# Patient Record
Sex: MALE | Race: ASIAN | NOT HISPANIC OR LATINO
[De-identification: names, ages, dates, MRNs, and addresses within clinical notes are randomized per-mention and may not be internally consistent; named-entity substitution may affect disease eponyms.]

---

## 2017-01-30 ENCOUNTER — APPOINTMENT (OUTPATIENT)
Dept: MRI IMAGING | Facility: IMAGING CENTER | Age: 77
End: 2017-01-30

## 2017-04-14 ENCOUNTER — OUTPATIENT (OUTPATIENT)
Dept: OUTPATIENT SERVICES | Facility: HOSPITAL | Age: 77
LOS: 1 days | End: 2017-04-14
Payer: MEDICAID

## 2017-04-14 ENCOUNTER — APPOINTMENT (OUTPATIENT)
Dept: RADIOLOGY | Facility: IMAGING CENTER | Age: 77
End: 2017-04-14

## 2017-04-14 DIAGNOSIS — Z00.8 ENCOUNTER FOR OTHER GENERAL EXAMINATION: ICD-10-CM

## 2017-04-14 DIAGNOSIS — Z09 ENCOUNTER FOR FOLLOW-UP EXAMINATION AFTER COMPLETED TREATMENT FOR CONDITIONS OTHER THAN MALIGNANT NEOPLASM: Chronic | ICD-10-CM

## 2017-04-14 DIAGNOSIS — Z98.89 OTHER SPECIFIED POSTPROCEDURAL STATES: Chronic | ICD-10-CM

## 2017-04-14 PROCEDURE — 71046 X-RAY EXAM CHEST 2 VIEWS: CPT

## 2017-08-24 ENCOUNTER — OUTPATIENT (OUTPATIENT)
Dept: OUTPATIENT SERVICES | Facility: HOSPITAL | Age: 77
LOS: 1 days | End: 2017-08-24
Payer: MEDICAID

## 2017-08-24 ENCOUNTER — APPOINTMENT (OUTPATIENT)
Dept: CT IMAGING | Facility: IMAGING CENTER | Age: 77
End: 2017-08-24
Payer: MEDICAID

## 2017-08-24 DIAGNOSIS — Z98.89 OTHER SPECIFIED POSTPROCEDURAL STATES: Chronic | ICD-10-CM

## 2017-08-24 DIAGNOSIS — Z09 ENCOUNTER FOR FOLLOW-UP EXAMINATION AFTER COMPLETED TREATMENT FOR CONDITIONS OTHER THAN MALIGNANT NEOPLASM: Chronic | ICD-10-CM

## 2017-08-24 DIAGNOSIS — Z00.8 ENCOUNTER FOR OTHER GENERAL EXAMINATION: ICD-10-CM

## 2017-08-24 PROCEDURE — 71250 CT THORAX DX C-: CPT | Mod: 26

## 2017-08-24 PROCEDURE — 71250 CT THORAX DX C-: CPT

## 2017-09-03 ENCOUNTER — EMERGENCY (EMERGENCY)
Facility: HOSPITAL | Age: 77
LOS: 1 days | Discharge: HOME | End: 2017-09-03
Attending: EMERGENCY MEDICINE | Admitting: EMERGENCY MEDICINE
Payer: MEDICAID

## 2017-09-03 VITALS
OXYGEN SATURATION: 100 % | HEART RATE: 95 BPM | TEMPERATURE: 99 F | DIASTOLIC BLOOD PRESSURE: 83 MMHG | RESPIRATION RATE: 16 BRPM | SYSTOLIC BLOOD PRESSURE: 142 MMHG

## 2017-09-03 DIAGNOSIS — Z09 ENCOUNTER FOR FOLLOW-UP EXAMINATION AFTER COMPLETED TREATMENT FOR CONDITIONS OTHER THAN MALIGNANT NEOPLASM: Chronic | ICD-10-CM

## 2017-09-03 DIAGNOSIS — Z98.89 OTHER SPECIFIED POSTPROCEDURAL STATES: Chronic | ICD-10-CM

## 2017-09-03 PROCEDURE — 99284 EMERGENCY DEPT VISIT MOD MDM: CPT

## 2017-09-03 RX ORDER — IBUPROFEN 200 MG
600 TABLET ORAL ONCE
Qty: 0 | Refills: 0 | Status: COMPLETED | OUTPATIENT
Start: 2017-09-03 | End: 2017-09-03

## 2017-09-03 RX ADMIN — Medication 1 TABLET(S): at 18:06

## 2017-09-03 RX ADMIN — Medication 600 MILLIGRAM(S): at 18:06

## 2017-09-03 NOTE — ED PROVIDER NOTE - MEDICAL DECISION MAKING DETAILS
75 y/o M w/ DM p/w complaint of R lower molar pain w/ some discoloration on exam concerning for potential dev abscess, consult dental for further evaluation, give Motrin for pain control.

## 2017-09-03 NOTE — ED PROVIDER NOTE - TOOTH FINDINGS
no trismus, minimal TTP, R mandibular molar w/ discoloration at base of tooth, no clear fluctuance, no discharge, no mandibular TTP

## 2017-09-03 NOTE — ED PROVIDER NOTE - OBJECTIVE STATEMENT
77 y/o M w/ PMHx of DM(On metformin), and cardiac issues, presents to ED w/ improving R lower tooth pain x3days. Reports swelling at the R lower jaw area and endorses difficulty sleeping secondary to pain. States he has "dentures" surgically placed in area of tooth pain. Endorses subjective fevers and chills. Has no issues opening and closing his jaw. Denies cough, congestion, nausea, vomiting, diarrhea, HA, other complaints. Regularly takes Lisinopril and Atorvastatin. NKDA.

## 2017-09-03 NOTE — PROGRESS NOTE ADULT - SUBJECTIVE AND OBJECTIVE BOX
Patient is a 76y old  Male who presents with a chief complaint of LR pain and swelling.    HPI: Pt. reports pain and swelling started 2 days prior. Pt. reports that the pain affected his hearing in the right ear.      PAST MEDICAL & SURGICAL HISTORY:  DM (diabetes mellitus)  No significant past surgical history      MEDICATIONS  (STANDING): None    MEDICATIONS  (PRN): None    Allergies    No Known Allergies    Intolerances    *SOCIAL HISTORY: Pt. presented to ED with his youngest son.    Vital Signs Last 24 Hrs  T(C): 37.3 (03 Sep 2017 17:05), Max: 37.3 (03 Sep 2017 17:05)  T(F): 99.2 (03 Sep 2017 17:05), Max: 99.2 (03 Sep 2017 17:05)  HR: 95 (03 Sep 2017 17:05) (95 - 95)  BP: 142/83 (03 Sep 2017 17:05) (142/83 - 142/83)  BP(mean): --  RR: 16 (03 Sep 2017 17:05) (16 - 16)  SpO2: 100% (03 Sep 2017 17:05) (100% - 100%)    EOE:  TMJ (  -) clicks                    ( -  ) pops                    ( -  ) crepitus             Mandible FROM             ( - ) trismus             ( - ) LAD             ( - ) swelling             ( - ) asymmetry             ( - ) palpation             ( - ) SOB             ( - ) dysphagia    IOE:  permanent multiple carious teeth and multiple missing teeth           hard/soft palate:  No pathology noted           tongue/FOM No pathology noted           labial/buccal mucosa No pathology noted           Tooth #29  ( - ) percussion           ( - ) palpation           ( - ) swelling     *DENTAL RADIOGRAPHS: panorex and PA around #29. Periapical infection around tooth #29.    ASSESSMENT: 77 yo male has periapical infection noted around apex of tooth #29. No gross acute clinical infection noted. Gingiva desquamation noted on maxillary and mandibular anterior region.     PROCEDURE:  EOE/ IOE. Radiographs    RECOMMENDATIONS:  1) Dental F/U with outpatient dentist for comprehensive dental care.   2) F/U with PCP for annual checkup.  3) If any difficulty swallowing/breathing, fever occur, contact [facility] dental.    Porfirio Ley DDS [id number]  Jeffrey Meza DMD

## 2017-09-03 NOTE — ED PROVIDER NOTE - PLAN OF CARE
1. Take Tylenol as directed for pain.   2. Follow-up with your dentist in 3-5 days, or see sheet you were given to schedule appointment  3. Return immediately for any worsening or concerning symptoms

## 2017-09-03 NOTE — ED PROVIDER NOTE - CARE PLAN
Principal Discharge DX:	Tooth pain  Instructions for follow-up, activity and diet:	1. Take Tylenol as directed for pain.   2. Follow-up with your dentist in 3-5 days, or see sheet you were given to schedule appointment  3. Return immediately for any worsening or concerning symptoms

## 2017-09-03 NOTE — ED PROVIDER NOTE - PROGRESS NOTE DETAILS
pt seen and cleared for d/c by dental. no signs of acute infection. will have f/u with dentist. discussed proper follow-up and indications to return to ED. - Jake Stewart MD

## 2017-12-24 ENCOUNTER — INPATIENT (INPATIENT)
Facility: HOSPITAL | Age: 77
LOS: 3 days | Discharge: HOME | DRG: 872 | End: 2017-12-28
Attending: INTERNAL MEDICINE | Admitting: INTERNAL MEDICINE
Payer: MEDICAID

## 2017-12-24 VITALS
RESPIRATION RATE: 20 BRPM | SYSTOLIC BLOOD PRESSURE: 122 MMHG | WEIGHT: 169.98 LBS | OXYGEN SATURATION: 98 % | TEMPERATURE: 100 F | HEIGHT: 68 IN | DIASTOLIC BLOOD PRESSURE: 56 MMHG | HEART RATE: 111 BPM

## 2017-12-24 DIAGNOSIS — Z98.89 OTHER SPECIFIED POSTPROCEDURAL STATES: Chronic | ICD-10-CM

## 2017-12-24 DIAGNOSIS — Z09 ENCOUNTER FOR FOLLOW-UP EXAMINATION AFTER COMPLETED TREATMENT FOR CONDITIONS OTHER THAN MALIGNANT NEOPLASM: Chronic | ICD-10-CM

## 2017-12-24 LAB
ALBUMIN SERPL ELPH-MCNC: 3.1 G/DL — LOW (ref 3.5–5)
ALP SERPL-CCNC: 50 U/L — SIGNIFICANT CHANGE UP (ref 40–120)
ALT FLD-CCNC: 17 U/L DA — SIGNIFICANT CHANGE UP (ref 10–60)
AMYLASE P1 CFR SERPL: 66 U/L — SIGNIFICANT CHANGE UP (ref 25–115)
ANION GAP SERPL CALC-SCNC: 10 MMOL/L — SIGNIFICANT CHANGE UP (ref 5–17)
APPEARANCE UR: CLEAR — SIGNIFICANT CHANGE UP
AST SERPL-CCNC: 19 U/L — SIGNIFICANT CHANGE UP (ref 10–40)
BACTERIA # UR AUTO: ABNORMAL /HPF
BILIRUB SERPL-MCNC: 0.6 MG/DL — SIGNIFICANT CHANGE UP (ref 0.2–1.2)
BILIRUB UR-MCNC: ABNORMAL
BUN SERPL-MCNC: 30 MG/DL — HIGH (ref 7–18)
CALCIUM SERPL-MCNC: 8.4 MG/DL — SIGNIFICANT CHANGE UP (ref 8.4–10.5)
CHLORIDE SERPL-SCNC: 97 MMOL/L — SIGNIFICANT CHANGE UP (ref 96–108)
CO2 SERPL-SCNC: 26 MMOL/L — SIGNIFICANT CHANGE UP (ref 22–31)
COLOR SPEC: YELLOW — SIGNIFICANT CHANGE UP
COMMENT - URINE: SIGNIFICANT CHANGE UP
CREAT SERPL-MCNC: 1.46 MG/DL — HIGH (ref 0.5–1.3)
DIFF PNL FLD: ABNORMAL
EPI CELLS # UR: SIGNIFICANT CHANGE UP /HPF
GLUCOSE SERPL-MCNC: 197 MG/DL — HIGH (ref 70–99)
GLUCOSE UR QL: NEGATIVE — SIGNIFICANT CHANGE UP
GRAN CASTS # UR COMP ASSIST: ABNORMAL /LPF
HCT VFR BLD CALC: 40.2 % — SIGNIFICANT CHANGE UP (ref 39–50)
HGB BLD-MCNC: 13.7 G/DL — SIGNIFICANT CHANGE UP (ref 13–17)
HYALINE CASTS # UR AUTO: ABNORMAL /LPF
KETONES UR-MCNC: ABNORMAL
LACTATE SERPL-SCNC: 3 MMOL/L — HIGH (ref 0.7–2)
LEUKOCYTE ESTERASE UR-ACNC: ABNORMAL
LIDOCAIN IGE QN: 159 U/L — SIGNIFICANT CHANGE UP (ref 73–393)
MANUAL DIF COMMENT BLD-IMP: SIGNIFICANT CHANGE UP
MCHC RBC-ENTMCNC: 33.6 PG — SIGNIFICANT CHANGE UP (ref 27–34)
MCHC RBC-ENTMCNC: 34 GM/DL — SIGNIFICANT CHANGE UP (ref 32–36)
MCV RBC AUTO: 98.7 FL — SIGNIFICANT CHANGE UP (ref 80–100)
MONOCYTES NFR BLD AUTO: 8 % — SIGNIFICANT CHANGE UP (ref 2–14)
NEUTROPHILS NFR BLD AUTO: 91 % — HIGH (ref 43–77)
NITRITE UR-MCNC: NEGATIVE — SIGNIFICANT CHANGE UP
PH UR: 5 — SIGNIFICANT CHANGE UP (ref 5–8)
PLAT MORPH BLD: NORMAL — SIGNIFICANT CHANGE UP
PLATELET # BLD AUTO: 131 K/UL — LOW (ref 150–400)
POTASSIUM SERPL-MCNC: 3.8 MMOL/L — SIGNIFICANT CHANGE UP (ref 3.5–5.3)
POTASSIUM SERPL-SCNC: 3.8 MMOL/L — SIGNIFICANT CHANGE UP (ref 3.5–5.3)
PROT SERPL-MCNC: 7.3 G/DL — SIGNIFICANT CHANGE UP (ref 6–8.3)
PROT UR-MCNC: 100
RBC # BLD: 4.07 M/UL — LOW (ref 4.2–5.8)
RBC # FLD: 11.4 % — SIGNIFICANT CHANGE UP (ref 10.3–14.5)
RBC BLD AUTO: NORMAL — SIGNIFICANT CHANGE UP
RBC CASTS # UR COMP ASSIST: SIGNIFICANT CHANGE UP /HPF (ref 0–2)
SODIUM SERPL-SCNC: 133 MMOL/L — LOW (ref 135–145)
SP GR SPEC: 1.02 — SIGNIFICANT CHANGE UP (ref 1.01–1.02)
UROBILINOGEN FLD QL: 1
VARIANT LYMPHS # BLD: 1 % — SIGNIFICANT CHANGE UP (ref 0–6)
WBC # BLD: 20.1 K/UL — HIGH (ref 3.8–10.5)
WBC # FLD AUTO: 20.1 K/UL — HIGH (ref 3.8–10.5)
WBC UR QL: SIGNIFICANT CHANGE UP /HPF (ref 0–5)

## 2017-12-24 PROCEDURE — 93010 ELECTROCARDIOGRAM REPORT: CPT

## 2017-12-24 RX ORDER — SODIUM CHLORIDE 9 MG/ML
3 INJECTION INTRAMUSCULAR; INTRAVENOUS; SUBCUTANEOUS ONCE
Qty: 0 | Refills: 0 | Status: DISCONTINUED | OUTPATIENT
Start: 2017-12-24 | End: 2017-12-24

## 2017-12-24 RX ORDER — SODIUM CHLORIDE 9 MG/ML
1000 INJECTION INTRAMUSCULAR; INTRAVENOUS; SUBCUTANEOUS ONCE
Qty: 0 | Refills: 0 | Status: COMPLETED | OUTPATIENT
Start: 2017-12-24 | End: 2017-12-24

## 2017-12-24 RX ORDER — SODIUM CHLORIDE 9 MG/ML
3 INJECTION INTRAMUSCULAR; INTRAVENOUS; SUBCUTANEOUS ONCE
Qty: 0 | Refills: 0 | Status: COMPLETED | OUTPATIENT
Start: 2017-12-24 | End: 2017-12-24

## 2017-12-24 RX ORDER — ONDANSETRON 8 MG/1
4 TABLET, FILM COATED ORAL ONCE
Qty: 0 | Refills: 0 | Status: COMPLETED | OUTPATIENT
Start: 2017-12-24 | End: 2017-12-24

## 2017-12-24 RX ORDER — SODIUM CHLORIDE 9 MG/ML
1000 INJECTION INTRAMUSCULAR; INTRAVENOUS; SUBCUTANEOUS
Qty: 0 | Refills: 0 | Status: DISCONTINUED | OUTPATIENT
Start: 2017-12-24 | End: 2017-12-25

## 2017-12-24 RX ORDER — ACETAMINOPHEN 500 MG
650 TABLET ORAL ONCE
Qty: 0 | Refills: 0 | Status: COMPLETED | OUTPATIENT
Start: 2017-12-24 | End: 2017-12-24

## 2017-12-24 RX ADMIN — SODIUM CHLORIDE 125 MILLILITER(S): 9 INJECTION INTRAMUSCULAR; INTRAVENOUS; SUBCUTANEOUS at 21:59

## 2017-12-24 RX ADMIN — SODIUM CHLORIDE 1000 MILLILITER(S): 9 INJECTION INTRAMUSCULAR; INTRAVENOUS; SUBCUTANEOUS at 22:47

## 2017-12-24 RX ADMIN — ONDANSETRON 4 MILLIGRAM(S): 8 TABLET, FILM COATED ORAL at 22:00

## 2017-12-24 RX ADMIN — SODIUM CHLORIDE 3 MILLILITER(S): 9 INJECTION INTRAMUSCULAR; INTRAVENOUS; SUBCUTANEOUS at 22:01

## 2017-12-24 NOTE — ED PROVIDER NOTE - PROGRESS NOTE DETAILS
labs show wbc 20K, cmp shows Na 133, Cr 1.46, UA shows trace LE and many bacteria  CT shows evidence of colitis, given cipro/flagyl  patient stable for admission.

## 2017-12-24 NOTE — ED PROVIDER NOTE - PMH
BPH (benign prostatic hyperplasia)    Diabetes    DM (diabetes mellitus)    HLD (hyperlipidemia)    HTN (hypertension)

## 2017-12-24 NOTE — ED PROVIDER NOTE - PSH
No significant past surgical history    S/P abdominal surgery, follow-up exam  sbo  S/P hernia surgery  r.inguinal

## 2017-12-24 NOTE — ED PROVIDER NOTE - CARE PLAN
Principal Discharge DX:	Colitis, acute  Secondary Diagnosis:	UTI (urinary tract infection)  Secondary Diagnosis:	Dehydration

## 2017-12-24 NOTE — ED PROVIDER NOTE - MEDICAL DECISION MAKING DETAILS
77 y/o M pt presents with vomiting and diarrhea. In ED, pt tachycardic. Will obtain labs, UA, CT abd, give 1L normal saline and reassess.

## 2017-12-24 NOTE — ED PROVIDER NOTE - OBJECTIVE STATEMENT
77 y/o M/F pt with a significant PMHx of DM (on Metformin), HTN, HLD, BPH and a significant PSHx of R inguinal hernia repair, abdominal surgery 2/2 SBO presents to the ED c/o vomiting and diarrhea x2 days. Pt reports onset yesterday, but states he felt very weak today, hence his family sent him to the ED for evaluation. Pt denies fever, chills, chest pain, abd pain, recent abx use, sick contacts, recent travel or any other complaints. NKDA.

## 2017-12-25 DIAGNOSIS — E11.9 TYPE 2 DIABETES MELLITUS WITHOUT COMPLICATIONS: ICD-10-CM

## 2017-12-25 DIAGNOSIS — K52.9 NONINFECTIVE GASTROENTERITIS AND COLITIS, UNSPECIFIED: ICD-10-CM

## 2017-12-25 DIAGNOSIS — E78.5 HYPERLIPIDEMIA, UNSPECIFIED: ICD-10-CM

## 2017-12-25 DIAGNOSIS — N40.0 BENIGN PROSTATIC HYPERPLASIA WITHOUT LOWER URINARY TRACT SYMPTOMS: ICD-10-CM

## 2017-12-25 DIAGNOSIS — I10 ESSENTIAL (PRIMARY) HYPERTENSION: ICD-10-CM

## 2017-12-25 DIAGNOSIS — E87.1 HYPO-OSMOLALITY AND HYPONATREMIA: ICD-10-CM

## 2017-12-25 DIAGNOSIS — Z29.9 ENCOUNTER FOR PROPHYLACTIC MEASURES, UNSPECIFIED: ICD-10-CM

## 2017-12-25 DIAGNOSIS — E87.6 HYPOKALEMIA: ICD-10-CM

## 2017-12-25 DIAGNOSIS — R80.9 PROTEINURIA, UNSPECIFIED: ICD-10-CM

## 2017-12-25 DIAGNOSIS — N17.9 ACUTE KIDNEY FAILURE, UNSPECIFIED: ICD-10-CM

## 2017-12-25 DIAGNOSIS — E83.39 OTHER DISORDERS OF PHOSPHORUS METABOLISM: ICD-10-CM

## 2017-12-25 LAB
ANION GAP SERPL CALC-SCNC: 10 MMOL/L — SIGNIFICANT CHANGE UP (ref 5–17)
BASOPHILS # BLD AUTO: 0 K/UL — SIGNIFICANT CHANGE UP (ref 0–0.2)
BASOPHILS NFR BLD AUTO: 0.3 % — SIGNIFICANT CHANGE UP (ref 0–2)
BUN SERPL-MCNC: 24 MG/DL — HIGH (ref 7–18)
C DIFF BY PCR RESULT: SIGNIFICANT CHANGE UP
C DIFF TOX GENS STL QL NAA+PROBE: SIGNIFICANT CHANGE UP
CALCIUM SERPL-MCNC: 7.6 MG/DL — LOW (ref 8.4–10.5)
CHLORIDE SERPL-SCNC: 103 MMOL/L — SIGNIFICANT CHANGE UP (ref 96–108)
CO2 SERPL-SCNC: 23 MMOL/L — SIGNIFICANT CHANGE UP (ref 22–31)
CREAT SERPL-MCNC: 1.14 MG/DL — SIGNIFICANT CHANGE UP (ref 0.5–1.3)
EOSINOPHIL # BLD AUTO: 0 K/UL — SIGNIFICANT CHANGE UP (ref 0–0.5)
EOSINOPHIL NFR BLD AUTO: 0 % — SIGNIFICANT CHANGE UP (ref 0–6)
GLUCOSE BLDC GLUCOMTR-MCNC: 102 MG/DL — HIGH (ref 70–99)
GLUCOSE SERPL-MCNC: 125 MG/DL — HIGH (ref 70–99)
HCT VFR BLD CALC: 37.9 % — LOW (ref 39–50)
HGB BLD-MCNC: 12.8 G/DL — LOW (ref 13–17)
LACTATE SERPL-SCNC: 1.1 MMOL/L — SIGNIFICANT CHANGE UP (ref 0.7–2)
LYMPHOCYTES # BLD AUTO: 1 K/UL — SIGNIFICANT CHANGE UP (ref 1–3.3)
LYMPHOCYTES # BLD AUTO: 5.8 % — LOW (ref 13–44)
MAGNESIUM SERPL-MCNC: 1.7 MG/DL — SIGNIFICANT CHANGE UP (ref 1.6–2.6)
MCHC RBC-ENTMCNC: 32.4 PG — SIGNIFICANT CHANGE UP (ref 27–34)
MCHC RBC-ENTMCNC: 33.6 GM/DL — SIGNIFICANT CHANGE UP (ref 32–36)
MCV RBC AUTO: 96.3 FL — SIGNIFICANT CHANGE UP (ref 80–100)
MONOCYTES # BLD AUTO: 1.7 K/UL — HIGH (ref 0–0.9)
MONOCYTES NFR BLD AUTO: 9.8 % — SIGNIFICANT CHANGE UP (ref 2–14)
NEUTROPHILS # BLD AUTO: 14.8 K/UL — HIGH (ref 1.8–7.4)
NEUTROPHILS NFR BLD AUTO: 84.2 % — HIGH (ref 43–77)
PHOSPHATE SERPL-MCNC: 2 MG/DL — LOW (ref 2.5–4.5)
PLATELET # BLD AUTO: 111 K/UL — LOW (ref 150–400)
POTASSIUM SERPL-MCNC: 3.4 MMOL/L — LOW (ref 3.5–5.3)
POTASSIUM SERPL-SCNC: 3.4 MMOL/L — LOW (ref 3.5–5.3)
RBC # BLD: 3.94 M/UL — LOW (ref 4.2–5.8)
RBC # FLD: 11.5 % — SIGNIFICANT CHANGE UP (ref 10.3–14.5)
SODIUM SERPL-SCNC: 136 MMOL/L — SIGNIFICANT CHANGE UP (ref 135–145)
WBC # BLD: 17.6 K/UL — HIGH (ref 3.8–10.5)
WBC # FLD AUTO: 17.6 K/UL — HIGH (ref 3.8–10.5)

## 2017-12-25 PROCEDURE — 99285 EMERGENCY DEPT VISIT HI MDM: CPT

## 2017-12-25 PROCEDURE — 74177 CT ABD & PELVIS W/CONTRAST: CPT | Mod: 26

## 2017-12-25 RX ORDER — SODIUM CHLORIDE 9 MG/ML
1000 INJECTION INTRAMUSCULAR; INTRAVENOUS; SUBCUTANEOUS
Qty: 0 | Refills: 0 | Status: DISCONTINUED | OUTPATIENT
Start: 2017-12-25 | End: 2017-12-25

## 2017-12-25 RX ORDER — LISINOPRIL 2.5 MG/1
0 TABLET ORAL
Qty: 0 | Refills: 0 | COMMUNITY

## 2017-12-25 RX ORDER — CIPROFLOXACIN LACTATE 400MG/40ML
400 VIAL (ML) INTRAVENOUS ONCE
Qty: 0 | Refills: 0 | Status: COMPLETED | OUTPATIENT
Start: 2017-12-25 | End: 2017-12-25

## 2017-12-25 RX ORDER — ACETAMINOPHEN 500 MG
650 TABLET ORAL EVERY 6 HOURS
Qty: 0 | Refills: 0 | Status: DISCONTINUED | OUTPATIENT
Start: 2017-12-25 | End: 2017-12-28

## 2017-12-25 RX ORDER — SODIUM,POTASSIUM PHOSPHATES 278-250MG
2 POWDER IN PACKET (EA) ORAL EVERY 8 HOURS
Qty: 0 | Refills: 0 | Status: COMPLETED | OUTPATIENT
Start: 2017-12-25 | End: 2017-12-26

## 2017-12-25 RX ORDER — METRONIDAZOLE 500 MG
500 TABLET ORAL EVERY 8 HOURS
Qty: 0 | Refills: 0 | Status: DISCONTINUED | OUTPATIENT
Start: 2017-12-25 | End: 2017-12-28

## 2017-12-25 RX ORDER — METRONIDAZOLE 500 MG
500 TABLET ORAL ONCE
Qty: 0 | Refills: 0 | Status: COMPLETED | OUTPATIENT
Start: 2017-12-25 | End: 2017-12-25

## 2017-12-25 RX ORDER — POTASSIUM CHLORIDE 20 MEQ
40 PACKET (EA) ORAL ONCE
Qty: 0 | Refills: 0 | Status: DISCONTINUED | OUTPATIENT
Start: 2017-12-25 | End: 2017-12-25

## 2017-12-25 RX ORDER — POTASSIUM CHLORIDE 20 MEQ
20 PACKET (EA) ORAL
Qty: 0 | Refills: 0 | Status: DISCONTINUED | OUTPATIENT
Start: 2017-12-25 | End: 2017-12-25

## 2017-12-25 RX ORDER — CIPROFLOXACIN LACTATE 400MG/40ML
400 VIAL (ML) INTRAVENOUS EVERY 12 HOURS
Qty: 0 | Refills: 0 | Status: DISCONTINUED | OUTPATIENT
Start: 2017-12-25 | End: 2017-12-27

## 2017-12-25 RX ORDER — FINASTERIDE 5 MG/1
5 TABLET, FILM COATED ORAL DAILY
Qty: 0 | Refills: 0 | Status: DISCONTINUED | OUTPATIENT
Start: 2017-12-25 | End: 2017-12-28

## 2017-12-25 RX ORDER — SODIUM CHLORIDE 9 MG/ML
1000 INJECTION INTRAMUSCULAR; INTRAVENOUS; SUBCUTANEOUS
Qty: 0 | Refills: 0 | Status: DISCONTINUED | OUTPATIENT
Start: 2017-12-25 | End: 2017-12-27

## 2017-12-25 RX ORDER — ENOXAPARIN SODIUM 100 MG/ML
40 INJECTION SUBCUTANEOUS DAILY
Qty: 0 | Refills: 0 | Status: DISCONTINUED | OUTPATIENT
Start: 2017-12-25 | End: 2017-12-28

## 2017-12-25 RX ORDER — ATORVASTATIN CALCIUM 80 MG/1
10 TABLET, FILM COATED ORAL AT BEDTIME
Qty: 0 | Refills: 0 | Status: DISCONTINUED | OUTPATIENT
Start: 2017-12-25 | End: 2017-12-28

## 2017-12-25 RX ADMIN — Medication 500 MILLIGRAM(S): at 21:48

## 2017-12-25 RX ADMIN — Medication 650 MILLIGRAM(S): at 00:31

## 2017-12-25 RX ADMIN — ATORVASTATIN CALCIUM 10 MILLIGRAM(S): 80 TABLET, FILM COATED ORAL at 21:48

## 2017-12-25 RX ADMIN — Medication 200 MILLIGRAM(S): at 18:09

## 2017-12-25 RX ADMIN — Medication 2 TABLET(S): at 18:28

## 2017-12-25 RX ADMIN — Medication 500 MILLIGRAM(S): at 13:08

## 2017-12-25 RX ADMIN — ENOXAPARIN SODIUM 40 MILLIGRAM(S): 100 INJECTION SUBCUTANEOUS at 13:08

## 2017-12-25 RX ADMIN — FINASTERIDE 5 MILLIGRAM(S): 5 TABLET, FILM COATED ORAL at 13:08

## 2017-12-25 RX ADMIN — SODIUM CHLORIDE 3000 MILLILITER(S): 9 INJECTION INTRAMUSCULAR; INTRAVENOUS; SUBCUTANEOUS at 00:30

## 2017-12-25 RX ADMIN — SODIUM CHLORIDE 75 MILLILITER(S): 9 INJECTION INTRAMUSCULAR; INTRAVENOUS; SUBCUTANEOUS at 18:28

## 2017-12-25 RX ADMIN — Medication 100 MILLIGRAM(S): at 04:48

## 2017-12-25 RX ADMIN — SODIUM CHLORIDE 125 MILLILITER(S): 9 INJECTION INTRAMUSCULAR; INTRAVENOUS; SUBCUTANEOUS at 05:54

## 2017-12-25 RX ADMIN — Medication 200 MILLIGRAM(S): at 05:53

## 2017-12-25 RX ADMIN — Medication 650 MILLIGRAM(S): at 09:53

## 2017-12-25 NOTE — H&P ADULT - NSHPREVIEWOFSYSTEMS_GEN_ALL_CORE
REVIEW OF SYSTEMS:  CONSTITUTIONAL:  fever, No weight loss, or fatigue  EYES: No eye pain, visual disturbances, or discharge  ENMT:  No difficulty hearing, tinnitus, vertigo; No sinus or throat pain  NECK: No pain or stiffness  RESPIRATORY: dry cough, No wheezing, chills or hemoptysis; No shortness of breath  CARDIOVASCULAR: No chest pain, palpitations, dizziness, or leg swelling  GASTROINTESTINAL: Nausea, vomiting, diarrhea. No abdominal or epigastric pain.   GENITOURINARY: No dysuria, frequency, hematuria, or incontinence  NEUROLOGICAL: No headaches, memory loss, loss of strength, numbness, or tremors  MUSCULOSKELETAL: No joint pain or swelling; No muscle, back, or extremity pain

## 2017-12-25 NOTE — H&P ADULT - NSHPPHYSICALEXAM_GEN_ALL_CORE
Vital Signs Last 24 Hrs  T(C): 37.7 (25 Dec 2017 11:10), Max: 39.1 (25 Dec 2017 09:59)  T(F): 99.9 (25 Dec 2017 11:10), Max: 102.3 (25 Dec 2017 09:59)  HR: 93 (25 Dec 2017 09:59) (70 - 111)  BP: 122/55 (25 Dec 2017 09:59) (100/41 - 163/89)  BP(mean): --  RR: 16 (25 Dec 2017 09:59) (16 - 20)  SpO2: 97% (25 Dec 2017 09:59) (97% - 100%)    GENERAL: NAD  HEAD:  Atraumatic, Normocephalic  EYES: EOMI, PERRLA, conjunctiva and sclera clear  ENMT: Dry  mucous membranes  NECK: Supple  NERVOUS SYSTEM:  Alert & Oriented X3  CHEST/LUNG: Clear to auscultation bilaterally; No rales, rhonchi, wheezing, or rubs  HEART: Regular rate and rhythm; No murmurs, rubs, or gallops  ABDOMEN: Soft, Nontender, Nondistended; Bowel sounds present  EXTREMITIES:  2+ Peripheral Pulses, No clubbing, cyanosis, or edema

## 2017-12-25 NOTE — H&P ADULT - PROBLEM SELECTOR PLAN 1
Febrile, leukocytosis, and elevated lactate on admission  CT A/P- Colitis of the likely infectious versus inflammatory etiology.  Lactate wnl s/p 2L bolus  Patient states having 2 episodes of watery diarrhea in ER  - NPO, until diarrhea improves, can start liquid diet afterwards and advance as tolerated  - c/w NS@75ml/hr  - c/w Flagyl 500 q8h, and Ciprofloxacin 400 q12h  - f/u blood cx and urine cx  - f/u C diff  - f/u Stool ova and parasites Febrile, leukocytosis, and elevated lactate on admission  CT A/P- Colitis of the likely infectious versus inflammatory etiology.  Lactate wnl s/p 2L bolus  Patient states having 2 episodes of watery diarrhea in ER  - NPO, until diarrhea improves, can start liquid diet afterwards and advance as tolerated  - c/w NS@75ml/hr  - c/w Flagyl 500 q8h, and Ciprofloxacin 400 q12h  - f/u blood cx and urine cx  - f/u C diff  - f/u Stool ova and parasites  - f/u RVP

## 2017-12-25 NOTE — H&P ADULT - NSHPLABSRESULTS_GEN_ALL_CORE
13.7   20.1  )-----------( 131      ( 24 Dec 2017 22:05 )             40.2     12-24    133<L>  |  97  |  30<H>  ----------------------------<  197<H>  3.8   |  26  |  1.46<H>    Ca    8.4      24 Dec 2017 22:05    TPro  7.3  /  Alb  3.1<L>  /  TBili  0.6  /  DBili  x   /  AST  19  /  ALT  17  /  AlkPhos  50  12-24    < from: CT Abdomen and Pelvis w/ Oral Cont and w/ IV Cont (12.25.17 @ 03:04) >    EXAM:  CT ABDOMEN AND PELVIS OC IC                            PROCEDURE DATE:  12/25/2017          INTERPRETATION:  CLINICAL INFORMATION: Abdominal pain and diarrhea    COMPARISON: None    PROCEDURE:   CT of the Abdomen and Pelvis was performed with intravenous contrast.   Intravenous contrast: 90 ml Omnipaque 350. 10 ml discarded.  Oral contrast: positive contrast was administered.  Sagittal and coronal reformats were performed.    FINDINGS:    LOWER CHEST: Within normal limits.    LIVER: Within normal limits.  BILE DUCTS: Normal caliber.  GALLBLADDER: Within normal limits.  SPLEEN: Within normal limits.  PANCREAS: Within normal limits.  ADRENALS: Within normal limits.  KIDNEYS/URETERS: Within normal limits.    BLADDER: Within normal limits.  REPRODUCTIVE ORGANS: Prostatomegaly     BOWEL: No bowel obstruction. Mild colonic wall thickening from the mid   transverse colon to rectum. Normal appendix.  PERITONEUM: No ascites.  VESSELS:  Within normal limits.  RETROPERITONEUM: No lymphadenopathy.    ABDOMINAL WALL: Small fat-containing left inguinal hernia.  BONES: Degenerative changes of the spine.    IMPRESSION: Colitis of the likely infectious versus inflammatory etiology.    < end of copied text >

## 2017-12-25 NOTE — H&P ADULT - PROBLEM SELECTOR PLAN 2
on Lisinopril 2.5 qday at home  - BP within goal for now  - Will hold off to it  - restart as needed

## 2017-12-25 NOTE — CONSULT NOTE ADULT - PROBLEM SELECTOR RECOMMENDATION 9
r/o secondary to hypovolemia ,secondary to gi losses, now improving  -we will continue current iv hydration  -Keep patient euvolemic and renal diet  -Avoid Nephrotoxic Meds/ Agents such as (NSAIDs, IV contrast, Aminoglycosides such as gentamicin, -Gadolinium contrast, Phosphate containing enemas, etc..)  -Adjust Medications according to eGFR  -f/u bmp daily

## 2017-12-25 NOTE — CONSULT NOTE ADULT - SUBJECTIVE AND OBJECTIVE BOX
Patient is a 77y Male whom presented to the hospital with diarrhea/vomiting , notyed to have ras,hypokalemia and hyponatremia.    Medical HPI:  78 y/o M  with a significant PMHx of DM (on Metformin), HTN (lisinopril), HLD (atorvastatin) , BPH (Finasteride) and a significant PSHx of R inguinal hernia repair, and abdominal surgery 2/ SBO presents to the ED c/o vomiting and diarrhea x2 days. Patient states on  he had 1 episode of soft BM, on  he had multiple episodes of watery diarrhea. Denies abdominal pain. He came to the ER due to weakness and multiple episodes of diarrhea. Also states having about 2 episodes of vomiting in 2 days, and subjective fever and chills. Denies any recent use of antbiotics. After confirming with pharmacy, patient was on amoxicillin 500 TID for 7 days starting 2017 (indication unknown). Denies eating out. Denies anyone in family with similar symptoms. No other complaints at this time.    SH: Retired, denies smoking, alcohol or illicit drug use. Lives with wife, walks independently  FH: DM, HTN  Allergies: NKDA  Pharmacy: [facility] (25 Dec 2017 10:57)  Renal HPI:   pts current chart reviewed and case discussed with resident  pts baseline serum cr is unknown  pt denies pmh of renal ds, proteinuria, renal stones, recurrent uti or nephrotic edema or nephrotic syndrome  pt denies Nsaids use or otc other nephrotoxic supplements  Pt currently denies cp,sob, skin rash or leg edema    PAST MEDICAL & SURGICAL HISTORY:  BPH (benign prostatic hyperplasia)  HLD (hyperlipidemia)  HTN (hypertension)  DM (diabetes mellitus)  Diabetes  No significant past surgical history  S/P abdominal surgery, follow-up exam: sbo  S/P hernia surgery: r.inguinal      Home Medications: Reviewed    MEDICATIONS  (STANDING):  atorvastatin 10 milliGRAM(s) Oral at bedtime  ciprofloxacin   IVPB 400 milliGRAM(s) IV Intermittent every 12 hours  enoxaparin Injectable 40 milliGRAM(s) SubCutaneous daily  finasteride 5 milliGRAM(s) Oral daily  metroNIDAZOLE    Tablet 500 milliGRAM(s) Oral every 8 hours  potassium acid phosphate/sodium acid phosphate tablet (K-PHOS No. 2) 2 Tablet(s) Oral every 8 hours  sodium chloride 0.9%. 1000 milliLiter(s) (75 mL/Hr) IV Continuous <Continuous>    MEDICATIONS  (PRN):  acetaminophen   Tablet 650 milliGRAM(s) Oral every 6 hours PRN For Temp greater than 38.5 C (101.3 F)      Allergies    No Known Allergies    Intolerances        SOCIAL HISTORY:  Alcohol use [X ] No  [ ] Yes  Smoking  [ X] No  [ ] Yes  Drug Abuse [X ] No  [ ] Yes  Tattoo [X ] No  [ ] Yes  History of Blood transfusion [ X] No  [ ] Yes    FAMILY HISTORY:n/c      REVIEW OF SYSTEMS:  CONSTITUTIONAL: No weakness, fevers or chills  EYES/ENT: No visual changes;  No vertigo or throat pain   NECK: No pain or stiffness  RESPIRATORY: No cough, wheezing, hemoptysis; No shortness of breath  CARDIOVASCULAR: No chest pain or palpitations  GASTROINTESTINAL: No abdominal or epigastric pain. No nausea, +vomiting, no  hematemesis; +diarrhea  ,no constipation. No melena or hematochezia.  GENITOURINARY: No dysuria, frequency or hematuria  pt has mild diffculty voiding sometimes s he has hx of BPH  NEUROLOGICAL: No numbness or weakness  SKIN: No itching, burning, rashes, or lesions   All other review of systems is negative unless indicated above    Vital Signs  T(F): 97.9 (17 @ 12:23), Max: 102.3 (17 @ 09:59)  HR: 73 (17 @ 12:23) (70 - 111)  BP: 103/54 (17 @ 12:23) (100/41 - 163/89)  ABP: --  RR: 16 (17 @ 12:23) (16 - 20)  SpO2: 98% (17 @ 12:23) (97% - 100%)  Wt(kg): --  CVP(cm H2O): --  CO: --  PCWP: --    I and O's:    Daily Height in cm: 172.72 (25 Dec 2017 05:15)    Daily Weight in k.9 (25 Dec 2017 05:50)    PHYSICAL EXAM:  Constitutional: well developed, well nourished  and in nad  HEENT: PERRLA,  no icteric sclera and mild pallor of conjunctiva noted  Neck: No JVD, thyromegaly or adenopathy  Respiratory: reduced air entry lower lungs with no rales, wheezing or rhonchi  Cardiovascular: S1 and S2 normally heard  Gastrointestinal: soft, nondistended, nontender and normal bowel sounds heard  old scar noted in lower mid abdomen  Extremities: No peripheral edema or cyanosis  Neurological: A/O x 3, no focal deficits  Psychiatric: Normal mood, normal affect  : No flank tenderness  Skin: No rashes        LABS:                        12   17.6  )-----------( 111      ( 25 Dec 2017 11:43 )             37.9     2.0  --            136  |  103  |  24<H>  ----------------------------<  125<H>  3.4<L>   |  23  |  1.14      133<L>  |  97  |  30<H>  ----------------------------<  197<H>  3.8   |  26  |  1.46<H>    Ca    7.6<L>      25 Dec 2017 11:43  Ca    8.4      24 Dec 2017 22:05  Phos  2.0       Mg     1.7         TPro  7.3  /  Alb  3.1<L>  /  TBili  0.6  /  DBili  x   /  AST  19  /  ALT  17  /  AlkPhos  50  -24          URINE STUDIES:  Urinalysis Basic - ( 24 Dec 2017 23:24 )    Color: Yellow / Appearance: Clear / S.020 / pH: x  Gluc: x / Ketone: Trace  / Bili: Small / Urobili: 1   Blood: x / Protein: 100 / Nitrite: Negative   Leuk Esterase: Trace / RBC: 0-2 /HPF / WBC 0-2 /HPF   Sq Epi: x / Non Sq Epi: Few /HPF / Bacteria: Many /HPF                      RADIOLOGY & ADDITIONAL STUDIES:      < from: CT Abdomen and Pelvis w/ Oral Cont and w/ IV Cont (17 @ 03:04) >  EXAM:  CT ABDOMEN AND PELVIS OC IC                            PROCEDURE DATE:  2017          INTERPRETATION:  CLINICAL INFORMATION: Abdominal pain and diarrhea    COMPARISON: None    PROCEDURE:   CT of the Abdomen and Pelvis was performed with intravenous contrast.   Intravenous contrast: 90 ml Omnipaque 350. 10 ml discarded.  Oral contrast: positive contrast was administered.  Sagittal and coronal reformats were performed.    FINDINGS:    LOWER CHEST: Within normal limits.    LIVER: Within normal limits.  BILE DUCTS: Normal caliber.  GALLBLADDER: Within normal limits.  SPLEEN: Within normal limits.  PANCREAS: Within normal limits.  ADRENALS: Within normal limits.  KIDNEYS/URETERS: Within normal limits.    BLADDER: Within normal limits.  REPRODUCTIVE ORGANS: Prostatomegaly     BOWEL: No bowel obstruction. Mild colonic wall thickening from the mid   transverse colon to rectum. Normal appendix.  PERITONEUM: No ascites.  VESSELS:  Within normal limits.  RETROPERITONEUM: No lymphadenopathy.    ABDOMINAL WALL: Small fat-containing left inguinal hernia.  BONES: Degenerative changes of the spine.    IMPRESSION: Colitis of the likely infectious versus inflammatory etiology    < end of copied text >  < from: Xray Chest 2 Views PA/Lat (17 @ 12:42) >  EXAM:  CHEST PA & LAT        PROCEDURE DATE:  2017           INTERPRETATION:  EXAMINATION: CHEST PA LAT    CLINICAL INDICATION: Routine checkup    TECHNIQUE: PA and lateral radiographs of the chest were obtained.    COMPARISON: 2014.    FINDINGS:     Cardiac silhouette normal in size. Right midlung 4 mm well-circumscribed   nodule is unchanged from the prior study may represent a granuloma. Right   basilar probable linear atelectasis. Lungs otherwise clear. No pleural   effusion or pneumothorax.    IMPRESSION:   Right midlung probable calcified granuloma. Right basilar linear   atelectasis. Lungs otherwise clear.    < end of copied text > Patient is a 77y Male whom presented to the hospital with diarrhea/vomiting , noted to have ras,hypokalemia and hyponatremia.    Medical HPI:  78 y/o M  with a significant PMHx of DM (on Metformin), HTN (lisinopril), HLD (atorvastatin) , BPH (Finasteride) and a significant PSHx of R inguinal hernia repair, and abdominal surgery 2/2 SBO presents to the ED c/o vomiting and diarrhea x2 days. Patient states on  he had 1 episode of soft BM, on  he had multiple episodes of watery diarrhea. Denies abdominal pain. He came to the ER due to weakness and multiple episodes of diarrhea. Also states having about 2 episodes of vomiting in 2 days, and subjective fever and chills. Denies any recent use of antbiotics. After confirming with pharmacy, patient was on amoxicillin 500 TID for 7 days starting 2017 (indication unknown). Denies eating out. Denies anyone in family with similar symptoms. No other complaints at this time.    SH: Retired, denies smoking, alcohol or illicit drug use. Lives with wife, walks independently  FH: DM, HTN  Allergies: NKDA  Pharmacy: [facility] (25 Dec 2017 10:57)  Renal HPI:   pts current chart reviewed and case discussed with resident  pts baseline serum cr is unknown  pt denies pmh of renal ds, proteinuria, renal stones, recurrent uti or nephrotic edema or nephrotic syndrome  pt denies Nsaids use or otc other nephrotoxic supplements  Pt currently denies cp,sob, skin rash or leg edema    PAST MEDICAL & SURGICAL HISTORY:  BPH (benign prostatic hyperplasia)  HLD (hyperlipidemia)  HTN (hypertension)  DM (diabetes mellitus)  Diabetes  No significant past surgical history  S/P abdominal surgery, follow-up exam: sbo  S/P hernia surgery: r.inguinal      Home Medications: Reviewed    MEDICATIONS  (STANDING):  atorvastatin 10 milliGRAM(s) Oral at bedtime  ciprofloxacin   IVPB 400 milliGRAM(s) IV Intermittent every 12 hours  enoxaparin Injectable 40 milliGRAM(s) SubCutaneous daily  finasteride 5 milliGRAM(s) Oral daily  metroNIDAZOLE    Tablet 500 milliGRAM(s) Oral every 8 hours  potassium acid phosphate/sodium acid phosphate tablet (K-PHOS No. 2) 2 Tablet(s) Oral every 8 hours  sodium chloride 0.9%. 1000 milliLiter(s) (75 mL/Hr) IV Continuous <Continuous>    MEDICATIONS  (PRN):  acetaminophen   Tablet 650 milliGRAM(s) Oral every 6 hours PRN For Temp greater than 38.5 C (101.3 F)      Allergies    No Known Allergies    Intolerances        SOCIAL HISTORY:  Alcohol use [X ] No  [ ] Yes  Smoking  [ X] No  [ ] Yes  Drug Abuse [X ] No  [ ] Yes  Tattoo [X ] No  [ ] Yes  History of Blood transfusion [ X] No  [ ] Yes    FAMILY HISTORY:n/c      REVIEW OF SYSTEMS:  CONSTITUTIONAL: No weakness, fevers or chills  EYES/ENT: No visual changes;  No vertigo or throat pain   NECK: No pain or stiffness  RESPIRATORY: No cough, wheezing, hemoptysis; No shortness of breath  CARDIOVASCULAR: No chest pain or palpitations  GASTROINTESTINAL: No abdominal or epigastric pain. No nausea, +vomiting, no  hematemesis; +diarrhea  ,no constipation. No melena or hematochezia.  GENITOURINARY: No dysuria, frequency or hematuria  pt has mild diffculty voiding sometimes s he has hx of BPH  NEUROLOGICAL: No numbness or weakness  SKIN: No itching, burning, rashes, or lesions   All other review of systems is negative unless indicated above    Vital Signs  T(F): 97.9 (17 @ 12:23), Max: 102.3 (17 @ 09:59)  HR: 73 (17 @ 12:23) (70 - 111)  BP: 103/54 (17 @ 12:23) (100/41 - 163/89)  ABP: --  RR: 16 (17 @ 12:23) (16 - 20)  SpO2: 98% (17 @ 12:23) (97% - 100%)  Wt(kg): --  CVP(cm H2O): --  CO: --  PCWP: --    I and O's:    Daily Height in cm: 172.72 (25 Dec 2017 05:15)    Daily Weight in k.9 (25 Dec 2017 05:50)    PHYSICAL EXAM:  Constitutional: well developed, well nourished  and in nad  HEENT: PERRLA,  no icteric sclera and mild pallor of conjunctiva noted  Neck: No JVD, thyromegaly or adenopathy  Respiratory: reduced air entry lower lungs with no rales, wheezing or rhonchi  Cardiovascular: S1 and S2 normally heard  Gastrointestinal: soft, nondistended, nontender and normal bowel sounds heard  old scar noted in lower mid abdomen  Extremities: No peripheral edema or cyanosis  Neurological: A/O x 3, no focal deficits  Psychiatric: Normal mood, normal affect  : No flank tenderness  Skin: No rashes        LABS:                        12.8   17.6  )-----------( 111      ( 25 Dec 2017 11:43 )             37.9     2.0  --            136  |  103  |  24<H>  ----------------------------<  125<H>  3.4<L>   |  23  |  1.14      133<L>  |  97  |  30<H>  ----------------------------<  197<H>  3.8   |  26  |  1.46<H>    Ca    7.6<L>      25 Dec 2017 11:43  Ca    8.4      24 Dec 2017 22:05  Phos  2.0       Mg     1.7         TPro  7.3  /  Alb  3.1<L>  /  TBili  0.6  /  DBili  x   /  AST  19  /  ALT  17  /  AlkPhos  50            URINE STUDIES:  Urinalysis Basic - ( 24 Dec 2017 23:24 )    Color: Yellow / Appearance: Clear / S.020 / pH: x  Gluc: x / Ketone: Trace  / Bili: Small / Urobili: 1   Blood: x / Protein: 100 / Nitrite: Negative   Leuk Esterase: Trace / RBC: 0-2 /HPF / WBC 0-2 /HPF   Sq Epi: x / Non Sq Epi: Few /HPF / Bacteria: Many /HPF                      RADIOLOGY & ADDITIONAL STUDIES:      < from: CT Abdomen and Pelvis w/ Oral Cont and w/ IV Cont (17 @ 03:04) >  EXAM:  CT ABDOMEN AND PELVIS OC IC                            PROCEDURE DATE:  2017          INTERPRETATION:  CLINICAL INFORMATION: Abdominal pain and diarrhea    COMPARISON: None    PROCEDURE:   CT of the Abdomen and Pelvis was performed with intravenous contrast.   Intravenous contrast: 90 ml Omnipaque 350. 10 ml discarded.  Oral contrast: positive contrast was administered.  Sagittal and coronal reformats were performed.    FINDINGS:    LOWER CHEST: Within normal limits.    LIVER: Within normal limits.  BILE DUCTS: Normal caliber.  GALLBLADDER: Within normal limits.  SPLEEN: Within normal limits.  PANCREAS: Within normal limits.  ADRENALS: Within normal limits.  KIDNEYS/URETERS: Within normal limits.    BLADDER: Within normal limits.  REPRODUCTIVE ORGANS: Prostatomegaly     BOWEL: No bowel obstruction. Mild colonic wall thickening from the mid   transverse colon to rectum. Normal appendix.  PERITONEUM: No ascites.  VESSELS:  Within normal limits.  RETROPERITONEUM: No lymphadenopathy.    ABDOMINAL WALL: Small fat-containing left inguinal hernia.  BONES: Degenerative changes of the spine.    IMPRESSION: Colitis of the likely infectious versus inflammatory etiology    < end of copied text >  < from: Xray Chest 2 Views PA/Lat (17 @ 12:42) >  EXAM:  CHEST PA & LAT        PROCEDURE DATE:  2017           INTERPRETATION:  EXAMINATION: CHEST PA LAT    CLINICAL INDICATION: Routine checkup    TECHNIQUE: PA and lateral radiographs of the chest were obtained.    COMPARISON: 2014.    FINDINGS:     Cardiac silhouette normal in size. Right midlung 4 mm well-circumscribed   nodule is unchanged from the prior study may represent a granuloma. Right   basilar probable linear atelectasis. Lungs otherwise clear. No pleural   effusion or pneumothorax.    IMPRESSION:   Right midlung probable calcified granuloma. Right basilar linear   atelectasis. Lungs otherwise clear.    < end of copied text >

## 2017-12-25 NOTE — H&P ADULT - HISTORY OF PRESENT ILLNESS
78 y/o M  with a significant PMHx of DM (on Metformin), HTN (lisinopril), HLD (atorvastatin) , BPH (Finasteride) and a significant PSHx of R inguinal hernia repair, and abdominal surgery 2/2 SBO presents to the ED c/o vomiting and diarrhea x2 days. Patient states on 12/23 he had 1 episode of soft BM, on 12/24 he had multiple episodes of watery diarrhea. Denies abdominal pain. He came to the ER due to weakness and multiple episodes of diarrhea. Also states having about 2 episodes of vomiting in 2 days, and subjective fever and chills. Denies any recent use of antbiotics. After confirming with pharmacy, patient was on amoxicillin 500 TID for 7 days starting Sept 5th 2017 (indication unknown). Denies eating out. Denies anyone in family with similar symptoms. No other complaints at this time.    SH: Retired, denies smoking, alcohol or illicit drug use. Lives with wife, walks independently  FH: DM, HTN  Allergies: NKDA  Pharmacy: [facility] [address]

## 2017-12-25 NOTE — CONSULT NOTE ADULT - PROBLEM SELECTOR RECOMMENDATION 2
secondary to improved gfr plus gi losses  -we will add kphos as replacement   -f/u k level in am  -keep k >4 and <5

## 2017-12-26 DIAGNOSIS — N39.0 URINARY TRACT INFECTION, SITE NOT SPECIFIED: ICD-10-CM

## 2017-12-26 LAB
-  COAGULASE NEGATIVE STAPHYLOCOCCUS: SIGNIFICANT CHANGE UP
ANION GAP SERPL CALC-SCNC: 8 MMOL/L — SIGNIFICANT CHANGE UP (ref 5–17)
BUN SERPL-MCNC: 17 MG/DL — SIGNIFICANT CHANGE UP (ref 7–18)
CALCIUM SERPL-MCNC: 7.4 MG/DL — LOW (ref 8.4–10.5)
CHLORIDE SERPL-SCNC: 103 MMOL/L — SIGNIFICANT CHANGE UP (ref 96–108)
CHOLEST SERPL-MCNC: 76 MG/DL — SIGNIFICANT CHANGE UP (ref 10–199)
CO2 SERPL-SCNC: 24 MMOL/L — SIGNIFICANT CHANGE UP (ref 22–31)
CREAT ?TM UR-MCNC: 101 MG/DL — SIGNIFICANT CHANGE UP
CREAT SERPL-MCNC: 1.03 MG/DL — SIGNIFICANT CHANGE UP (ref 0.5–1.3)
CULTURE RESULTS: SIGNIFICANT CHANGE UP
CULTURE RESULTS: SIGNIFICANT CHANGE UP
GLUCOSE BLDC GLUCOMTR-MCNC: 112 MG/DL — HIGH (ref 70–99)
GLUCOSE BLDC GLUCOMTR-MCNC: 121 MG/DL — HIGH (ref 70–99)
GLUCOSE BLDC GLUCOMTR-MCNC: 95 MG/DL — SIGNIFICANT CHANGE UP (ref 70–99)
GLUCOSE SERPL-MCNC: 145 MG/DL — HIGH (ref 70–99)
GRAM STN FLD: SIGNIFICANT CHANGE UP
GRAM STN FLD: SIGNIFICANT CHANGE UP
HBA1C BLD-MCNC: 6 % — HIGH (ref 4–5.6)
HCT VFR BLD CALC: 37.8 % — LOW (ref 39–50)
HDLC SERPL-MCNC: 28 MG/DL — LOW (ref 40–125)
HGB BLD-MCNC: 12.6 G/DL — LOW (ref 13–17)
LIPID PNL WITH DIRECT LDL SERPL: 23 MG/DL — SIGNIFICANT CHANGE UP
MAGNESIUM SERPL-MCNC: 1.8 MG/DL — SIGNIFICANT CHANGE UP (ref 1.6–2.6)
MCHC RBC-ENTMCNC: 32.6 PG — SIGNIFICANT CHANGE UP (ref 27–34)
MCHC RBC-ENTMCNC: 33.4 GM/DL — SIGNIFICANT CHANGE UP (ref 32–36)
MCV RBC AUTO: 97.3 FL — SIGNIFICANT CHANGE UP (ref 80–100)
METHOD TYPE: SIGNIFICANT CHANGE UP
NEUTROPHILS NFR BLD AUTO: SIGNIFICANT CHANGE UP % (ref 43–77)
OB PNL STL: POSITIVE
PHOSPHATE SERPL-MCNC: 2.3 MG/DL — LOW (ref 2.5–4.5)
PLATELET # BLD AUTO: 112 K/UL — LOW (ref 150–400)
POTASSIUM SERPL-MCNC: 3.8 MMOL/L — SIGNIFICANT CHANGE UP (ref 3.5–5.3)
POTASSIUM SERPL-SCNC: 3.8 MMOL/L — SIGNIFICANT CHANGE UP (ref 3.5–5.3)
PROCALCITONIN SERPL-MCNC: 1.28 NG/ML — HIGH (ref 0–0.04)
PROT ?TM UR-MCNC: 39 MG/DL — HIGH (ref 0–12)
PROT/CREAT UR-RTO: 0.4 RATIO — HIGH (ref 0–0.2)
RBC # BLD: 3.88 M/UL — LOW (ref 4.2–5.8)
RBC # FLD: 11.4 % — SIGNIFICANT CHANGE UP (ref 10.3–14.5)
SODIUM SERPL-SCNC: 135 MMOL/L — SIGNIFICANT CHANGE UP (ref 135–145)
SPECIMEN SOURCE: SIGNIFICANT CHANGE UP
TOTAL CHOLESTEROL/HDL RATIO MEASUREMENT: 2.7 RATIO — LOW (ref 3.4–9.6)
TRIGL SERPL-MCNC: 126 MG/DL — SIGNIFICANT CHANGE UP (ref 10–149)
TSH SERPL-MCNC: 0.26 UU/ML — LOW (ref 0.34–4.82)
WBC # BLD: 10 K/UL — SIGNIFICANT CHANGE UP (ref 3.8–10.5)
WBC # FLD AUTO: 10 K/UL — SIGNIFICANT CHANGE UP (ref 3.8–10.5)

## 2017-12-26 RX ADMIN — SODIUM CHLORIDE 75 MILLILITER(S): 9 INJECTION INTRAMUSCULAR; INTRAVENOUS; SUBCUTANEOUS at 21:37

## 2017-12-26 RX ADMIN — Medication 2 TABLET(S): at 13:32

## 2017-12-26 RX ADMIN — Medication 500 MILLIGRAM(S): at 05:49

## 2017-12-26 RX ADMIN — ENOXAPARIN SODIUM 40 MILLIGRAM(S): 100 INJECTION SUBCUTANEOUS at 12:33

## 2017-12-26 RX ADMIN — Medication 200 MILLIGRAM(S): at 05:48

## 2017-12-26 RX ADMIN — Medication 500 MILLIGRAM(S): at 21:36

## 2017-12-26 RX ADMIN — Medication 2 TABLET(S): at 05:49

## 2017-12-26 RX ADMIN — FINASTERIDE 5 MILLIGRAM(S): 5 TABLET, FILM COATED ORAL at 12:34

## 2017-12-26 RX ADMIN — ATORVASTATIN CALCIUM 10 MILLIGRAM(S): 80 TABLET, FILM COATED ORAL at 21:36

## 2017-12-26 RX ADMIN — Medication 500 MILLIGRAM(S): at 13:32

## 2017-12-26 RX ADMIN — Medication 200 MILLIGRAM(S): at 18:48

## 2017-12-26 NOTE — PROVIDER CONTACT NOTE (CRITICAL VALUE NOTIFICATION) - SITUATION
blood cultures collected on 12/25/17 in anaerobic bottle preliminary result gram positive cocci in cluster

## 2017-12-26 NOTE — PROGRESS NOTE ADULT - SUBJECTIVE AND OBJECTIVE BOX
pt seen and examined.pts current chart reviewed and case discussed with resident covering.    SUBJECTIVE:  pt feels fine and denies cp,sob,gi or gu symptons    REVIEW OF SYSTEMS:  CONSTITUTIONAL: No weakness, fevers or chills  EYES/ENT: No visual changes;  No vertigo or throat pain   NECK: No pain or stiffness  RESPIRATORY: No cough, wheezing, hemoptysis; No shortness of breath  CARDIOVASCULAR: No chest pain or palpitations  GASTROINTESTINAL: No abdominal or epigastric pain. No nausea, vomiting, or hematemesis; No diarrhea or constipation. No melena or hematochezia.  GENITOURINARY: No dysuria, frequency , hematuria, flank pain or nocturia  NEUROLOGICAL: No numbness or weakness  SKIN: No itching, burning, rashes, or lesions   All other review of systems is negative unless indicated above    Current meds:    acetaminophen   Tablet 650 milliGRAM(s) Oral every 6 hours PRN  atorvastatin 10 milliGRAM(s) Oral at bedtime  ciprofloxacin   IVPB 400 milliGRAM(s) IV Intermittent every 12 hours  enoxaparin Injectable 40 milliGRAM(s) SubCutaneous daily  finasteride 5 milliGRAM(s) Oral daily  metroNIDAZOLE    Tablet 500 milliGRAM(s) Oral every 8 hours  sodium chloride 0.9%. 1000 milliLiter(s) IV Continuous <Continuous>      Vital Signs    T(F): 98.8 (12-26-17 @ 13:50), Max: 98.8 (12-26-17 @ 13:50)  HR: 74 (12-26-17 @ 13:50) (74 - 84)  BP: 111/57 (12-26-17 @ 13:50) (111/57 - 140/64)  ABP: --  RR: 16 (12-26-17 @ 13:50) (16 - 16)  SpO2: 100% (12-26-17 @ 13:50) (97% - 100%)  Wt(kg): --  CVP(cm H2O): --  CO: --  PCWP: --    I and O's:    12-25 @ 07:01  -  12-26 @ 07:00  --------------------------------------------------------  IN:    IV PiggyBack: 200 mL    sodium chloride 0.9%.: 900 mL  Total IN: 1100 mL    OUT:  Total OUT: 0 mL    Total NET: 1100 mL        Daily     Daily     PHYSICAL EXAM:  Constitutional: well developed, well nourished  and in nad  HEENT: PERRLA,  no icteric sclera and mild pallor of conjunctiva noted  Neck: No JVD, thyromegaly or adenopathy  Respiratory: reduced air entry lower lungs with no rales, wheezing or rhonchi  Cardiovascular: S1 and S2 normally heard  Gastrointestinal: soft, nondistended, nontender and normal bowel sounds heard  Extremities: No peripheral edema or cyanosis  Neurological: A/O x 3, no focal deficits  : No flank or cva tenderness palpated.  Skin: No rashes      LABS:    CBC:                          12.6   10.0  )-----------( 112      ( 26 Dec 2017 07:46 )             37.8           BMP:    12-26    135  |  103  |  17  ----------------------------<  145<H>  3.8   |  24  |  1.03  12-25    136  |  103  |  24<H>  ----------------------------<  125<H>  3.4<L>   |  23  |  1.14  12-24    133<L>  |  97  |  30<H>  ----------------------------<  197<H>  3.8   |  26  |  1.46<H>    Ca    7.4<L>      26 Dec 2017 07:46  Ca    7.6<L>      25 Dec 2017 11:43  Ca    8.4      24 Dec 2017 22:05  Phos  2.3     12-26  Phos  2.0     12-25  Mg     1.8     12-26  Mg     1.7     12-25    TPro  7.3  /  Alb  3.1<L>  /  TBili  0.6  /  DBili  x   /  AST  19  /  ALT  17  /  AlkPhos  50  12-24      Thyroid Stimulating Hormone, Serum: 0.26 uU/mL (12-26 @ 07:46)      URINE STUDIES:        Creatinine, Random Urine: 101 mg/dL (12-26 @ 09:57)  Protein/Creatinine Ratio Calculation: 0.4 Ratio (12-26 @ 09:57)  (mild) pt seen and examined.pts current chart reviewed and case discussed with resident covering.    SUBJECTIVE:  pt feels fine and denies cp,sob,or gu symptons    REVIEW OF SYSTEMS:  CONSTITUTIONAL: No weakness, fevers or chills  EYES/ENT: No visual changes;  No vertigo or throat pain   NECK: No pain or stiffness  RESPIRATORY: No cough, wheezing, hemoptysis; No shortness of breath  CARDIOVASCULAR: No chest pain or palpitations  GASTROINTESTINAL: No abdominal or epigastric pain. No nausea, vomiting, or hematemesis; +diarrhea , no constipation. No melena or hematochezia.  GENITOURINARY: No dysuria, frequency , hematuria, flank pain or nocturia  NEUROLOGICAL: No numbness or weakness  SKIN: No itching, burning, rashes, or lesions   All other review of systems is negative unless indicated above    Current meds:    acetaminophen   Tablet 650 milliGRAM(s) Oral every 6 hours PRN  atorvastatin 10 milliGRAM(s) Oral at bedtime  ciprofloxacin   IVPB 400 milliGRAM(s) IV Intermittent every 12 hours  enoxaparin Injectable 40 milliGRAM(s) SubCutaneous daily  finasteride 5 milliGRAM(s) Oral daily  metroNIDAZOLE    Tablet 500 milliGRAM(s) Oral every 8 hours  sodium chloride 0.9%. 1000 milliLiter(s) IV Continuous <Continuous>      Vital Signs    T(F): 98.8 (12-26-17 @ 13:50), Max: 98.8 (12-26-17 @ 13:50)  HR: 74 (12-26-17 @ 13:50) (74 - 84)  BP: 111/57 (12-26-17 @ 13:50) (111/57 - 140/64)  ABP: --  RR: 16 (12-26-17 @ 13:50) (16 - 16)  SpO2: 100% (12-26-17 @ 13:50) (97% - 100%)  Wt(kg): --  CVP(cm H2O): --  CO: --  PCWP: --    I and O's:    12-25 @ 07:01  -  12-26 @ 07:00  --------------------------------------------------------  IN:    IV PiggyBack: 200 mL    sodium chloride 0.9%.: 900 mL  Total IN: 1100 mL    OUT:  Total OUT: 0 mL    Total NET: 1100 mL        Daily     Daily     PHYSICAL EXAM:  Constitutional: well developed, well nourished  and in nad  HEENT: PERRLA,  no icteric sclera and mild pallor of conjunctiva noted  Neck: No JVD, thyromegaly or adenopathy  Respiratory: reduced air entry lower lungs with no rales, wheezing or rhonchi  Cardiovascular: S1 and S2 normally heard  Gastrointestinal: soft, nondistended, nontender and normal bowel sounds heard  Extremities: No peripheral edema or cyanosis  Neurological: A/O x 3, no focal deficits  : No flank or cva tenderness palpated.  Skin: No rashes      LABS:    CBC:                          12.6   10.0  )-----------( 112      ( 26 Dec 2017 07:46 )             37.8           BMP:    12-26    135  |  103  |  17  ----------------------------<  145<H>  3.8   |  24  |  1.03  12-25    136  |  103  |  24<H>  ----------------------------<  125<H>  3.4<L>   |  23  |  1.14  12-24    133<L>  |  97  |  30<H>  ----------------------------<  197<H>  3.8   |  26  |  1.46<H>    Ca    7.4<L>      26 Dec 2017 07:46  Ca    7.6<L>      25 Dec 2017 11:43  Ca    8.4      24 Dec 2017 22:05  Phos  2.3     12-26  Phos  2.0     12-25  Mg     1.8     12-26  Mg     1.7     12-25    TPro  7.3  /  Alb  3.1<L>  /  TBili  0.6  /  DBili  x   /  AST  19  /  ALT  17  /  AlkPhos  50  12-24      Thyroid Stimulating Hormone, Serum: 0.26 uU/mL (12-26 @ 07:46)      URINE STUDIES:        Creatinine, Random Urine: 101 mg/dL (12-26 @ 09:57)  Protein/Creatinine Ratio Calculation: 0.4 Ratio (12-26 @ 09:57)  (mild)

## 2017-12-26 NOTE — PROGRESS NOTE ADULT - SUBJECTIVE AND OBJECTIVE BOX
NP Note discussed with  Primary Attending    Patient is a 77y old  Male who presents with a chief complaint of Diarrhea (25 Dec 2017 10:57)      INTERVAL HPI/OVERNIGHT EVENTS: no new complaints    MEDICATIONS  (STANDING):  atorvastatin 10 milliGRAM(s) Oral at bedtime  ciprofloxacin   IVPB 400 milliGRAM(s) IV Intermittent every 12 hours  enoxaparin Injectable 40 milliGRAM(s) SubCutaneous daily  finasteride 5 milliGRAM(s) Oral daily  metroNIDAZOLE    Tablet 500 milliGRAM(s) Oral every 8 hours  sodium chloride 0.9%. 1000 milliLiter(s) (75 mL/Hr) IV Continuous <Continuous>    MEDICATIONS  (PRN):  acetaminophen   Tablet 650 milliGRAM(s) Oral every 6 hours PRN For Temp greater than 38.5 C (101.3 F)      Vital Signs Last 24 Hrs  T(C): 37.1 (26 Dec 2017 13:50), Max: 37.1 (26 Dec 2017 13:50)  T(F): 98.8 (26 Dec 2017 13:50), Max: 98.8 (26 Dec 2017 13:50)  HR: 74 (26 Dec 2017 13:50) (74 - 84)  BP: 111/57 (26 Dec 2017 13:50) (111/57 - 140/64)  BP(mean): --  RR: 16 (26 Dec 2017 13:50) (16 - 16)  SpO2: 100% (26 Dec 2017 13:50) (97% - 100%)    ________________________________________________  PHYSICAL EXAM:  GENERAL: NAD  HEENT: Normocephalic;  conjunctivae and sclerae clear; moist mucous membranes;   NECK : supple  CHEST/LUNG: Clear to auscultation bilaterally with good air entry   HEART: S1 S2  regular; no murmurs, gallops or rubs  ABDOMEN: Soft, Nontender, Nondistended; Bowel sounds present  EXTREMITIES: no cyanosis; no edema; no calf tenderness  SKIN: warm and dry; no rash  NERVOUS SYSTEM:  Awake and alert; Oriented  to place, person and time ; no new deficits    _________________________________________________  LABS:                        12.6   10.0  )-----------( 112      ( 26 Dec 2017 07:46 )             37.8         135  |  103  |  17  ----------------------------<  145<H>  3.8   |  24  |  1.03    Ca    7.4<L>      26 Dec 2017 07:46  Phos  2.3       Mg     1.8         TPro  7.3  /  Alb  3.1<L>  /  TBili  0.6  /  DBili  x   /  AST  19  /  ALT  17  /  AlkPhos  50        Urinalysis Basic - ( 24 Dec 2017 23:24 )    Color: Yellow / Appearance: Clear / S.020 / pH: x  Gluc: x / Ketone: Trace  / Bili: Small / Urobili: 1   Blood: x / Protein: 100 / Nitrite: Negative   Leuk Esterase: Trace / RBC: 0-2 /HPF / WBC 0-2 /HPF   Sq Epi: x / Non Sq Epi: Few /HPF / Bacteria: Many /HPF      CAPILLARY BLOOD GLUCOSE      POCT Blood Glucose.: 112 mg/dL (26 Dec 2017 11:27)  POCT Blood Glucose.: 121 mg/dL (26 Dec 2017 06:22)  POCT Blood Glucose.: 95 mg/dL (26 Dec 2017 02:17)  POCT Blood Glucose.: 102 mg/dL (25 Dec 2017 22:17)        RADIOLOGY & ADDITIONAL TESTS:    Imaging Personally Reviewed:  YES    Consultant(s) Notes Reviewed:   YES    Care Discussed with Consultants :     Plan of care was discussed with patient and /or primary care giver; all questions and concerns were addressed and care was aligned with patient's wishes.

## 2017-12-26 NOTE — CONSULT NOTE ADULT - ASSESSMENT
The etiology for abdominal cramps vomiting and diarrhea in this patient is due to:  1. Colitis  2. Diverticulitis unlikely    Suggestions:    1. Check stool for culture, O&P and C. Diff toxin  2. Monitor electrolytes  3. IV antibiotics  4. Protonix daily  5. Avoid NSAID  6. DVT prophylaxis

## 2017-12-26 NOTE — PROGRESS NOTE ADULT - SUBJECTIVE AND OBJECTIVE BOX
BIBI SEXTON  MR# [id number]  77yMale        Patient is a 77y old  Male who presents with a chief complaint of Diarrhea (25 Dec 2017 10:57)      INTERVAL HPI/OVERNIGHT EVENTS:  Patient reporting moderate improvement of diarrhea and abdo pain. Starting today on clears only diet. Denies any fever /chills and rigors.    ALLERGIES  No Known Allergies      MEDICATIONS  acetaminophen   Tablet 650 milliGRAM(s) Oral every 6 hours PRN For Temp greater than 38.5 C (101.3 F)  atorvastatin 10 milliGRAM(s) Oral at bedtime  ciprofloxacin   IVPB 400 milliGRAM(s) IV Intermittent every 12 hours  enoxaparin Injectable 40 milliGRAM(s) SubCutaneous daily  finasteride 5 milliGRAM(s) Oral daily  metroNIDAZOLE    Tablet 500 milliGRAM(s) Oral every 8 hours  sodium chloride 0.9%. 1000 milliLiter(s) IV Continuous <Continuous>              REVIEW OF SYSTEMS:  CONSTITUTIONAL: No fever, weight loss, or fatigue  EYES: No eye pain, visual disturbances, or discharge  ENT:  No difficulty hearing, tinnitus, vertigo; No sinus or throat pain  NECK: No pain or stiffness  RESPIRATORY: No cough, wheezing, chills or hemoptysis; No Shortness of Breath  CARDIOVASCULAR: No chest pain, palpitations, passing out, dizziness, or leg swelling  GASTROINTESTINAL: No abdominal or epigastric pain. No nausea, vomiting, or hematemesis; No diarrhea or constipation. No melena or hematochezia.  GENITOURINARY: No dysuria, frequency, hematuria, or incontinence  NEUROLOGICAL: No headaches, memory loss, loss of strength, numbness, or tremors  SKIN: No itching, burning, rashes, or lesions   LYMPH Nodes: No enlarged glands  ENDOCRINE: No heat or cold intolerance; No hair loss  MUSCULOSKELETAL: No joint pain or swelling; No muscle, back, or extremity pain  PSYCHIATRIC: No depression, anxiety, mood swings, or difficulty sleeping  HEME/LYMPH: No easy bruising, or bleeding gums  ALLERGY AND IMMUNOLOGIC: No hives or eczema	    [ ] All others negative	  [ ] Unable to obtain      T(C): 37.1 (17 @ 13:50), Max: 37.1 (17 @ 13:50)  T(F): 98.8 (17 @ 13:50), Max: 98.8 (12-26-17 @ 13:50)  HR: 74 (17 @ 13:50) (74 - 84)  BP: 111/57 (17 @ 13:50) (111/57 - 140/64)  RR: 16 (17 @ 13:50) (16 - 16)  SpO2: 100% (17 @ 13:50) (97% - 100%)  Wt(kg): --    I&O's Summary    25 Dec 2017 07:01  -  26 Dec 2017 07:00  --------------------------------------------------------  IN: 1100 mL / OUT: 0 mL / NET: 1100 mL          PHYSICAL EXAM:  A X O x  HEAD:  Atraumatic, Normocephalic  EYES: EOMI, PERRLA, conjunctiva and sclera clear  NECK: Supple, No JVD, Normal thyroid  Resp: CTAB, No crackles, wheezing,   CVS: Regular rate and rhythm; No discernable murmurs, rubs, or gallops  ABD: Soft, Nontender, Nondistended; Bowel sounds present  EXTREMITIES:  2+ Peripheral Pulses, No edema  LYMPH: No dicernable lymphadenopathy noted  GENERAL: NAD, well-groomed, well-developed      LABS:                        12.6   10.0  )-----------( 112      ( 26 Dec 2017 07:46 )             37.8         135  |  103  |  17  ----------------------------<  145<H>  3.8   |  24  |  1.03    Ca    7.4<L>      26 Dec 2017 07:46  Phos  2.3       Mg     1.8         TPro  7.3  /  Alb  3.1<L>  /  TBili  0.6  /  DBili  x   /  AST  19  /  ALT  17  /  AlkPhos  50  12-24      Urinalysis Basic - ( 24 Dec 2017 23:24 )    Color: Yellow / Appearance: Clear / S.020 / pH: x  Gluc: x / Ketone: Trace  / Bili: Small / Urobili: 1   Blood: x / Protein: 100 / Nitrite: Negative   Leuk Esterase: Trace / RBC: 0-2 /HPF / WBC 0-2 /HPF   Sq Epi: x / Non Sq Epi: Few /HPF / Bacteria: Many /HPF      CAPILLARY BLOOD GLUCOSE      POCT Blood Glucose.: 112 mg/dL (26 Dec 2017 11:27)  POCT Blood Glucose.: 121 mg/dL (26 Dec 2017 06:22)  POCT Blood Glucose.: 95 mg/dL (26 Dec 2017 02:17)  POCT Blood Glucose.: 102 mg/dL (25 Dec 2017 22:17)      Troponins:  ProBNP:  Lipid Profile:   HgA1c:  TSH:           RADIOLOGY & ADDITIONAL TESTS:    Imaging Personally Reviewed:  [ ] YES  [ ] NO      Consultant(s) Notes Reviewed:  [x ] YES  [ ] NO    Care Discussed with Consultants/Other Providers [ x] YES  [ ] NO          PAST MEDICAL & SURGICAL HISTORY:  BPH (benign prostatic hyperplasia)  HLD (hyperlipidemia)  HTN (hypertension)  DM (diabetes mellitus)  Diabetes  No significant past surgical history  S/P abdominal surgery, follow-up exam: sbo  S/P hernia surgery: r.inguinal        Noninfectious gastroenteritis  No h/o HF  Unknown h/o HF  Handoff  MEWS Score  BPH (benign prostatic hyperplasia)  HLD (hyperlipidemia)  HTN (hypertension)  DM (diabetes mellitus)  Diabetes  BPH (benign prostatic hyperplasia)  HLD (hyperlipidemia)  HTN (hypertension)  HLD (hyperlipidemia)  HTN (hypertension)  DM (diabetes mellitus)  No pertinent past medical history  Colitis, acute  Hypertension, unspecified type  Proteinuria, unspecified type  Hypophosphatemia  Hyponatremia  Hypokalemia  PELON (acute kidney injury)  Need for prophylactic measure  DM (diabetes mellitus)  BPH (benign prostatic hyperplasia)  HLD (hyperlipidemia)  HTN (hypertension)  Colitis, acute  No significant past surgical history  S/P abdominal surgery, follow-up exam  S/P hernia surgery  No significant past surgical history  W- VOMITING CANT EAT  90+  Dehydration  UTI (urinary tract infection)

## 2017-12-26 NOTE — CONSULT NOTE ADULT - NEGATIVE ENMT SYMPTOMS
no hearing difficulty/no ear pain/no gum bleeding/no nose bleeds/no dry mouth/no throat pain/no dysphagia

## 2017-12-26 NOTE — PROGRESS NOTE ADULT - PROBLEM SELECTOR PLAN 1
Febrile, leukocytosis, and elevated lactate on admission  CT A/P- Colitis of the likely infectious versus inflammatory etiology.  Lactate wnl s/p 2L bolus  Patient states having 2 episodes of watery diarrhea in ER  - advanced to clears only diet, given improvable of diarrhea  - c/w NS@75ml/hr  - c/w Flagyl 500 q8h, and Ciprofloxacin 400 q12h  - f/u blood cx and urine cx  - f/u C diff  - f/u Stool ova and parasites  - f/u RVP  - GI consult noted and appreciated

## 2017-12-26 NOTE — CONSULT NOTE ADULT - RS GEN PE MLT RESP DETAILS PC
respirations non-labored/good air movement/airway patent/clear to auscultation bilaterally/no rhonchi/no chest wall tenderness/no intercostal retractions/breath sounds equal/no rales

## 2017-12-26 NOTE — PROGRESS NOTE ADULT - PROBLEM SELECTOR PLAN 1
Febrile, leukocytosis, and elevated lactate on admission  CT A/P showed colitis likely infectious versus inflammatory etiology.  Lactate wnl s/p 2L bolus  Pt continued with maintenance IVF  Flagyl/Cipro IV  Dr. Romano, GI, consulted  Blood cultures + for gram + cocci in clusters;  However, afebrile/no  leukocytosis.  Repeat BC ordered  C-Diff negative  Stool culture negative to date

## 2017-12-26 NOTE — PROGRESS NOTE ADULT - PROBLEM SELECTOR PLAN 1
r/o secondary to hypovolemia ,secondary to gi losses, now improved  -Keep patient euvolemic and renal diet  -Avoid Nephrotoxic Meds/ Agents such as (NSAIDs, IV contrast, Aminoglycosides such as gentamicin, -Gadolinium contrast, Phosphate containing enemas, etc..)  -Adjust Medications according to eGFR  -f/u bmp daily.

## 2017-12-26 NOTE — CONSULT NOTE ADULT - SUBJECTIVE AND OBJECTIVE BOX
[  ] STAT REQUEST              [ X ] ROUTINE REQUEST    Patient is a 77 year old male with abdominal pain and diarrhea. GI consulted to evaluate.      HPI:  77 year old male with multiple medical problems  presented with 2 days h/o abdominal cramps associated with fever, diarrhea and vomiting.  Patient denies hematemesis, hematochezia, melena, chest pain, SOB, palpitation, hematuria, hemoptysis or dysuria.         PAIN MANAGEMENT:  Pain Scale:                 2-3/10  Pain Location:  Diffuse abdominal cramps    Prior Colonoscopy: Unknown    PAST MEDICAL HISTORY  BPH (benign prostatic hyperplasia)  HLD (hyperlipidemia)  HTN (hypertension)  DM (diabetes mellitus)  SBO      PAST SURGICAL HISTORY  Abdominal surgery  Hernia surgery      Allergies    No Known Allergies         MEDICATIONS  (STANDING):  atorvastatin 10 milliGRAM(s) Oral at bedtime  ciprofloxacin   IVPB 400 milliGRAM(s) IV Intermittent every 12 hours  enoxaparin Injectable 40 milliGRAM(s) SubCutaneous daily  finasteride 5 milliGRAM(s) Oral daily  metroNIDAZOLE    Tablet 500 milliGRAM(s) Oral every 8 hours  sodium chloride 0.9%. 1000 milliLiter(s) (75 mL/Hr) IV Continuous <Continuous>    MEDICATIONS  (PRN):  acetaminophen   Tablet 650 milliGRAM(s) Oral every 6 hours PRN For Temp greater than 38.5 C (101.3 F)      SOCIAL HISTORY  Advanced Directives:       [ X ] Full Code       [  ] DNR  Marital Status:         [  ] M      [ X ] S      [  ] D       [  ] W  Children:       [ X ] Yes      [  ] No  Occupation:        [  ] Employed       [ X ] Unemployed       [  ] Retired  Diet:       [ X ] Regular       [  ] PEG feeding          [  ] NG tube feeding  Drug Use:           [ X ] Patient denied          [  ] Yes  Alcohol:           [ X ] No             [  ] Yes (socially)         [  ] Yes (chronic)  Tobacco:           [  ] Yes           [ X ] No      FAMILY HISTORY  [ X ] Heart Disease  (father)          [  ] Diabetes             [  ] HTN             [  ] Colon Cancer             [  ] Stomach Cancer              [  ] Pancreatic Cancer        VITAL SIGNS   Vital Signs Last 24 Hrs  T(C): 37.1 (26 Dec 2017 13:50), Max: 37.1 (26 Dec 2017 13:50)  T(F): 98.8 (26 Dec 2017 13:50), Max: 98.8 (26 Dec 2017 13:50)  HR: 74 (26 Dec 2017 13:50) (74 - 84)  BP: 111/57 (26 Dec 2017 13:50) (111/57 - 140/64)  RR: 16 (26 Dec 2017 13:50) (16 - 16)  SpO2: 100% (26 Dec 2017 13:50) (97% - 100%)           CBC Full  -  ( 26 Dec 2017 07:46 )  WBC Count : 10.0 K/uL  Hemoglobin : 12.6 g/dL  Hematocrit : 37.8 %  Platelet Count - Automated : 112 K/uL  Mean Cell Volume : 97.3 fl  Mean Cell Hemoglobin : 32.6 pg  Mean Cell Hemoglobin Concentration : 33.4 gm/dL       135  |  103  |  17  ----------------------------<  145<H>  3.8   |  24  |  1.03    Ca    7.4<L>      26 Dec 2017 07:46  Phos  2.3     12-26  Mg     1.8     12-26    TPro  7.3  /  Alb  3.1<L>  /  TBili  0.6  /  DBili  x   /  AST  19  /  ALT  17  /  AlkPhos  50  12-24    Occult Blood, Feces (12.26.17 @ 02:18)    Occult Blood, Feces: Positive    Urinalysis (12.24.17 @ 23:24)    pH Urine: 5.0    Glucose Qualitative, Urine: Negative    Blood, Urine: Small    Color: Yellow    Urine Appearance: Clear    Bilirubin: Small    Ketone - Urine: Trace    Specific Gravity: 1.020    Protein, Urine: 100    Urobilinogen: 1    Nitrite: Negative    Leukocyte Esterase Concentration: Trace    ECG  Ventricular Rate 91 BPM    Atrial Rate 91 BPM    P-R Interval 196 ms    QRS Duration 74 ms     ms    QTc 411 ms    P Axis 57 degrees    R Axis 63 degrees    T Axis 84 degrees    Diagnosis Line Normal sinus rhythm  Left ventricular hypertrophywith repolarization abnormality  Abnormal ECG        RADIOLOGY/IMAGING                EXAM:  CT ABDOMEN AND PELVIS OC IC                            PROCEDURE DATE:  12/25/2017          INTERPRETATION:  CLINICAL INFORMATION: Abdominal pain and diarrhea    COMPARISON: None    PROCEDURE:   CT of the Abdomen and Pelvis was performed with intravenous contrast.   Intravenous contrast: 90 ml Omnipaque 350. 10 ml discarded.  Oral contrast: positive contrast was administered.  Sagittal and coronal reformats were performed.    FINDINGS:    LOWER CHEST: Within normal limits.    LIVER: Within normal limits.  BILE DUCTS: Normal caliber.  GALLBLADDER: Within normal limits.  SPLEEN: Within normal limits.  PANCREAS: Within normal limits.  ADRENALS: Within normal limits.  KIDNEYS/URETERS: Within normal limits.    BLADDER: Within normal limits.  REPRODUCTIVE ORGANS: Prostatomegaly     BOWEL: No bowel obstruction. Mild colonic wall thickening from the mid   transverse colon to rectum. Normal appendix.  PERITONEUM: No ascites.  VESSELS:  Within normal limits.  RETROPERITONEUM: No lymphadenopathy.    ABDOMINAL WALL: Small fat-containing left inguinal hernia.  BONES: Degenerative changes of the spine.    IMPRESSION: Colitis of the likely infectious versus inflammatory etiology.

## 2017-12-26 NOTE — PROVIDER CONTACT NOTE (CRITICAL VALUE NOTIFICATION) - TEST AND RESULT REPORTED:
blood cultures collected on 12/25/17 in anaerobic bottle preliminary result gram positive cocci in cluster
Blood Culture from 12/25/2017 gram + cocci and clusters in aerobic bottle

## 2017-12-27 LAB
ANION GAP SERPL CALC-SCNC: 8 MMOL/L — SIGNIFICANT CHANGE UP (ref 5–17)
BASOPHILS NFR BLD AUTO: 1 % — SIGNIFICANT CHANGE UP (ref 0–2)
BUN SERPL-MCNC: 10 MG/DL — SIGNIFICANT CHANGE UP (ref 7–18)
CA-I BLD-SCNC: 1.14 MMOL/L — SIGNIFICANT CHANGE UP (ref 1.12–1.3)
CALCIUM SERPL-MCNC: 7.7 MG/DL — LOW (ref 8.4–10.5)
CHLORIDE SERPL-SCNC: 104 MMOL/L — SIGNIFICANT CHANGE UP (ref 96–108)
CO2 SERPL-SCNC: 25 MMOL/L — SIGNIFICANT CHANGE UP (ref 22–31)
CREAT SERPL-MCNC: 0.92 MG/DL — SIGNIFICANT CHANGE UP (ref 0.5–1.3)
CULTURE RESULTS: SIGNIFICANT CHANGE UP
CULTURE RESULTS: SIGNIFICANT CHANGE UP
EOSINOPHIL NFR BLD AUTO: 7 % — HIGH (ref 0–6)
GLUCOSE BLDC GLUCOMTR-MCNC: 104 MG/DL — HIGH (ref 70–99)
GLUCOSE BLDC GLUCOMTR-MCNC: 105 MG/DL — HIGH (ref 70–99)
GLUCOSE BLDC GLUCOMTR-MCNC: 119 MG/DL — HIGH (ref 70–99)
GLUCOSE BLDC GLUCOMTR-MCNC: 140 MG/DL — HIGH (ref 70–99)
GLUCOSE BLDC GLUCOMTR-MCNC: 96 MG/DL — SIGNIFICANT CHANGE UP (ref 70–99)
GLUCOSE SERPL-MCNC: 123 MG/DL — HIGH (ref 70–99)
HCT VFR BLD CALC: 38.8 % — LOW (ref 39–50)
HGB BLD-MCNC: 12.7 G/DL — LOW (ref 13–17)
LYMPHOCYTES # BLD AUTO: 10 % — LOW (ref 13–44)
MCHC RBC-ENTMCNC: 31.4 PG — SIGNIFICANT CHANGE UP (ref 27–34)
MCHC RBC-ENTMCNC: 32.7 GM/DL — SIGNIFICANT CHANGE UP (ref 32–36)
MCV RBC AUTO: 96 FL — SIGNIFICANT CHANGE UP (ref 80–100)
MONOCYTES NFR BLD AUTO: 3 % — SIGNIFICANT CHANGE UP (ref 2–14)
NEUTROPHILS NFR BLD AUTO: 37 % — LOW (ref 43–77)
ORGANISM # SPEC MICROSCOPIC CNT: SIGNIFICANT CHANGE UP
ORGANISM # SPEC MICROSCOPIC CNT: SIGNIFICANT CHANGE UP
PHOSPHATE SERPL-MCNC: 1.6 MG/DL — LOW (ref 2.5–4.5)
PLATELET # BLD AUTO: 130 K/UL — LOW (ref 150–400)
POTASSIUM SERPL-MCNC: 3.1 MMOL/L — LOW (ref 3.5–5.3)
POTASSIUM SERPL-SCNC: 3.1 MMOL/L — LOW (ref 3.5–5.3)
RBC # BLD: 4.04 M/UL — LOW (ref 4.2–5.8)
RBC # FLD: 11.6 % — SIGNIFICANT CHANGE UP (ref 10.3–14.5)
SODIUM SERPL-SCNC: 137 MMOL/L — SIGNIFICANT CHANGE UP (ref 135–145)
SPECIMEN SOURCE: SIGNIFICANT CHANGE UP
SPECIMEN SOURCE: SIGNIFICANT CHANGE UP
T3FREE SERPL-MCNC: 1.98 PG/ML — SIGNIFICANT CHANGE UP (ref 1.8–4.6)
T4 FREE SERPL-MCNC: 1.4 NG/DL — SIGNIFICANT CHANGE UP (ref 0.9–1.8)
WBC # BLD: 5.8 K/UL — SIGNIFICANT CHANGE UP (ref 3.8–10.5)
WBC # FLD AUTO: 5.8 K/UL — SIGNIFICANT CHANGE UP (ref 3.8–10.5)

## 2017-12-27 RX ORDER — POTASSIUM PHOSPHATE, MONOBASIC POTASSIUM PHOSPHATE, DIBASIC 236; 224 MG/ML; MG/ML
15 INJECTION, SOLUTION INTRAVENOUS ONCE
Qty: 0 | Refills: 0 | Status: COMPLETED | OUTPATIENT
Start: 2017-12-27 | End: 2017-12-27

## 2017-12-27 RX ORDER — CIPROFLOXACIN LACTATE 400MG/40ML
500 VIAL (ML) INTRAVENOUS EVERY 12 HOURS
Qty: 0 | Refills: 0 | Status: DISCONTINUED | OUTPATIENT
Start: 2017-12-27 | End: 2017-12-28

## 2017-12-27 RX ORDER — POTASSIUM PHOSPHATE, MONOBASIC POTASSIUM PHOSPHATE, DIBASIC 236; 224 MG/ML; MG/ML
15 INJECTION, SOLUTION INTRAVENOUS ONCE
Qty: 0 | Refills: 0 | Status: DISCONTINUED | OUTPATIENT
Start: 2017-12-27 | End: 2017-12-27

## 2017-12-27 RX ORDER — INSULIN LISPRO 100/ML
VIAL (ML) SUBCUTANEOUS
Qty: 0 | Refills: 0 | Status: DISCONTINUED | OUTPATIENT
Start: 2017-12-27 | End: 2017-12-28

## 2017-12-27 RX ORDER — LISINOPRIL 2.5 MG/1
2.5 TABLET ORAL DAILY
Qty: 0 | Refills: 0 | Status: DISCONTINUED | OUTPATIENT
Start: 2017-12-27 | End: 2017-12-28

## 2017-12-27 RX ORDER — POTASSIUM CHLORIDE 20 MEQ
20 PACKET (EA) ORAL
Qty: 0 | Refills: 0 | Status: COMPLETED | OUTPATIENT
Start: 2017-12-27 | End: 2017-12-27

## 2017-12-27 RX ADMIN — ATORVASTATIN CALCIUM 10 MILLIGRAM(S): 80 TABLET, FILM COATED ORAL at 22:32

## 2017-12-27 RX ADMIN — Medication 200 MILLIGRAM(S): at 05:15

## 2017-12-27 RX ADMIN — Medication 20 MILLIEQUIVALENT(S): at 10:05

## 2017-12-27 RX ADMIN — Medication 500 MILLIGRAM(S): at 14:13

## 2017-12-27 RX ADMIN — Medication 500 MILLIGRAM(S): at 17:07

## 2017-12-27 RX ADMIN — POTASSIUM PHOSPHATE, MONOBASIC POTASSIUM PHOSPHATE, DIBASIC 62.5 MILLIMOLE(S): 236; 224 INJECTION, SOLUTION INTRAVENOUS at 10:05

## 2017-12-27 RX ADMIN — Medication 500 MILLIGRAM(S): at 22:32

## 2017-12-27 RX ADMIN — Medication 500 MILLIGRAM(S): at 05:14

## 2017-12-27 RX ADMIN — ENOXAPARIN SODIUM 40 MILLIGRAM(S): 100 INJECTION SUBCUTANEOUS at 12:07

## 2017-12-27 RX ADMIN — FINASTERIDE 5 MILLIGRAM(S): 5 TABLET, FILM COATED ORAL at 12:07

## 2017-12-27 RX ADMIN — Medication 20 MILLIEQUIVALENT(S): at 12:07

## 2017-12-27 NOTE — PROGRESS NOTE ADULT - ASSESSMENT
76 y/o M  lives home with wife with a significant PMHx of DM (on Metformin), HTN (lisinopril), HLD (atorvastatin) , BPH (Finasteride) and a significant PSHx of R inguinal hernia repair, and abdominal surgery 2/2 SBO presents to the ED c/o vomiting and diarrhea x2 days. Patient stated on 12/23 he had 1 episode of soft BM, on 12/24 he had multiple episodes of watery diarrhea. Denies abdominal pain. He came to the ER due to weakness and multiple episodes of diarrhea. Also stated having about 2 episodes of vomiting in 2 days, and subjective fever and chills. Denied any recent use of antbiotics. After confirming with pharmacy, patient was on amoxicillin 500 TID for 7 days startied Sept 5th 2017 (indication unknown).
78yo M with pmhx DM, HTN, HLD, and BPH came in with mulitple episodes of nausea, vomiting, and watery diarrhea since the past 2 days. Also c/o subjective fevers and chills. No abdominal pain. No sick contact.
76 y/o M  with a significant PMHx of DM (on Metformin), HTN (lisinopril), HLD (atorvastatin) , BPH (Finasteride) and a significant PSHx of R inguinal hernia repair, and abdominal surgery 2/2 SBO presents to the ED c/o vomiting and diarrhea x2 days. Patient states on 12/23 he had 1 episode of soft BM, on 12/24 he had multiple episodes of watery diarrhea. Denies abdominal pain. He came to the ER due to weakness and multiple episodes of diarrhea. Also states having about 2 episodes of vomiting in 2 days, and subjective fever and chills. Denies any recent use of antbiotics or eating at a resteraunt as well as eating left overs or poorly cooked food . . . Denies anyone in family with similar symptoms. No other complaints at this time.afebrile on adm with stable vss and leucocytosis . ct abd and pelvis with mild thickening of left side of colon . started on cipro and flagyl. stool pcr and cx neg including ova and parasites as well as c.diff  . ID called since blood cx pos for coag neg staph . denies having any foreign body , no valve replacement , no prosthetic joints       # infectious colitis ?viral vs bacterial  . all stool studies neg . responding to cipro and flagyl . currently with no vomiting and diarrhea     # coag neg staph in blood [place] contaminant     PLAN  rpt blood cx   complete 7 days of cipro and flagyl     thnx will f/u   d/w np
78 y/o M  lives home with wife with a significant PMHx of DM (on Metformin), HTN (lisinopril), HLD (atorvastatin) , BPH (Finasteride) and a significant PSHx of R inguinal hernia repair, and abdominal surgery 2/2 SBO presents to the ED c/o vomiting and diarrhea x2 days. Patient stated on 12/23 he had 1 episode of soft BM, on 12/24 he had multiple episodes of watery diarrhea. Denies abdominal pain. He came to the ER due to weakness and multiple episodes of diarrhea. Also stated having about 2 episodes of vomiting in 2 days, and subjective fever and chills. Denied any recent use of antbiotics. After confirming with pharmacy, patient was on amoxicillin 500 TID for 7 days startied Sept 5th 2017 (indication unknown).

## 2017-12-27 NOTE — PROGRESS NOTE ADULT - SUBJECTIVE AND OBJECTIVE BOX
NP Note discussed with  Primary Attending    Patient is a 77y old  Male who presents with a chief complaint of Diarrhea (25 Dec 2017 10:57)      INTERVAL HPI/OVERNIGHT EVENTS: no new complaints    MEDICATIONS  (STANDING):  atorvastatin 10 milliGRAM(s) Oral at bedtime  ciprofloxacin     Tablet 500 milliGRAM(s) Oral every 12 hours  enoxaparin Injectable 40 milliGRAM(s) SubCutaneous daily  finasteride 5 milliGRAM(s) Oral daily  insulin lispro (HumaLOG) corrective regimen sliding scale   SubCutaneous three times a day before meals  metroNIDAZOLE    Tablet 500 milliGRAM(s) Oral every 8 hours    MEDICATIONS  (PRN):  acetaminophen   Tablet 650 milliGRAM(s) Oral every 6 hours PRN For Temp greater than 38.5 C (101.3 F)      __________________________________________________  REVIEW OF SYSTEMS:    CONSTITUTIONAL: No fever,   EYES: no acute visual disturbances  NECK: No pain or stiffness  RESPIRATORY: No cough; No shortness of breath  CARDIOVASCULAR: No chest pain, no palpitations  GASTROINTESTINAL: No pain. No nausea or vomiting; ONE episode of diarrhea today.  NEUROLOGICAL: No headache or numbness, no tremors  MUSCULOSKELETAL: No joint pain, no muscle pain  GENITOURINARY: no dysuria, no frequency, no hesitancy  PSYCHIATRY: no depression , no anxiety  ALL OTHER  ROS negative        Vital Signs Last 24 Hrs  T(C): 36.6 (27 Dec 2017 14:14), Max: 36.6 (27 Dec 2017 14:14)  T(F): 97.8 (27 Dec 2017 14:14), Max: 97.8 (27 Dec 2017 14:14)  HR: 63 (27 Dec 2017 14:14) (61 - 67)  BP: 123/69 (27 Dec 2017 14:14) (120/60 - 142/62)  BP(mean): --  RR: 16 (27 Dec 2017 14:14) (16 - 16)  SpO2: 100% (27 Dec 2017 14:14) (99% - 100%)    ________________________________________________  PHYSICAL EXAM:  GENERAL: NAD  HEENT: Normocephalic;  conjunctivae and sclerae clear; moist mucous membranes;   NECK : supple  CHEST/LUNG: Clear to auscultation bilaterally with good air entry   HEART: S1 S2  regular; no murmurs, gallops or rubs  ABDOMEN: Soft, Nontender, Nondistended; Bowel sounds present  EXTREMITIES: no cyanosis; no edema; no calf tenderness  SKIN: warm and dry; no rash  NERVOUS SYSTEM:  Awake and alert; Oriented  to place, person and time ; no new deficits    _________________________________________________  LABS:                        12.7   5.8   )-----------( 130      ( 27 Dec 2017 07:52 )             38.8     12-27    137  |  104  |  10  ----------------------------<  123<H>  3.1<L>   |  25  |  0.92    Ca    7.7<L>      27 Dec 2017 07:52  Phos  1.6     12-27  Mg     1.8     12-26          CAPILLARY BLOOD GLUCOSE      POCT Blood Glucose.: 140 mg/dL (27 Dec 2017 11:09)  POCT Blood Glucose.: 119 mg/dL (27 Dec 2017 05:43)  POCT Blood Glucose.: 96 mg/dL (27 Dec 2017 02:00)        RADIOLOGY & ADDITIONAL TESTS:    Imaging Personally Reviewed:  YES    Consultant(s) Notes Reviewed:   YES    Care Discussed with Consultants :     Plan of care was discussed with patient and /or primary care giver; all questions and concerns were addressed and care was aligned with patient's wishes.

## 2017-12-27 NOTE — PROGRESS NOTE ADULT - SUBJECTIVE AND OBJECTIVE BOX
pt seen and examined.pts current chart reviewed and case discussed with resident covering.    SUBJECTIVE:  pt feels fine and denies cp,sob,or gu symptons    REVIEW OF SYSTEMS:  CONSTITUTIONAL: No weakness, fevers or chills  EYES/ENT: No visual changes;  No vertigo or throat pain   NECK: No pain or stiffness  RESPIRATORY: No cough, wheezing, hemoptysis; No shortness of breath  CARDIOVASCULAR: No chest pain or palpitations  GASTROINTESTINAL: No abdominal or epigastric pain. No nausea, vomiting, or hematemesis; +diarrhea , no constipation. No melena or hematochezia.  GENITOURINARY: No dysuria, frequency , hematuria, flank pain or nocturia  NEUROLOGICAL: No numbness or weakness  SKIN: No itching, burning, rashes, or lesions   All other review of systems is negative unless indicated above    Current meds:    acetaminophen   Tablet 650 milliGRAM(s) Oral every 6 hours PRN  atorvastatin 10 milliGRAM(s) Oral at bedtime  ciprofloxacin   IVPB 400 milliGRAM(s) IV Intermittent every 12 hours  enoxaparin Injectable 40 milliGRAM(s) SubCutaneous daily  finasteride 5 milliGRAM(s) Oral daily  metroNIDAZOLE    Tablet 500 milliGRAM(s) Oral every 8 hours  sodium chloride 0.9%. 1000 milliLiter(s) IV Continuous <Continuous>      Vital Signs: noted      PHYSICAL EXAM:  Constitutional: well developed, well nourished  and in nad  HEENT: PERRLA,  no icteric sclera and mild pallor of conjunctiva noted  Neck: No JVD, thyromegaly or adenopathy  Respiratory: reduced air entry lower lungs with no rales, wheezing or rhonchi  Cardiovascular: S1 and S2 normally heard  Gastrointestinal: soft, nondistended, nontender and normal bowel sounds heard  Extremities: No peripheral edema or cyanosis  Neurological: A/O x 3, no focal deficits  : No flank or cva tenderness palpated.  Skin: No rashes      LABS:    CBC:    Complete Blood Count + Automated Diff (12.27.17 @ 07:52)    WBC Count: 5.8: Test repeated. K/uL    RBC Count: 4.04 M/uL    Hemoglobin: 12.7 g/dL    Hematocrit: 38.8 %    Mean Cell Volume: 96.0 fl    Mean Cell Hemoglobin: 31.4 pg    Mean Cell Hemoglobin Conc: 32.7 gm/dL    Red Cell Distrib Width: 11.6 %    Platelet Count - Automated: 130 K/uL                          12.6   10.0  )-----------( 112      ( 26 Dec 2017 07:46 )             37.8           BMP:  Basic Metabolic Panel (12.27.17 @ 07:52)    Sodium, Serum: 137 mmol/L    Potassium, Serum: 3.1 mmol/L    Chloride, Serum: 104 mmol/L    Carbon Dioxide, Serum: 25 mmol/L    Anion Gap, Serum: 8 mmol/L    Blood Urea Nitrogen, Serum: 10 mg/dL    Creatinine, Serum: 0.92 mg/dL    Glucose, Serum: 123 mg/dL    Calcium, Total Serum: 7.7 mg/dL    eGFR if Non : 79: Interpretative comment    eGFR if : 92 mL/min/1.73M2  Phosphorus Level, Serum in AM (12.27.17 @ 07:52)    Phosphorus Level, Serum: 1.6 mg/dL        12-26    135  |  103  |  17  ----------------------------<  145<H>  3.8   |  24  |  1.03  12-25    136  |  103  |  24<H>  ----------------------------<  125<H>  3.4<L>   |  23  |  1.14  12-24    133<L>  |  97  |  30<H>  ----------------------------<  197<H>  3.8   |  26  |  1.46<H>    Ca    7.4<L>      26 Dec 2017 07:46  Ca    7.6<L>      25 Dec 2017 11:43  Ca    8.4      24 Dec 2017 22:05  Phos  2.3     12-26  Phos  2.0     12-25  Mg     1.8     12-26  Mg     1.7     12-25    TPro  7.3  /  Alb  3.1<L>  /  TBili  0.6  /  DBili  x   /  AST  19  /  ALT  17  /  AlkPhos  50  12-24      Thyroid Stimulating Hormone, Serum: 0.26 uU/mL (12-26 @ 07:46)      URINE STUDIES:        Creatinine, Random Urine: 101 mg/dL (12-26 @ 09:57)  Protein/Creatinine Ratio Calculation: 0.4 Ratio (12-26 @ 09:57)  (mild) pt seen and examined.pts current chart reviewed and case discussed with resident covering.    SUBJECTIVE:  pt feels fine and denies cp,sob, gi or gu symptons    REVIEW OF SYSTEMS:  CONSTITUTIONAL: No weakness, fevers or chills  EYES/ENT: No visual changes;  No vertigo or throat pain   NECK: No pain or stiffness  RESPIRATORY: No cough, wheezing, hemoptysis; No shortness of breath  CARDIOVASCULAR: No chest pain or palpitations  GASTROINTESTINAL: No abdominal or epigastric pain. No nausea, vomiting, or hematemesis; no diarrhea , no constipation. No melena or hematochezia.  GENITOURINARY: No dysuria, frequency , hematuria, flank pain or nocturia  NEUROLOGICAL: No numbness or weakness  SKIN: No itching, burning, rashes, or lesions   All other review of systems is negative unless indicated above    Current meds:    acetaminophen   Tablet 650 milliGRAM(s) Oral every 6 hours PRN  atorvastatin 10 milliGRAM(s) Oral at bedtime  ciprofloxacin   IVPB 400 milliGRAM(s) IV Intermittent every 12 hours  enoxaparin Injectable 40 milliGRAM(s) SubCutaneous daily  finasteride 5 milliGRAM(s) Oral daily  metroNIDAZOLE    Tablet 500 milliGRAM(s) Oral every 8 hours  sodium chloride 0.9%. 1000 milliLiter(s) IV Continuous <Continuous>      Vital Signs: noted      PHYSICAL EXAM:  Constitutional: well developed, well nourished  and in nad  HEENT: PERRLA,  no icteric sclera and mild pallor of conjunctiva noted  Neck: No JVD, thyromegaly or adenopathy  Respiratory: reduced air entry lower lungs with no rales, wheezing or rhonchi  Cardiovascular: S1 and S2 normally heard  Gastrointestinal: soft, nondistended, nontender and normal bowel sounds heard  Extremities: No peripheral edema or cyanosis  Neurological: A/O x 3, no focal deficits  : No flank or cva tenderness palpated.  Skin: No rashes      LABS:    CBC:    Complete Blood Count + Automated Diff (12.27.17 @ 07:52)    WBC Count: 5.8: Test repeated. K/uL    RBC Count: 4.04 M/uL    Hemoglobin: 12.7 g/dL    Hematocrit: 38.8 %    Mean Cell Volume: 96.0 fl    Mean Cell Hemoglobin: 31.4 pg    Mean Cell Hemoglobin Conc: 32.7 gm/dL    Red Cell Distrib Width: 11.6 %    Platelet Count - Automated: 130 K/uL                          12.6   10.0  )-----------( 112      ( 26 Dec 2017 07:46 )             37.8           BMP:  Basic Metabolic Panel (12.27.17 @ 07:52)    Sodium, Serum: 137 mmol/L    Potassium, Serum: 3.1 mmol/L    Chloride, Serum: 104 mmol/L    Carbon Dioxide, Serum: 25 mmol/L    Anion Gap, Serum: 8 mmol/L    Blood Urea Nitrogen, Serum: 10 mg/dL    Creatinine, Serum: 0.92 mg/dL    Glucose, Serum: 123 mg/dL    Calcium, Total Serum: 7.7 mg/dL    eGFR if Non : 79: Interpretative comment    eGFR if : 92 mL/min/1.73M2  Phosphorus Level, Serum in AM (12.27.17 @ 07:52)    Phosphorus Level, Serum: 1.6 mg/dL        12-26    135  |  103  |  17  ----------------------------<  145<H>  3.8   |  24  |  1.03  12-25    136  |  103  |  24<H>  ----------------------------<  125<H>  3.4<L>   |  23  |  1.14  12-24    133<L>  |  97  |  30<H>  ----------------------------<  197<H>  3.8   |  26  |  1.46<H>    Ca    7.4<L>      26 Dec 2017 07:46  Ca    7.6<L>      25 Dec 2017 11:43  Ca    8.4      24 Dec 2017 22:05  Phos  2.3     12-26  Phos  2.0     12-25  Mg     1.8     12-26  Mg     1.7     12-25    TPro  7.3  /  Alb  3.1<L>  /  TBili  0.6  /  DBili  x   /  AST  19  /  ALT  17  /  AlkPhos  50  12-24      Thyroid Stimulating Hormone, Serum: 0.26 uU/mL (12-26 @ 07:46)      URINE STUDIES:        Creatinine, Random Urine: 101 mg/dL (12-26 @ 09:57)  Protein/Creatinine Ratio Calculation: 0.4 Ratio (12-26 @ 09:57)  (mild)

## 2017-12-27 NOTE — PROGRESS NOTE ADULT - PROBLEM SELECTOR PLAN 1
Stool culture negative  Stool Occult blood positive/Hemoglobin stable  Pt had one episode of diarrhea today.  Dr. Romano changed patient's IV antibiotics to PO, and diet advanced to ADA/DASH and tolerating well.    Blood cultures + for gram + cocci in clusters;  However, afebrile/no  leukocytosis.  Repeat BC ordered.  Dr. Yousif, ID, to consult/AWAITING Stool culture negative  Stool Occult blood positive/Hemoglobin stable  Pt had one episode of diarrhea today.  Dr. Romano changed patient's IV antibiotics to PO, and diet advanced to ADA/DASH and tolerating well.    Blood cultures + for gram + cocci in clusters;  However, afebrile/no  leukocytosis.  Repeat BC ordered.  Dr. Yousif, ID, to consult/AWAITING  -will repeat D/C and cont on current abx

## 2017-12-27 NOTE — PROGRESS NOTE ADULT - SUBJECTIVE AND OBJECTIVE BOX
HPI:  76 y/o M  with a significant PMHx of DM (on Metformin), HTN (lisinopril), HLD (atorvastatin) , BPH (Finasteride) and a significant PSHx of R inguinal hernia repair, and abdominal surgery 2/2 SBO presents to the ED c/o vomiting and diarrhea x2 days. Patient states on 12/23 he had 1 episode of soft BM, on 12/24 he had multiple episodes of watery diarrhea. Denies abdominal pain. He came to the ER due to weakness and multiple episodes of diarrhea. Also states having about 2 episodes of vomiting in 2 days, and subjective fever and chills. Denies any recent use of antbiotics or eating at a resteraunt as well as eating left overs or poorly cooked food . . . Denies anyone in family with similar symptoms. No other complaints at this time.afebrile on adm with stable vss and leucocytosis . ct abd and pelvis with mild thickening of left side of colon . started on cipro and flagyl. stool pcr and cx neg including ova and parasites as well as c.diff  . ID called since blood cx pos for coag neg staph . denies having any foreign body , no valve replacement , no prosthetic joints     SH: Retired, denies smoking, alcohol or illicit drug use. Lives with wife, walks independently  FH: DM, HTN  Allergies: NKDA  Pharmacy: [facility] [address] (25 Dec 2017 10:57)      PAST MEDICAL & SURGICAL HISTORY:  BPH (benign prostatic hyperplasia)  HLD (hyperlipidemia)  HTN (hypertension)  DM (diabetes mellitus)  Diabetes  No significant past surgical history  S/P abdominal surgery, follow-up exam: sbo  S/P hernia surgery: r.inguinal      No Known Allergies      acetaminophen   Tablet 650 milliGRAM(s) Oral every 6 hours PRN  atorvastatin 10 milliGRAM(s) Oral at bedtime  ciprofloxacin     Tablet 500 milliGRAM(s) Oral every 12 hours  enoxaparin Injectable 40 milliGRAM(s) SubCutaneous daily  finasteride 5 milliGRAM(s) Oral daily  insulin lispro (HumaLOG) corrective regimen sliding scale   SubCutaneous three times a day before meals  lisinopril 2.5 milliGRAM(s) Oral daily  metroNIDAZOLE    Tablet 500 milliGRAM(s) Oral every 8 hours      Social Hx: no smoking no etoh     FAMILY HISTORY:non contributory         ROS  [  ] UNABLE TO ELICIT    General:  [ - ] Fever   [ - ] Malaise    Skin:no rash     HEENT:  [ - ] Sore Throat  [ - ] Photophobia	    Chest: [ - ] SOB  [ - ] Cough     Cardiovascular: [  -] CP	no pnd no orthopnea     Gastrointestinal: [ - ] Abd pain   [ x ] N    [ x ] V   [ x ] Diarrhea	    Genitourinary: [ - ] Polyuria   [ - ] Urgency   [  -] Dysuria    [-  ]  Hematuria	    Musculoskeletal:  [ - ] Back Pain	    Neurological: [-  ]Dizziness  [ - ]Visual Disturbance  [ - ]Headaches      PHYSICAL EXAM:    Vital Signs Last 24 Hrs  T(C): 36.4 (27 Dec 2017 20:33), Max: 36.6 (27 Dec 2017 14:14)  T(F): 97.5 (27 Dec 2017 20:33), Max: 97.8 (27 Dec 2017 14:14)  HR: 65 (27 Dec 2017 20:33) (63 - 67)  BP: 130/89 (27 Dec 2017 20:33) (123/69 - 142/62)  BP(mean): --  RR: 16 (27 Dec 2017 20:33) (16 - 16)  SpO2: 98% (27 Dec 2017 20:33) (98% - 100%)    Constitutional: awake alert oriented times 3   TONY SCLERA anicteric EOMI   LUNGS clear   CVS s1 s2 aud no murmurs   ABDOMEN soft non tender no HSM scar of surgery   NEUROLOGY  no defecits   SKIN no rash   EXTREMITIES no edema no cyanosis         LABS/DIAGNOSTIC TESTS                          12.7   5.8   )-----------( 130      ( 27 Dec 2017 07:52 )             38.8     WBC Count: 5.8 K/uL (12-27 @ 07:52)  WBC Count: 10.0 K/uL (12-26 @ 07:46)  WBC Count: 17.6 K/uL (12-25 @ 11:43)  WBC Count: 20.1 K/uL (12-24 @ 22:05)      12-27    137  |  104  |  10  ----------------------------<  123<H>  3.1<L>   |  25  |  0.92    Ca    7.7<L>      27 Dec 2017 07:52  Phos  1.6     12-27  Mg     1.8     12-26                    LACTATE:Lactate, Blood (12.25.17 @ 03:21)    Lactate, Blood: 1.1 mmol/L          Culture Results: stool   No enteric pathogens isolated.  (Stool culture examined for Salmonella,  Shigella, Campylobacter, Aeromonas, Plesiomonas,  Vibrio, E.coli O157 and Yersinia) (12-25 @ 10:14)  Culture Results: stool   No Protozoa seen by trichrome stain  No Helminths or Protozoa seen in formalin concentrate  performed by iodine stain  (routine O+P not evaluated for Microsporidia,  Cryptosporidia, Cyclospora, or Isospora.)  Note: One negative specimen does not rule  out the possibility of a parasitic infection. (12-25 @ 10:12)  Culture Results: urine   10,000 - 49,000 CFU/mL Coag Negative Staphylococcus  Normal Urogenital marycarmen present (12-25 @ 10:03)  Culture Results: blood   Growth in anaerobic bottle: Coag Negative Staphylococcus  ***Blood Panel PCR results on this specimen are available  approximately 3 hours after the Gram stain result.***  Gram stain, PCR, and/or culture results may not always  correspond due to difference in methodologies.  ************************************************************  This PCR assay was performed using [employer].  The following targets are tested for: Enterococcus,  vancomycin resistant enterococci, Listeria monocytogenes,  coagulase negative staphylococci, S. aureus,  methicillin resistant S. aureus, Streptococcus agalactiae  (Group B), S. pneumoniae, S. pyogenes (Group A),  Acinetobacter baumannii, Enterobacter cloacae, E. coli,  Klebsiella oxytoca, K. pneumoniae, Proteus sp.,  Serratia marcescens, Haemophilus influenzae,  Neisseria meningitidis, Pseudomonas aeruginosa, Candida  albicans, C. glabrata, C krusei, C parapsilosis,  C. tropicalis and the KPC resistance gene. (12-25 @ 09:57)  Culture Results: blood  Growth in anaerobic bottle: Coag Negative Staphylococcus (12-25 @ 09:57)  Culture Results: blood  No growth to date. (12-25 @ 09:56)  Culture Results: blood  No growth to date. (12-25 @ 09:56)    Clostridium difficile Toxin by PCR (12.25.17 @ 09:41) neg     Clostridium difficile Toxin by PCR: The results of this test should be interpreted with consideration of all  clinical and laboratory findings. This test determines the presence of  the C. difficile tcdB gene at a given time and is not intended to  identify antibiotic associated disease or C. difficile infection without  clinical context. Successful treatment is based on the resolution of  clinical symptoms. This test should not be used as a "test of cure"  because C. difficile DNA will persist after successful treatment. Repeat  testing will not be permitted.    This test is performed on the BD MAX system using Real-Time PCR and  fluorogenic target-specific hybridization.    C Diff by PCR Result: NotDetec            RADIOLOGY    < from: CT Abdomen and Pelvis w/ Oral Cont and w/ IV Cont (12.25.17 @ 03:04) >  EXAM:  CT ABDOMEN AND PELVIS OC IC                            PROCEDURE DATE:  12/25/2017          INTERPRETATION:  CLINICAL INFORMATION: Abdominal pain and diarrhea    COMPARISON: None    PROCEDURE:   CT of the Abdomen and Pelvis was performed with intravenous contrast.   Intravenous contrast: 90 ml Omnipaque 350. 10 ml discarded.  Oral contrast: positive contrast was administered.  Sagittal and coronal reformats were performed.    FINDINGS:    LOWER CHEST: Within normal limits.    LIVER: Within normal limits.  BILE DUCTS: Normal caliber.  GALLBLADDER: Within normal limits.  SPLEEN: Within normal limits.  PANCREAS: Within normal limits.  ADRENALS: Within normal limits.  KIDNEYS/URETERS: Within normal limits.    BLADDER: Within normal limits.  REPRODUCTIVE ORGANS: Prostatomegaly     BOWEL: No bowel obstruction. Mild colonic wall thickening from the mid   transverse colon to rectum. Normal appendix.  PERITONEUM: No ascites.  VESSELS:  Within normal limits.  RETROPERITONEUM: No lymphadenopathy.    ABDOMINAL WALL: Small fat-containing left inguinal hernia.  BONES: Degenerative changes of the spine.    IMPRESSION: Colitis of the likely infectious versus inflammatory etiology.    < end of copied text >

## 2017-12-28 ENCOUNTER — TRANSCRIPTION ENCOUNTER (OUTPATIENT)
Age: 77
End: 2017-12-28

## 2017-12-28 VITALS
OXYGEN SATURATION: 96 % | RESPIRATION RATE: 16 BRPM | DIASTOLIC BLOOD PRESSURE: 68 MMHG | TEMPERATURE: 98 F | HEART RATE: 71 BPM | SYSTOLIC BLOOD PRESSURE: 127 MMHG

## 2017-12-28 LAB
-  AMPICILLIN/SULBACTAM: SIGNIFICANT CHANGE UP
-  CEFAZOLIN: SIGNIFICANT CHANGE UP
-  CIPROFLOXACIN: SIGNIFICANT CHANGE UP
-  CLINDAMYCIN: SIGNIFICANT CHANGE UP
-  ERYTHROMYCIN: SIGNIFICANT CHANGE UP
-  GENTAMICIN: SIGNIFICANT CHANGE UP
-  LEVOFLOXACIN: SIGNIFICANT CHANGE UP
-  MOXIFLOXACIN(AEROBIC): SIGNIFICANT CHANGE UP
-  OXACILLIN: SIGNIFICANT CHANGE UP
-  PENICILLIN: SIGNIFICANT CHANGE UP
-  RIFAMPIN: SIGNIFICANT CHANGE UP
-  TETRACYCLINE: SIGNIFICANT CHANGE UP
-  TRIMETHOPRIM/SULFAMETHOXAZOLE: SIGNIFICANT CHANGE UP
-  VANCOMYCIN: SIGNIFICANT CHANGE UP
ANION GAP SERPL CALC-SCNC: 9 MMOL/L — SIGNIFICANT CHANGE UP (ref 5–17)
BUN SERPL-MCNC: 11 MG/DL — SIGNIFICANT CHANGE UP (ref 7–18)
CALCIUM SERPL-MCNC: 7.7 MG/DL — LOW (ref 8.4–10.5)
CHLORIDE SERPL-SCNC: 107 MMOL/L — SIGNIFICANT CHANGE UP (ref 96–108)
CO2 SERPL-SCNC: 24 MMOL/L — SIGNIFICANT CHANGE UP (ref 22–31)
CREAT SERPL-MCNC: 0.86 MG/DL — SIGNIFICANT CHANGE UP (ref 0.5–1.3)
CULTURE RESULTS: SIGNIFICANT CHANGE UP
GLUCOSE BLDC GLUCOMTR-MCNC: 101 MG/DL — HIGH (ref 70–99)
GLUCOSE BLDC GLUCOMTR-MCNC: 118 MG/DL — HIGH (ref 70–99)
GLUCOSE BLDC GLUCOMTR-MCNC: 133 MG/DL — HIGH (ref 70–99)
GLUCOSE SERPL-MCNC: 106 MG/DL — HIGH (ref 70–99)
HCT VFR BLD CALC: 32.8 % — LOW (ref 39–50)
HGB BLD-MCNC: 11.5 G/DL — LOW (ref 13–17)
MCHC RBC-ENTMCNC: 33.7 PG — SIGNIFICANT CHANGE UP (ref 27–34)
MCHC RBC-ENTMCNC: 35.1 GM/DL — SIGNIFICANT CHANGE UP (ref 32–36)
MCV RBC AUTO: 96 FL — SIGNIFICANT CHANGE UP (ref 80–100)
METHOD TYPE: SIGNIFICANT CHANGE UP
ORGANISM # SPEC MICROSCOPIC CNT: SIGNIFICANT CHANGE UP
ORGANISM # SPEC MICROSCOPIC CNT: SIGNIFICANT CHANGE UP
PHOSPHATE SERPL-MCNC: 2 MG/DL — LOW (ref 2.5–4.5)
PLATELET # BLD AUTO: 117 K/UL — LOW (ref 150–400)
POTASSIUM SERPL-MCNC: 3.5 MMOL/L — SIGNIFICANT CHANGE UP (ref 3.5–5.3)
POTASSIUM SERPL-SCNC: 3.5 MMOL/L — SIGNIFICANT CHANGE UP (ref 3.5–5.3)
RBC # BLD: 3.42 M/UL — LOW (ref 4.2–5.8)
RBC # FLD: 11.7 % — SIGNIFICANT CHANGE UP (ref 10.3–14.5)
SODIUM SERPL-SCNC: 140 MMOL/L — SIGNIFICANT CHANGE UP (ref 135–145)
SPECIMEN SOURCE: SIGNIFICANT CHANGE UP
WBC # BLD: 4.6 K/UL — SIGNIFICANT CHANGE UP (ref 3.8–10.5)
WBC # FLD AUTO: 4.6 K/UL — SIGNIFICANT CHANGE UP (ref 3.8–10.5)

## 2017-12-28 PROCEDURE — 36415 COLL VENOUS BLD VENIPUNCTURE: CPT

## 2017-12-28 PROCEDURE — 80053 COMPREHEN METABOLIC PANEL: CPT

## 2017-12-28 PROCEDURE — 81001 URINALYSIS AUTO W/SCOPE: CPT

## 2017-12-28 PROCEDURE — 83690 ASSAY OF LIPASE: CPT

## 2017-12-28 PROCEDURE — 93005 ELECTROCARDIOGRAM TRACING: CPT

## 2017-12-28 PROCEDURE — 84443 ASSAY THYROID STIM HORMONE: CPT

## 2017-12-28 PROCEDURE — 83036 HEMOGLOBIN GLYCOSYLATED A1C: CPT

## 2017-12-28 PROCEDURE — 80048 BASIC METABOLIC PNL TOTAL CA: CPT

## 2017-12-28 PROCEDURE — 87086 URINE CULTURE/COLONY COUNT: CPT

## 2017-12-28 PROCEDURE — 84484 ASSAY OF TROPONIN QUANT: CPT

## 2017-12-28 PROCEDURE — 85027 COMPLETE CBC AUTOMATED: CPT

## 2017-12-28 PROCEDURE — 87150 DNA/RNA AMPLIFIED PROBE: CPT

## 2017-12-28 PROCEDURE — 82272 OCCULT BLD FECES 1-3 TESTS: CPT

## 2017-12-28 PROCEDURE — 84145 PROCALCITONIN (PCT): CPT

## 2017-12-28 PROCEDURE — 87177 OVA AND PARASITES SMEARS: CPT

## 2017-12-28 PROCEDURE — 87040 BLOOD CULTURE FOR BACTERIA: CPT

## 2017-12-28 PROCEDURE — 74177 CT ABD & PELVIS W/CONTRAST: CPT

## 2017-12-28 PROCEDURE — 84156 ASSAY OF PROTEIN URINE: CPT

## 2017-12-28 PROCEDURE — 84100 ASSAY OF PHOSPHORUS: CPT

## 2017-12-28 PROCEDURE — 87493 C DIFF AMPLIFIED PROBE: CPT

## 2017-12-28 PROCEDURE — 83735 ASSAY OF MAGNESIUM: CPT

## 2017-12-28 PROCEDURE — 84439 ASSAY OF FREE THYROXINE: CPT

## 2017-12-28 PROCEDURE — 87186 SC STD MICRODIL/AGAR DIL: CPT

## 2017-12-28 PROCEDURE — 87046 STOOL CULTR AEROBIC BACT EA: CPT

## 2017-12-28 PROCEDURE — 82962 GLUCOSE BLOOD TEST: CPT

## 2017-12-28 PROCEDURE — 83605 ASSAY OF LACTIC ACID: CPT

## 2017-12-28 PROCEDURE — 87045 FECES CULTURE AEROBIC BACT: CPT

## 2017-12-28 PROCEDURE — 82330 ASSAY OF CALCIUM: CPT

## 2017-12-28 PROCEDURE — 80061 LIPID PANEL: CPT

## 2017-12-28 PROCEDURE — 84481 FREE ASSAY (FT-3): CPT

## 2017-12-28 PROCEDURE — 82150 ASSAY OF AMYLASE: CPT

## 2017-12-28 PROCEDURE — 99285 EMERGENCY DEPT VISIT HI MDM: CPT | Mod: 25

## 2017-12-28 RX ORDER — METRONIDAZOLE 500 MG
1 TABLET ORAL
Qty: 21 | Refills: 0 | OUTPATIENT
Start: 2017-12-28 | End: 2018-01-03

## 2017-12-28 RX ORDER — CIPROFLOXACIN LACTATE 400MG/40ML
1 VIAL (ML) INTRAVENOUS
Qty: 14 | Refills: 0 | OUTPATIENT
Start: 2017-12-28 | End: 2018-01-03

## 2017-12-28 RX ORDER — POTASSIUM PHOSPHATE, MONOBASIC POTASSIUM PHOSPHATE, DIBASIC 236; 224 MG/ML; MG/ML
15 INJECTION, SOLUTION INTRAVENOUS ONCE
Qty: 0 | Refills: 0 | Status: COMPLETED | OUTPATIENT
Start: 2017-12-28 | End: 2017-12-28

## 2017-12-28 RX ORDER — FINASTERIDE 5 MG/1
5 TABLET, FILM COATED ORAL
Qty: 0 | Refills: 0 | COMMUNITY

## 2017-12-28 RX ADMIN — Medication 500 MILLIGRAM(S): at 06:28

## 2017-12-28 RX ADMIN — LISINOPRIL 2.5 MILLIGRAM(S): 2.5 TABLET ORAL at 06:28

## 2017-12-28 RX ADMIN — POTASSIUM PHOSPHATE, MONOBASIC POTASSIUM PHOSPHATE, DIBASIC 62.5 MILLIMOLE(S): 236; 224 INJECTION, SOLUTION INTRAVENOUS at 11:03

## 2017-12-28 RX ADMIN — Medication 500 MILLIGRAM(S): at 13:50

## 2017-12-28 RX ADMIN — ENOXAPARIN SODIUM 40 MILLIGRAM(S): 100 INJECTION SUBCUTANEOUS at 11:03

## 2017-12-28 RX ADMIN — FINASTERIDE 5 MILLIGRAM(S): 5 TABLET, FILM COATED ORAL at 11:03

## 2017-12-28 NOTE — PROGRESS NOTE ADULT - PROBLEM SELECTOR PROBLEM 3
HLD (hyperlipidemia)
Hyponatremia
PELON (acute kidney injury)
PELON (acute kidney injury)

## 2017-12-28 NOTE — PROGRESS NOTE ADULT - PROBLEM SELECTOR PLAN 4
HgbA1C 6.0  Home oral med  HSS  Fingersticks monitored
HgbA1C 6.0  Home oral med held  HSS  Fingersticks monitored
c/w finasteride 5mg qday
secondary to poor po intake, now improving  -f/u phos level in am.
secondary to poor po intake, worsening today  -replace prn  -f/u phos level in am.  -keep phos level>2.5
secondary to poor po intake,mild  -s/p iv phospahte given this am  -f/u phos level in 1 week  -keep phos level>2.5

## 2017-12-28 NOTE — PROGRESS NOTE ADULT - PROBLEM SELECTOR PLAN 2
Home antihypertensive held 2/2 blood pressure within goal  Blood pressure monitored  DASH diet
on Lisinopril 2.5 qday at home  - BP within goal for now  - Will hold off at present  - restart as needed
secondary to improved gfr plus gi losses, now low nl on Lisinopril  -keep k >4 and <5.
secondary to improved gfr plus gi losses, now nl  -f/u k level in am  -keep k >4 and <5.
secondary to improved gfr plus gi losses,mild  -replace prn  -keep k >4 and <5.
Home antihypertensive held 2/2 blood pressure within goal  Blood pressure monitored

## 2017-12-28 NOTE — PROGRESS NOTE ADULT - PROBLEM SELECTOR PLAN 1
r/o secondary to hypovolemia ,secondary to gi losses, now improved and may be at baseline  -Keep patient euvolemic and renal diet  -Avoid Nephrotoxic Meds/ Agents such as (NSAIDs, IV contrast, Aminoglycosides such as gentamicin, -Gadolinium contrast, Phosphate containing enemas, etc..)  -Adjust Medications according to eGFR  -f/u bmp in 1 week at pcp office to check k and cr a spt is on Lisinopril

## 2017-12-28 NOTE — PROGRESS NOTE ADULT - PROBLEM SELECTOR PROBLEM 6
UTI (urinary tract infection)
Hypertension, unspecified type
Need for prophylactic measure
UTI (urinary tract infection)

## 2017-12-28 NOTE — DISCHARGE NOTE ADULT - HOSPITAL COURSE
78 y/o M  lives home with wife with a significant PMHx of DM (on Metformin), HTN (lisinopril), HLD (atorvastatin) , BPH (Finasteride) and a significant PSHx of R inguinal hernia repair, and abdominal surgery 2/2 SBO presented to the ED c/o vomiting and diarrhea x 2 days. Patient stated on 12/23 he had 1 episode of soft BM, on 12/24 he had multiple episodes of watery diarrhea. Denied abdominal pain. He came to the ER due to weakness and multiple episodes of diarrhea. Also stated having about 2 episodes of vomiting in 2 days, and subjective fever and chills. Denied any recent use of antbiotics.     In the ED, pt's had leukocytosis at 20.1, lactate was 3 and temperature was 102.3.  He was given two bolus's and started on maintenance IVF as well as IV Cipro/Flagyl.  CT showed colitis, likely infectious versus inflammatory etiology.      Pt was admitted with sepsis 2/2 to acute colitis.    Sepsis 2/2 to acute colitis, likely infectious etiology    CT A/P as above.  Lactate wnl s/p 2L bolus  Pt continued with maintenance IVF and IV Cipro and Flagyl.    Dr. Romano, GI, consulted  C-Diff negative  Stool culture negative  Stool Occult blood positive/Hemoglobin stable  Pt afebrile with no leukocytosis.    Dr. Romano changed patient's IV antibiotics to PO, and diet advanced to ADA/DASH and tolerating well.    Blood cultures + for gram + cocci in clusters and gram negative staph;  However, afebrile with no leukocytosis.  Dr. Yousif, ID, consulted and ordered repeat BC's which were negative to date.     UTI  UA+.  UC with 10,000-49,000 negative staph  Pt asymptomatic/afebrile  Dr. Yousif, ID, consulted    HTN (hypertension)  Home antihypertensive held 2/2 blood pressure within goal  and later restarted  Blood pressure monitored/stable    PELON  cr monitored  Sodium/potassium levels normal  Normal Saline IV 75 ml/hr  Dr. Henriquez, renal, consulted  Lisinopril 2.5 mg., PO, started for proteinurea  Resolved with cr 0.86    HLD (hyperlipidemia).    Lipid profile WNL  c/w atorvastatin 10mg qday.     BPH (benign prostatic hyperplasia).    finasteride 5mg qday.     DM (diabetes mellitus).    HgbA1C 6.0  Home oral med held  HSS  Fingersticks monitored    Need for prophylactic measure.  IMPROVE- 2 (Age, immobilization)  Lovenox subq    Attending cleared patient for discharge home with oral Cipro and Flagyl for seven days.  Pt is to followup with his PCP in one week for mild decrease in platelet count (117) as well as followup care.

## 2017-12-28 NOTE — PROGRESS NOTE ADULT - PROBLEM SELECTOR PLAN 6
UC with 10-49,000 negative staph  Pt asymptomatic/afebrile/no leukocytosis
-bp is acceptble  -keep bp>120 and <130 systolic.
IMPROVE- 2 (Age, immobilization)  Lovenox
UC with 10-49,000 negative staph  Pt asymptomatic/afebrile/no leukocytosis  Dr. Yousif, ID, to consult

## 2017-12-28 NOTE — PROGRESS NOTE ADULT - PROBLEM SELECTOR PROBLEM 4
DM (diabetes mellitus)
BPH (benign prostatic hyperplasia)
DM (diabetes mellitus)
Hypophosphatemia

## 2017-12-28 NOTE — DISCHARGE NOTE ADULT - MEDICATION SUMMARY - MEDICATIONS TO TAKE
I will START or STAY ON the medications listed below when I get home from the hospital:    finasteride 5 mg oral tablet  -- 1 tab(s) by mouth once a day  -- Indication: For BPH (benign prostatic hyperplasia)    metroNIDAZOLE 500 mg oral tablet  -- 1 tab(s) by mouth every 8 hours  -- Indication: For Colitis, acute    lisinopril 2.5 mg oral tablet  -- 1 tab(s) by mouth once a day  -- Indication: For HTN (hypertension)/proteinurea    metformin 500 mg oral tablet, extended release  -- 1 tab(s) by mouth once a day  -- Indication: For DM (diabetes mellitus)    atorvastatin 10 mg oral tablet  -- 1 tab(s) by mouth once a day  -- Indication: For HLD (hyperlipidemia)    ciprofloxacin 500 mg oral tablet  -- 1 tab(s) by mouth every 12 hours  -- Indication: For Colitis, acute    Vitamin D2 50,000 intl units (1.25 mg) oral capsule  -- 1 cap(s) by mouth once a week  -- Indication: For Vit D deficiency

## 2017-12-28 NOTE — DISCHARGE NOTE ADULT - PATIENT PORTAL LINK FT
“You can access the [employer] Patient Portal, offered by [facility], by registering with the following website: [email or web address]”

## 2017-12-28 NOTE — DISCHARGE NOTE ADULT - SECONDARY DIAGNOSIS.
BPH (benign prostatic hyperplasia) DM (diabetes mellitus) HLD (hyperlipidemia) Hypertension, unspecified type Proteinuria, unspecified type UTI (urinary tract infection)

## 2017-12-28 NOTE — PROGRESS NOTE ADULT - SUBJECTIVE AND OBJECTIVE BOX
pt seen and examined.pts current chart reviewed and case discussed with resident covering.    SUBJECTIVE:  pt is doing better and denies cp,sob, gi or gu symptons    Current meds:    acetaminophen   Tablet 650 milliGRAM(s) Oral every 6 hours PRN  atorvastatin 10 milliGRAM(s) Oral at bedtime  ciprofloxacin   IVPB 400 milliGRAM(s) IV Intermittent every 12 hours  enoxaparin Injectable 40 milliGRAM(s) SubCutaneous daily  finasteride 5 milliGRAM(s) Oral daily  metroNIDAZOLE    Tablet 500 milliGRAM(s) Oral every 8 hours  sodium chloride 0.9%. 1000 milliLiter(s) IV Continuous <Continuous>      Vital Signs: noted      PHYSICAL EXAM:  Constitutional: well developed, well nourished  and in nad  HEENT: PERRLA,  no icteric sclera and mild pallor of conjunctiva noted  Neck: No JVD, thyromegaly or adenopathy  Respiratory: reduced air entry lower lungs with no rales, wheezing or rhonchi  Cardiovascular: S1 and S2 normally heard  Gastrointestinal: soft, nondistended, nontender and normal bowel sounds heard  Extremities: No peripheral edema or cyanosis  Neurological: A/O x 3, no focal deficits  : No flank or cva tenderness palpated.  Skin: No rashes      LABS:    CBC:  Complete Blood Count (12.28.17 @ 07:31)    WBC Count: 4.6 K/uL    RBC Count: 3.42 M/uL    Hemoglobin: 11.5 g/dL    Hematocrit: 32.8 %    Mean Cell Volume: 96.0 fl    Mean Cell Hemoglobin: 33.7 pg    Mean Cell Hemoglobin Conc: 35.1 gm/dL    Red Cell Distrib Width: 11.7 %    Platelet Count - Automated: 117 K/uL        Complete Blood Count + Automated Diff (12.27.17 @ 07:52)    WBC Count: 5.8: Test repeated. K/uL    RBC Count: 4.04 M/uL    Hemoglobin: 12.7 g/dL    Hematocrit: 38.8 %    Mean Cell Volume: 96.0 fl    Mean Cell Hemoglobin: 31.4 pg    Mean Cell Hemoglobin Conc: 32.7 gm/dL    Red Cell Distrib Width: 11.6 %    Platelet Count - Automated: 130 K/uL                          12.6   10.0  )-----------( 112      ( 26 Dec 2017 07:46 )             37.8           BMP:  Basic Metabolic Panel (12.28.17 @ 07:31)    Sodium, Serum: 140 mmol/L    Potassium, Serum: 3.5 mmol/L    Chloride, Serum: 107 mmol/L    Carbon Dioxide, Serum: 24 mmol/L    Anion Gap, Serum: 9 mmol/L    Blood Urea Nitrogen, Serum: 11 mg/dL    Creatinine, Serum: 0.86 mg/dL    Glucose, Serum: 106 mg/dL    Calcium, Total Serum: 7.7 mg/dL    eGFR if Non : 83: Interpretative comment    eGFR if : 96 mL/min/1.73M2  Phosphorus Level, Serum in AM (12.28.17 @ 07:31)    Phosphorus Level, Serum: 2.0 mg/dL        Basic Metabolic Panel (12.27.17 @ 07:52)    Sodium, Serum: 137 mmol/L    Potassium, Serum: 3.1 mmol/L    Chloride, Serum: 104 mmol/L    Carbon Dioxide, Serum: 25 mmol/L    Anion Gap, Serum: 8 mmol/L    Blood Urea Nitrogen, Serum: 10 mg/dL    Creatinine, Serum: 0.92 mg/dL    Glucose, Serum: 123 mg/dL    Calcium, Total Serum: 7.7 mg/dL    eGFR if Non : 79: Interpretative comment    eGFR if : 92 mL/min/1.73M2  Phosphorus Level, Serum in AM (12.27.17 @ 07:52)    Phosphorus Level, Serum: 1.6 mg/dL        12-26    135  |  103  |  17  ----------------------------<  145<H>  3.8   |  24  |  1.03  12-25    136  |  103  |  24<H>  ----------------------------<  125<H>  3.4<L>   |  23  |  1.14  12-24    133<L>  |  97  |  30<H>  ----------------------------<  197<H>  3.8   |  26  |  1.46<H>    Ca    7.4<L>      26 Dec 2017 07:46  Ca    7.6<L>      25 Dec 2017 11:43  Ca    8.4      24 Dec 2017 22:05  Phos  2.3     12-26  Phos  2.0     12-25  Mg     1.8     12-26  Mg     1.7     12-25    TPro  7.3  /  Alb  3.1<L>  /  TBili  0.6  /  DBili  x   /  AST  19  /  ALT  17  /  AlkPhos  50  12-24      Thyroid Stimulating Hormone, Serum: 0.26 uU/mL (12-26 @ 07:46)      URINE STUDIES:        Creatinine, Random Urine: 101 mg/dL (12-26 @ 09:57)  Protein/Creatinine Ratio Calculation: 0.4 Ratio (12-26 @ 09:57)  (mild) pt seen and examined.    SUBJECTIVE:  pt is doing better and denies cp,sob, gi or gu symptons    Current meds:    acetaminophen   Tablet 650 milliGRAM(s) Oral every 6 hours PRN  atorvastatin 10 milliGRAM(s) Oral at bedtime  ciprofloxacin   IVPB 400 milliGRAM(s) IV Intermittent every 12 hours  enoxaparin Injectable 40 milliGRAM(s) SubCutaneous daily  finasteride 5 milliGRAM(s) Oral daily  metroNIDAZOLE    Tablet 500 milliGRAM(s) Oral every 8 hours  sodium chloride 0.9%. 1000 milliLiter(s) IV Continuous <Continuous>      Vital Signs: noted      PHYSICAL EXAM:  Constitutional: well developed, well nourished  and in nad  HEENT: PERRLA,  no icteric sclera and mild pallor of conjunctiva noted  Neck: No JVD, thyromegaly or adenopathy  Respiratory: reduced air entry lower lungs with no rales, wheezing or rhonchi  Cardiovascular: S1 and S2 normally heard  Gastrointestinal: soft, nondistended, nontender and normal bowel sounds heard  Extremities: No peripheral edema or cyanosis  Neurological: A/O x 3, no focal deficits  : No flank or cva tenderness palpated.  Skin: No rashes      LABS:    CBC:  Complete Blood Count (12.28.17 @ 07:31)    WBC Count: 4.6 K/uL    RBC Count: 3.42 M/uL    Hemoglobin: 11.5 g/dL    Hematocrit: 32.8 %    Mean Cell Volume: 96.0 fl    Mean Cell Hemoglobin: 33.7 pg    Mean Cell Hemoglobin Conc: 35.1 gm/dL    Red Cell Distrib Width: 11.7 %    Platelet Count - Automated: 117 K/uL        Complete Blood Count + Automated Diff (12.27.17 @ 07:52)    WBC Count: 5.8: Test repeated. K/uL    RBC Count: 4.04 M/uL    Hemoglobin: 12.7 g/dL    Hematocrit: 38.8 %    Mean Cell Volume: 96.0 fl    Mean Cell Hemoglobin: 31.4 pg    Mean Cell Hemoglobin Conc: 32.7 gm/dL    Red Cell Distrib Width: 11.6 %    Platelet Count - Automated: 130 K/uL                          12.6   10.0  )-----------( 112      ( 26 Dec 2017 07:46 )             37.8           BMP:  Basic Metabolic Panel (12.28.17 @ 07:31)    Sodium, Serum: 140 mmol/L    Potassium, Serum: 3.5 mmol/L    Chloride, Serum: 107 mmol/L    Carbon Dioxide, Serum: 24 mmol/L    Anion Gap, Serum: 9 mmol/L    Blood Urea Nitrogen, Serum: 11 mg/dL    Creatinine, Serum: 0.86 mg/dL    Glucose, Serum: 106 mg/dL    Calcium, Total Serum: 7.7 mg/dL    eGFR if Non : 83: Interpretative comment    eGFR if : 96 mL/min/1.73M2  Phosphorus Level, Serum in AM (12.28.17 @ 07:31)    Phosphorus Level, Serum: 2.0 mg/dL        Basic Metabolic Panel (12.27.17 @ 07:52)    Sodium, Serum: 137 mmol/L    Potassium, Serum: 3.1 mmol/L    Chloride, Serum: 104 mmol/L    Carbon Dioxide, Serum: 25 mmol/L    Anion Gap, Serum: 8 mmol/L    Blood Urea Nitrogen, Serum: 10 mg/dL    Creatinine, Serum: 0.92 mg/dL    Glucose, Serum: 123 mg/dL    Calcium, Total Serum: 7.7 mg/dL    eGFR if Non : 79: Interpretative comment    eGFR if : 92 mL/min/1.73M2  Phosphorus Level, Serum in AM (12.27.17 @ 07:52)    Phosphorus Level, Serum: 1.6 mg/dL        12-26    135  |  103  |  17  ----------------------------<  145<H>  3.8   |  24  |  1.03  12-25    136  |  103  |  24<H>  ----------------------------<  125<H>  3.4<L>   |  23  |  1.14  12-24    133<L>  |  97  |  30<H>  ----------------------------<  197<H>  3.8   |  26  |  1.46<H>    Ca    7.4<L>      26 Dec 2017 07:46  Ca    7.6<L>      25 Dec 2017 11:43  Ca    8.4      24 Dec 2017 22:05  Phos  2.3     12-26  Phos  2.0     12-25  Mg     1.8     12-26  Mg     1.7     12-25    TPro  7.3  /  Alb  3.1<L>  /  TBili  0.6  /  DBili  x   /  AST  19  /  ALT  17  /  AlkPhos  50  12-24      Thyroid Stimulating Hormone, Serum: 0.26 uU/mL (12-26 @ 07:46)      URINE STUDIES:        Creatinine, Random Urine: 101 mg/dL (12-26 @ 09:57)  Protein/Creatinine Ratio Calculation: 0.4 Ratio (12-26 @ 09:57)  (mild)

## 2017-12-28 NOTE — DISCHARGE NOTE ADULT - CARE PROVIDER_API CALL
MD Jelena, Pranav  [phone] [place] [id number]  Phone: [phone]  Fax: (   )    -

## 2017-12-28 NOTE — PROGRESS NOTE ADULT - PROBLEM SELECTOR PROBLEM 2
HTN (hypertension)
Hypertension, unspecified type
Hypokalemia
Hypertension, unspecified type

## 2017-12-28 NOTE — PROGRESS NOTE ADULT - PROBLEM SELECTOR PROBLEM 1
Colitis, acute
Colitis, acute
PELON (acute kidney injury)
Colitis, acute

## 2017-12-28 NOTE — PROGRESS NOTE ADULT - PROBLEM SELECTOR PLAN 3
cr 1.03  Sodium/potassium levels normal  Normal Saline IV 75 ml/hr  Dr. Henriquez, renal, consulted
- f/u Lipid profile  - c/w atorvastatin 10mg qday
resolved.  Creatinine 0.92  Dr. Henriquez, renal, consulted
secondary to hypovolemic , now improved with volume replacement  -f/u Na level daily.

## 2017-12-28 NOTE — DISCHARGE NOTE ADULT - PLAN OF CARE
resolved Complete seven-day course of Cipro/Flagyl, as prescribed and followup with your PCP in one week. Continue with Finasteride, as prescribed. A1C <7 Continue with your home diabetes med and followup with PCP in one week Continue with statin, DASH diet, healthy lifestyle measures, including exercise, and followup with your PCP in one week. <140/90 Continue with Lisinopril for HTN and proteinuria, DASH/ADA diet, exercise according to your PCP's instructions, and followup with PCP in one week. Continue with Lisinopril, as prescribed, DASH/ADA diet, exercise, and followup with PCP in one week. You are asymptomatic. No antibiotics are needed for UTI at this time.  However, complete antibiotic course prescribed for colitis.

## 2017-12-28 NOTE — DISCHARGE NOTE ADULT - MEDICATION SUMMARY - MEDICATIONS TO STOP TAKING
I will STOP taking the medications listed below when I get home from the hospital:    lisinopril    finasteride  -- 5 milligram(s) by mouth once a day

## 2017-12-28 NOTE — PROGRESS NOTE ADULT - PROBLEM SELECTOR PROBLEM 5
BPH (benign prostatic hyperplasia)
BPH (benign prostatic hyperplasia)
DM (diabetes mellitus)
Proteinuria, unspecified type

## 2017-12-28 NOTE — DISCHARGE NOTE ADULT - CARE PLAN
Principal Discharge DX:	Colitis, acute  Goal:	resolved  Instructions for follow-up, activity and diet:	Complete seven-day course of Cipro/Flagyl, as prescribed and followup with your PCP in one week.  Secondary Diagnosis:	BPH (benign prostatic hyperplasia)  Instructions for follow-up, activity and diet:	Continue with Finasteride, as prescribed.  Secondary Diagnosis:	DM (diabetes mellitus)  Goal:	A1C <7  Instructions for follow-up, activity and diet:	Continue with your home diabetes med and followup with PCP in one week  Secondary Diagnosis:	HLD (hyperlipidemia)  Instructions for follow-up, activity and diet:	Continue with statin, DASH diet, healthy lifestyle measures, including exercise, and followup with your PCP in one week.  Secondary Diagnosis:	Hypertension, unspecified type  Goal:	<140/90  Instructions for follow-up, activity and diet:	Continue with Lisinopril for HTN and proteinuria, DASH/ADA diet, exercise according to your PCP's instructions, and followup with PCP in one week.  Secondary Diagnosis:	Proteinuria, unspecified type  Instructions for follow-up, activity and diet:	Continue with Lisinopril, as prescribed, DASH/ADA diet, exercise, and followup with PCP in one week.  Secondary Diagnosis:	UTI (urinary tract infection)  Instructions for follow-up, activity and diet:	You are asymptomatic. No antibiotics are needed for UTI at this time.  However, complete antibiotic course prescribed for colitis.

## 2017-12-28 NOTE — PROGRESS NOTE ADULT - PROVIDER SPECIALTY LIST ADULT
Infectious Disease
Nephrology
Internal Medicine

## 2017-12-28 NOTE — DISCHARGE NOTE ADULT - PROVIDER TOKENS
FREE:[LAST:[MD Jelena],FIRST:[Pranav],PHONE:[phone]],FAX:[phone]   [phone]    -],ADDRESS:[[phone] [place] [address]]]

## 2017-12-28 NOTE — PROGRESS NOTE ADULT - PROBLEM SELECTOR PLAN 5
c/w finasteride 5mg qday.
c/w finasteride 5mg qday.
f/u A1c  On metformin 500 bid at home  Will start sliding scale for now
mild, r/o secondary to htn with mild proteinuria  -please start acei once serum cr at baseline.
mild, r/o secondary to htn with mild proteinuria  -we will  continue  Lisinopril 2.5 mg daily to treat proteinuria and to prevent hypokalemia
mild, r/o secondary to htn with mild proteinuria  -we will  start Lisinopril 2.5 mg daily to treat proteinuria and to prevent hypokalemia

## 2017-12-30 LAB
CULTURE RESULTS: SIGNIFICANT CHANGE UP
CULTURE RESULTS: SIGNIFICANT CHANGE UP
SPECIMEN SOURCE: SIGNIFICANT CHANGE UP
SPECIMEN SOURCE: SIGNIFICANT CHANGE UP

## 2018-01-01 ENCOUNTER — OUTPATIENT (OUTPATIENT)
Dept: OUTPATIENT SERVICES | Facility: HOSPITAL | Age: 78
LOS: 1 days | End: 2018-01-01
Payer: MEDICAID

## 2018-01-01 DIAGNOSIS — Z98.89 OTHER SPECIFIED POSTPROCEDURAL STATES: Chronic | ICD-10-CM

## 2018-01-01 DIAGNOSIS — Z09 ENCOUNTER FOR FOLLOW-UP EXAMINATION AFTER COMPLETED TREATMENT FOR CONDITIONS OTHER THAN MALIGNANT NEOPLASM: Chronic | ICD-10-CM

## 2018-01-01 PROCEDURE — G9001: CPT

## 2018-01-09 ENCOUNTER — INPATIENT (INPATIENT)
Facility: HOSPITAL | Age: 78
LOS: 4 days | Discharge: HOME | End: 2018-01-14
Attending: HOSPITALIST | Admitting: HOSPITALIST
Payer: MEDICAID

## 2018-01-09 VITALS
WEIGHT: 169.98 LBS | HEART RATE: 90 BPM | DIASTOLIC BLOOD PRESSURE: 77 MMHG | RESPIRATION RATE: 16 BRPM | TEMPERATURE: 100 F | SYSTOLIC BLOOD PRESSURE: 99 MMHG | OXYGEN SATURATION: 95 % | HEIGHT: 68 IN

## 2018-01-09 DIAGNOSIS — Z98.89 OTHER SPECIFIED POSTPROCEDURAL STATES: Chronic | ICD-10-CM

## 2018-01-09 DIAGNOSIS — D72.829 ELEVATED WHITE BLOOD CELL COUNT, UNSPECIFIED: ICD-10-CM

## 2018-01-09 DIAGNOSIS — Z09 ENCOUNTER FOR FOLLOW-UP EXAMINATION AFTER COMPLETED TREATMENT FOR CONDITIONS OTHER THAN MALIGNANT NEOPLASM: Chronic | ICD-10-CM

## 2018-01-09 LAB
ALBUMIN SERPL ELPH-MCNC: 3.6 G/DL — SIGNIFICANT CHANGE UP (ref 3.3–5)
ALP SERPL-CCNC: 39 U/L — LOW (ref 40–120)
ALT FLD-CCNC: 15 U/L — SIGNIFICANT CHANGE UP (ref 4–41)
AST SERPL-CCNC: 20 U/L — SIGNIFICANT CHANGE UP (ref 4–40)
BASE EXCESS BLDV CALC-SCNC: 4 MMOL/L — SIGNIFICANT CHANGE UP
BASOPHILS # BLD AUTO: 0.05 K/UL — SIGNIFICANT CHANGE UP (ref 0–0.2)
BASOPHILS NFR BLD AUTO: 0.2 % — SIGNIFICANT CHANGE UP (ref 0–2)
BILIRUB SERPL-MCNC: 1 MG/DL — SIGNIFICANT CHANGE UP (ref 0.2–1.2)
BLOOD GAS VENOUS - CREATININE: 1.07 MG/DL — SIGNIFICANT CHANGE UP (ref 0.5–1.3)
BUN SERPL-MCNC: 31 MG/DL — HIGH (ref 7–23)
CALCIUM SERPL-MCNC: 8.5 MG/DL — SIGNIFICANT CHANGE UP (ref 8.4–10.5)
CHLORIDE BLDV-SCNC: 94 MMOL/L — LOW (ref 96–108)
CHLORIDE SERPL-SCNC: 90 MMOL/L — LOW (ref 98–107)
CO2 SERPL-SCNC: 26 MMOL/L — SIGNIFICANT CHANGE UP (ref 22–31)
CREAT SERPL-MCNC: 1.22 MG/DL — SIGNIFICANT CHANGE UP (ref 0.5–1.3)
EOSINOPHIL # BLD AUTO: 0 K/UL — SIGNIFICANT CHANGE UP (ref 0–0.5)
EOSINOPHIL NFR BLD AUTO: 0 % — SIGNIFICANT CHANGE UP (ref 0–6)
GAS PNL BLDV: 125 MMOL/L — LOW (ref 136–146)
GLUCOSE BLDV-MCNC: 151 — HIGH (ref 70–99)
GLUCOSE SERPL-MCNC: 144 MG/DL — HIGH (ref 70–99)
HCO3 BLDV-SCNC: 26 MMOL/L — SIGNIFICANT CHANGE UP (ref 20–27)
HCT VFR BLD CALC: 36.5 % — LOW (ref 39–50)
HCT VFR BLDV CALC: 39.2 % — SIGNIFICANT CHANGE UP (ref 39–51)
HGB BLD-MCNC: 12.5 G/DL — LOW (ref 13–17)
HGB BLDV-MCNC: 12.8 G/DL — LOW (ref 13–17)
IMM GRANULOCYTES # BLD AUTO: 0.55 # — SIGNIFICANT CHANGE UP
IMM GRANULOCYTES NFR BLD AUTO: 1.7 % — HIGH (ref 0–1.5)
LACTATE BLDV-MCNC: 2 MMOL/L — SIGNIFICANT CHANGE UP (ref 0.5–2)
LYMPHOCYTES # BLD AUTO: 2.22 K/UL — SIGNIFICANT CHANGE UP (ref 1–3.3)
LYMPHOCYTES # BLD AUTO: 7 % — LOW (ref 13–44)
MCHC RBC-ENTMCNC: 32.9 PG — SIGNIFICANT CHANGE UP (ref 27–34)
MCHC RBC-ENTMCNC: 34.2 % — SIGNIFICANT CHANGE UP (ref 32–36)
MCV RBC AUTO: 96.1 FL — SIGNIFICANT CHANGE UP (ref 80–100)
MONOCYTES # BLD AUTO: 3.76 K/UL — HIGH (ref 0–0.9)
MONOCYTES NFR BLD AUTO: 11.9 % — SIGNIFICANT CHANGE UP (ref 2–14)
NEUTROPHILS # BLD AUTO: 24.96 K/UL — HIGH (ref 1.8–7.4)
NEUTROPHILS NFR BLD AUTO: 79.2 % — HIGH (ref 43–77)
NRBC # FLD: 0 — SIGNIFICANT CHANGE UP
PCO2 BLDV: 50 MMHG — SIGNIFICANT CHANGE UP (ref 41–51)
PH BLDV: 7.38 PH — SIGNIFICANT CHANGE UP (ref 7.32–7.43)
PLATELET # BLD AUTO: 189 K/UL — SIGNIFICANT CHANGE UP (ref 150–400)
PMV BLD: 10 FL — SIGNIFICANT CHANGE UP (ref 7–13)
PO2 BLDV: < 24 MMHG — LOW (ref 35–40)
POTASSIUM BLDV-SCNC: 4 MMOL/L — SIGNIFICANT CHANGE UP (ref 3.4–4.5)
POTASSIUM SERPL-MCNC: 4.1 MMOL/L — SIGNIFICANT CHANGE UP (ref 3.5–5.3)
POTASSIUM SERPL-SCNC: 4.1 MMOL/L — SIGNIFICANT CHANGE UP (ref 3.5–5.3)
PROT SERPL-MCNC: 7.2 G/DL — SIGNIFICANT CHANGE UP (ref 6–8.3)
RBC # BLD: 3.8 M/UL — LOW (ref 4.2–5.8)
RBC # FLD: 12.8 % — SIGNIFICANT CHANGE UP (ref 10.3–14.5)
SAO2 % BLDV: 32.2 % — LOW (ref 60–85)
SODIUM SERPL-SCNC: 128 MMOL/L — LOW (ref 135–145)
WBC # BLD: 31.54 K/UL — HIGH (ref 3.8–10.5)
WBC # FLD AUTO: 31.54 K/UL — HIGH (ref 3.8–10.5)

## 2018-01-09 PROCEDURE — 71046 X-RAY EXAM CHEST 2 VIEWS: CPT | Mod: 26

## 2018-01-09 RX ORDER — VANCOMYCIN HCL 1 G
1000 VIAL (EA) INTRAVENOUS ONCE
Qty: 0 | Refills: 0 | Status: COMPLETED | OUTPATIENT
Start: 2018-01-09 | End: 2018-01-10

## 2018-01-09 RX ORDER — SODIUM CHLORIDE 9 MG/ML
1000 INJECTION INTRAMUSCULAR; INTRAVENOUS; SUBCUTANEOUS ONCE
Qty: 0 | Refills: 0 | Status: COMPLETED | OUTPATIENT
Start: 2018-01-09 | End: 2018-01-09

## 2018-01-09 RX ORDER — PIPERACILLIN AND TAZOBACTAM 4; .5 G/20ML; G/20ML
3.38 INJECTION, POWDER, LYOPHILIZED, FOR SOLUTION INTRAVENOUS ONCE
Qty: 0 | Refills: 0 | Status: COMPLETED | OUTPATIENT
Start: 2018-01-09 | End: 2018-01-09

## 2018-01-09 RX ADMIN — SODIUM CHLORIDE 1000 MILLILITER(S): 9 INJECTION INTRAMUSCULAR; INTRAVENOUS; SUBCUTANEOUS at 23:12

## 2018-01-09 RX ADMIN — PIPERACILLIN AND TAZOBACTAM 200 GRAM(S): 4; .5 INJECTION, POWDER, LYOPHILIZED, FOR SOLUTION INTRAVENOUS at 22:50

## 2018-01-09 RX ADMIN — SODIUM CHLORIDE 1000 MILLILITER(S): 9 INJECTION INTRAMUSCULAR; INTRAVENOUS; SUBCUTANEOUS at 22:16

## 2018-01-09 NOTE — ED ADULT NURSE NOTE - CHIEF COMPLAINT QUOTE
c/o fever/chill, weakness, nausea/vomiting, body aches, chest pain, dizziness, frequent urination and dysuria since pt has been discharged from [facility] 1/28/17. Pt took Advil at 6pm. [id number]

## 2018-01-09 NOTE — ED PROVIDER NOTE - MEDICAL DECISION MAKING DETAILS
weakness, confusion, hypotensive, labs, fluids, cxr, ua, rectal temp check for sepsis/cause for sepsis reasses Weakness, hypotension in setting of recent bacteremia and colitis.  Now without abd pain or tenderness, without diarrhea. Well appearing. Will send labs, fluids, cxr, ua, rectal temp check for sepsis/cause for sepsis reasses.  Admit vs DC pending lab results and reassessment.

## 2018-01-09 NOTE — ED ADULT NURSE NOTE - OBJECTIVE STATEMENT
Alert and oriented x 4. Pt states: " I was recently in the hospital for colitis. I took antibiotics and now I feel weak. I am also nauseous and vomiting." Pt denies chest pain, shortness of breath, dizziness or diarrhea.  IV 20g to right AC. Labs drawn. VSS. Pt ambulates. Family at bedside. Will continue to monitor. RN Facilitator.

## 2018-01-09 NOTE — ED PROVIDER NOTE - ATTENDING CONTRIBUTION TO CARE
ED Attending (Dr Guerrero): I have personally performed a face to face bedside history and physical examination of this patient.  I have discussed the case with the PA and  I have personally authored the following components: HPI, ROS, Physical Exam and MDM in addition to reviewing and revising the rest of the Provider Note. Weakness, hypotension in setting of recent bacteremia and colitis.  Now without abd pain or tenderness, without diarrhea. Well appearing. Will send labs, fluids, cxr, ua, rectal temp check for sepsis/cause for sepsis reasses.  Admit vs DC pending lab results and reassessment.

## 2018-01-09 NOTE — ED PROVIDER NOTE - OBJECTIVE STATEMENT
78 y/o male pmh htn, hld, dm, bph, recently admitted and discharged from [facility] for bacteremia/colitis, discharged 10 days ago, now presents with c/o weakness, fevers, chills x today, as per the son he was confused earlier today, pt denies any headaches, neck pain, visual disturbances, cough, n/v/d, chest pain, sob, abdominal pain, urinary symptoms, numbness/weakness/tingling, recent travel, sick contact, social history 78 y/o male pmh htn, hld, dm, bph, recently admitted and discharged from [facility] for bacteremia/colitis, discharged 10 days ago, now presents with c/o weakness, fevers, chills x today, as per the son he was confused earlier today, pt denies any headaches, neck pain, visual disturbances, cough, n/v/d, chest pain, sob, abdominal pain, urinary symptoms, numbness/weakness/tingling, recent travel, sick contact, social history.  Pt states he feels weak and has not been able to walk well d/t gen weakness.  Had been feeling better when he left hospital.  States he completed approx 10-14 days of Abx total (3 days ago).

## 2018-01-09 NOTE — ED ADULT TRIAGE NOTE - CHIEF COMPLAINT QUOTE
c/o fever/chill, weakness, nausea/vomiting, body aches, chest pain, dizziness, frequent urination and dysuria since pt has been discharged from [facility] 1/28/17. Pt took Advil at 6pm. [id number]

## 2018-01-10 DIAGNOSIS — E83.39 OTHER DISORDERS OF PHOSPHORUS METABOLISM: ICD-10-CM

## 2018-01-10 DIAGNOSIS — I10 ESSENTIAL (PRIMARY) HYPERTENSION: ICD-10-CM

## 2018-01-10 DIAGNOSIS — A41.9 SEPSIS, UNSPECIFIED ORGANISM: ICD-10-CM

## 2018-01-10 DIAGNOSIS — D72.829 ELEVATED WHITE BLOOD CELL COUNT, UNSPECIFIED: ICD-10-CM

## 2018-01-10 DIAGNOSIS — N17.9 ACUTE KIDNEY FAILURE, UNSPECIFIED: ICD-10-CM

## 2018-01-10 DIAGNOSIS — E11.9 TYPE 2 DIABETES MELLITUS WITHOUT COMPLICATIONS: ICD-10-CM

## 2018-01-10 DIAGNOSIS — E87.1 HYPO-OSMOLALITY AND HYPONATREMIA: ICD-10-CM

## 2018-01-10 DIAGNOSIS — R65.10 SYSTEMIC INFLAMMATORY RESPONSE SYNDROME (SIRS) OF NON-INFECTIOUS ORIGIN WITHOUT ACUTE ORGAN DYSFUNCTION: ICD-10-CM

## 2018-01-10 DIAGNOSIS — E78.5 HYPERLIPIDEMIA, UNSPECIFIED: ICD-10-CM

## 2018-01-10 DIAGNOSIS — N40.0 BENIGN PROSTATIC HYPERPLASIA WITHOUT LOWER URINARY TRACT SYMPTOMS: ICD-10-CM

## 2018-01-10 DIAGNOSIS — Z29.9 ENCOUNTER FOR PROPHYLACTIC MEASURES, UNSPECIFIED: ICD-10-CM

## 2018-01-10 LAB
ALBUMIN SERPL ELPH-MCNC: 3.2 G/DL — LOW (ref 3.3–5)
ALP SERPL-CCNC: 36 U/L — LOW (ref 40–120)
ALT FLD-CCNC: 14 U/L — SIGNIFICANT CHANGE UP (ref 4–41)
APPEARANCE UR: CLEAR — SIGNIFICANT CHANGE UP
AST SERPL-CCNC: 20 U/L — SIGNIFICANT CHANGE UP (ref 4–40)
B PERT DNA SPEC QL NAA+PROBE: SIGNIFICANT CHANGE UP
BACTERIA # UR AUTO: SIGNIFICANT CHANGE UP
BASOPHILS # BLD AUTO: 0.06 K/UL — SIGNIFICANT CHANGE UP (ref 0–0.2)
BASOPHILS NFR BLD AUTO: 0.2 % — SIGNIFICANT CHANGE UP (ref 0–2)
BASOPHILS NFR SPEC: 0 % — SIGNIFICANT CHANGE UP (ref 0–2)
BILIRUB SERPL-MCNC: 1.2 MG/DL — SIGNIFICANT CHANGE UP (ref 0.2–1.2)
BILIRUB UR-MCNC: NEGATIVE — SIGNIFICANT CHANGE UP
BLOOD UR QL VISUAL: NEGATIVE — SIGNIFICANT CHANGE UP
BUN SERPL-MCNC: 24 MG/DL — HIGH (ref 7–23)
C PNEUM DNA SPEC QL NAA+PROBE: NOT DETECTED — SIGNIFICANT CHANGE UP
CALCIUM SERPL-MCNC: 8.1 MG/DL — LOW (ref 8.4–10.5)
CHLORIDE SERPL-SCNC: 97 MMOL/L — LOW (ref 98–107)
CO2 SERPL-SCNC: 24 MMOL/L — SIGNIFICANT CHANGE UP (ref 22–31)
COLOR SPEC: SIGNIFICANT CHANGE UP
CREAT ?TM UR-MCNC: 52.65 MG/DL — SIGNIFICANT CHANGE UP
CREAT SERPL-MCNC: 1.03 MG/DL — SIGNIFICANT CHANGE UP (ref 0.5–1.3)
EOSINOPHIL # BLD AUTO: 0.01 K/UL — SIGNIFICANT CHANGE UP (ref 0–0.5)
EOSINOPHIL NFR BLD AUTO: 0 % — SIGNIFICANT CHANGE UP (ref 0–6)
EOSINOPHIL NFR FLD: 0 % — SIGNIFICANT CHANGE UP (ref 0–6)
FLUAV H1 2009 PAND RNA SPEC QL NAA+PROBE: NOT DETECTED — SIGNIFICANT CHANGE UP
FLUAV H1 RNA SPEC QL NAA+PROBE: NOT DETECTED — SIGNIFICANT CHANGE UP
FLUAV H3 RNA SPEC QL NAA+PROBE: NOT DETECTED — SIGNIFICANT CHANGE UP
FLUAV SUBTYP SPEC NAA+PROBE: SIGNIFICANT CHANGE UP
FLUBV RNA SPEC QL NAA+PROBE: NOT DETECTED — SIGNIFICANT CHANGE UP
GLUCOSE SERPL-MCNC: 147 MG/DL — HIGH (ref 70–99)
GLUCOSE UR-MCNC: NEGATIVE — SIGNIFICANT CHANGE UP
HADV DNA SPEC QL NAA+PROBE: NOT DETECTED — SIGNIFICANT CHANGE UP
HCOV 229E RNA SPEC QL NAA+PROBE: NOT DETECTED — SIGNIFICANT CHANGE UP
HCOV HKU1 RNA SPEC QL NAA+PROBE: NOT DETECTED — SIGNIFICANT CHANGE UP
HCOV NL63 RNA SPEC QL NAA+PROBE: NOT DETECTED — SIGNIFICANT CHANGE UP
HCOV OC43 RNA SPEC QL NAA+PROBE: NOT DETECTED — SIGNIFICANT CHANGE UP
HCT VFR BLD CALC: 35 % — LOW (ref 39–50)
HGB BLD-MCNC: 11.8 G/DL — LOW (ref 13–17)
HMPV RNA SPEC QL NAA+PROBE: NOT DETECTED — SIGNIFICANT CHANGE UP
HPIV1 RNA SPEC QL NAA+PROBE: NOT DETECTED — SIGNIFICANT CHANGE UP
HPIV2 RNA SPEC QL NAA+PROBE: NOT DETECTED — SIGNIFICANT CHANGE UP
HPIV3 RNA SPEC QL NAA+PROBE: NOT DETECTED — SIGNIFICANT CHANGE UP
HPIV4 RNA SPEC QL NAA+PROBE: NOT DETECTED — SIGNIFICANT CHANGE UP
IMM GRANULOCYTES # BLD AUTO: 0.48 # — SIGNIFICANT CHANGE UP
IMM GRANULOCYTES NFR BLD AUTO: 1.7 % — HIGH (ref 0–1.5)
KETONES UR-MCNC: NEGATIVE — SIGNIFICANT CHANGE UP
LEUKOCYTE ESTERASE UR-ACNC: HIGH
LYMPHOCYTES # BLD AUTO: 1.29 K/UL — SIGNIFICANT CHANGE UP (ref 1–3.3)
LYMPHOCYTES # BLD AUTO: 4.7 % — LOW (ref 13–44)
LYMPHOCYTES NFR SPEC AUTO: 4.4 % — LOW (ref 13–44)
M PNEUMO DNA SPEC QL NAA+PROBE: NOT DETECTED — SIGNIFICANT CHANGE UP
MAGNESIUM SERPL-MCNC: 1.6 MG/DL — SIGNIFICANT CHANGE UP (ref 1.6–2.6)
MCHC RBC-ENTMCNC: 32 PG — SIGNIFICANT CHANGE UP (ref 27–34)
MCHC RBC-ENTMCNC: 33.7 % — SIGNIFICANT CHANGE UP (ref 32–36)
MCV RBC AUTO: 94.9 FL — SIGNIFICANT CHANGE UP (ref 80–100)
MONOCYTES # BLD AUTO: 2.54 K/UL — HIGH (ref 0–0.9)
MONOCYTES NFR BLD AUTO: 9.2 % — SIGNIFICANT CHANGE UP (ref 2–14)
MONOCYTES NFR BLD: 10.6 % — HIGH (ref 2–9)
MORPHOLOGY BLD-IMP: NORMAL — SIGNIFICANT CHANGE UP
MUCOUS THREADS # UR AUTO: SIGNIFICANT CHANGE UP
NEUTROPHIL AB SER-ACNC: 84.1 % — HIGH (ref 43–77)
NEUTROPHILS # BLD AUTO: 23.12 K/UL — HIGH (ref 1.8–7.4)
NEUTROPHILS NFR BLD AUTO: 84.2 % — HIGH (ref 43–77)
NEUTS BAND # BLD: 0.9 % — SIGNIFICANT CHANGE UP (ref 0–6)
NITRITE UR-MCNC: NEGATIVE — SIGNIFICANT CHANGE UP
NON-SQ EPI CELLS # UR AUTO: <1 — SIGNIFICANT CHANGE UP
NRBC # FLD: 0 — SIGNIFICANT CHANGE UP
OSMOLALITY SERPL: 283 MOSMO/KG — SIGNIFICANT CHANGE UP (ref 275–295)
OSMOLALITY UR: 377 MOSMO/KG — SIGNIFICANT CHANGE UP (ref 50–1200)
PH UR: 6 — SIGNIFICANT CHANGE UP (ref 4.6–8)
PHOSPHATE SERPL-MCNC: 2 MG/DL — LOW (ref 2.5–4.5)
PLATELET # BLD AUTO: 178 K/UL — SIGNIFICANT CHANGE UP (ref 150–400)
PLATELET COUNT - ESTIMATE: NORMAL — SIGNIFICANT CHANGE UP
PMV BLD: 10 FL — SIGNIFICANT CHANGE UP (ref 7–13)
POTASSIUM SERPL-MCNC: 3.8 MMOL/L — SIGNIFICANT CHANGE UP (ref 3.5–5.3)
POTASSIUM SERPL-SCNC: 3.8 MMOL/L — SIGNIFICANT CHANGE UP (ref 3.5–5.3)
PROT SERPL-MCNC: 6.7 G/DL — SIGNIFICANT CHANGE UP (ref 6–8.3)
PROT UR-MCNC: 10 MG/DL — SIGNIFICANT CHANGE UP
RBC # BLD: 3.69 M/UL — LOW (ref 4.2–5.8)
RBC # FLD: 12.8 % — SIGNIFICANT CHANGE UP (ref 10.3–14.5)
RBC CASTS # UR COMP ASSIST: SIGNIFICANT CHANGE UP (ref 0–?)
RSV RNA SPEC QL NAA+PROBE: NOT DETECTED — SIGNIFICANT CHANGE UP
RV+EV RNA SPEC QL NAA+PROBE: NOT DETECTED — SIGNIFICANT CHANGE UP
SODIUM SERPL-SCNC: 132 MMOL/L — LOW (ref 135–145)
SODIUM UR-SCNC: 43 MMOL/L — SIGNIFICANT CHANGE UP
SP GR SPEC: 1.01 — SIGNIFICANT CHANGE UP (ref 1–1.04)
SPECIMEN SOURCE: SIGNIFICANT CHANGE UP
SPECIMEN SOURCE: SIGNIFICANT CHANGE UP
UROBILINOGEN FLD QL: NORMAL MG/DL — SIGNIFICANT CHANGE UP
WBC # BLD: 27.5 K/UL — HIGH (ref 3.8–10.5)
WBC # FLD AUTO: 27.5 K/UL — HIGH (ref 3.8–10.5)
WBC UR QL: HIGH (ref 0–?)

## 2018-01-10 PROCEDURE — 99222 1ST HOSP IP/OBS MODERATE 55: CPT | Mod: GC

## 2018-01-10 PROCEDURE — 99223 1ST HOSP IP/OBS HIGH 75: CPT | Mod: GC

## 2018-01-10 PROCEDURE — 12345: CPT | Mod: NC

## 2018-01-10 RX ORDER — SODIUM CHLORIDE 9 MG/ML
1000 INJECTION, SOLUTION INTRAVENOUS
Qty: 0 | Refills: 0 | Status: DISCONTINUED | OUTPATIENT
Start: 2018-01-10 | End: 2018-01-14

## 2018-01-10 RX ORDER — DEXTROSE 50 % IN WATER 50 %
25 SYRINGE (ML) INTRAVENOUS ONCE
Qty: 0 | Refills: 0 | Status: DISCONTINUED | OUTPATIENT
Start: 2018-01-10 | End: 2018-01-14

## 2018-01-10 RX ORDER — PIPERACILLIN AND TAZOBACTAM 4; .5 G/20ML; G/20ML
3.38 INJECTION, POWDER, LYOPHILIZED, FOR SOLUTION INTRAVENOUS EVERY 8 HOURS
Qty: 0 | Refills: 0 | Status: DISCONTINUED | OUTPATIENT
Start: 2018-01-10 | End: 2018-01-13

## 2018-01-10 RX ORDER — ATORVASTATIN CALCIUM 80 MG/1
10 TABLET, FILM COATED ORAL AT BEDTIME
Qty: 0 | Refills: 0 | Status: DISCONTINUED | OUTPATIENT
Start: 2018-01-10 | End: 2018-01-14

## 2018-01-10 RX ORDER — VANCOMYCIN HCL 1 G
1000 VIAL (EA) INTRAVENOUS EVERY 12 HOURS
Qty: 0 | Refills: 0 | Status: DISCONTINUED | OUTPATIENT
Start: 2018-01-10 | End: 2018-01-10

## 2018-01-10 RX ORDER — INSULIN LISPRO 100/ML
VIAL (ML) SUBCUTANEOUS
Qty: 0 | Refills: 0 | Status: DISCONTINUED | OUTPATIENT
Start: 2018-01-10 | End: 2018-01-14

## 2018-01-10 RX ORDER — INSULIN LISPRO 100/ML
VIAL (ML) SUBCUTANEOUS AT BEDTIME
Qty: 0 | Refills: 0 | Status: DISCONTINUED | OUTPATIENT
Start: 2018-01-10 | End: 2018-01-14

## 2018-01-10 RX ORDER — DEXTROSE 50 % IN WATER 50 %
12.5 SYRINGE (ML) INTRAVENOUS ONCE
Qty: 0 | Refills: 0 | Status: DISCONTINUED | OUTPATIENT
Start: 2018-01-10 | End: 2018-01-14

## 2018-01-10 RX ORDER — SODIUM CHLORIDE 9 MG/ML
1000 INJECTION, SOLUTION INTRAVENOUS
Qty: 0 | Refills: 0 | Status: DISCONTINUED | OUTPATIENT
Start: 2018-01-10 | End: 2018-01-10

## 2018-01-10 RX ORDER — HEPARIN SODIUM 5000 [USP'U]/ML
5000 INJECTION INTRAVENOUS; SUBCUTANEOUS EVERY 8 HOURS
Qty: 0 | Refills: 0 | Status: DISCONTINUED | OUTPATIENT
Start: 2018-01-10 | End: 2018-01-12

## 2018-01-10 RX ORDER — FINASTERIDE 5 MG/1
5 TABLET, FILM COATED ORAL DAILY
Qty: 0 | Refills: 0 | Status: DISCONTINUED | OUTPATIENT
Start: 2018-01-10 | End: 2018-01-14

## 2018-01-10 RX ORDER — GLUCAGON INJECTION, SOLUTION 0.5 MG/.1ML
1 INJECTION, SOLUTION SUBCUTANEOUS ONCE
Qty: 0 | Refills: 0 | Status: DISCONTINUED | OUTPATIENT
Start: 2018-01-10 | End: 2018-01-14

## 2018-01-10 RX ORDER — SODIUM,POTASSIUM PHOSPHATES 278-250MG
1 POWDER IN PACKET (EA) ORAL
Qty: 0 | Refills: 0 | Status: COMPLETED | OUTPATIENT
Start: 2018-01-10 | End: 2018-01-11

## 2018-01-10 RX ORDER — SODIUM CHLORIDE 9 MG/ML
1000 INJECTION INTRAMUSCULAR; INTRAVENOUS; SUBCUTANEOUS
Qty: 0 | Refills: 0 | Status: DISCONTINUED | OUTPATIENT
Start: 2018-01-10 | End: 2018-01-11

## 2018-01-10 RX ORDER — DEXTROSE 50 % IN WATER 50 %
1 SYRINGE (ML) INTRAVENOUS ONCE
Qty: 0 | Refills: 0 | Status: DISCONTINUED | OUTPATIENT
Start: 2018-01-10 | End: 2018-01-14

## 2018-01-10 RX ADMIN — PIPERACILLIN AND TAZOBACTAM 25 GRAM(S): 4; .5 INJECTION, POWDER, LYOPHILIZED, FOR SOLUTION INTRAVENOUS at 08:06

## 2018-01-10 RX ADMIN — Medication 1 TABLET(S): at 22:15

## 2018-01-10 RX ADMIN — SODIUM CHLORIDE 75 MILLILITER(S): 9 INJECTION, SOLUTION INTRAVENOUS at 02:26

## 2018-01-10 RX ADMIN — HEPARIN SODIUM 5000 UNIT(S): 5000 INJECTION INTRAVENOUS; SUBCUTANEOUS at 14:02

## 2018-01-10 RX ADMIN — HEPARIN SODIUM 5000 UNIT(S): 5000 INJECTION INTRAVENOUS; SUBCUTANEOUS at 22:15

## 2018-01-10 RX ADMIN — PIPERACILLIN AND TAZOBACTAM 25 GRAM(S): 4; .5 INJECTION, POWDER, LYOPHILIZED, FOR SOLUTION INTRAVENOUS at 22:15

## 2018-01-10 RX ADMIN — Medication: at 13:04

## 2018-01-10 RX ADMIN — ATORVASTATIN CALCIUM 10 MILLIGRAM(S): 80 TABLET, FILM COATED ORAL at 22:15

## 2018-01-10 RX ADMIN — Medication 250 MILLIGRAM(S): at 01:01

## 2018-01-10 RX ADMIN — FINASTERIDE 5 MILLIGRAM(S): 5 TABLET, FILM COATED ORAL at 14:02

## 2018-01-10 RX ADMIN — Medication 1 TABLET(S): at 18:02

## 2018-01-10 RX ADMIN — HEPARIN SODIUM 5000 UNIT(S): 5000 INJECTION INTRAVENOUS; SUBCUTANEOUS at 05:58

## 2018-01-10 RX ADMIN — SODIUM CHLORIDE 75 MILLILITER(S): 9 INJECTION INTRAMUSCULAR; INTRAVENOUS; SUBCUTANEOUS at 02:54

## 2018-01-10 RX ADMIN — PIPERACILLIN AND TAZOBACTAM 25 GRAM(S): 4; .5 INJECTION, POWDER, LYOPHILIZED, FOR SOLUTION INTRAVENOUS at 15:14

## 2018-01-10 NOTE — PROGRESS NOTE ADULT - PROBLEM SELECTOR PLAN 4
-Improving s/p IV hydration, will c/w IVF as ordered -Improving s/p IV hydration, will c/w IVF as ordered  -UA negative for casts to suggest ATN thus likely prerenal in nature

## 2018-01-10 NOTE — H&P ADULT - PROBLEM SELECTOR PLAN 1
- Patient p/w worsening weakness and fatigue over the past 2-3 days which appears 2/2 presumed sepsis given leukocytosis (WBC = 31.54), PELON, and hypotension in the ED (SBP 90s). Source for infection is unclear at this time. CXR was obtained and appears grossly clear. Blood cultures were sent. RVP, UA, and UCx are currently pending. Of note, patient does report an episode of NBNB emesis this AM and he does endorse urinary frequency and burning with urination, suggestive possible viral vs urinary source. In addition, patient was recently admitted from 12/24-12/28 for colitis.  - Patient given IV Vanc/Zosyn x1 in the ED. Will c/w these antibiotics for now pending culture results. Will deescalate as appropriate.  - Patient given 2L NS in the ED with improvement in BP. Will start maintenance fluid.  - Patient is currently afebrile. Will monitor CBC and fever curve. - Patient p/w worsening weakness and fatigue over the past 2-3 days and was found to have SIRS given leukocytosis (WBC = 31.54), PELON, and hypotension in the ED (SBP 90s). Source for infection is unclear at this time. CXR was obtained and appears grossly clear. RVP negative. Blood cultures were sent. UA and UCx are currently pending. Of note, patient was recently admitted from 12/24-12/28 for colitis.  - Patient given IV Vanc/Zosyn x1 in the ED. Low suspicion for MRSA infection currently. Will c/w IV Zosyn for now pending culture results. Will deescalate as appropriate.  - Patient given 2L NS in the ED with improvement in BP. Will start maintenance fluid.  - Patient is currently afebrile. Will monitor CBC and fever curve.

## 2018-01-10 NOTE — PHYSICAL THERAPY INITIAL EVALUATION ADULT - PERTINENT HX OF CURRENT PROBLEM, REHAB EVAL
Pt. is a 76 y/o male admitted to [facility] on 01/09/18 with a dx of leukocytosis and weakness.  PT consult request secondary to deconditioning.  H/O DM.  Recently discharged from [facility].  (-) CXR.

## 2018-01-10 NOTE — PROGRESS NOTE ADULT - PROBLEM SELECTOR PLAN 2
-Unclear etiology of SIRS  -Will consult ID and f/u recs  -Patient's vitals have improved clinically, will continue to hold lisinopril for now and monitor BP  -c/w Zosyn for now for possible GI source, patient might benefit from repeat CT A/P to ensure resolution of colitis vs possible abscess but if able to do so would like to hold off for an additional day given patient arrived with PELON

## 2018-01-10 NOTE — CONSULT NOTE ADULT - SUBJECTIVE AND OBJECTIVE BOX
Patient is a 76 y/o Male, ambulatory with a cane w/ Hx of HTN, Type 2 DM, HLD, BPH, and recent hospitalization for colitis (12/24-12/28) who presents to the ED with worsening weakness and fatigue over the past 2-3 days. Patient's children were at bedside who helped provide history. Patient was recently admitted to [facility] from 12/24-12/28 after he presented with diarrhea. CT A+P at that time showed colitis, and so patient was started on a course of Cipro/Flagyl which he finished as an outpatient ~ 3 days ago. Patient states that his prior diarrhea has since resolved. Patient reports that he was doing very well after he was discharged until about 3 days ago he started feeling progressively more weak to the point where he was unable to stand up on his own. In addition, he has been feeling very fatigued and started to become more confused. On ROS, patient reports subjective fever/chills and one episode of NBNB emesis earlier today. He also endorses some burning with urination, urinary frequency, and a mild headache.   Pt was hyponatremic with mild PELON at admission which improved with IVF. WBC noted to be 31.54, UA with small LE, 5-10 WBC and few bacteria. Pt was given vancomycin and zosyn. Pt reports feeling improved after abx and IVF. Now able to walk to toilet on his own.      PAST MEDICAL & SURGICAL HISTORY:  BPH (benign prostatic hyperplasia)  HLD (hyperlipidemia)  HTN (hypertension)  DM (diabetes mellitus)  S/P abdominal surgery, follow-up exam: sbo  S/P hernia surgery: r.inguinal      Social history:       Occasional EtOH use.No smoking or illicit drug use, lives at home with wife and children. Occasionally requires a cane to ambulate    FAMILY HISTORY:  No pertinent family history in first degree relatives  - Reviewed,Non contributory     REVIEW OF SYSTEMS  General: +malaise, fatigue subjective fever and chills prior to presentation  Skin: No rash  Ophthalmologic: Denies any visual complaints, discharge redness or photophobia  ENMT: No nasal discharge, headache, sinus congestion or throat pain. No dental complaints  Respiratory and Thorax: No cough, sputum or chest pain. Denies shortness of breath  Cardiovascular:	No chest pain, palpitations or dizziness  Gastrointestinal:	NO nausea, abdominal pain or diarrhea.  Genitourinary:	+dysuria, +frequency. No flank pain  Musculoskeletal:	No joint swelling or pain.No weakness  Neurological: No confusion, dizziness. No extremity weakness. No bladder or bowel incontinence	  Psychiatric: No delusions or hallucinations	  Hematology/Lymphatics:	No LN swelling. No gum bleeding   Endocrine: No recent weight gain or loss. No abnormal heat/cold intolerance  Allergic/Immunologic: No hives or rash     Allergies  No Known Allergies      Meds    piperacillin/tazobactam IVPB. 3.375 Gram(s) IV Intermittent every 8 hours        Vital Signs Last 24 Hrs  T(C): 37 (10 Yoan 2018 09:46), Max: 37.7 (09 Jan 2018 19:32)  T(F): 98.6 (10 Yoan 2018 09:46), Max: 99.9 (10 Yoan 2018 06:35)  HR: 93 (10 Yoan 2018 09:46) (72 - 93)  BP: 130/74 (10 Yoan 2018 09:46) (92/51 - 154/68)  BP(mean): --  RR: 17 (10 Yoan 2018 09:46) (16 - 18)  SpO2: 96% (10 Yoan 2018 09:46) (95% - 100%)    PHYSICAL EXAM: Pleasant patient in no acute distress.  Constitutional: Comfortable. Awake and alert  Eyes: PERRL EOMI. NO discharge or conjunctival injection  ENMT: No sinus tenderness. No thrush. No pharyngeal exudate or erythema.   Neck: Supple, No LN,no JVD  Respiratory: Good air entry bilaterally, CTA  Cardiovascular:S1 S2 wnl, No murmurs,rub or gallops  Gastrointestinal: Soft BS(+) no tenderness no masses ,No rebound or guarding  Genitourinary: No CVA tendereness   Extremities: No cyanosis, clubbing or edema.  Vascular: peripheral pulses felt  Neurological: AAO X 3, No grossly focal deficits  Skin: No rash   Musculoskeletal: No joint swelling or LOM  Psychiatric: Affect normal.                 11.8   27.50 )-----------( 178      ( 10 Yoan 2018 06:10 )             35.0         01-10    132<L>  |  97<L>  |  24<H>  ----------------------------<  147<H>  3.8   |  24  |  1.03    Ca    8.1<L>      10 Yoan 2018 06:10  Phos  2.0     01-10  Mg     1.6     01-10    TPro  6.7  /  Alb  3.2<L>  /  TBili  1.2  /  DBili  x   /  AST  20  /  ALT  14  /  AlkPhos  36<L>  01-10      RECENT CULTURES:        Radiology:  CXR showed R lung calcified noudules, no change from prior studies. No focal consolidation or pleural effusion.
HISTORY OF PRESENT ILLNESS:   78 y/o Male, ambulatory with a cane w/ Hx of HTN, Type 2 DM, HLD, BPH, and recent hospitalization for colitis (12/24-12/28) who presents to the ED with worsening weakness and fatigue over the past 2-3 days. Patient's children were at bedside who helped provide history. Patient was recently admitted to [facility] from 12/24-12/28 after he presented with diarrhea. CT A+P at that time showed colitis, and so patient was started on a course of Cipro/Flagyl which he finished as an outpatient ~ 3 days agoOn ROS, patient reports subjective fever/chills and one episode of NBNB emesis earlier today. He also endorses some burning with urination, urinary frequency, and a mild headache. Otherwise, patient denies any chest pain, shortness of breath, cough, abdominal pain, or dysuria. No recent travel or sick contacts.  Last stress echo April 2017 with No ischemia and normal LV function.        PAST MEDICAL & SURGICAL HISTORY:  BPH (benign prostatic hyperplasia)  HLD (hyperlipidemia)  HTN (hypertension)  DM (diabetes mellitus)  Diabetes  S/P abdominal surgery, follow-up exam: sbo  S/P hernia surgery: r.inguinal      FAMILY HISTORY:  No pertinent family history in first degree relatives      SOCIAL HISTORY:    (x ) Non-smoker ( ) Smoker ( ) Alcohol Abuse ( ) IVDA    Allergies    No Known Allergies    MEDICATIONS  (STANDING):  atorvastatin 10 milliGRAM(s) Oral at bedtime  finasteride 5 milliGRAM(s) Oral daily  heparin  Injectable 5000 Unit(s) SubCutaneous every 8 hours  insulin lispro (HumaLOG) corrective regimen sliding scale   SubCutaneous three times a day before meals  insulin lispro (HumaLOG) corrective regimen sliding scale   SubCutaneous at bedtime  piperacillin/tazobactam IVPB. 3.375 Gram(s) IV Intermittent every 8 hours  potassium acid phosphate/sodium acid phosphate tablet (K-PHOS No. 2) 1 Tablet(s) Oral four times a day with meals  sodium chloride 0.9%. 1000 milliLiter(s) (75 mL/Hr) IV Continuous <Continuous>    REVIEW OF SYSTEMS:     CONSTITUTIONAL: + fever, chills, fatigue  RESPIRATORY: No cough, wheezing, chills or hemoptysis; No Shortness of Breath  CARDIOVASCULAR: No chest pain, palpitations, dizziness, syncope, or leg swelling  GASTROINTESTINAL: No pain, melena.  Recent colitis  GENITOURINARY: +dysuria, +frequency  NEUROLOGICAL: No headaches, memory loss, loss of strength, numbness, or tremors  SKIN: No itching, burning, rashes, or lesions     [x ] All others negative	  [ ] Unable to obtain    PHYSICAL EXAM:  Vital Signs Last 24 Hrs  T(C): 37.6 (10 Yoan 2018 14:00), Max: 37.7 (09 Jan 2018 19:32)  T(F): 99.7 (10 Yoan 2018 14:00), Max: 99.9 (10 Yoan 2018 06:35)  HR: 89 (10 Yoan 2018 14:00) (72 - 93)  BP: 109/60 (10 Yoan 2018 14:00) (92/51 - 154/68)  RR: 17 (10 Yoan 2018 14:00) (16 - 18)  SpO2: 97% (10 Yoan 2018 14:00) (95% - 100%)    Cardiovascular:  S1 S2 RRR, No JVD, No murmurs  Respiratory: Lungs CTA B/L, No wheeze, rales, or rhonchi	  Psychiatry: A & O x 3, Mood & affect appropriate  Gastrointestinal:  Soft, Non-tender, + BS	  Extremities: No clubbing, cyanosis or edema    DATA:    ECG:  NSR	    RADIOLOGY:   < from: Xray Chest 2 Views PA/Lat (01.09.18 @ 21:58) >    IMPRESSION:    No acute pulmonary disease.    < end of copied text >    		  LABS:	                       11.8   27.50 )-----------( 178      ( 10 Yoan 2018 06:10 )             35.0    132<L>  |  97<L>  |  24<H>  ----------------------------<  147<H>  3.8   |  24  |  1.03    Ca    8.1<L>      10 Yoan 2018 06:10  Phos  2.0     01-10  Mg     1.6     01-10    TPro  6.7  /  Alb  3.2<L>  /  TBili  1.2  /  DBili  x   /  AST  20  /  ALT  14  /  AlkPhos  36<L>  01-10      ASSESSMENT/PLAN: 	  78 y/o Male, ambulatory with a cane w/ Hx of HTN, Type 2 DM, HLD, BPH, and recent hospitalization for colitis (12/24-12/28) who presents to the ED with worsening weakness, fatigue and confusion over the past 2-3 days.  Last stress echo in my office in April 2017 with No ischemia and normal LV function.      --Not in clinical CHF  --No further inpatient cardiac work up needed at this time  --f/u ID    Thank you for allowing us to participate in the care of your patient.  Please do not hesitate to call with any questions.     Winnie Holloway PA-C  [facility]  [address], [address] [place] [id number]  office [phone]  pager [phone]

## 2018-01-10 NOTE — CONSULT NOTE ADULT - ASSESSMENT
78 y/o Male, ambulatory with a cane, w/ Hx of HTN, Type 2 DM, HLD, BPH, and recent hospitalization for colitis (12/24-12/28) who presents to the ED with worsening weakness and fatigue over the past 2-3 days, found to have SIRS of unclear etiology. r/o sepsis    # Sepsis  - leukocytosis, tachycardic in the setting of mildly positive UA.  - s/p IVF and vancomycin and zosyn, and pt reports clinical improvement.  - CXR was obtained and appears grossly clear. RVP negative.   - f/u bcx, ucx, urine legionella    # PELON  -with hyponatremia  -appears pre-renal with FENa 0.6  -improved with IVF 78 y/o Male, ambulatory with a cane, w/ Hx of HTN, Type 2 DM, HLD, BPH, and recent hospitalization for colitis (12/24-12/28) who presents to the ED with worsening weakness and fatigue over the past 2-3 days, found to have SIRS of unclear etiology. r/o sepsis    # Sepsis  - leukocytosis, tachycardic in the setting of mildly positive UA.  - s/p IVF and vancomycin and zosyn, and pt reports clinical improvement.  - CXR was obtained and appears grossly clear. RVP negative.   -  infxn is unlikely the cause of such a high leukocytosis  - unlikely bowel ischemia with normal lactate  - would obtain another CT a/p with IV contrast to r/o possible occult infxn  - can continue with both vancomycin and zosyn for now   - f/u bcx, ucx, urine legionella    # PELON  -with hyponatremia  -appears pre-renal with FENa 0.6  -improved with IVF    Richard Hyde MD  ID resident PGY 3  [phone] 76 y/o Male, ambulatory with a cane, w/ Hx of HTN, Type 2 DM, HLD, BPH, and recent hospitalization for colitis (12/24-12/28) who presents to the ED with worsening weakness and fatigue over the past 2-3 days, found to have SIRS of unclear etiology. r/o sepsis    # Sepsis  - leukocytosis, tachycardic in the setting of mildly positive UA.  - s/p IVF and vancomycin and zosyn, and pt reports clinical improvement.  - CXR was obtained and appears grossly clear. RVP negative.   -  infxn is unlikely the cause of such a high leukocytosis  - unlikely bowel ischemia with normal lactate, benign abd exam.  - would obtain another CT a/p with IV contrast to r/o possible occult infxn, abscess and resolution of colitis.  - can continue with both vancomycin and zosyn for now   - f/u bcx, ucx, urine legionella    # PELON  -with hyponatremia  -appears pre-renal with FENa 0.6  -improved with IVF  -continue to trend    # BPH  -c/w finasteride      Richard Hyde MD  ID resident PGY 3  [phone]

## 2018-01-10 NOTE — H&P ADULT - PROBLEM SELECTOR PLAN 7
- Holding patient's home Finasteride for now in the setting of recent hypotension. Will restart as appropriate.

## 2018-01-10 NOTE — H&P ADULT - HISTORY OF PRESENT ILLNESS
Patient is a 78 y/o M w/ a PMHx of HTN, T2DM, HLD, BPH, and recent hospitalization for colitis (12/24-12/28) who presents to the ED with worsening weakness and fatigue over the past 2-3 days. Patient's children were at bedside who helped provide history. Patient was recently admitted to [facility] from 12/24-12/28 after he presented with diarrhea. CT A+P at that time showed colitis, and so patient was started on a course of Cipro/Flagyl which he finished as an outpatient ~ 3 days ago. Patient states that his prior diarrhea has since resolved. Per patient's children, patient was doing very well after he was discharged. However, over the past 2-3 days, patient has been becoming progressively more weak to the point where he was unable to stand up on his own. In addition, he has been feeling very fatigued and started to not act like himself. Patient's children became very concerned, and so patient was brought to the ED for further evaluation. On ROS, patient reports subjective fever/chills and one episode of NBNB emesis earlier today. He also endorses some burning with urination, urinary frequency, and a mild headache. Otherwise, patient denies any chest pain, shortness of breath, cough, abdominal pain, or dysuria. No recent travel or sick contacts.    In the ED, pt's VS were: T = 99.8F, HR = 90, BP + 99/77, RR = 16, O2 Sat = 95% on RA. Labs were significant for leukocytosis (WBC = 31.54), hyponatremia (Na = 128), PELON (Cr = 1.22, baseline = 0.8-0.9), and a lactate of 2.0. Patient was given 2L NS, IV Vancomycin 1g x1, and IV Zosyn 3.375g x1. Patient was then admitted to Medicine for further management. Patient is a 76 y/o M w/ a PMHx of HTN, T2DM, HLD, BPH, and recent hospitalization for colitis (12/24-12/28) who presents to the ED with worsening weakness and fatigue over the past 2-3 days. Patient's children were at bedside who helped provide history. Patient was recently admitted to [facility] from 12/24-12/28 after he presented with diarrhea. CT A+P at that time showed colitis, and so patient was started on a course of Cipro/Flagyl which he finished as an outpatient ~ 3 days ago. Patient states that his prior diarrhea has since resolved. Per patient's children, patient was doing very well after he was discharged. However, over the past 2-3 days, patient has been becoming progressively more weak to the point where he was unable to stand up on his own. In addition, he has been feeling very fatigued and started to become more confused. Patient's children became very concerned, and so patient was brought to the ED for further evaluation. On ROS, patient reports subjective fever/chills and one episode of NBNB emesis earlier today. He also endorses some burning with urination, urinary frequency, and a mild headache. Otherwise, patient denies any chest pain, shortness of breath, cough, abdominal pain, or dysuria. No recent travel or sick contacts.    In the ED, pt's VS were: T = 99.8F, HR = 90, BP + 99/77, RR = 16, O2 Sat = 95% on RA. Labs were significant for leukocytosis (WBC = 31.54), hyponatremia (Na = 128), PELON (Cr = 1.22, baseline = 0.8-0.9), and a lactate of 2.0. Patient was given 2L NS, IV Vancomycin 1g x1, and IV Zosyn 3.375g x1. Patient was then admitted to Medicine for further management. Patient is a 76 y/o Male w/ Hx of HTN, Type 2 DM, HLD, BPH, and recent hospitalization for colitis (12/24-12/28) who presents to the ED with worsening weakness and fatigue over the past 2-3 days. Patient's children were at bedside who helped provide history. Patient was recently admitted to [facility] from 12/24-12/28 after he presented with diarrhea. CT A+P at that time showed colitis, and so patient was started on a course of Cipro/Flagyl which he finished as an outpatient ~ 3 days ago. Patient states that his prior diarrhea has since resolved. Per patient's children, patient was doing very well after he was discharged. However, over the past 2-3 days, patient has been becoming progressively more weak to the point where he was unable to stand up on his own. In addition, he has been feeling very fatigued and started to become more confused. Patient's children became very concerned, and so patient was brought to the ED for further evaluation. On ROS, patient reports subjective fever/chills and one episode of NBNB emesis earlier today. He also endorses some burning with urination, urinary frequency, and a mild headache. Otherwise, patient denies any chest pain, shortness of breath, cough, abdominal pain, or dysuria. No recent travel or sick contacts.    In the ED, pt's VS were: T = 99.8F, HR = 90, BP + 99/77, RR = 16, O2 Sat = 95% on RA. Labs were significant for leukocytosis (WBC = 31.54), hyponatremia (Na = 128), PELON (Cr = 1.22, baseline = 0.8-0.9), and a lactate of 2.0. Patient was given 2L NS, IV Vancomycin 1g x1, and IV Zosyn 3.375g x1. Patient was then admitted to Medicine for further management. Patient is a 78 y/o Male, ambulatory with a cane w/ Hx of HTN, Type 2 DM, HLD, BPH, and recent hospitalization for colitis (12/24-12/28) who presents to the ED with worsening weakness and fatigue over the past 2-3 days. Patient's children were at bedside who helped provide history. Patient was recently admitted to [facility] from 12/24-12/28 after he presented with diarrhea. CT A+P at that time showed colitis, and so patient was started on a course of Cipro/Flagyl which he finished as an outpatient ~ 3 days ago. Patient states that his prior diarrhea has since resolved. Per patient's children, patient was doing very well after he was discharged. However, over the past 2-3 days, patient has been becoming progressively more weak to the point where he was unable to stand up on his own. In addition, he has been feeling very fatigued and started to become more confused. Patient's children became very concerned, and so patient was brought to the ED for further evaluation. On ROS, patient reports subjective fever/chills and one episode of NBNB emesis earlier today. He also endorses some burning with urination, urinary frequency, and a mild headache. Otherwise, patient denies any chest pain, shortness of breath, cough, abdominal pain, or dysuria. No recent travel or sick contacts.    In the ED, pt's VS were: T = 99.8F, HR = 90, BP + 99/77, RR = 16, O2 Sat = 95% on RA. Labs were significant for leukocytosis (WBC = 31.54), hyponatremia (Na = 128), PELON (Cr = 1.22, baseline = 0.8-0.9), and a lactate of 2.0. Patient was given 2L NS, IV Vancomycin 1g x1, and IV Zosyn 3.375g x1. Patient was then admitted to Medicine for further management.

## 2018-01-10 NOTE — PROGRESS NOTE ADULT - ASSESSMENT
Patient is a 76 y/o Male, ambulatory with a cane, w/ Hx of HTN, Type 2 DM, HLD, BPH, and recent hospitalization for colitis (12/24-12/28) who presents to the ED with worsening weakness and fatigue over the past 2-3 days, found to have SIRS of unclear etiology. r/o sepsis 76 yo M with HTN, well-controlled DM2, HLD, BPH, and recent hospitalization for sepsis 2/2 colitis (12/24-12/28) who presents to the ED with worsening weakness and fatigue over the past 2-3 days, likely 2/2 SIRS of unclear etiology c/b vomiting, PELON, and likely hypovolemic hyponatremia.

## 2018-01-10 NOTE — H&P ADULT - NSHPPHYSICALEXAM_GEN_ALL_CORE
Vital Signs Last 24 Hrs  T(C): 36.3 (09 Jan 2018 23:45), Max: 37.7 (09 Jan 2018 19:32)  T(F): 97.4 (09 Jan 2018 23:45), Max: 99.8 (09 Jan 2018 19:32)  HR: 72 (09 Jan 2018 23:45) (72 - 90)  BP: 154/68 (09 Jan 2018 23:45) (92/51 - 154/68)  BP(mean): --  RR: 18 (09 Jan 2018 22:11) (16 - 18)  SpO2: 100% (09 Jan 2018 23:45) (95% - 100%)    PHYSICAL EXAM:  Constitutional:  Eyes:  ENMT:  Neck:  Breasts:  Back:  Respiratory:  Cardiovascular:  Gastrointestinal:  Genitourinary:  Rectal:  Extremities:  Vascular:  Neurological:  Skin:  Lymph Nodes:  Musculoskeletal:  Psychiatric: Vital Signs Last 24 Hrs  T(C): 36.3 (09 Jan 2018 23:45), Max: 37.7 (09 Jan 2018 19:32)  T(F): 97.4 (09 Jan 2018 23:45), Max: 99.8 (09 Jan 2018 19:32)  HR: 72 (09 Jan 2018 23:45) (72 - 90)  BP: 154/68 (09 Jan 2018 23:45) (92/51 - 154/68)  BP(mean): --  RR: 18 (09 Jan 2018 22:11) (16 - 18)  SpO2: 100% (09 Jan 2018 23:45) (95% - 100%)    PHYSICAL EXAM:  Constitutional: NAD  Eyes: PERRLA, EOMI  ENMT: Slightly dry mucous membranes  Neck: Supple  Respiratory: CTAB; No wheeze appreciated  Cardiovascular: RRR; +S1/S2  Gastrointestinal: +BS, Soft, NT, No rigidity  Genitourinary: No Lloyd, No suprapubic TTP  Extremities: No peripheral edema  Neurological: A+Ox3, Grossly nonfocal  Skin: Warm and dry  Psychiatric: Normal mood and affect

## 2018-01-10 NOTE — ED ADULT NURSE REASSESSMENT NOTE - NS ED NURSE REASSESS COMMENT FT1
Pt now c/o a headache. Vital signs as noted- hypotensive - Charge RN noted.
pt resting comfortably. denies discomforts currently. vitals noted. labs sent. medicated per mar. NAD noted. fluids infusing per mar. Report given to [facility] RICHA KINGSLEY. pt being taken by transport.

## 2018-01-10 NOTE — H&P ADULT - PROBLEM SELECTOR PLAN 4
- Holding patient's home Lisinopril for now in the setting of PELON and recent hypotension. Will restart as appropriate  - Monitor BP Type 2 DM;  - Holding patient's home Lisinopril for now in the setting of PELON and recent hypotension. Will restart as appropriate  - Monitor BP

## 2018-01-10 NOTE — H&P ADULT - ASSESSMENT
Patient is a 78 y/o M w/ a PMHx of HTN, T2DM, HLD, BPH, and recent hospitalization for colitis (12/24-12/28) who presents to the ED with worsening weakness and fatigue over the past 2-3 days, admitted for presumed sepsis of unclear source. Patient is a 78 y/o M w/ a PMHx of HTN, T2DM, HLD, BPH, and recent hospitalization for colitis (12/24-12/28) who presents to the ED with worsening weakness and fatigue over the past 2-3 days, found to have SIRS from unknown etiology. Patient is a 78 y/o Male, ambulatory with a cane, w/ Hx of HTN, Type 2 DM, HLD, BPH, and recent hospitalization for colitis (12/24-12/28) who presents to the ED with worsening weakness and fatigue over the past 2-3 days, found to have SIRS of unclear etiology. r/o sepsis

## 2018-01-10 NOTE — PROGRESS NOTE ADULT - SUBJECTIVE AND OBJECTIVE BOX
Patient is a 77y old  Male who presents with a chief complaint of Weakness (10 Yoan 2018 01:05)      SUBJECTIVE / OVERNIGHT EVENTS: No acute events overnight. Patient reports feeling better. Was able to tolerate breakfast. Denies any nausea, further emesis, diarrhea, abdominal pain, dysuria, cough, SOB, CP, URI symptoms, rashes or wounds. Last BM was three days ago. Denies any sick contacts at home, dietary changes, or eating at restaurants. [facility]    MEDICATIONS  (STANDING):  atorvastatin 10 milliGRAM(s) Oral at bedtime  dextrose 5%. 1000 milliLiter(s) (50 mL/Hr) IV Continuous <Continuous>  dextrose 50% Injectable 12.5 Gram(s) IV Push once  dextrose 50% Injectable 25 Gram(s) IV Push once  dextrose 50% Injectable 25 Gram(s) IV Push once  heparin  Injectable 5000 Unit(s) SubCutaneous every 8 hours  insulin lispro (HumaLOG) corrective regimen sliding scale   SubCutaneous three times a day before meals  insulin lispro (HumaLOG) corrective regimen sliding scale   SubCutaneous at bedtime  piperacillin/tazobactam IVPB. 3.375 Gram(s) IV Intermittent every 8 hours  sodium chloride 0.9%. 1000 milliLiter(s) (75 mL/Hr) IV Continuous <Continuous>    MEDICATIONS  (PRN):  dextrose Gel 1 Dose(s) Oral once PRN Blood Glucose LESS THAN 70 milliGRAM(s)/deciliter  glucagon  Injectable 1 milliGRAM(s) IntraMuscular once PRN Glucose LESS THAN 70 milligrams/deciliter      Vital Signs Last 24 Hrs  T(C): 37 (01-10-18 @ 09:46)  T(F): 98.6 (01-10-18 @ 09:46), Max: 99.9 (01-10-18 @ 06:35)  HR: 93 (01-10-18 @ 09:46) (72 - 93)  BP: 130/74 (01-10-18 @ 09:46)  BP(mean): --  RR: 17 (01-10-18 @ 09:46) (16 - 18)  SpO2: 96% (01-10-18 @ 09:46) (95% - 100%)  Wt(kg): --    CAPILLARY BLOOD GLUCOSE      POCT Blood Glucose.: 133 mg/dL (10 Yoan 2018 09:04)  POCT Blood Glucose.: 196 mg/dL (2018 19:44)    I&O's Summary      PHYSICAL EXAM:  GENERAL: NAD, well-developed  HEAD:  Atraumatic, Normocephalic  EYES: EOMI, PERRLA, conjunctiva and sclera clear  NECK: Supple, No JVD  CHEST/LUNG: Clear to auscultation bilaterally; No wheeze  HEART: Regular rate and rhythm; No murmurs, rubs, or gallops  ABDOMEN: Soft, Nontender, Nondistended; Bowel sounds present  EXTREMITIES:  2+ Peripheral Pulses, No clubbing, cyanosis, or edema  PSYCH: AAOx3  NEUROLOGY: non-focal  SKIN: No rashes or lesions    LABS:                        11.8   27.50 )-----------( 178      ( 10 Yoan 2018 06:10 )             35.0     01-10    132<L>  |  97<L>  |  24<H>  ----------------------------<  147<H>  3.8   |  24  |  1.03    Ca    8.1<L>      10 Yoan 2018 06:10  Phos  2.0     -10  Mg     1.6     01-10    TPro  6.7  /  Alb  3.2<L>  /  TBili  1.2  /  DBili  x   /  AST  20  /  ALT  14  /  AlkPhos  36<L>  -10          Urinalysis Basic - ( 10 Yoan 2018 03:00 )    Color: PLYEL / Appearance: CLEAR / S.009 / pH: 6.0  Gluc: NEGATIVE / Ketone: NEGATIVE  / Bili: NEGATIVE / Urobili: NORMAL mg/dL   Blood: NEGATIVE / Protein: 10 mg/dL / Nitrite: NEGATIVE   Leuk Esterase: SMALL / RBC: 0-2 / WBC 5-10   Sq Epi: x / Non Sq Epi: x / Bacteria: FEW        RADIOLOGY & ADDITIONAL TESTS:    Imaging Personally Reviewed:    Consultant(s) Notes Reviewed:      Care Discussed with Consultants/Other Providers:    Assessment and Plan: Patient is a 77y old  Male who presents with a chief complaint of Weakness (10 Yoan 2018 01:05)      SUBJECTIVE / OVERNIGHT EVENTS: No acute events overnight. Patient reports feeling better. Was able to tolerate breakfast. Denies any nausea, further emesis, diarrhea, abdominal pain, dysuria, cough, SOB, CP, URI symptoms, rashes or wounds. Last BM was three days ago. Denies any sick contacts at home, dietary changes, or eating at restaurants. Only travel was to [facility] access hospital from -.    MEDICATIONS  (STANDING):  atorvastatin 10 milliGRAM(s) Oral at bedtime  dextrose 5%. 1000 milliLiter(s) (50 mL/Hr) IV Continuous <Continuous>  dextrose 50% Injectable 12.5 Gram(s) IV Push once  dextrose 50% Injectable 25 Gram(s) IV Push once  dextrose 50% Injectable 25 Gram(s) IV Push once  heparin  Injectable 5000 Unit(s) SubCutaneous every 8 hours  insulin lispro (HumaLOG) corrective regimen sliding scale   SubCutaneous three times a day before meals  insulin lispro (HumaLOG) corrective regimen sliding scale   SubCutaneous at bedtime  piperacillin/tazobactam IVPB. 3.375 Gram(s) IV Intermittent every 8 hours  sodium chloride 0.9%. 1000 milliLiter(s) (75 mL/Hr) IV Continuous <Continuous>    MEDICATIONS  (PRN):  dextrose Gel 1 Dose(s) Oral once PRN Blood Glucose LESS THAN 70 milliGRAM(s)/deciliter  glucagon  Injectable 1 milliGRAM(s) IntraMuscular once PRN Glucose LESS THAN 70 milligrams/deciliter      Vital Signs Last 24 Hrs  T(C): 37 (01-10-18 @ 09:46)  T(F): 98.6 (01-10-18 @ 09:46), Max: 99.9 (01-10-18 @ 06:35)  HR: 93 (01-10-18 @ 09:46) (72 - 93)  BP: 130/74 (01-10-18 @ 09:46)  BP(mean): --  RR: 17 (01-10-18 @ 09:46) (16 - 18)  SpO2: 96% (01-10-18 @ 09:46) (95% - 100%)  Wt(kg): --    CAPILLARY BLOOD GLUCOSE      POCT Blood Glucose.: 133 mg/dL (10 Yoan 2018 09:04)  POCT Blood Glucose.: 196 mg/dL (2018 19:44)    I&O's Summary      PHYSICAL EXAM:  GENERAL: NAD, well-developed  HEAD:  Atraumatic, Normocephalic, no sinus tenderness appreicated  EYES:  conjunctiva and sclera clear  NECK: Supple, No JVD  CHEST/LUNG: Clear to auscultation bilaterally; No wheeze, rhonchi rales  HEART: Regular rate and rhythm; No murmurs, rubs, or gallops  ABDOMEN: Soft, Nontender, Nondistended; Bowel sounds present  EXTREMITIES:  2+ Peripheral Pulses, No clubbing, cyanosis, or edema  PSYCH: AAOx3, calm, cooperative  SKIN: No rashes or lesions    LABS:                        11.8   27.50 )-----------( 178      ( 10 Yoan 2018 06:10 )             35.0     WBC Count: 31.54 K/uL (18 @ 22:05)    WBC Count: 4.6 K/uL (12.28.17 @ 07:31)      01-10    132<L>  |  97<L>  |  24<H>  ----------------------------<  147<H>  3.8   |  24  |  1.03    Ca    8.1<L>      10 Yoan 2018 06:10  Phos  2.0     01-10  Mg     1.6     01-10    TPro  6.7  /  Alb  3.2<L>  /  TBili  1.2  /  DBili  x   /  AST  20  /  ALT  14  /  AlkPhos  36<L>  01-10          Urinalysis Basic - ( 10 Yoan 2018 03:00 )    Color: PLYEL / Appearance: CLEAR / S.009 / pH: 6.0  Gluc: NEGATIVE / Ketone: NEGATIVE  / Bili: NEGATIVE / Urobili: NORMAL mg/dL   Blood: NEGATIVE / Protein: 10 mg/dL / Nitrite: NEGATIVE   Leuk Esterase: SMALL / RBC: 0-2 / WBC 5-10   Sq Epi: x / Non Sq Epi: x / Bacteria: FEW    Rapid Respiratory Viral Panel (18 @ 23:00)    Adenovirus (RapRVP): NOT DETECTED    Influenza A (RapRVP): NOT DETECTED (any subtype)    Influenza AH1  (RapRVP): NOT DETECTED    Influenza AH1 (RapRVP): NOT DETECTED    Influenza AH3 (RapRVP): NOT DETECTED    Influenza B (RapRVP): NOT DETECTED    Parainfluenza 1 (RapRVP): NOT DETECTED    Parainfluenza 2 (RapRVP): NOT DETECTED    Parainfluenza 3 (RapRVP): NOT DETECTED    Parainfluenza 4 (RapRVP): NOT DETECTED    Resp Syncytial Virus (RapRVP): NOT DETECTED    Bordetella pertussis (RapRVP): NOT DETECTED    Chlamydia pneumoniae (RapRVP): NOT DETECTED    Mycoplasma pneumoniae (RapRVP): NOT DETECTED    Entero/Rhinovirus (RapRVP): NOT DETECTED    HKU1 Coronavirus (RapRVP): NOT DETECTED    NL63 Coronavirus (RapRVP): NOT DETECTED    229E Coronavirus (RapRVP): NOT DETECTED    OC43 Coronavirus (RapRVP): NOT DETECTED    hMPV (RapRVP): NOT DETECTED    RADIOLOGY & ADDITIONAL TESTS:    Imaging Personally Reviewed:  < from: Xray Chest 2 Views PA/Lat (18 @ 21:58) >  IMPRESSION:    No acute pulmonary disease.    < end of copied text >  < from: CT Abdomen and Pelvis w/ Oral Cont and w/ IV Cont (17 @ 03:04) >    IMPRESSION: Colitis of the likely infectious versus inflammatory etiology.    < end of copied text >      Consultant(s) Notes Reviewed:  Reviewed prior records from [facility], patient did not receive steroids. Received cipro/Flagyl for transverse, descending colitis, blood cultures were positive for coagulase negative staphylococcus there, repeat cultures were negative     Care Discussed with Consultants/Other Providers: Infectious Diseases (Dr. Dailey) re: leukocytosis and need for consult    Assessment and Plan:

## 2018-01-10 NOTE — H&P ADULT - NSHPLABSRESULTS_GEN_ALL_CORE
CBC Full  -  ( 09 Jan 2018 22:05 )  WBC Count : 31.54 K/uL  Hemoglobin : 12.5 g/dL  Hematocrit : 36.5 %  Platelet Count - Automated : 189 K/uL  Mean Cell Volume : 96.1 fL  Mean Cell Hemoglobin : 32.9 pg  Mean Cell Hemoglobin Concentration : 34.2 %  Auto Neutrophil # : 24.96 K/uL  Auto Lymphocyte # : 2.22 K/uL  Auto Monocyte # : 3.76 K/uL  Auto Eosinophil # : 0.00 K/uL  Auto Basophil # : 0.05 K/uL  Auto Neutrophil % : 79.2 %  Auto Lymphocyte % : 7.0 %  Auto Monocyte % : 11.9 %  Auto Eosinophil % : 0.0 %  Auto Basophil % : 0.2 %    01-09    128<L>  |  90<L>  |  31<H>  ----------------------------<  144<H>  4.1   |  26  |  1.22    Ca    8.5      09 Jan 2018 22:05    TPro  7.2  /  Alb  3.6  /  TBili  1.0  /  DBili  x   /  AST  20  /  ALT  15  /  AlkPhos  39<L>  01-09    EKG: NSR (Rate = 89bpm), NL Axis, No acute ST-T wave changes, QT/QTc = 354/430    CXR (prelim): No acute pulmonary disease. 01-09    128<L>  |  90<L>  |  31<H>  ----------------------------<  144<H>  4.1   |  26  |  1.22    Ca    8.5      09 Jan 2018 22:05    TPro  7.2  /  Alb  3.6  /  TBili  1.0  /  DBili  x   /  AST  20  /  ALT  15  /  AlkPhos  39<L>  01-09    EKG, 1/9/18, NSR (Rate = 89bpm), NL Axis, No acute ST-T wave changes, QT/QTc = 354/430 - my reading     CXR - clear lungs, no effusions - my reading

## 2018-01-10 NOTE — PROGRESS NOTE ADULT - PROBLEM SELECTOR PLAN 7
-Patient came in hypotensive to the ED, will hold lisinopril for now given PELON and hypotension on arrival. Will monitor BP and BMP. If both improve, will resume ACEi in the AM.

## 2018-01-10 NOTE — PHYSICAL THERAPY INITIAL EVALUATION ADULT - PREDICTED DURATION OF THERAPY (DAYS/WKS), PT EVAL
Pt. will not be placed on PT program at this time secondary to independently amb. 150 with IV pole without loss of balance.

## 2018-01-10 NOTE — H&P ADULT - MUSCULOSKELETAL
detailed exam no calf tenderness/no joint swelling/no joint erythema/normal strength/no joint warmth/ROM intact

## 2018-01-10 NOTE — H&P ADULT - PROBLEM SELECTOR PLAN 2
- Patient found to have an PELON on presentation (Cr = 1.22, baseline = 0.8-0.9). Suspect likely prerenal etiology due patient's underlying sepsis. Patient given 2L NS in the ED. Will start maintenance fluid.  - Will monitor BMP. If no improvement in Cr s/p IVF, will check a Renal U/S. - Patient found to have an PELON on presentation (Cr = 1.22, baseline = 0.8-0.9). Suspect likely prerenal etiology due patient's underlying sepsis. Patient given 2L NS in the ED. Will start maintenance fluid.  - Will monitor BMP.  - If no improvement in Cr s/p IVF, will check a Renal U/S.

## 2018-01-10 NOTE — H&P ADULT - NSHPSOCIALHISTORY_GEN_ALL_CORE
Occasional EtOH use, No smoking or illicit drug use  Lives at home with wife. Occasionally requires a cane to ambulate Occasional EtOH use  No smoking or illicit drug use  Lives at home with wife.  Occasionally requires a cane to ambulate

## 2018-01-10 NOTE — H&P ADULT - PROBLEM SELECTOR PLAN 3
- Patient found to have hyponatremia on presentation (Na = 128). Possibly hypovolemic in nature as patient appears clinically dry and his BUN/Cr ratio is > 20.  - Will check serum/urine Osm and urine studies  - Will monitor BMP - Patient found to have hyponatremia on presentation (Na = 128). Possibly hypovolemic in nature as patient appears clinically dry and his BUN/Cr ratio is > 20.  - Will check serum/urine Osm and urine studies  -trial of IV NS  - Will monitor BMP

## 2018-01-10 NOTE — H&P ADULT - NSHPREVIEWOFSYSTEMS_GEN_ALL_CORE
REVIEW OF SYSTEMS  General:	  Skin/Breast:  Ophthalmologic:  ENMT:	  Respiratory and Thorax:  Cardiovascular:	  Gastrointestinal:	  Genitourinary:	  Musculoskeletal:	  Neurological: REVIEW OF SYSTEMS  General: + Subjective fever, + Chills, + Fatigue, + Weakness  Skin/Breast: No itching or rash  Ophthalmologic: No eye pain or vision changes  ENMT: No sore throat or nasal congestion  Respiratory and Thorax: No shortness of breath or cough  Cardiovascular: No chest pain or palpitations	  Gastrointestinal: + Vomiting, No diarrhea or abdominal pain	  Genitourinary: + Urinary frequency, + Burning with urination  Musculoskeletal: No joint pain or back pain  Neurological: + Mild headache, + Confusion

## 2018-01-10 NOTE — PROGRESS NOTE ADULT - PROBLEM SELECTOR PLAN 1
-Unclear why patient has profound leukocytosis again s/p improvement and treatment for colitis, no other localizing symptoms appreciated  -Follow-up blood and urine cultures  -Will c/s ID (called) and follow-up further recommendations  -Patient does not appear to have receive steroids at last hospitalization

## 2018-01-10 NOTE — PROGRESS NOTE ADULT - PROBLEM SELECTOR PLAN 5
-Patient's urine studies are more suggestive of SIADH which could have been induced from patient's vomiting, but given the Na has improved with IV hydration, will continue for now. If patient's Na does not continue to improve on AM BMP, will consider fluid restriction at that time  -Patient appears slightly hypovolemic on exam

## 2018-01-10 NOTE — PHYSICAL THERAPY INITIAL EVALUATION ADULT - ADDITIONAL COMMENTS
Pt. left seated at edge of bed post-PT in NAD with all lines/tubes intact & call bell within reach.  RN Marta wells.

## 2018-01-10 NOTE — CONSULT NOTE ADULT - ATTENDING COMMENTS
Patient seen and examined.  Agree with above.   -No clinical heart failure  -no further cardiac workup needed at this time    Mallika Sutton MD  [facility]  [address], [address]  [place] [address]  office: [phone]  pager: [phone]
76 yo man with recent hospitalization for colitis treated with flagyl and cipro who returns with fatigue and found to have WBC 31K.   Sepsis unclear source.  GI symptoms appear to have resolved; only complaint urinary frequency.    Suggest; CT abd/pelvis                agree with empiric vanco and zosyn                follow cultures                if develops diarrhea would check c dif and GI PCR panel.

## 2018-01-11 LAB
BACTERIA UR CULT: SIGNIFICANT CHANGE UP
BASOPHILS # BLD AUTO: 0.04 K/UL — SIGNIFICANT CHANGE UP (ref 0–0.2)
BASOPHILS NFR BLD AUTO: 0.2 % — SIGNIFICANT CHANGE UP (ref 0–2)
BUN SERPL-MCNC: 18 MG/DL — SIGNIFICANT CHANGE UP (ref 7–23)
BUN SERPL-MCNC: 18 MG/DL — SIGNIFICANT CHANGE UP (ref 7–23)
CALCIUM SERPL-MCNC: 7.9 MG/DL — LOW (ref 8.4–10.5)
CALCIUM SERPL-MCNC: 7.9 MG/DL — LOW (ref 8.4–10.5)
CHLORIDE SERPL-SCNC: 98 MMOL/L — SIGNIFICANT CHANGE UP (ref 98–107)
CHLORIDE SERPL-SCNC: 98 MMOL/L — SIGNIFICANT CHANGE UP (ref 98–107)
CO2 SERPL-SCNC: 25 MMOL/L — SIGNIFICANT CHANGE UP (ref 22–31)
CO2 SERPL-SCNC: 25 MMOL/L — SIGNIFICANT CHANGE UP (ref 22–31)
CREAT SERPL-MCNC: 1.04 MG/DL — SIGNIFICANT CHANGE UP (ref 0.5–1.3)
CREAT SERPL-MCNC: 1.04 MG/DL — SIGNIFICANT CHANGE UP (ref 0.5–1.3)
EOSINOPHIL # BLD AUTO: 0.04 K/UL — SIGNIFICANT CHANGE UP (ref 0–0.5)
EOSINOPHIL NFR BLD AUTO: 0.2 % — SIGNIFICANT CHANGE UP (ref 0–6)
GLUCOSE SERPL-MCNC: 107 MG/DL — HIGH (ref 70–99)
GLUCOSE SERPL-MCNC: 107 MG/DL — HIGH (ref 70–99)
HCT VFR BLD CALC: 32 % — LOW (ref 39–50)
HCT VFR BLD CALC: 32 % — LOW (ref 39–50)
HGB BLD-MCNC: 10.5 G/DL — LOW (ref 13–17)
HGB BLD-MCNC: 10.5 G/DL — LOW (ref 13–17)
IMM GRANULOCYTES # BLD AUTO: 0.17 # — SIGNIFICANT CHANGE UP
IMM GRANULOCYTES NFR BLD AUTO: 1 % — SIGNIFICANT CHANGE UP (ref 0–1.5)
L PNEUMO AG UR QL: NEGATIVE — SIGNIFICANT CHANGE UP
LYMPHOCYTES # BLD AUTO: 1.28 K/UL — SIGNIFICANT CHANGE UP (ref 1–3.3)
LYMPHOCYTES # BLD AUTO: 7.6 % — LOW (ref 13–44)
MAGNESIUM SERPL-MCNC: 1.8 MG/DL — SIGNIFICANT CHANGE UP (ref 1.6–2.6)
MCHC RBC-ENTMCNC: 31.5 PG — SIGNIFICANT CHANGE UP (ref 27–34)
MCHC RBC-ENTMCNC: 31.5 PG — SIGNIFICANT CHANGE UP (ref 27–34)
MCHC RBC-ENTMCNC: 32.8 % — SIGNIFICANT CHANGE UP (ref 32–36)
MCHC RBC-ENTMCNC: 32.8 % — SIGNIFICANT CHANGE UP (ref 32–36)
MCV RBC AUTO: 96.1 FL — SIGNIFICANT CHANGE UP (ref 80–100)
MCV RBC AUTO: 96.1 FL — SIGNIFICANT CHANGE UP (ref 80–100)
MONOCYTES # BLD AUTO: 1.64 K/UL — HIGH (ref 0–0.9)
MONOCYTES NFR BLD AUTO: 9.7 % — SIGNIFICANT CHANGE UP (ref 2–14)
NEUTROPHILS # BLD AUTO: 13.67 K/UL — HIGH (ref 1.8–7.4)
NEUTROPHILS NFR BLD AUTO: 81.3 % — HIGH (ref 43–77)
NRBC # FLD: 0 — SIGNIFICANT CHANGE UP
NRBC # FLD: 0 — SIGNIFICANT CHANGE UP
PHOSPHATE SERPL-MCNC: 1.8 MG/DL — LOW (ref 2.5–4.5)
PLATELET # BLD AUTO: 146 K/UL — LOW (ref 150–400)
PLATELET # BLD AUTO: 146 K/UL — LOW (ref 150–400)
PMV BLD: 10.7 FL — SIGNIFICANT CHANGE UP (ref 7–13)
PMV BLD: 10.7 FL — SIGNIFICANT CHANGE UP (ref 7–13)
POTASSIUM SERPL-MCNC: 3.6 MMOL/L — SIGNIFICANT CHANGE UP (ref 3.5–5.3)
POTASSIUM SERPL-MCNC: 3.6 MMOL/L — SIGNIFICANT CHANGE UP (ref 3.5–5.3)
POTASSIUM SERPL-SCNC: 3.6 MMOL/L — SIGNIFICANT CHANGE UP (ref 3.5–5.3)
POTASSIUM SERPL-SCNC: 3.6 MMOL/L — SIGNIFICANT CHANGE UP (ref 3.5–5.3)
RBC # BLD: 3.33 M/UL — LOW (ref 4.2–5.8)
RBC # BLD: 3.33 M/UL — LOW (ref 4.2–5.8)
RBC # FLD: 12.9 % — SIGNIFICANT CHANGE UP (ref 10.3–14.5)
RBC # FLD: 12.9 % — SIGNIFICANT CHANGE UP (ref 10.3–14.5)
SODIUM SERPL-SCNC: 136 MMOL/L — SIGNIFICANT CHANGE UP (ref 135–145)
SODIUM SERPL-SCNC: 136 MMOL/L — SIGNIFICANT CHANGE UP (ref 135–145)
SPECIMEN SOURCE: SIGNIFICANT CHANGE UP
T4 FREE SERPL-MCNC: 1.45 NG/DL — SIGNIFICANT CHANGE UP (ref 0.9–1.8)
TSH SERPL-MCNC: 0.22 UIU/ML — LOW (ref 0.27–4.2)
WBC # BLD: 16.84 K/UL — HIGH (ref 3.8–10.5)
WBC # BLD: 16.84 K/UL — HIGH (ref 3.8–10.5)
WBC # FLD AUTO: 16.84 K/UL — HIGH (ref 3.8–10.5)
WBC # FLD AUTO: 16.84 K/UL — HIGH (ref 3.8–10.5)

## 2018-01-11 PROCEDURE — 99232 SBSQ HOSP IP/OBS MODERATE 35: CPT

## 2018-01-11 PROCEDURE — 99233 SBSQ HOSP IP/OBS HIGH 50: CPT

## 2018-01-11 PROCEDURE — 74177 CT ABD & PELVIS W/CONTRAST: CPT | Mod: 26

## 2018-01-11 RX ORDER — DOCUSATE SODIUM 100 MG
100 CAPSULE ORAL DAILY
Qty: 0 | Refills: 0 | Status: DISCONTINUED | OUTPATIENT
Start: 2018-01-11 | End: 2018-01-14

## 2018-01-11 RX ORDER — SENNA PLUS 8.6 MG/1
2 TABLET ORAL AT BEDTIME
Qty: 0 | Refills: 0 | Status: DISCONTINUED | OUTPATIENT
Start: 2018-01-11 | End: 2018-01-14

## 2018-01-11 RX ORDER — BACLOFEN 100 %
10 POWDER (GRAM) MISCELLANEOUS THREE TIMES A DAY
Qty: 0 | Refills: 0 | Status: DISCONTINUED | OUTPATIENT
Start: 2018-01-11 | End: 2018-01-14

## 2018-01-11 RX ORDER — POTASSIUM PHOSPHATE, MONOBASIC POTASSIUM PHOSPHATE, DIBASIC 236; 224 MG/ML; MG/ML
15 INJECTION, SOLUTION INTRAVENOUS ONCE
Qty: 0 | Refills: 0 | Status: COMPLETED | OUTPATIENT
Start: 2018-01-11 | End: 2018-01-11

## 2018-01-11 RX ORDER — BACLOFEN 100 %
10 POWDER (GRAM) MISCELLANEOUS ONCE
Qty: 0 | Refills: 0 | Status: COMPLETED | OUTPATIENT
Start: 2018-01-11 | End: 2018-01-11

## 2018-01-11 RX ORDER — ACETAMINOPHEN 500 MG
650 TABLET ORAL ONCE
Qty: 0 | Refills: 0 | Status: COMPLETED | OUTPATIENT
Start: 2018-01-11 | End: 2018-01-11

## 2018-01-11 RX ORDER — VANCOMYCIN HCL 1 G
1000 VIAL (EA) INTRAVENOUS EVERY 12 HOURS
Qty: 0 | Refills: 0 | Status: DISCONTINUED | OUTPATIENT
Start: 2018-01-11 | End: 2018-01-12

## 2018-01-11 RX ADMIN — PIPERACILLIN AND TAZOBACTAM 25 GRAM(S): 4; .5 INJECTION, POWDER, LYOPHILIZED, FOR SOLUTION INTRAVENOUS at 14:41

## 2018-01-11 RX ADMIN — Medication 100 MILLIGRAM(S): at 06:28

## 2018-01-11 RX ADMIN — Medication 1 TABLET(S): at 09:12

## 2018-01-11 RX ADMIN — PIPERACILLIN AND TAZOBACTAM 25 GRAM(S): 4; .5 INJECTION, POWDER, LYOPHILIZED, FOR SOLUTION INTRAVENOUS at 22:40

## 2018-01-11 RX ADMIN — ATORVASTATIN CALCIUM 10 MILLIGRAM(S): 80 TABLET, FILM COATED ORAL at 22:40

## 2018-01-11 RX ADMIN — POTASSIUM PHOSPHATE, MONOBASIC POTASSIUM PHOSPHATE, DIBASIC 62.5 MILLIMOLE(S): 236; 224 INJECTION, SOLUTION INTRAVENOUS at 10:24

## 2018-01-11 RX ADMIN — PIPERACILLIN AND TAZOBACTAM 25 GRAM(S): 4; .5 INJECTION, POWDER, LYOPHILIZED, FOR SOLUTION INTRAVENOUS at 06:28

## 2018-01-11 RX ADMIN — HEPARIN SODIUM 5000 UNIT(S): 5000 INJECTION INTRAVENOUS; SUBCUTANEOUS at 22:40

## 2018-01-11 RX ADMIN — Medication 250 MILLIGRAM(S): at 18:48

## 2018-01-11 RX ADMIN — Medication 10 MILLIGRAM(S): at 17:17

## 2018-01-11 RX ADMIN — Medication 650 MILLIGRAM(S): at 22:40

## 2018-01-11 RX ADMIN — FINASTERIDE 5 MILLIGRAM(S): 5 TABLET, FILM COATED ORAL at 12:42

## 2018-01-11 RX ADMIN — Medication 1 TABLET(S): at 14:40

## 2018-01-11 RX ADMIN — Medication 10 MILLIGRAM(S): at 02:30

## 2018-01-11 RX ADMIN — HEPARIN SODIUM 5000 UNIT(S): 5000 INJECTION INTRAVENOUS; SUBCUTANEOUS at 06:28

## 2018-01-11 RX ADMIN — SENNA PLUS 2 TABLET(S): 8.6 TABLET ORAL at 22:40

## 2018-01-11 RX ADMIN — HEPARIN SODIUM 5000 UNIT(S): 5000 INJECTION INTRAVENOUS; SUBCUTANEOUS at 14:41

## 2018-01-11 NOTE — PROGRESS NOTE ADULT - PROBLEM SELECTOR PLAN 5
-Patient's urine studies are more suggestive of SIADH which could have been induced from patient's vomiting, but given the Na has improved with IV hydration, will continue for now. If patient's Na does not continue to improve on AM BMP, will consider fluid restriction at that time  -Patient appears slightly hypovolemic on exam -Now resolved with patient's clinical improvement and ability to tolerate po  -Will dc IVF

## 2018-01-11 NOTE — PROGRESS NOTE ADULT - SUBJECTIVE AND OBJECTIVE BOX
Subjective:   	 no chest pain   no shortness of breath   reports had BM today feels much better     MEDICATIONS:  MEDICATIONS  (STANDING):  atorvastatin 10 milliGRAM(s) Oral at bedtime  dextrose 5%. 1000 milliLiter(s) (50 mL/Hr) IV Continuous <Continuous>  dextrose 50% Injectable 12.5 Gram(s) IV Push once  dextrose 50% Injectable 25 Gram(s) IV Push once  dextrose 50% Injectable 25 Gram(s) IV Push once  docusate sodium 100 milliGRAM(s) Oral daily  finasteride 5 milliGRAM(s) Oral daily  heparin  Injectable 5000 Unit(s) SubCutaneous every 8 hours  insulin lispro (HumaLOG) corrective regimen sliding scale   SubCutaneous three times a day before meals  insulin lispro (HumaLOG) corrective regimen sliding scale   SubCutaneous at bedtime  piperacillin/tazobactam IVPB. 3.375 Gram(s) IV Intermittent every 8 hours  potassium acid phosphate/sodium acid phosphate tablet (K-PHOS No. 2) 1 Tablet(s) Oral four times a day with meals  senna 2 Tablet(s) Oral at bedtime  vancomycin  IVPB 1000 milliGRAM(s) IV Intermittent every 12 hours    MEDICATIONS  (PRN):  dextrose Gel 1 Dose(s) Oral once PRN Blood Glucose LESS THAN 70 milliGRAM(s)/deciliter  glucagon  Injectable 1 milliGRAM(s) IntraMuscular once PRN Glucose LESS THAN 70 milligrams/deciliter      LABS:	 	    CARDIAC MARKERS:                          10.5   16.84 )-----------( 146      ( 11 Jan 2018 05:00 )             32.0     01-11    136  |  98  |  18  ----------------------------<  107<H>  3.6   |  25  |  1.04    Ca    7.9<L>      11 Jan 2018 05:00  Phos  1.8     01-11  Mg     1.8     01-11    TPro  6.7  /  Alb  3.2<L>  /  TBili  1.2  /  DBili  x   /  AST  20  /  ALT  14  /  AlkPhos  36<L>  01-10    COAGS:       proBNP:   Lipid Profile:   HgA1c:   TSH: Thyroid Stimulating Hormone, Serum: 0.22 uIU/mL (01-11 @ 05:00)        PHYSICAL EXAM:  T(C): 37.4 (01-11-18 @ 05:50), Max: 37.7 (01-10-18 @ 18:07)  HR: 79 (01-11-18 @ 05:50) (79 - 89)  BP: 123/57 (01-11-18 @ 05:50) (109/60 - 135/72)  RR: 17 (01-11-18 @ 05:50) (17 - 17)  SpO2: 97% (01-11-18 @ 05:50) (96% - 97%)  Wt(kg): --  I&O's Summary    10 Yoan 2018 07:01  -  11 Jan 2018 07:00  --------------------------------------------------------  IN: 0 mL / OUT: 300 mL / NET: -300 mL        Cardiovascular: Normal S1 S2, RRR   No JVD, 1/6 ROZ murmur, Peripheral pulses palpable 2+ bilaterally  Respiratory: Lungs clear to auscultation, normal effort 	  Gastrointestinal:  Soft, Non-tender, + BS	  Extremities no edema, cyanosis, clubbing B/L LE's       TELEMETRY: 	    ECG:  NSR 	  RADIOLOGY:    CXR      IMPRESSION:    No acute pulmonary disease.        DIAGNOSTIC TESTING:  [ ] Echocardiogram:  [ ]  Catheterization:  [ ] Stress Test:    OTHER: 	      ASSESSMENT/PLAN: 	77y Male with a cane w/ Hx of HTN, Type 2 DM, HLD, BPH, and recent hospitalization for colitis (12/24-12/28) who presents to the ED with worsening weakness, fatigue and confusion over the past 2-3 days.  Last stress echo in the office in April 2017 with No ischemia and normal LV function.      --Not in clinical CHF  --No further inpatient cardiac work up needed at this time  --f/u ID  --to f/u with Dr Phillips post discharge

## 2018-01-11 NOTE — PROGRESS NOTE ADULT - PROBLEM SELECTOR PLAN 1
-Unclear why patient has profound leukocytosis again s/p improvement and treatment for colitis, no other localizing symptoms appreciated  -Follow-up blood and urine cultures  -Will c/s ID (called) and follow-up further recommendations  -Patient does not appear to have receive steroids at last hospitalization -Case discussed with ID, who agree patient likely with some infection as the cause of his leukocytosis given the profound improvement with abx, but source is unclear, possibly UTI given GNR seen in urine culture (but only <10,000)  -Will c/w vancomycin and Zosyn for now and f/u further recs  -Trend CBC with differential daily  -CT A/P negative for GI pathology

## 2018-01-11 NOTE — PROGRESS NOTE ADULT - ASSESSMENT
78 yo M with HTN, well-controlled DM2, HLD, BPH, and recent hospitalization for sepsis 2/2 colitis (12/24-12/28) who presents to the ED with worsening weakness and fatigue over the past 2-3 days, likely 2/2 SIRS of unclear etiology c/b vomiting, PELON, and likely hypovolemic hyponatremia. 78 yo M with HTN, well-controlled DM2, HLD, BPH, and recent hospitalization for sepsis 2/2 colitis (12/24-12/28) who presents to the ED with worsening weakness and fatigue over the past 2-3 days, likely 2/2 SIRS of unclear etiology c/b vomiting, prerenal PELON, and likely hypovolemic hyponatremia.

## 2018-01-11 NOTE — PROGRESS NOTE ADULT - PROBLEM SELECTOR PLAN 7
-Patient came in hypotensive to the ED, will hold lisinopril for now given PELON and hypotension on arrival. Will monitor BP and BMP. If both improve, will resume ACEi in the AM. -BP acceptable at this kayy, will continue to monitor patient off ACEi for now given that he just received repeat CT A/P with contrast

## 2018-01-11 NOTE — PROGRESS NOTE ADULT - PROBLEM SELECTOR PLAN 2
-Unclear etiology of SIRS  -Will consult ID and f/u recs  -Patient's vitals have improved clinically, will continue to hold lisinopril for now and monitor BP  -c/w Zosyn for now for possible GI source, patient might benefit from repeat CT A/P to ensure resolution of colitis vs possible abscess but if able to do so would like to hold off for an additional day given patient arrived with PELON -Possibly in the setting of UTI given GNR in culture  -Will continue with vancomycin and Zosyn for now and follow-up further ID recommendations  -c/w Tylenol PRN fever  -CT A/P negative for infectious pathology  -Follow-up blood cultures, currently, NTD

## 2018-01-11 NOTE — PROGRESS NOTE ADULT - ASSESSMENT
78 yo man s/p recent admission [facility] for colitis admitted [facility] with fatigue and WBC 31K.  Today improved with normal BM, WBC 16K. CT abd no pathology except enlarged prostate. ?UTI.  Seems to be responding to antibiotics.  Would continue vanco and zosyn.  Likely complete short course if continues to improve.

## 2018-01-11 NOTE — PROGRESS NOTE ADULT - SUBJECTIVE AND OBJECTIVE BOX
Patient is a 77y old  Male who presents with a chief complaint of Weakness (10 Yoan 2018 01:05)      SUBJECTIVE / OVERNIGHT EVENTS: No acute events overnight. Patient reports he had normal BM with bowel regimen. Hiccups resolved s/p baclofen 10mg po x1. Patient reports feeling stronger and has been tolerating po. No fevers or chills.    MEDICATIONS  (STANDING):  atorvastatin 10 milliGRAM(s) Oral at bedtime  dextrose 5%. 1000 milliLiter(s) (50 mL/Hr) IV Continuous <Continuous>  dextrose 50% Injectable 12.5 Gram(s) IV Push once  dextrose 50% Injectable 25 Gram(s) IV Push once  dextrose 50% Injectable 25 Gram(s) IV Push once  docusate sodium 100 milliGRAM(s) Oral daily  finasteride 5 milliGRAM(s) Oral daily  heparin  Injectable 5000 Unit(s) SubCutaneous every 8 hours  insulin lispro (HumaLOG) corrective regimen sliding scale   SubCutaneous three times a day before meals  insulin lispro (HumaLOG) corrective regimen sliding scale   SubCutaneous at bedtime  piperacillin/tazobactam IVPB. 3.375 Gram(s) IV Intermittent every 8 hours  potassium acid phosphate/sodium acid phosphate tablet (K-PHOS No. 2) 1 Tablet(s) Oral four times a day with meals  senna 2 Tablet(s) Oral at bedtime  vancomycin  IVPB 1000 milliGRAM(s) IV Intermittent every 12 hours    MEDICATIONS  (PRN):  dextrose Gel 1 Dose(s) Oral once PRN Blood Glucose LESS THAN 70 milliGRAM(s)/deciliter  glucagon  Injectable 1 milliGRAM(s) IntraMuscular once PRN Glucose LESS THAN 70 milligrams/deciliter      Vital Signs Last 24 Hrs  T(C): 37.4 (18 @ 05:50)  T(F): 99.3 (18 @ 05:50), Max: 99.8 (01-10-18 @ 18:07)  HR: 79 (18 @ 05:50) (79 - 89)  BP: 123/57 (18 @ 05:50)  BP(mean): --  RR: 17 (18 @ 05:50) (17 - 17)  SpO2: 97% (18 @ 05:50) (96% - 97%)  Wt(kg): --    01-10 @ 07:01  -  -11 @ 07:00  --------------------------------------------------------  IN: 0 mL / OUT: 300 mL / NET: -300 mL      CAPILLARY BLOOD GLUCOSE      POCT Blood Glucose.: 116 mg/dL (2018 08:12)  POCT Blood Glucose.: 127 mg/dL (10 Yoan 2018 21:50)  POCT Blood Glucose.: 149 mg/dL (10 Yoan 2018 17:46)  POCT Blood Glucose.: 175 mg/dL (10 Yoan 2018 12:31)    I&O's Summary    10 Yoan 2018 07:  -  2018 07:00  --------------------------------------------------------  IN: 0 mL / OUT: 300 mL / NET: -300 mL        PHYSICAL EXAM:  GENERAL: NAD, well-developed  HEAD:  Atraumatic, Normocephalic  EYES: EOMI, PERRLA, conjunctiva and sclera clear  NECK: Supple, No JVD  CHEST/LUNG: Clear to auscultation bilaterally; No wheeze  HEART: Regular rate and rhythm; No murmurs, rubs, or gallops  ABDOMEN: Soft, Nontender, Nondistended; Bowel sounds present  EXTREMITIES:  2+ Peripheral Pulses, No clubbing, cyanosis, or edema  PSYCH: AAOx3  NEUROLOGY: non-focal  SKIN: No rashes or lesions    LABS:                        10.5   16.84 )-----------( 146      ( 2018 05:00 )             32.0         136  |  98  |  18  ----------------------------<  107<H>  3.6   |  25  |  1.04    Ca    7.9<L>      2018 05:00  Phos  1.8       Mg     1.8         TPro  6.7  /  Alb  3.2<L>  /  TBili  1.2  /  DBili  x   /  AST  20  /  ALT  14  /  AlkPhos  36<L>  01-10          Urinalysis Basic - ( 10 Yoan 2018 03:00 )    Color: PLYEL / Appearance: CLEAR / S.009 / pH: 6.0  Gluc: NEGATIVE / Ketone: NEGATIVE  / Bili: NEGATIVE / Urobili: NORMAL mg/dL   Blood: NEGATIVE / Protein: 10 mg/dL / Nitrite: NEGATIVE   Leuk Esterase: SMALL / RBC: 0-2 / WBC 5-10   Sq Epi: x / Non Sq Epi: x / Bacteria: FEW        RADIOLOGY & ADDITIONAL TESTS:    Imaging Personally Reviewed:    Consultant(s) Notes Reviewed:  ID, Cardiology    Care Discussed with Consultants/Other Providers: Dr. Bateman (ID) re: plan of care regarding antibiotics    Assessment and Plan: Patient is a 77y old  Male who presents with a chief complaint of Weakness (10 Yoan 2018 01:05)      SUBJECTIVE / OVERNIGHT EVENTS: No acute events overnight. Patient reports he had normal BM with bowel regimen. Hiccups resolved s/p baclofen 10mg po x1. Patient reports feeling stronger and has been tolerating po. No fevers or chills.    MEDICATIONS  (STANDING):  atorvastatin 10 milliGRAM(s) Oral at bedtime  dextrose 5%. 1000 milliLiter(s) (50 mL/Hr) IV Continuous <Continuous>  dextrose 50% Injectable 12.5 Gram(s) IV Push once  dextrose 50% Injectable 25 Gram(s) IV Push once  dextrose 50% Injectable 25 Gram(s) IV Push once  docusate sodium 100 milliGRAM(s) Oral daily  finasteride 5 milliGRAM(s) Oral daily  heparin  Injectable 5000 Unit(s) SubCutaneous every 8 hours  insulin lispro (HumaLOG) corrective regimen sliding scale   SubCutaneous three times a day before meals  insulin lispro (HumaLOG) corrective regimen sliding scale   SubCutaneous at bedtime  piperacillin/tazobactam IVPB. 3.375 Gram(s) IV Intermittent every 8 hours  potassium acid phosphate/sodium acid phosphate tablet (K-PHOS No. 2) 1 Tablet(s) Oral four times a day with meals  senna 2 Tablet(s) Oral at bedtime  vancomycin  IVPB 1000 milliGRAM(s) IV Intermittent every 12 hours    MEDICATIONS  (PRN):  dextrose Gel 1 Dose(s) Oral once PRN Blood Glucose LESS THAN 70 milliGRAM(s)/deciliter  glucagon  Injectable 1 milliGRAM(s) IntraMuscular once PRN Glucose LESS THAN 70 milligrams/deciliter      Vital Signs Last 24 Hrs  T(C): 37.4 (18 @ 05:50)  T(F): 99.3 (18 @ 05:50), Max: 99.8 (01-10-18 @ 18:07)  HR: 79 (18 @ 05:50) (79 - 89)  BP: 123/57 (18 @ 05:50)  BP(mean): --  RR: 17 (18 @ 05:50) (17 - 17)  SpO2: 97% (18 @ 05:50) (96% - 97%)  Wt(kg): --    01-10 @ 07:  -   @ 07:00  --------------------------------------------------------  IN: 0 mL / OUT: 300 mL / NET: -300 mL      CAPILLARY BLOOD GLUCOSE      POCT Blood Glucose.: 116 mg/dL (2018 08:12)  POCT Blood Glucose.: 127 mg/dL (10 Yoan 2018 21:50)  POCT Blood Glucose.: 149 mg/dL (10 Yoan 2018 17:46)  POCT Blood Glucose.: 175 mg/dL (10 Yoan 2018 12:31)    I&O's Summary    10 Yoan 2018 07:  -  2018 07:00  --------------------------------------------------------  IN: 0 mL / OUT: 300 mL / NET: -300 mL        PHYSICAL EXAM:  GENERAL: NAD, well-developed  HEAD:  Atraumatic, Normocephalic,   EYES:  conjunctiva and sclera clear  NECK: Supple, No JVD  CHEST/LUNG: Clear to auscultation bilaterally; No wheeze appreciated  HEART: Regular rate and rhythm; No murmurs, rubs, or gallops  ABDOMEN: Soft, Nontender, Nondistended; Bowel sounds present  EXTREMITIES:  2+ Peripheral Pulses, No clubbing, cyanosis, or edema  PSYCH: AAOx3, calm, cooperative  SKIN: No rashes or lesions    LABS:                        10.5   16.84 )-----------( 146      ( 2018 05:00 )             32.0     WBC Count: 27.50 K/uL (01.10.18 @ 06:10)    WBC Count: 31.54 K/uL (18 @ 22:05)          136  |  98  |  18  ----------------------------<  107<H>  3.6   |  25  |  1.04    Ca    7.9<L>      2018 05:00  Phos  1.8       Mg     1.8         TPro  6.7  /  Alb  3.2<L>  /  TBili  1.2  /  DBili  x   /  AST  20  /  ALT  14  /  AlkPhos  36<L>  01-10    Thyroid Stimulating Hormone, Serum: 0.22 uIU/mL (18 @ 05:00)    Free Thyroxine, Serum: 1.45 ng/dL (18 @ 05:00)      Urinalysis Basic - ( 10 Yoan 2018 03:00 )    Color: PLYEL / Appearance: CLEAR / S.009 / pH: 6.0  Gluc: NEGATIVE / Ketone: NEGATIVE  / Bili: NEGATIVE / Urobili: NORMAL mg/dL   Blood: NEGATIVE / Protein: 10 mg/dL / Nitrite: NEGATIVE   Leuk Esterase: SMALL / RBC: 0-2 / WBC 5-10   Sq Epi: x / Non Sq Epi: x / Bacteria: FEW    Culture - Urine:   GNR^Gram Neg Rods  COLONY COUNT: LESS THAN 10,000 CFU/ML (01.10.18 @ 05:46      Specimen Source: BLOOD - Negative to date    RADIOLOGY & ADDITIONAL TESTS:    Imaging Personally Reviewed:    Consultant(s) Notes Reviewed:  ID, Cardiology    Care Discussed with Consultants/Other Providers: Dr. Bateman (ID) re: plan of care regarding antibiotics    Assessment and Plan: Patient is a 77y old  Male who presents with a chief complaint of Weakness (10 Yoan 2018 01:05)      SUBJECTIVE / OVERNIGHT EVENTS: No acute events overnight. Patient reports he had normal BM with bowel regimen. Hiccups resolved s/p baclofen 10mg po x1. Patient reports feeling stronger and has been tolerating po. No fevers or chills.    MEDICATIONS  (STANDING):  atorvastatin 10 milliGRAM(s) Oral at bedtime  dextrose 5%. 1000 milliLiter(s) (50 mL/Hr) IV Continuous <Continuous>  dextrose 50% Injectable 12.5 Gram(s) IV Push once  dextrose 50% Injectable 25 Gram(s) IV Push once  dextrose 50% Injectable 25 Gram(s) IV Push once  docusate sodium 100 milliGRAM(s) Oral daily  finasteride 5 milliGRAM(s) Oral daily  heparin  Injectable 5000 Unit(s) SubCutaneous every 8 hours  insulin lispro (HumaLOG) corrective regimen sliding scale   SubCutaneous three times a day before meals  insulin lispro (HumaLOG) corrective regimen sliding scale   SubCutaneous at bedtime  piperacillin/tazobactam IVPB. 3.375 Gram(s) IV Intermittent every 8 hours  potassium acid phosphate/sodium acid phosphate tablet (K-PHOS No. 2) 1 Tablet(s) Oral four times a day with meals  senna 2 Tablet(s) Oral at bedtime  vancomycin  IVPB 1000 milliGRAM(s) IV Intermittent every 12 hours    MEDICATIONS  (PRN):  dextrose Gel 1 Dose(s) Oral once PRN Blood Glucose LESS THAN 70 milliGRAM(s)/deciliter  glucagon  Injectable 1 milliGRAM(s) IntraMuscular once PRN Glucose LESS THAN 70 milligrams/deciliter      Vital Signs Last 24 Hrs  T(C): 37.4 (18 @ 05:50)  T(F): 99.3 (18 @ 05:50), Max: 99.8 (01-10-18 @ 18:07)  HR: 79 (18 @ 05:50) (79 - 89)  BP: 123/57 (18 @ 05:50)  BP(mean): --  RR: 17 (18 @ 05:50) (17 - 17)  SpO2: 97% (18 @ 05:50) (96% - 97%)  Wt(kg): --    01-10 @ 07:  -   @ 07:00  --------------------------------------------------------  IN: 0 mL / OUT: 300 mL / NET: -300 mL      CAPILLARY BLOOD GLUCOSE      POCT Blood Glucose.: 116 mg/dL (2018 08:12)  POCT Blood Glucose.: 127 mg/dL (10 Yoan 2018 21:50)  POCT Blood Glucose.: 149 mg/dL (10 Yoan 2018 17:46)  POCT Blood Glucose.: 175 mg/dL (10 Yoan 2018 12:31)    I&O's Summary    10 Yoan 2018 07:  -  2018 07:00  --------------------------------------------------------  IN: 0 mL / OUT: 300 mL / NET: -300 mL        PHYSICAL EXAM:  GENERAL: NAD, well-developed  HEAD:  Atraumatic, Normocephalic,   EYES:  conjunctiva and sclera clear  NECK: Supple, No JVD  CHEST/LUNG: Clear to auscultation bilaterally; No wheeze appreciated  HEART: Regular rate and rhythm; No murmurs, rubs, or gallops  ABDOMEN: Soft, Nontender, Nondistended; Bowel sounds present  EXTREMITIES:  2+ Peripheral Pulses, No clubbing, cyanosis, or edema  PSYCH: AAOx3, calm, cooperative  SKIN: No rashes or lesions    LABS:                        10.5   16.84 )-----------( 146      ( 2018 05:00 )             32.0     WBC Count: 27.50 K/uL (01.10.18 @ 06:10)    WBC Count: 31.54 K/uL (18 @ 22:05)          136  |  98  |  18  ----------------------------<  107<H>  3.6   |  25  |  1.04    Ca    7.9<L>      2018 05:00  Phos  1.8       Mg     1.8         TPro  6.7  /  Alb  3.2<L>  /  TBili  1.2  /  DBili  x   /  AST  20  /  ALT  14  /  AlkPhos  36<L>  01-10    Thyroid Stimulating Hormone, Serum: 0.22 uIU/mL (18 @ 05:00)    Free Thyroxine, Serum: 1.45 ng/dL (18 @ 05:00)      Urinalysis Basic - ( 10 Yoan 2018 03:00 )    Color: PLYEL / Appearance: CLEAR / S.009 / pH: 6.0  Gluc: NEGATIVE / Ketone: NEGATIVE  / Bili: NEGATIVE / Urobili: NORMAL mg/dL   Blood: NEGATIVE / Protein: 10 mg/dL / Nitrite: NEGATIVE   Leuk Esterase: SMALL / RBC: 0-2 / WBC 5-10   Sq Epi: x / Non Sq Epi: x / Bacteria: FEW    Culture - Urine:   GNR^Gram Neg Rods  COLONY COUNT: LESS THAN 10,000 CFU/ML (01.10.18 @ 05:46      Specimen Source: BLOOD - Negative to date    RADIOLOGY & ADDITIONAL TESTS:    Imaging Personally Reviewed:  < from: CT Abdomen and Pelvis w/ IV Cont (18 @ 10:07) >    IMPRESSION:     No acute intra-abdominal pathology.    < end of copied text >      Consultant(s) Notes Reviewed:  ID, Cardiology    Care Discussed with Consultants/Other Providers: Dr. Bateman (ID) re: plan of care regarding antibiotics    Assessment and Plan:

## 2018-01-11 NOTE — PROGRESS NOTE ADULT - SUBJECTIVE AND OBJECTIVE BOX
Follow Up:  recent colitis.  fatigue and leucocytosis.    Inverval History/ROS: feels much better.  had normal BM.  CT abd/pelvis done    Allergies  No Known Allergies        ANTIMICROBIALS:  piperacillin/tazobactam IVPB. 3.375 every 8 hours      OTHER MEDS:  atorvastatin 10 milliGRAM(s) Oral at bedtime  dextrose 5%. 1000 milliLiter(s) IV Continuous <Continuous>  dextrose 50% Injectable 12.5 Gram(s) IV Push once  dextrose 50% Injectable 25 Gram(s) IV Push once  dextrose 50% Injectable 25 Gram(s) IV Push once  dextrose Gel 1 Dose(s) Oral once PRN  docusate sodium 100 milliGRAM(s) Oral daily  finasteride 5 milliGRAM(s) Oral daily  glucagon  Injectable 1 milliGRAM(s) IntraMuscular once PRN  heparin  Injectable 5000 Unit(s) SubCutaneous every 8 hours  insulin lispro (HumaLOG) corrective regimen sliding scale   SubCutaneous three times a day before meals  insulin lispro (HumaLOG) corrective regimen sliding scale   SubCutaneous at bedtime  potassium acid phosphate/sodium acid phosphate tablet (K-PHOS No. 2) 1 Tablet(s) Oral four times a day with meals  senna 2 Tablet(s) Oral at bedtime  sodium chloride 0.9%. 1000 milliLiter(s) IV Continuous <Continuous>      Vital Signs Last 24 Hrs  T(C): 37.4 (2018 05:50), Max: 37.7 (10 Yoan 2018 18:07)  T(F): 99.3 (2018 05:50), Max: 99.8 (10 Yoan 2018 18:07)  HR: 79 (2018 05:50) (79 - 89)  BP: 123/57 (2018 05:50) (109/60 - 135/72)  BP(mean): --  RR: 17 (2018 05:50) (17 - 17)  SpO2: 97% (2018 05:50) (96% - 97%)    PHYSICAL EXAM:  General: [x ] non-toxic  HEAD/EYES: [ ] PERRL [x ] white sclera [ ] icterus  ENT:  [ ] normal [x ] supple [ ] thrush [ ] pharyngeal exudate  Cardiovascular:   [ ] murmur [x ] normal [ ] PPM/AICD  Respiratory:  [x ] clear to ausculation bilaterally  GI:  [x ] soft, non-tender, normal bowel sounds  :  [ ] jenkins [x ] no CVA tenderness   Musculoskeletal:  [x ] no synovitis  Neurologic:  [x ] non-focal exam   Skin:  [x ] no rash  Lymph: [ ] no lymphadenopathy  Psychiatric:  [x ] appropriate affect [x ] alert & oriented  Lines:  [x ] no phlebitis [ ] central line                                10.5   16.84 )-----------( 146      ( 2018 05:00 )             32.0           136  |  98  |  18  ----------------------------<  107<H>  3.6   |  25  |  1.04    Ca    7.9<L>      2018 05:00  Phos  1.8       Mg     1.8         TPro  6.7  /  Alb  3.2<L>  /  TBili  1.2  /  DBili  x   /  AST  20  /  ALT  14  /  AlkPhos  36<L>  01-10      Urinalysis Basic - ( 10 Yoan 2018 03:00 )    Color: PLYEL / Appearance: CLEAR / S.009 / pH: 6.0  Gluc: NEGATIVE / Ketone: NEGATIVE  / Bili: NEGATIVE / Urobili: NORMAL mg/dL   Blood: NEGATIVE / Protein: 10 mg/dL / Nitrite: NEGATIVE   Leuk Esterase: SMALL / RBC: 0-2 / WBC 5-10   Sq Epi: x / Non Sq Epi: x / Bacteria: FEW        MICROBIOLOGY:  v  URINE MIDSTREAM  01-10-18 --  --  --      BLOOD  18 --  --  --      .Blood Blood-Peripheral  17   No growth at 5 days.  --  --      .Stool Feces  17   No enteric pathogens isolated.  (Stool culture examined for Salmonella,  Shigella, Campylobacter, Aeromonas, Plesiomonas,  Vibrio, E.coli O157 and Yersinia)  --  --      .Stool Feces  17   No Protozoa seen by trichrome stain  No Helminths or Protozoa seen in formalin concentrate  performed by iodine stain  (routine O+P not evaluated for Microsporidia,  Cryptosporidia, Cyclospora, or Isospora.)  Note: One negative specimen does not rule  out the possibility of a parasitic infection.  --  --      .Urine Clean Catch (Midstream)  17   10,000 - 49,000 CFU/mL Coag Negative Staphylococcus  Normal Urogenital marycarmen present  --  --      .Blood Blood-Peripheral  17   Growth in anaerobic bottle: Coag Negative Staphylococcus      .Blood Blood  17   No growth at 5 days.  --  --                RADIOLOGY:    < from: CT Abdomen and Pelvis w/ IV Cont (18 @ 10:07) >  PROCEDURE:   CT of the Abdomen and Pelvis was performed with intravenous contrast.   Intravenous contrast: 90 ml Omnipaque 350. 10 ml discarded.  Oral contrast: None.  Sagittal and coronal reformats were performed.    FINDINGS:    LOWER CHEST: Trace pericardial effusion it is unchanged. Bilateral   calcified granulomas are unchanged.    LIVER: Within normal limits.  BILE DUCTS: Normal caliber.  GALLBLADDER: Within normal limits.  SPLEEN: Within normal limits.  PANCREAS: Punctate calcifications unchanged.  ADRENALS: Within normal limits.  KIDNEYS/URETERS: Symmetric enhancement. No hydronephrosis.   Low-attenuation focus in the left kidney too small to characterize.    BLADDER: Nondistended.  REPRODUCTIVE ORGANS: Enlarged prostate.    BOWEL: No bowel obstruction. Colonic diverticulosis. The appendix is   normal.   PERITONEUM: No ascites.  VESSELS:  Within normal limits.  RETROPERITONEUM: No lymphadenopathy.    ABDOMINAL WALL: Small fat-containing inguinal hernia.  BONES: Degenerative changes.    IMPRESSION:     No acute intra-abdominal pathology.    < end of copied text >

## 2018-01-11 NOTE — PROGRESS NOTE ADULT - PROBLEM SELECTOR PLAN 4
-Improving s/p IV hydration, will c/w IVF as ordered  -UA negative for casts to suggest ATN thus likely prerenal in nature -Improving s/p IV hydration, will c/w IVF as ordered  -UA negative for casts to suggest ATN thus likely prerenal in nature  -Will hold off on resumption of lisinopril today as patient received IV contrast for CT A/P and thus expect creatinine may rish again

## 2018-01-12 DIAGNOSIS — R69 ILLNESS, UNSPECIFIED: ICD-10-CM

## 2018-01-12 LAB
BUN SERPL-MCNC: 17 MG/DL — SIGNIFICANT CHANGE UP (ref 7–23)
CALCIUM SERPL-MCNC: 8.2 MG/DL — LOW (ref 8.4–10.5)
CHLORIDE SERPL-SCNC: 100 MMOL/L — SIGNIFICANT CHANGE UP (ref 98–107)
CO2 SERPL-SCNC: 27 MMOL/L — SIGNIFICANT CHANGE UP (ref 22–31)
CREAT SERPL-MCNC: 0.91 MG/DL — SIGNIFICANT CHANGE UP (ref 0.5–1.3)
GLUCOSE SERPL-MCNC: 118 MG/DL — HIGH (ref 70–99)
HCT VFR BLD CALC: 34.7 % — LOW (ref 39–50)
HGB BLD-MCNC: 11.3 G/DL — LOW (ref 13–17)
MCHC RBC-ENTMCNC: 31.4 PG — SIGNIFICANT CHANGE UP (ref 27–34)
MCHC RBC-ENTMCNC: 32.6 % — SIGNIFICANT CHANGE UP (ref 32–36)
MCV RBC AUTO: 96.4 FL — SIGNIFICANT CHANGE UP (ref 80–100)
NRBC # FLD: 0 — SIGNIFICANT CHANGE UP
PLATELET # BLD AUTO: 152 K/UL — SIGNIFICANT CHANGE UP (ref 150–400)
PMV BLD: 10.6 FL — SIGNIFICANT CHANGE UP (ref 7–13)
POTASSIUM SERPL-MCNC: 3.6 MMOL/L — SIGNIFICANT CHANGE UP (ref 3.5–5.3)
POTASSIUM SERPL-SCNC: 3.6 MMOL/L — SIGNIFICANT CHANGE UP (ref 3.5–5.3)
RBC # BLD: 3.6 M/UL — LOW (ref 4.2–5.8)
RBC # FLD: 12.8 % — SIGNIFICANT CHANGE UP (ref 10.3–14.5)
SODIUM SERPL-SCNC: 138 MMOL/L — SIGNIFICANT CHANGE UP (ref 135–145)
WBC # BLD: 5.88 K/UL — SIGNIFICANT CHANGE UP (ref 3.8–10.5)
WBC # FLD AUTO: 5.88 K/UL — SIGNIFICANT CHANGE UP (ref 3.8–10.5)

## 2018-01-12 PROCEDURE — 99232 SBSQ HOSP IP/OBS MODERATE 35: CPT

## 2018-01-12 PROCEDURE — 99233 SBSQ HOSP IP/OBS HIGH 50: CPT

## 2018-01-12 RX ADMIN — Medication 250 MILLIGRAM(S): at 05:49

## 2018-01-12 RX ADMIN — Medication 2: at 18:05

## 2018-01-12 RX ADMIN — PIPERACILLIN AND TAZOBACTAM 25 GRAM(S): 4; .5 INJECTION, POWDER, LYOPHILIZED, FOR SOLUTION INTRAVENOUS at 14:20

## 2018-01-12 RX ADMIN — HEPARIN SODIUM 5000 UNIT(S): 5000 INJECTION INTRAVENOUS; SUBCUTANEOUS at 07:18

## 2018-01-12 RX ADMIN — SENNA PLUS 2 TABLET(S): 8.6 TABLET ORAL at 22:02

## 2018-01-12 RX ADMIN — FINASTERIDE 5 MILLIGRAM(S): 5 TABLET, FILM COATED ORAL at 12:22

## 2018-01-12 RX ADMIN — PIPERACILLIN AND TAZOBACTAM 25 GRAM(S): 4; .5 INJECTION, POWDER, LYOPHILIZED, FOR SOLUTION INTRAVENOUS at 07:18

## 2018-01-12 RX ADMIN — PIPERACILLIN AND TAZOBACTAM 25 GRAM(S): 4; .5 INJECTION, POWDER, LYOPHILIZED, FOR SOLUTION INTRAVENOUS at 22:03

## 2018-01-12 RX ADMIN — Medication 250 MILLIGRAM(S): at 18:06

## 2018-01-12 RX ADMIN — HEPARIN SODIUM 5000 UNIT(S): 5000 INJECTION INTRAVENOUS; SUBCUTANEOUS at 14:19

## 2018-01-12 RX ADMIN — ATORVASTATIN CALCIUM 10 MILLIGRAM(S): 80 TABLET, FILM COATED ORAL at 22:02

## 2018-01-12 RX ADMIN — Medication 2: at 12:49

## 2018-01-12 NOTE — PROGRESS NOTE ADULT - SUBJECTIVE AND OBJECTIVE BOX
Patient is a 77y old  Male who presents with a chief complaint of Weakness (10 Yoan 2018 01:05)      SUBJECTIVE / OVERNIGHT EVENTS: Patient reports feeling well. Tolerating po. No further hiccups. Excited about possibility of dc over weekend. had BM.    MEDICATIONS  (STANDING):  atorvastatin 10 milliGRAM(s) Oral at bedtime  dextrose 5%. 1000 milliLiter(s) (50 mL/Hr) IV Continuous <Continuous>  dextrose 50% Injectable 12.5 Gram(s) IV Push once  dextrose 50% Injectable 25 Gram(s) IV Push once  dextrose 50% Injectable 25 Gram(s) IV Push once  docusate sodium 100 milliGRAM(s) Oral daily  finasteride 5 milliGRAM(s) Oral daily  heparin  Injectable 5000 Unit(s) SubCutaneous every 8 hours  insulin lispro (HumaLOG) corrective regimen sliding scale   SubCutaneous three times a day before meals  insulin lispro (HumaLOG) corrective regimen sliding scale   SubCutaneous at bedtime  piperacillin/tazobactam IVPB. 3.375 Gram(s) IV Intermittent every 8 hours  senna 2 Tablet(s) Oral at bedtime  vancomycin  IVPB 1000 milliGRAM(s) IV Intermittent every 12 hours    MEDICATIONS  (PRN):  baclofen 10 milliGRAM(s) Oral three times a day PRN hiccups  dextrose Gel 1 Dose(s) Oral once PRN Blood Glucose LESS THAN 70 milliGRAM(s)/deciliter  glucagon  Injectable 1 milliGRAM(s) IntraMuscular once PRN Glucose LESS THAN 70 milligrams/deciliter      Vital Signs Last 24 Hrs  T(C): 36.6 (01-12-18 @ 12:20)  T(F): 97.9 (01-12-18 @ 12:20), Max: 100.1 (01-11-18 @ 21:00)  HR: 66 (01-12-18 @ 12:20) (63 - 78)  BP: 127/77 (01-12-18 @ 12:20)  BP(mean): --  RR: 18 (01-12-18 @ 12:20) (18 - 18)  SpO2: 100% (01-12-18 @ 12:20) (100% - 100%)  Wt(kg): --    CAPILLARY BLOOD GLUCOSE      POCT Blood Glucose.: 178 mg/dL (12 Jan 2018 12:12)  POCT Blood Glucose.: 133 mg/dL (12 Jan 2018 08:27)  POCT Blood Glucose.: 142 mg/dL (11 Jan 2018 22:04)  POCT Blood Glucose.: 128 mg/dL (11 Jan 2018 17:12)    I&O's Summary      PHYSICAL EXAM:  GENERAL: NAD, well-developed  HEAD:  Atraumatic, Normocephalic  EYES: EOMI, PERRLA, conjunctiva and sclera clear  NECK: Supple, No JVD  CHEST/LUNG: Clear to auscultation bilaterally; No wheeze  HEART: Regular rate and rhythm; No murmurs, rubs, or gallops  ABDOMEN: Soft, Nontender, Nondistended; Bowel sounds present  EXTREMITIES:  2+ Peripheral Pulses, No clubbing, cyanosis, or edema  PSYCH: AAOx3  NEUROLOGY: non-focal  SKIN: No rashes or lesions    LABS:                        11.3   5.88  )-----------( 152      ( 12 Jan 2018 06:00 )             34.7   WBC Count: 16.84 K/uL (01.11.18 @ 05:00)        01-12    138  |  100  |  17  ----------------------------<  118<H>  3.6   |  27  |  0.91        RADIOLOGY & ADDITIONAL TESTS:    Imaging Personally Reviewed:    Consultant(s) Notes Reviewed:  Cards    Care Discussed with Consultants/Other Providers:    Assessment and Plan: Patient is a 77y old  Male who presents with a chief complaint of Weakness (10 Yoan 2018 01:05)      SUBJECTIVE / OVERNIGHT EVENTS: Patient reports feeling well. Tolerating po. No further hiccups. Excited about possibility of dc over weekend. had BM.    MEDICATIONS  (STANDING):  atorvastatin 10 milliGRAM(s) Oral at bedtime  dextrose 5%. 1000 milliLiter(s) (50 mL/Hr) IV Continuous <Continuous>  dextrose 50% Injectable 12.5 Gram(s) IV Push once  dextrose 50% Injectable 25 Gram(s) IV Push once  dextrose 50% Injectable 25 Gram(s) IV Push once  docusate sodium 100 milliGRAM(s) Oral daily  finasteride 5 milliGRAM(s) Oral daily  heparin  Injectable 5000 Unit(s) SubCutaneous every 8 hours  insulin lispro (HumaLOG) corrective regimen sliding scale   SubCutaneous three times a day before meals  insulin lispro (HumaLOG) corrective regimen sliding scale   SubCutaneous at bedtime  piperacillin/tazobactam IVPB. 3.375 Gram(s) IV Intermittent every 8 hours  senna 2 Tablet(s) Oral at bedtime  vancomycin  IVPB 1000 milliGRAM(s) IV Intermittent every 12 hours    MEDICATIONS  (PRN):  baclofen 10 milliGRAM(s) Oral three times a day PRN hiccups  dextrose Gel 1 Dose(s) Oral once PRN Blood Glucose LESS THAN 70 milliGRAM(s)/deciliter  glucagon  Injectable 1 milliGRAM(s) IntraMuscular once PRN Glucose LESS THAN 70 milligrams/deciliter      Vital Signs Last 24 Hrs  T(C): 36.6 (01-12-18 @ 12:20)  T(F): 97.9 (01-12-18 @ 12:20), Max: 100.1 (01-11-18 @ 21:00)  HR: 66 (01-12-18 @ 12:20) (63 - 78)  BP: 127/77 (01-12-18 @ 12:20)  BP(mean): --  RR: 18 (01-12-18 @ 12:20) (18 - 18)  SpO2: 100% (01-12-18 @ 12:20) (100% - 100%)  Wt(kg): --    CAPILLARY BLOOD GLUCOSE      POCT Blood Glucose.: 178 mg/dL (12 Jan 2018 12:12)  POCT Blood Glucose.: 133 mg/dL (12 Jan 2018 08:27)  POCT Blood Glucose.: 142 mg/dL (11 Jan 2018 22:04)  POCT Blood Glucose.: 128 mg/dL (11 Jan 2018 17:12)    I&O's Summary      PHYSICAL EXAM:  GENERAL: NAD, well-developed  HEAD:  Atraumatic, Normocephalic,   EYES:  conjunctiva and sclera clear  NECK: Supple, No JVD  CHEST/LUNG: Clear to auscultation bilaterally; No wheeze appreciated  HEART: Regular rate and rhythm; No murmurs, rubs, or gallops  ABDOMEN: Soft, Nontender, Nondistended; Bowel sounds present  EXTREMITIES:  2+ Peripheral Pulses, No clubbing, cyanosis, or edema  PSYCH: AAOx3, calm, cooperative  SKIN: No rashes or lesions    LABS:                        11.3   5.88  )-----------( 152      ( 12 Jan 2018 06:00 )             34.7   WBC Count: 16.84 K/uL (01.11.18 @ 05:00)        01-12    138  |  100  |  17  ----------------------------<  118<H>  3.6   |  27  |  0.91      Culture - Urine:   GNR^Gram Neg Rods  COLONY COUNT: LESS THAN 10,000 CFU/ML (01.10.18 @ 05:46)      RADIOLOGY & ADDITIONAL TESTS:    Imaging Personally Reviewed:    Consultant(s) Notes Reviewed:  Cards    Care Discussed with Consultants/Other Providers:    Assessment and Plan:

## 2018-01-12 NOTE — PROGRESS NOTE ADULT - PROBLEM SELECTOR PLAN 7
-BP acceptable at this kayy, will continue to monitor patient off ACEi for now given that he just received repeat CT A/P with contrast. Suspect will resume ACEi upon discharge

## 2018-01-12 NOTE — PROGRESS NOTE ADULT - SUBJECTIVE AND OBJECTIVE BOX
Follow Up:  recent colitis.  fatigue and leucocytosis.    Inverval History/ROS:  no complaint.  no abd pain or dysuria.    Allergies  No Known Allergies        ANTIMICROBIALS:  piperacillin/tazobactam IVPB. 3.375 every 8 hours      OTHER MEDS:  atorvastatin 10 milliGRAM(s) Oral at bedtime  dextrose 5%. 1000 milliLiter(s) IV Continuous <Continuous>  dextrose 50% Injectable 12.5 Gram(s) IV Push once  dextrose 50% Injectable 25 Gram(s) IV Push once  dextrose 50% Injectable 25 Gram(s) IV Push once  dextrose Gel 1 Dose(s) Oral once PRN  docusate sodium 100 milliGRAM(s) Oral daily  finasteride 5 milliGRAM(s) Oral daily  glucagon  Injectable 1 milliGRAM(s) IntraMuscular once PRN  heparin  Injectable 5000 Unit(s) SubCutaneous every 8 hours  insulin lispro (HumaLOG) corrective regimen sliding scale   SubCutaneous three times a day before meals  insulin lispro (HumaLOG) corrective regimen sliding scale   SubCutaneous at bedtime  potassium acid phosphate/sodium acid phosphate tablet (K-PHOS No. 2) 1 Tablet(s) Oral four times a day with meals  senna 2 Tablet(s) Oral at bedtime  sodium chloride 0.9%. 1000 milliLiter(s) IV Continuous <Continuous>      Vital Signs Last 24 Hrs  T(F): 97.9 (18 @ 12:20), Max: 100.1 (18 @ 21:00)  HR: 66 (18 @ 12:20)  BP: 127/77 (18 @ 12:20)  RR: 18 (18 @ 12:20)  SpO2: 100% (18 @ 12:20) (100% - 100%)    PHYSICAL EXAM:  General: [x ] non-toxic comfortable lying flat  HEAD/EYES: [ ] PERRL [x ] white sclera [ ] icterus  ENT:  [ ] normal [x ] supple [ ] thrush [ ] pharyngeal exudate  Cardiovascular:   [ ] murmur [x ] normal [ ] PPM/AICD  Respiratory:  [x ] clear to ausculation bilaterally  GI:  [x ] soft, non-tender, normal bowel sounds  :  [ ] jenkins [x ] no CVA tenderness   Musculoskeletal:  [x ] no synovitis  Neurologic:  [x ] non-focal exam   Skin:  [x ] no rash  Lymph: [ ] no lymphadenopathy  Psychiatric:  [x ] appropriate affect [x ] alert & oriented  Lines:  [x ] no phlebitis [ ] central line                                           11.3   5.88  )-----------( 152      ( 2018 06:00 )             34.7 12    138  |  100  |  17  ----------------------------<  118  3.6   |  27  |  0.91  Ca    8.2      2018 06:00Phos  1.8     Mg     1.8             Urinalysis Basic - ( 10 Yoan 2018 03:00 )    Color: PLYEL / Appearance: CLEAR / S.009 / pH: 6.0  Gluc: NEGATIVE / Ketone: NEGATIVE  / Bili: NEGATIVE / Urobili: NORMAL mg/dL   Blood: NEGATIVE / Protein: 10 mg/dL / Nitrite: NEGATIVE   Leuk Esterase: SMALL / RBC: 0-2 / WBC 5-10   Sq Epi: x / Non Sq Epi: x / Bacteria: FEW        MICROBIOLOGY:    URINE MIDSTREAM  01-10-18 --  --  --      BLOOD  18 --  --  --      .Blood Blood-Peripheral  17   No growth at 5 days.  --  --      .Stool Feces  17   No enteric pathogens isolated.  (Stool culture examined for Salmonella,  Shigella, Campylobacter, Aeromonas, Plesiomonas,  Vibrio, E.coli O157 and Yersinia)  --  --      .Stool Feces  17   No Protozoa seen by trichrome stain  No Helminths or Protozoa seen in formalin concentrate  performed by iodine stain  (routine O+P not evaluated for Microsporidia,  Cryptosporidia, Cyclospora, or Isospora.)  Note: One negative specimen does not rule  out the possibility of a parasitic infection.  --  --      .Urine Clean Catch (Midstream)  17   10,000 - 49,000 CFU/mL Coag Negative Staphylococcus  Normal Urogenital marycarmen present  --  --      .Blood Blood-Peripheral  17   Growth in anaerobic bottle: Coag Negative Staphylococcus      .Blood Blood  17   No growth at 5 days.  --  --                RADIOLOGY:    < from: CT Abdomen and Pelvis w/ IV Cont (18 @ 10:07) >  PROCEDURE:   CT of the Abdomen and Pelvis was performed with intravenous contrast.   Intravenous contrast: 90 ml Omnipaque 350. 10 ml discarded.  Oral contrast: None.  Sagittal and coronal reformats were performed.    FINDINGS:    LOWER CHEST: Trace pericardial effusion it is unchanged. Bilateral   calcified granulomas are unchanged.    LIVER: Within normal limits.  BILE DUCTS: Normal caliber.  GALLBLADDER: Within normal limits.  SPLEEN: Within normal limits.  PANCREAS: Punctate calcifications unchanged.  ADRENALS: Within normal limits.  KIDNEYS/URETERS: Symmetric enhancement. No hydronephrosis.   Low-attenuation focus in the left kidney too small to characterize.    BLADDER: Nondistended.  REPRODUCTIVE ORGANS: Enlarged prostate.    BOWEL: No bowel obstruction. Colonic diverticulosis. The appendix is   normal.   PERITONEUM: No ascites.  VESSELS:  Within normal limits.  RETROPERITONEUM: No lymphadenopathy.    ABDOMINAL WALL: Small fat-containing inguinal hernia.  BONES: Degenerative changes.    IMPRESSION:     No acute intra-abdominal pathology.    < end of copied text >

## 2018-01-12 NOTE — PROGRESS NOTE ADULT - ASSESSMENT
76 yo M with HTN, well-controlled DM2, HLD, BPH, and recent hospitalization for sepsis 2/2 colitis (12/24-12/28) who presents to the ED with worsening weakness and fatigue over the past 2-3 days, likely 2/2 SIRS of unclear etiology c/b vomiting, prerenal PELON, and likely hypovolemic hyponatremia.

## 2018-01-12 NOTE — PROGRESS NOTE ADULT - PROBLEM SELECTOR PLAN 4
-Now resolved  -UA negative for casts to suggest ATN thus likely prerenal in nature  -Will hold off on resumption of lisinopril today as patient received IV contrast for CT A/P and thus expect creatinine may rish again

## 2018-01-12 NOTE — PROGRESS NOTE ADULT - ASSESSMENT
78 [facility] man [facility] with fatigue and WBC 31K.  Leucocytosis resolved. CT abd no pathology except enlarged prostate. ?UTI.    would d/c vanco today  if continues to improve and cultures neg would d/c zosyn tomorrow.    ID service available for questions over weekend.

## 2018-01-12 NOTE — PROGRESS NOTE ADULT - PROBLEM SELECTOR PLAN 2
-Possibly in the setting of UTI given GNR in culture  -Will continue with vancomycin and Zosyn for now and follow-up further ID recommendations  -c/w Tylenol PRN fever  -CT A/P negative for infectious pathology  -Follow-up blood cultures, currently, NTD

## 2018-01-12 NOTE — PROGRESS NOTE ADULT - SUBJECTIVE AND OBJECTIVE BOX
Subjective:   	 no chest pain   no shortness of breath   reports had BM today feels much better       MEDICATIONS  (STANDING):  atorvastatin 10 milliGRAM(s) Oral at bedtime  dextrose 5%. 1000 milliLiter(s) (50 mL/Hr) IV Continuous <Continuous>  dextrose 50% Injectable 12.5 Gram(s) IV Push once  dextrose 50% Injectable 25 Gram(s) IV Push once  dextrose 50% Injectable 25 Gram(s) IV Push once  docusate sodium 100 milliGRAM(s) Oral daily  finasteride 5 milliGRAM(s) Oral daily  insulin lispro (HumaLOG) corrective regimen sliding scale   SubCutaneous three times a day before meals  insulin lispro (HumaLOG) corrective regimen sliding scale   SubCutaneous at bedtime  piperacillin/tazobactam IVPB. 3.375 Gram(s) IV Intermittent every 8 hours  senna 2 Tablet(s) Oral at bedtime  vancomycin  IVPB 1000 milliGRAM(s) IV Intermittent every 12 hours    MEDICATIONS  (PRN):  baclofen 10 milliGRAM(s) Oral three times a day PRN hiccups  dextrose Gel 1 Dose(s) Oral once PRN Blood Glucose LESS THAN 70 milliGRAM(s)/deciliter  glucagon  Injectable 1 milliGRAM(s) IntraMuscular once PRN Glucose LESS THAN 70 milligrams/deciliter      LABS:                        11.3   5.88  )-----------( 152      ( 12 Jan 2018 06:00 )             34.7     Hemoglobin: 11.3 g/dL (01-12 @ 06:00)  Hemoglobin: 10.5 g/dL (01-11 @ 05:00)  Hemoglobin: 10.5 g/dL (01-11 @ 05:00)  Hemoglobin: 11.8 g/dL (01-10 @ 06:10)  Hemoglobin: 12.5 g/dL (01-09 @ 22:05)    01-12    138  |  100  |  17  ----------------------------<  118<H>  3.6   |  27  |  0.91    Ca    8.2<L>      12 Jan 2018 06:00  Phos  1.8     01-11  Mg     1.8     01-11      Creatinine Trend: 0.91<--, 1.04<--, 1.03<--, 1.22<--, 0.86<--, 0.92<--           PHYSICAL EXAM  Vital Signs Last 24 Hrs  T(C): 36.6 (12 Jan 2018 12:20), Max: 37.8 (11 Jan 2018 21:00)  T(F): 97.9 (12 Jan 2018 12:20), Max: 100.1 (11 Jan 2018 21:00)  HR: 66 (12 Jan 2018 12:20) (63 - 78)  BP: 127/77 (12 Jan 2018 12:20) (127/77 - 141/81)  BP(mean): --  RR: 18 (12 Jan 2018 12:20) (18 - 18)  SpO2: 100% (12 Jan 2018 12:20) (100% - 100%)    Cardiovascular: Normal S1 S2, RRR   No JVD, 1/6 ROZ murmur, Peripheral pulses palpable 2+ bilaterally  Respiratory: Lungs clear to auscultation, normal effort 	  Gastrointestinal:  Soft, Non-tender, + BS	  Extremities no edema, cyanosis, clubbing B/L LE's       TELEMETRY: 	    ECG:  NSR 	  RADIOLOGY:    CXR      IMPRESSION:    No acute pulmonary disease.        DIAGNOSTIC TESTING:  [ ] Echocardiogram:  [ ]  Catheterization:  [ ] Stress Test:    OTHER: 	      ASSESSMENT/PLAN: 	77y Male with a cane w/ Hx of HTN, Type 2 DM, HLD, BPH, and recent hospitalization for colitis (12/24-12/28) who presents to the ED with worsening weakness, fatigue and confusion over the past 2-3 days.  Last stress echo in the office in April 2017 with No ischemia and normal LV function.      --Not in clinical CHF  --No further inpatient cardiac work up needed at this time  --f/u ID  --to f/u with Dr Phillips post discharge

## 2018-01-13 ENCOUNTER — TRANSCRIPTION ENCOUNTER (OUTPATIENT)
Age: 78
End: 2018-01-13

## 2018-01-13 LAB
ALBUMIN SERPL ELPH-MCNC: 3.2 G/DL — LOW (ref 3.3–5)
ALP SERPL-CCNC: 47 U/L — SIGNIFICANT CHANGE UP (ref 40–120)
ALT FLD-CCNC: 28 U/L — SIGNIFICANT CHANGE UP (ref 4–41)
AST SERPL-CCNC: 33 U/L — SIGNIFICANT CHANGE UP (ref 4–40)
BASOPHILS # BLD AUTO: 0.04 K/UL — SIGNIFICANT CHANGE UP (ref 0–0.2)
BASOPHILS NFR BLD AUTO: 1 % — SIGNIFICANT CHANGE UP (ref 0–2)
BASOPHILS NFR SPEC: 0.9 % — SIGNIFICANT CHANGE UP (ref 0–2)
BILIRUB SERPL-MCNC: 0.3 MG/DL — SIGNIFICANT CHANGE UP (ref 0.2–1.2)
BUN SERPL-MCNC: 12 MG/DL — SIGNIFICANT CHANGE UP (ref 7–23)
CALCIUM SERPL-MCNC: 8.4 MG/DL — SIGNIFICANT CHANGE UP (ref 8.4–10.5)
CHLORIDE SERPL-SCNC: 102 MMOL/L — SIGNIFICANT CHANGE UP (ref 98–107)
CO2 SERPL-SCNC: 25 MMOL/L — SIGNIFICANT CHANGE UP (ref 22–31)
CREAT SERPL-MCNC: 0.89 MG/DL — SIGNIFICANT CHANGE UP (ref 0.5–1.3)
EOSINOPHIL # BLD AUTO: 0.16 K/UL — SIGNIFICANT CHANGE UP (ref 0–0.5)
EOSINOPHIL NFR BLD AUTO: 3.8 % — SIGNIFICANT CHANGE UP (ref 0–6)
EOSINOPHIL NFR FLD: 9.5 % — HIGH (ref 0–6)
GIANT PLATELETS BLD QL SMEAR: PRESENT — SIGNIFICANT CHANGE UP
GLUCOSE SERPL-MCNC: 110 MG/DL — HIGH (ref 70–99)
HCT VFR BLD CALC: 34.5 % — LOW (ref 39–50)
HGB BLD-MCNC: 11.9 G/DL — LOW (ref 13–17)
IMM GRANULOCYTES # BLD AUTO: 0.16 # — SIGNIFICANT CHANGE UP
IMM GRANULOCYTES NFR BLD AUTO: 3.8 % — HIGH (ref 0–1.5)
LYMPHOCYTES # BLD AUTO: 1.35 K/UL — SIGNIFICANT CHANGE UP (ref 1–3.3)
LYMPHOCYTES # BLD AUTO: 32.4 % — SIGNIFICANT CHANGE UP (ref 13–44)
LYMPHOCYTES NFR SPEC AUTO: 15.6 % — SIGNIFICANT CHANGE UP (ref 13–44)
MAGNESIUM SERPL-MCNC: 2.1 MG/DL — SIGNIFICANT CHANGE UP (ref 1.6–2.6)
MCHC RBC-ENTMCNC: 33 PG — SIGNIFICANT CHANGE UP (ref 27–34)
MCHC RBC-ENTMCNC: 34.5 % — SIGNIFICANT CHANGE UP (ref 32–36)
MCV RBC AUTO: 95.6 FL — SIGNIFICANT CHANGE UP (ref 80–100)
METAMYELOCYTES # FLD: 0.9 % — SIGNIFICANT CHANGE UP (ref 0–1)
MONOCYTES # BLD AUTO: 1.17 K/UL — HIGH (ref 0–0.9)
MONOCYTES NFR BLD AUTO: 28.1 % — HIGH (ref 2–14)
MONOCYTES NFR BLD: 29.6 % — HIGH (ref 2–9)
MORPHOLOGY BLD-IMP: NORMAL — SIGNIFICANT CHANGE UP
MYELOCYTES NFR BLD: 0.9 % — HIGH (ref 0–0)
NEUTROPHIL AB SER-ACNC: 35.6 % — LOW (ref 43–77)
NEUTROPHILS # BLD AUTO: 1.29 K/UL — LOW (ref 1.8–7.4)
NEUTROPHILS NFR BLD AUTO: 30.9 % — LOW (ref 43–77)
NEUTS BAND # BLD: 0.9 % — SIGNIFICANT CHANGE UP (ref 0–6)
NRBC # FLD: 0 — SIGNIFICANT CHANGE UP
PHOSPHATE SERPL-MCNC: 2.6 MG/DL — SIGNIFICANT CHANGE UP (ref 2.5–4.5)
PLATELET # BLD AUTO: 152 K/UL — SIGNIFICANT CHANGE UP (ref 150–400)
PLATELET COUNT - ESTIMATE: NORMAL — SIGNIFICANT CHANGE UP
PMV BLD: 10.8 FL — SIGNIFICANT CHANGE UP (ref 7–13)
POTASSIUM SERPL-MCNC: 4.3 MMOL/L — SIGNIFICANT CHANGE UP (ref 3.5–5.3)
POTASSIUM SERPL-SCNC: 4.3 MMOL/L — SIGNIFICANT CHANGE UP (ref 3.5–5.3)
PROT SERPL-MCNC: 6.9 G/DL — SIGNIFICANT CHANGE UP (ref 6–8.3)
RBC # BLD: 3.61 M/UL — LOW (ref 4.2–5.8)
RBC # FLD: 12.5 % — SIGNIFICANT CHANGE UP (ref 10.3–14.5)
REVIEW TO FOLLOW: YES — SIGNIFICANT CHANGE UP
SODIUM SERPL-SCNC: 138 MMOL/L — SIGNIFICANT CHANGE UP (ref 135–145)
VANCOMYCIN TROUGH SERPL-MCNC: 11.9 UG/ML — SIGNIFICANT CHANGE UP (ref 10–20)
VARIANT LYMPHS # BLD: 6.1 % — SIGNIFICANT CHANGE UP
WBC # BLD: 4.17 K/UL — SIGNIFICANT CHANGE UP (ref 3.8–10.5)
WBC # FLD AUTO: 4.17 K/UL — SIGNIFICANT CHANGE UP (ref 3.8–10.5)

## 2018-01-13 PROCEDURE — 99233 SBSQ HOSP IP/OBS HIGH 50: CPT

## 2018-01-13 RX ADMIN — Medication 100 MILLIGRAM(S): at 13:15

## 2018-01-13 RX ADMIN — PIPERACILLIN AND TAZOBACTAM 25 GRAM(S): 4; .5 INJECTION, POWDER, LYOPHILIZED, FOR SOLUTION INTRAVENOUS at 13:14

## 2018-01-13 RX ADMIN — ATORVASTATIN CALCIUM 10 MILLIGRAM(S): 80 TABLET, FILM COATED ORAL at 21:57

## 2018-01-13 RX ADMIN — Medication 1: at 18:08

## 2018-01-13 RX ADMIN — FINASTERIDE 5 MILLIGRAM(S): 5 TABLET, FILM COATED ORAL at 13:15

## 2018-01-13 RX ADMIN — SENNA PLUS 2 TABLET(S): 8.6 TABLET ORAL at 21:57

## 2018-01-13 RX ADMIN — PIPERACILLIN AND TAZOBACTAM 25 GRAM(S): 4; .5 INJECTION, POWDER, LYOPHILIZED, FOR SOLUTION INTRAVENOUS at 05:39

## 2018-01-13 NOTE — PROGRESS NOTE ADULT - PROBLEM SELECTOR PLAN 1
-Will f/u further ID recs re: abx  -Possibly dc vancomycin?  -Now resolved on abx Unclear etiology. Infectious vs reactive?  -Now resolved on abx Unclear etiology. Infectious vs reactive?  -Now resolved  - appreciate ID recs - gerry was d/garry yesterday   - would d/c zosyn today and monitor

## 2018-01-13 NOTE — PROGRESS NOTE ADULT - PROBLEM SELECTOR PLAN 8
-c/w Lipitor -IMPROVE=1 and patient ambulatory.   -PT recs no skilled needs    Blaine Smith MD PGY1  Care Model A, pager [phone]

## 2018-01-13 NOTE — PROGRESS NOTE ADULT - PROBLEM SELECTOR PLAN 2
-Possibly in the setting of UTI given GNR in culture  -Will continue with vancomycin and Zosyn for now and follow-up further ID recommendations  -c/w Tylenol PRN fever  -CT A/P negative for infectious pathology  -Follow-up blood cultures, currently, NTD #clinically improving.   CT A/P negative for infectious pathology. Blood and urine cultures w/ no growth to date. Initially on vanc and zosyn; van d/c'ed 12/13.    -ID c/s and followig; Appreciate recs. #clinically improving.   CT A/P negative for infectious pathology. Blood and urine cultures w/ no growth to date. Initially on vanc and zosyn; van d/c'ed 12/13.    -ID c/s and followig; Appreciate recs.  would d/c zosyn today and monitor

## 2018-01-13 NOTE — PROGRESS NOTE ADULT - SUBJECTIVE AND OBJECTIVE BOX
Medicine Accept Note   Patient is a 77y old  Male who presents with a chief complaint of Weakness (10 Yoan 2018 01:05)    HPI:  Patient is a 78 y/o Male, ambulatory with a cane w/ Hx of HTN, Type 2 DM, HLD, BPH, and recent hospitalization for colitis (12/24-12/28) who presents to the ED with worsening weakness and fatigue over the past 2-3 days. Patient's children were at bedside who helped provide history. Patient was recently admitted to [facility] from 12/24-12/28 after he presented with diarrhea. CT A+P at that time showed colitis, and so patient was started on a course of Cipro/Flagyl which he finished as an outpatient ~ 3 days ago. Patient states that his prior diarrhea has since resolved. Per patient's children, patient was doing very well after he was discharged. However, over the past 2-3 days, patient has been becoming progressively more weak to the point where he was unable to stand up on his own. In addition, he has been feeling very fatigued and started to become more confused. Patient's children became very concerned, and so patient was brought to the ED for further evaluation. On ROS, patient reports subjective fever/chills and one episode of NBNB emesis earlier today. He also endorses some burning with urination, urinary frequency, and a mild headache. Otherwise, patient denies any chest pain, shortness of breath, cough, abdominal pain, or dysuria. No recent travel or sick contacts.    In the ED, pt's VS were: T = 99.8F, HR = 90, BP + 99/77, RR = 16, O2 Sat = 95% on RA. Labs were significant for leukocytosis (WBC = 31.54), hyponatremia (Na = 128), PELON (Cr = 1.22, baseline = 0.8-0.9), and a lactate of 2.0. Patient was given 2L NS, IV Vancomycin 1g x1, and IV Zosyn 3.375g x1. Patient was then admitted to Medicine for further management. (10 Yoan 2018 01:05)    MEDICATIONS  (STANDING):  atorvastatin 10 milliGRAM(s) Oral at bedtime  dextrose 5%. 1000 milliLiter(s) (50 mL/Hr) IV Continuous <Continuous>  dextrose 50% Injectable 12.5 Gram(s) IV Push once  dextrose 50% Injectable 25 Gram(s) IV Push once  dextrose 50% Injectable 25 Gram(s) IV Push once  docusate sodium 100 milliGRAM(s) Oral daily  finasteride 5 milliGRAM(s) Oral daily  insulin lispro (HumaLOG) corrective regimen sliding scale   SubCutaneous three times a day before meals  insulin lispro (HumaLOG) corrective regimen sliding scale   SubCutaneous at bedtime  piperacillin/tazobactam IVPB. 3.375 Gram(s) IV Intermittent every 8 hours  senna 2 Tablet(s) Oral at bedtime    MEDICATIONS  (PRN):  baclofen 10 milliGRAM(s) Oral three times a day PRN hiccups  dextrose Gel 1 Dose(s) Oral once PRN Blood Glucose LESS THAN 70 milliGRAM(s)/deciliter  glucagon  Injectable 1 milliGRAM(s) IntraMuscular once PRN Glucose LESS THAN 70 milligrams/deciliter    Allergies    No Known Allergies    Intolerances      REVIEW OF SYSTEMS:    CONSTITUTIONAL: No weakness, fevers or chills  EYES/ENT: No visual changes;  No vertigo or throat pain   NECK: No pain or stiffness  RESPIRATORY: No cough, wheezing, hemoptysis; No shortness of breath  CARDIOVASCULAR: No chest pain or palpitations  GASTROINTESTINAL: No abdominal or epigastric pain. No nausea, vomiting, or hematemesis; No diarrhea or constipation. No melena or hematochezia.  GENITOURINARY: No dysuria, frequency or hematuria  NEUROLOGICAL: No numbness or weakness  SKIN: No itching, burning, rashes, or lesions   All other review of systems is negative unless indicated above.      VITAL SIGNS:  T(C): 36.6 (01-13-18 @ 05:36), Max: 36.7 (01-12-18 @ 21:22)  T(F): 97.8 (01-13-18 @ 05:36), Max: 98.1 (01-12-18 @ 21:22)  HR: 64 (01-13-18 @ 05:36) (61 - 75)  BP: 153/90 (01-13-18 @ 05:36) (127/77 - 158/89)    RR: 17 (01-13-18 @ 05:36) (17 - 18)  SpO2: 98% (01-13-18 @ 05:36) (98% - 100%)        PHYSICAL EXAM:  GENERAL: NAD, well-developed  HEAD:  Atraumatic, Normocephalic  EYES: EOMI, PERRLA, conjunctiva and sclera clear  NECK: Supple, No JVD  CHEST/LUNG: Clear to auscultation bilaterally; No wheeze  HEART: Regular rate and rhythm; No murmurs, rubs, or gallops  ABDOMEN: Soft, Nontender, Nondistended; Bowel sounds present  EXTREMITIES:  2+ Peripheral Pulses, No clubbing, cyanosis, or edema  PSYCH: AAOx3  NEUROLOGY: non-focal  SKIN: No rashes or lesions        LABS:                      11.9   4.17  )-----------( 152      ( 13 Jan 2018 05:33 )             34.5     01-12    138  |  100  |  17  ----------------------------<  118<H>  3.6   |  27  |  0.91    Ca    8.2<L>      12 Jan 2018 06:00      CAPILLARY BLOOD GLUCOSE      POCT Blood Glucose.: 155 mg/dL (12 Jan 2018 22:00)  POCT Blood Glucose.: 189 mg/dL (12 Jan 2018 17:42)  POCT Blood Glucose.: 178 mg/dL (12 Jan 2018 12:12)  POCT Blood Glucose.: 133 mg/dL (12 Jan 2018 08:27)              CULTURES  RECENT CULTURES:  01-10 @ 05:46 URINE MIDSTREAM           01-09 @ 23:19 BLOOD         NO ORGANISMS ISOLATED  NO ORGANISMS ISOLATED AT 72 HRS. Medicine Accept Note   Patient is a 77y old  Male who presents with a chief complaint of Weakness (10 Yoan 2018 01:05)    INTERVAL HX: Patient doing fine this morning. Has no complaints. Denies N/V, SOB, CP, or urinary symptoms.     HPI:  Patient is a 76 y/o Male, ambulatory with a cane w/ Hx of HTN, Type 2 DM, HLD, BPH, and recent hospitalization for colitis (12/24-12/28) who presents to the ED with worsening weakness and fatigue over the past 2-3 days. Patient's children were at bedside who helped provide history. Patient was recently admitted to [facility] from 12/24-12/28 after he presented with diarrhea. CT A+P at that time showed colitis, and so patient was started on a course of Cipro/Flagyl which he finished as an outpatient ~ 3 days ago. Patient states that his prior diarrhea has since resolved. Per patient's children, patient was doing very well after he was discharged. However, over the past 2-3 days, patient has been becoming progressively more weak to the point where he was unable to stand up on his own. In addition, he has been feeling very fatigued and started to become more confused. Patient's children became very concerned, and so patient was brought to the ED for further evaluation. On ROS, patient reports subjective fever/chills and one episode of NBNB emesis earlier today. He also endorses some burning with urination, urinary frequency, and a mild headache. Otherwise, patient denies any chest pain, shortness of breath, cough, abdominal pain, or dysuria. No recent travel or sick contacts.    In the ED, pt's VS were: T = 99.8F, HR = 90, BP + 99/77, RR = 16, O2 Sat = 95% on RA. Labs were significant for leukocytosis (WBC = 31.54), hyponatremia (Na = 128), PELON (Cr = 1.22, baseline = 0.8-0.9), and a lactate of 2.0. Patient was given 2L NS, IV Vancomycin 1g x1, and IV Zosyn 3.375g x1. Patient was then admitted to Medicine for further management. (10 Yoan 2018 01:05)    MEDICATIONS  (STANDING):  atorvastatin 10 milliGRAM(s) Oral at bedtime  dextrose 5%. 1000 milliLiter(s) (50 mL/Hr) IV Continuous <Continuous>  dextrose 50% Injectable 12.5 Gram(s) IV Push once  dextrose 50% Injectable 25 Gram(s) IV Push once  dextrose 50% Injectable 25 Gram(s) IV Push once  docusate sodium 100 milliGRAM(s) Oral daily  finasteride 5 milliGRAM(s) Oral daily  insulin lispro (HumaLOG) corrective regimen sliding scale   SubCutaneous three times a day before meals  insulin lispro (HumaLOG) corrective regimen sliding scale   SubCutaneous at bedtime  piperacillin/tazobactam IVPB. 3.375 Gram(s) IV Intermittent every 8 hours  senna 2 Tablet(s) Oral at bedtime    MEDICATIONS  (PRN):  baclofen 10 milliGRAM(s) Oral three times a day PRN hiccups  dextrose Gel 1 Dose(s) Oral once PRN Blood Glucose LESS THAN 70 milliGRAM(s)/deciliter  glucagon  Injectable 1 milliGRAM(s) IntraMuscular once PRN Glucose LESS THAN 70 milligrams/deciliter    Allergies    No Known Allergies    Intolerances      REVIEW OF SYSTEMS:    CONSTITUTIONAL: No weakness, fevers or chills  EYES/ENT: No visual changes;  No vertigo or throat pain   NECK: No pain or stiffness  RESPIRATORY: No cough, wheezing, hemoptysis; No shortness of breath  CARDIOVASCULAR: No chest pain or palpitations  GASTROINTESTINAL: No abdominal or epigastric pain. No nausea, vomiting, or hematemesis; No diarrhea or constipation. No melena or hematochezia.  GENITOURINARY: No dysuria, frequency or hematuria  NEUROLOGICAL: No numbness or weakness  SKIN: No itching, burning, rashes, or lesions   All other review of systems is negative unless indicated above.      VITAL SIGNS:  T(C): 36.6 (01-13-18 @ 05:36), Max: 36.7 (01-12-18 @ 21:22)  T(F): 97.8 (01-13-18 @ 05:36), Max: 98.1 (01-12-18 @ 21:22)  HR: 64 (01-13-18 @ 05:36) (61 - 75)  BP: 153/90 (01-13-18 @ 05:36) (127/77 - 158/89)    RR: 17 (01-13-18 @ 05:36) (17 - 18)  SpO2: 98% (01-13-18 @ 05:36) (98% - 100%)        PHYSICAL EXAM:  GENERAL: NAD, well-developed  HEAD:  Atraumatic, Normocephalic  EYES: EOMI, PERRLA, conjunctiva and sclera clear  NECK: Supple, No JVD  CHEST/LUNG: Clear to auscultation bilaterally; No wheeze  HEART: Regular rate and rhythm; No murmurs, rubs, or gallops  ABDOMEN: Soft, Nontender, Nondistended; Bowel sounds present  EXTREMITIES:  2+ Peripheral Pulses, No clubbing, cyanosis, or edema  PSYCH: AAOx3  NEUROLOGY: non-focal  SKIN: No rashes or lesions        LABS:                      11.9   4.17  )-----------( 152      ( 13 Jan 2018 05:33 )             34.5     01-12    138  |  100  |  17  ----------------------------<  118<H>  3.6   |  27  |  0.91    Ca    8.2<L>      12 Jan 2018 06:00      CAPILLARY BLOOD GLUCOSE      POCT Blood Glucose.: 155 mg/dL (12 Jan 2018 22:00)  POCT Blood Glucose.: 189 mg/dL (12 Jan 2018 17:42)  POCT Blood Glucose.: 178 mg/dL (12 Jan 2018 12:12)  POCT Blood Glucose.: 133 mg/dL (12 Jan 2018 08:27)              CULTURES  RECENT CULTURES:  01-10 @ 05:46 URINE MIDSTREAM           01-09 @ 23:19 BLOOD         NO ORGANISMS ISOLATED  NO ORGANISMS ISOLATED AT 72 HRS.

## 2018-01-13 NOTE — PROGRESS NOTE ADULT - PROBLEM SELECTOR PLAN 4
-Now resolved  -UA negative for casts to suggest ATN thus likely prerenal in nature  -Will hold off on resumption of lisinopril today as patient received IV contrast for CT A/P and thus expect creatinine may rish again SBP >150.   ACE-I held for now given that he just received repeat CT A/P with contrast 1/11/18.    -consider restarting ACE-I as Scr has been wnl.

## 2018-01-13 NOTE — PROGRESS NOTE ADULT - PROBLEM SELECTOR PLAN 3
-Patient's diabetes is well-controlled with A1c of 6.0  -Continue with diabetic diet and HISS #resolved  -UA negative for casts to suggest ATN thus likely prerenal in nature  - lisinopril held in the setting of PELON and patient received IV contrast for CT A/P. Concern for rise in Scr.

## 2018-01-13 NOTE — PROGRESS NOTE ADULT - SUBJECTIVE AND OBJECTIVE BOX
Subjective:   	 no chest pain   no shortness of breath   reports had BM today feels much better     MEDICATIONS  (STANDING):  atorvastatin 10 milliGRAM(s) Oral at bedtime  dextrose 5%. 1000 milliLiter(s) (50 mL/Hr) IV Continuous <Continuous>  dextrose 50% Injectable 12.5 Gram(s) IV Push once  dextrose 50% Injectable 25 Gram(s) IV Push once  dextrose 50% Injectable 25 Gram(s) IV Push once  docusate sodium 100 milliGRAM(s) Oral daily  finasteride 5 milliGRAM(s) Oral daily  insulin lispro (HumaLOG) corrective regimen sliding scale   SubCutaneous three times a day before meals  insulin lispro (HumaLOG) corrective regimen sliding scale   SubCutaneous at bedtime  piperacillin/tazobactam IVPB. 3.375 Gram(s) IV Intermittent every 8 hours  senna 2 Tablet(s) Oral at bedtime    MEDICATIONS  (PRN):  baclofen 10 milliGRAM(s) Oral three times a day PRN hiccups  dextrose Gel 1 Dose(s) Oral once PRN Blood Glucose LESS THAN 70 milliGRAM(s)/deciliter  glucagon  Injectable 1 milliGRAM(s) IntraMuscular once PRN Glucose LESS THAN 70 milligrams/deciliter      LABS:                        11.9   4.17  )-----------( 152      ( 13 Jan 2018 05:33 )             34.5     Hemoglobin: 11.9 g/dL (01-13 @ 05:33)  Hemoglobin: 11.3 g/dL (01-12 @ 06:00)  Hemoglobin: 10.5 g/dL (01-11 @ 05:00)  Hemoglobin: 10.5 g/dL (01-11 @ 05:00)  Hemoglobin: 11.8 g/dL (01-10 @ 06:10)    01-13    138  |  102  |  12  ----------------------------<  110<H>  4.3   |  25  |  0.89    Ca    8.4      13 Jan 2018 05:33  Phos  2.6     01-13  Mg     2.1     01-13    TPro  6.9  /  Alb  3.2<L>  /  TBili  0.3  /  DBili  x   /  AST  33  /  ALT  28  /  AlkPhos  47  01-13    Creatinine Trend: 0.89<--, 0.91<--, 1.04<--, 1.03<--, 1.22<--, 0.86<--     PHYSICAL EXAM  Vital Signs Last 24 Hrs  T(C): 36.6 (13 Jan 2018 05:36), Max: 36.7 (12 Jan 2018 21:22)  T(F): 97.8 (13 Jan 2018 05:36), Max: 98.1 (12 Jan 2018 21:22)  HR: 64 (13 Jan 2018 05:36) (61 - 75)  BP: 153/90 (13 Jan 2018 05:36) (127/77 - 158/89)  BP(mean): --  RR: 17 (13 Jan 2018 05:36) (17 - 18)  SpO2: 98% (13 Jan 2018 05:36) (98% - 100%)      Cardiovascular: Normal S1 S2, RRR   No JVD, 1/6 ROZ murmur, Peripheral pulses palpable 2+ bilaterally  Respiratory: Lungs clear to auscultation, normal effort 	  Gastrointestinal:  Soft, Non-tender, + BS	  Extremities no edema, cyanosis, clubbing B/L LE's       TELEMETRY: 	    ECG:  NSR 	  RADIOLOGY:    CXR      IMPRESSION:    No acute pulmonary disease.    DIAGNOSTIC TESTING:  [ ] Echocardiogram:  [ ]  Catheterization:  [ ] Stress Test:    OTHER: 	  Bld cx x 2 neg @ 72  Ucx GNR       ASSESSMENT/PLAN: 	77y Male with a cane w/ Hx of HTN, Type 2 DM, HLD, BPH, and recent hospitalization for colitis (12/24-12/28) who presents to the ED with worsening weakness, fatigue and confusion over the past 2-3 days.  Last stress echo in the office in April 2017 with No ischemia and normal LV function.      --Not in clinical CHF  --No further inpatient cardiac work up needed at this time  --f/u ID  --to f/u with Dr Phillips post discharge

## 2018-01-13 NOTE — PROGRESS NOTE ADULT - PROBLEM SELECTOR PLAN 7
-BP acceptable at this kayy, will continue to monitor patient off ACEi for now given that he just received repeat CT A/P with contrast. Suspect will resume ACEi upon discharge -c/w Lipitor

## 2018-01-13 NOTE — PROGRESS NOTE ADULT - PROBLEM SELECTOR PROBLEM 5
Hyponatremia Type 2 diabetes mellitus without complication, without long-term current use of insulin

## 2018-01-13 NOTE — PROGRESS NOTE ADULT - PROBLEM SELECTOR PROBLEM 3
Type 2 diabetes mellitus without complication, without long-term current use of insulin PELON (acute kidney injury)

## 2018-01-13 NOTE — PROGRESS NOTE ADULT - PROBLEM SELECTOR PLAN 5
-Now resolved with patient's clinical improvement and ability to tolerate po  -Will dc IVF Well controlled,  Hgb A1c of 6.0  -home metformin held.  -Continue with diabetic diet and HISS with FS

## 2018-01-13 NOTE — PROGRESS NOTE ADULT - PROBLEM SELECTOR PLAN 10
-IMPROVE=1, will dc HSQ  -PT recs no skilled needs
-IMPROVE=1, will dc HSQ  -PT recs no skilled needs
-IMPROVE=1-2, HSQ  -PT recs no skilled needs
-IMPROVE=1-2, HSQ  -PT recs no skilled needs

## 2018-01-14 VITALS
DIASTOLIC BLOOD PRESSURE: 72 MMHG | HEART RATE: 72 BPM | SYSTOLIC BLOOD PRESSURE: 139 MMHG | OXYGEN SATURATION: 99 % | RESPIRATION RATE: 18 BRPM | TEMPERATURE: 98 F

## 2018-01-14 LAB
BACTERIA BLD CULT: SIGNIFICANT CHANGE UP
BACTERIA BLD CULT: SIGNIFICANT CHANGE UP
BUN SERPL-MCNC: 20 MG/DL — SIGNIFICANT CHANGE UP (ref 7–23)
CALCIUM SERPL-MCNC: 8.7 MG/DL — SIGNIFICANT CHANGE UP (ref 8.4–10.5)
CHLORIDE SERPL-SCNC: 100 MMOL/L — SIGNIFICANT CHANGE UP (ref 98–107)
CO2 SERPL-SCNC: 25 MMOL/L — SIGNIFICANT CHANGE UP (ref 22–31)
CREAT SERPL-MCNC: 1.03 MG/DL — SIGNIFICANT CHANGE UP (ref 0.5–1.3)
GLUCOSE SERPL-MCNC: 119 MG/DL — HIGH (ref 70–99)
HCT VFR BLD CALC: 37.6 % — LOW (ref 39–50)
HGB BLD-MCNC: 12.3 G/DL — LOW (ref 13–17)
MAGNESIUM SERPL-MCNC: 2 MG/DL — SIGNIFICANT CHANGE UP (ref 1.6–2.6)
MCHC RBC-ENTMCNC: 31.1 PG — SIGNIFICANT CHANGE UP (ref 27–34)
MCHC RBC-ENTMCNC: 32.7 % — SIGNIFICANT CHANGE UP (ref 32–36)
MCV RBC AUTO: 95.2 FL — SIGNIFICANT CHANGE UP (ref 80–100)
NRBC # FLD: 0 — SIGNIFICANT CHANGE UP
PHOSPHATE SERPL-MCNC: 3.3 MG/DL — SIGNIFICANT CHANGE UP (ref 2.5–4.5)
PLATELET # BLD AUTO: 170 K/UL — SIGNIFICANT CHANGE UP (ref 150–400)
PMV BLD: 10.5 FL — SIGNIFICANT CHANGE UP (ref 7–13)
POTASSIUM SERPL-MCNC: 4.4 MMOL/L — SIGNIFICANT CHANGE UP (ref 3.5–5.3)
POTASSIUM SERPL-SCNC: 4.4 MMOL/L — SIGNIFICANT CHANGE UP (ref 3.5–5.3)
RBC # BLD: 3.95 M/UL — LOW (ref 4.2–5.8)
RBC # FLD: 12.5 % — SIGNIFICANT CHANGE UP (ref 10.3–14.5)
SODIUM SERPL-SCNC: 137 MMOL/L — SIGNIFICANT CHANGE UP (ref 135–145)
WBC # BLD: 4.8 K/UL — SIGNIFICANT CHANGE UP (ref 3.8–10.5)
WBC # FLD AUTO: 4.8 K/UL — SIGNIFICANT CHANGE UP (ref 3.8–10.5)

## 2018-01-14 PROCEDURE — 99239 HOSP IP/OBS DSCHRG MGMT >30: CPT

## 2018-01-14 RX ADMIN — FINASTERIDE 5 MILLIGRAM(S): 5 TABLET, FILM COATED ORAL at 12:33

## 2018-01-14 RX ADMIN — Medication 1: at 13:52

## 2018-01-14 NOTE — PROGRESS NOTE ADULT - SUBJECTIVE AND OBJECTIVE BOX
CARDIOLOGY ATTENDING    no palpitations, no syncope, no angina    atorvastatin 10 milliGRAM(s) Oral at bedtime  baclofen 10 milliGRAM(s) Oral three times a day PRN  dextrose 5%. 1000 milliLiter(s) IV Continuous <Continuous>  dextrose 50% Injectable 12.5 Gram(s) IV Push once  dextrose 50% Injectable 25 Gram(s) IV Push once  dextrose 50% Injectable 25 Gram(s) IV Push once  dextrose Gel 1 Dose(s) Oral once PRN  docusate sodium 100 milliGRAM(s) Oral daily  finasteride 5 milliGRAM(s) Oral daily  glucagon  Injectable 1 milliGRAM(s) IntraMuscular once PRN  insulin lispro (HumaLOG) corrective regimen sliding scale   SubCutaneous three times a day before meals  insulin lispro (HumaLOG) corrective regimen sliding scale   SubCutaneous at bedtime  senna 2 Tablet(s) Oral at bedtime                            12.3   4.80  )-----------( 170      ( 14 Jan 2018 05:16 )             37.6       01-14    137  |  100  |  20  ----------------------------<  119<H>  4.4   |  25  |  1.03    Ca    8.7      14 Jan 2018 05:16  Phos  3.3     01-14  Mg     2.0     01-14    TPro  6.9  /  Alb  3.2<L>  /  TBili  0.3  /  DBili  x   /  AST  33  /  ALT  28  /  AlkPhos  47  01-13      T(C): 36.4 (01-14-18 @ 14:31), Max: 36.8 (01-14-18 @ 06:07)  HR: 72 (01-14-18 @ 14:31) (59 - 72)  BP: 139/72 (01-14-18 @ 14:31) (136/77 - 144/88)  RR: 18 (01-14-18 @ 14:31) (18 - 18)  SpO2: 99% (01-14-18 @ 14:31) (98% - 99%)  Wt(kg): --    no JVD  RRR, no murmurs  CTAB  soft nt/nd  no c/c/e      ASSESSMENT/PLAN: 	77y Male with a cane w/ Hx of HTN, Type 2 DM, HLD, BPH, and recent hospitalization for colitis (12/24-12/28) who presents to the ED with worsening weakness, fatigue and confusion over the past 2-3 days.  Last stress echo in the office in April 2017 with No ischemia and normal LV function.      --Not in clinical CHF  --No further inpatient cardiac work up needed at this time  --f/u ID  --to f/u with Dr Phillips post discharge

## 2018-01-14 NOTE — DISCHARGE NOTE ADULT - HOSPITAL COURSE
Patient is a 78 y/o Male, ambulatory with a cane w/ Hx of HTN, Type 2 DM, HLD, BPH, and recent hospitalization for colitis (12/24-12/28) who presents to the ED with worsening weakness and fatigue over the past 2-3 days. In the ED, pt's VS were: T = 99.8F, HR = 90, BP + 99/77, RR = 16, O2 Sat = 95% on RA. Labs were significant for leukocytosis (WBC = 31.54), hyponatremia (Na = 128), PELON (Cr = 1.22, baseline = 0.8-0.9), and a lactate of 2.0. Patient was given 2L NS, IV Vancomycin 1g x1, and IV Zosyn 3.375g x1. Patient was then admitted to Medicine for further management.  Patient's home meds were restarted except Lisinopril in the setting of acute PELON. Patient was continued on broad spectrum antibiotics, Vanc and zosyn. All infectious workup including imaging was negative for an active source of infection. Infectious disease was consulted. Antibiotics was later discontinued as patient was clinically improving and WBCs were within normal limits. Patient is a 78 y/o Male, ambulatory with a cane w/ Hx of HTN, Type 2 DM, HLD, BPH, and recent hospitalization for colitis (12/24-12/28) who presents to the ED with worsening weakness and fatigue over the past 2-3 days. In the ED, pt's VS were: T = 99.8F, HR = 90, BP + 99/77, RR = 16, O2 Sat = 95% on RA. Labs were significant for leukocytosis (WBC = 31.54), hyponatremia (Na = 128), PELON (Cr = 1.22, baseline = 0.8-0.9), and a lactate of 2.0. Patient was given 2L NS, IV Vancomycin 1g x1, and IV Zosyn 3.375g x1.     Patient was then admitted to Medicine for further management.  Patient's home meds were restarted except Lisinopril in the setting of acute PELON. Patient was continued on broad spectrum antibiotics, Vanc and zosyn. Infectious disease was consulted. Infectious workup, including blood and urine cx, RVP, and imaging were negative. White count returned to normal limits with course of antibiotics. ID recommended observing patient off abx. He remained hemodynamically stable, afebrile with normal WBC off abx is stable for discharge. He will follow-up with his primary care doctor within one week of discharge. He will continue taking his previous home medications. Patient is a 78 y/o Male, ambulatory with a cane w/ Hx of HTN, Type 2 DM, HLD, BPH, and recent hospitalization for colitis (12/24-12/28) who presents to the ED with worsening weakness and fatigue over the past 2-3 days. In the ED, pt's VS were: T = 99.8F, HR = 90, BP + 99/77, RR = 16, O2 Sat = 95% on RA. Labs were significant for leukocytosis (WBC = 31.54), hyponatremia (Na = 128), PELON (Cr = 1.22, baseline = 0.8-0.9), and a lactate of 2.0. Patient was given 2L NS, IV Vancomycin 1g x1, and IV Zosyn 3.375g x1.     Patient was then admitted to Medicine for further management.  Patient's home meds were restarted except Lisinopril in the setting of acute PELON. Patient was continued on broad spectrum antibiotics, Vanc and zosyn. Infectious disease was consulted. Infectious workup, including blood and urine cx, RVP, and imaging were negative. White count returned to normal limits with course of antibiotics. ID recommended observing patient off antibiotics for 24 hours. He remained hemodynamically stable, afebrile with normal WBC off abx is stable for discharge. He will follow-up with his primary care doctor within one week of discharge. He will continue taking his previous home medications.

## 2018-01-14 NOTE — PROGRESS NOTE ADULT - ASSESSMENT
77M h/o HTN, well-controlled DM2, HLD, BPH, and recent hospitalization for sepsis 2/2 colitis (12/24-12/28) who presents to the ED with worsening weakness and fatigue over the past 2-3 days, found to have WBC 31 and admitted for SIRS eval, PELON, and hypovolemic hyponatremia, now clincially improved (resolution of SIRS, PELON, and hyponatremia) without obvious source of infection identified:

## 2018-01-14 NOTE — DISCHARGE NOTE ADULT - MEDICATION SUMMARY - MEDICATIONS TO TAKE
I will START or STAY ON the medications listed below when I get home from the hospital:    finasteride 5 mg oral tablet  -- 1 tab(s) by mouth once a day  -- Indication: For BPH (benign prostatic hyperplasia)    lisinopril 2.5 mg oral tablet  -- 1 tab(s) by mouth once a day  -- Indication: For Hypertension, unspecified type    metformin 500 mg oral tablet, extended release  -- 1 tab(s) by mouth once a day  -- Indication: For DM (diabetes mellitus)    atorvastatin 10 mg oral tablet  -- 1 tab(s) by mouth once a day  -- Indication: For HLD (hyperlipidemia)    Vitamin D2 50,000 intl units (1.25 mg) oral capsule  -- 1 cap(s) by mouth once a week  -- Indication: For Low vitamin D

## 2018-01-14 NOTE — DISCHARGE NOTE ADULT - CARE PLAN
Principal Discharge DX:	SIRS (systemic inflammatory response syndrome)  Goal:	resolution  Instructions for follow-up, activity and diet:	You presented with increased white blood cells and a fever which meet the SIRS criteria so you were started on broad spectrum antibiotic empirically. However, all infectious workup including imaging and cultures were negative. Your labs are now back to normal limits. For worsening signs and symptoms such as headache, fever, chills, diarrhea, nausea or vomiting, call 911 or come to the ED immediately.  Secondary Diagnosis:	PELON (acute kidney injury)  Instructions for follow-up, activity and diet:	Creatnine levels were elevated likely from dehydration which improved with IV fluids. Lisinopril was held during this admission to prevent worsening kidney functions. Continue to eat and drink to stay hydrated.  Secondary Diagnosis:	HTN (hypertension)  Instructions for follow-up, activity and diet:	You blood pressure was controlled without anti-hypertensives. Follow up with your PCP within a week of discharge to restart Lisinopril.   Follow up with cardiologist, Dr. Phillips. Call [phone] to make an appointment. Principal Discharge DX:	SIRS (systemic inflammatory response syndrome)  Goal:	medically treated with antibiotics  Instructions for follow-up, activity and diet:	You presented with increased white blood cells and a fever which meet the SIRS (systemic inflammatory response) criteria so you were started on broad spectrum antibiotics empirically. However, all infectious workup including imaging and cultures were negative. You clinically improved after a course of antibiotics. For worsening signs and symptoms such as headache, fever, chills, diarrhea, nausea or vomiting, call 911 or come to the ED immediately. Follow-up with your primary care doctor within 1 week of discharge.  Secondary Diagnosis:	PELON (acute kidney injury)  Instructions for follow-up, activity and diet:	Resolved with fluids.  Secondary Diagnosis:	HTN (hypertension)  Instructions for follow-up, activity and diet:	Continue  taking lisinopril as prescribed.   Follow up with cardiologist, Dr. Phillips. Call [phone] to make an appointment. Principal Discharge DX:	SIRS (systemic inflammatory response syndrome)  Goal:	medically treated with antibiotics  Assessment and plan of treatment:	You presented with increased white blood cells and a fever which meet the SIRS (systemic inflammatory response) criteria so you were started on broad spectrum antibiotics empirically. However, all infectious workup including imaging and cultures were negative. You clinically improved after a course of antibiotics. For worsening signs and symptoms such as headache, fever, chills, diarrhea, nausea or vomiting, call 911 or come to the ED immediately. Follow-up with your primary care doctor within 1 week of discharge.  Secondary Diagnosis:	PELON (acute kidney injury)  Assessment and plan of treatment:	Resolved with fluids.  Secondary Diagnosis:	HTN (hypertension)  Assessment and plan of treatment:	Continue  taking lisinopril as prescribed.   Follow up with cardiologist, Dr. Phillips. Call [phone] to make an appointment.

## 2018-01-14 NOTE — PROGRESS NOTE ADULT - PROBLEM SELECTOR PROBLEM 6
Need for prophylactic measure
BPH (benign prostatic hyperplasia)

## 2018-01-14 NOTE — DISCHARGE NOTE ADULT - PLAN OF CARE
resolution You presented with increased white blood cells and a fever which meet the SIRS criteria so you were started on broad spectrum antibiotic empirically. However, all infectious workup including imaging and cultures were negative. Your labs are now back to normal limits. For worsening signs and symptoms such as headache, fever, chills, diarrhea, nausea or vomiting, call 911 or come to the ED immediately. Creatnine levels were elevated likely from dehydration which improved with IV fluids. Lisinopril was held during this admission to prevent worsening kidney functions. Continue to eat and drink to stay hydrated. You blood pressure was controlled without anti-hypertensives. Follow up with your PCP within a week of discharge to restart Lisinopril.   Follow up with cardiologist, Dr. Phillips. Call [phone] to make an appointment. medically treated with antibiotics You presented with increased white blood cells and a fever which meet the SIRS (systemic inflammatory response) criteria so you were started on broad spectrum antibiotics empirically. However, all infectious workup including imaging and cultures were negative. You clinically improved after a course of antibiotics. For worsening signs and symptoms such as headache, fever, chills, diarrhea, nausea or vomiting, call 911 or come to the ED immediately. Follow-up with your primary care doctor within 1 week of discharge. Resolved with fluids. Continue  taking lisinopril as prescribed.   Follow up with cardiologist, Dr. Phillips. Call [phone] to make an appointment.

## 2018-01-14 NOTE — DISCHARGE NOTE ADULT - PATIENT PORTAL LINK FT
“You can access the [employer] Patient Portal, offered by [facility], by registering with the following website: [email or web address]”

## 2018-01-14 NOTE — PROGRESS NOTE ADULT - PROBLEM SELECTOR PLAN 1
-now resolved   -Unclear etiology. Infectious vs reactive?  -blood, urine cx NGTD. RVP negative.   - afebrile off abx -now resolved   -Unclear etiology. Infectious vs reactive?  -blood, urine cx NGTD. RVP negative.   - afebrile off antibiotics x 24 hours

## 2018-01-14 NOTE — PROGRESS NOTE ADULT - PROVIDER SPECIALTY LIST ADULT
Cardiology
Hospitalist
Infectious Disease
Internal Medicine
Cardiology
Infectious Disease
Internal Medicine

## 2018-01-14 NOTE — PROGRESS NOTE ADULT - SUBJECTIVE AND OBJECTIVE BOX
***************************************************************  Ryan Horne, PGY2  Internal Medicine   spectra:[id number]  ***************************************************************    BIBI SEXTON  77y  MRN: [id number]        Patient is a 77y old  Male who presents with a chief complaint of Weakness (14 Jan 2018 01:56)      Subjective: no events ON, no complaints, no fever, CP, SOB, abn pain, N/V/D/C, dysuria. tolerating diet. wants to go home.       MEDICATIONS  (STANDING):  atorvastatin 10 milliGRAM(s) Oral at bedtime  dextrose 5%. 1000 milliLiter(s) (50 mL/Hr) IV Continuous <Continuous>  dextrose 50% Injectable 12.5 Gram(s) IV Push once  dextrose 50% Injectable 25 Gram(s) IV Push once  dextrose 50% Injectable 25 Gram(s) IV Push once  docusate sodium 100 milliGRAM(s) Oral daily  finasteride 5 milliGRAM(s) Oral daily  insulin lispro (HumaLOG) corrective regimen sliding scale   SubCutaneous three times a day before meals  insulin lispro (HumaLOG) corrective regimen sliding scale   SubCutaneous at bedtime  senna 2 Tablet(s) Oral at bedtime    MEDICATIONS  (PRN):  baclofen 10 milliGRAM(s) Oral three times a day PRN hiccups  dextrose Gel 1 Dose(s) Oral once PRN Blood Glucose LESS THAN 70 milliGRAM(s)/deciliter  glucagon  Injectable 1 milliGRAM(s) IntraMuscular once PRN Glucose LESS THAN 70 milligrams/deciliter        Objective:    Vitals: Vital Signs Last 24 Hrs  T(C): 36.4 (01-14-18 @ 14:31), Max: 36.8 (01-14-18 @ 06:07)  T(F): 97.6 (01-14-18 @ 14:31), Max: 98.3 (01-14-18 @ 06:07)  HR: 72 (01-14-18 @ 14:31) (59 - 72)  BP: 139/72 (01-14-18 @ 14:31) (136/77 - 144/88)  BP(mean): --  RR: 18 (01-14-18 @ 14:31) (18 - 18)  SpO2: 99% (01-14-18 @ 14:31) (98% - 99%)            I&O's Summary      PHYSICAL EXAM:  GENERAL: NAD  HEAD:  Atraumatic, Normocephalic  EYES: EOMI, conjunctiva and sclera clear  CHEST/LUNG: Clear to percussion bilaterally; No rales, rhonchi, wheezing, or rubs  HEART: Regular rate and rhythm; No murmurs, rubs, or gallops  ABDOMEN: Soft, Nontender, Nondistended;   SKIN: No rashes or lesions  NERVOUS SYSTEM:  Alert & Oriented X3, no focal deficit    LABS:  01-14    137  |  100  |  20  ----------------------------<  119<H>  4.4   |  25  |  1.03  01-13    138  |  102  |  12  ----------------------------<  110<H>  4.3   |  25  |  0.89  01-12    138  |  100  |  17  ----------------------------<  118<H>  3.6   |  27  |  0.91    Ca    8.7      14 Jan 2018 05:16  Ca    8.4      13 Jan 2018 05:33  Ca    8.2<L>      12 Jan 2018 06:00  Phos  3.3     01-14  Mg     2.0     01-14    TPro  6.9  /  Alb  3.2<L>  /  TBili  0.3  /  DBili  x   /  AST  33  /  ALT  28  /  AlkPhos  47  01-13                                              12.3   4.80  )-----------( 170      ( 14 Jan 2018 05:16 )             37.6                         11.9   4.17  )-----------( 152      ( 13 Jan 2018 05:33 )             34.5                         11.3   5.88  )-----------( 152      ( 12 Jan 2018 06:00 )             34.7     CAPILLARY BLOOD GLUCOSE      POCT Blood Glucose.: 159 mg/dL (14 Jan 2018 13:37)  POCT Blood Glucose.: 111 mg/dL (14 Jan 2018 09:03)  POCT Blood Glucose.: 154 mg/dL (13 Jan 2018 22:21)  POCT Blood Glucose.: 165 mg/dL (13 Jan 2018 17:14)      RADIOLOGY & ADDITIONAL TESTS:    Imaging Personally Reviewed:  [x ] YES  [ ] NO      Consultants involved in case:   Consultant(s) Notes Reviewed:  [x ] YES  [ ] NO:   Care Discussed with Consultants/Other Providers [x ] YES  [ ] NO ***************************************************************  Ryan Horne, PGY2  Internal Medicine   spectra:[phone]  ***************************************************************    BIBI SEXTON  77y  MRN: [id number]        Patient is a 77y old  Male who presents with a chief complaint of Weakness (14 Jan 2018 01:56)      Subjective: no events ON, no complaints, no fever, CP, SOB, abn pain, N/V/D/C, dysuria. tolerating diet. wants to go home.       MEDICATIONS  (STANDING):  atorvastatin 10 milliGRAM(s) Oral at bedtime  dextrose 5%. 1000 milliLiter(s) (50 mL/Hr) IV Continuous <Continuous>  dextrose 50% Injectable 12.5 Gram(s) IV Push once  dextrose 50% Injectable 25 Gram(s) IV Push once  dextrose 50% Injectable 25 Gram(s) IV Push once  docusate sodium 100 milliGRAM(s) Oral daily  finasteride 5 milliGRAM(s) Oral daily  insulin lispro (HumaLOG) corrective regimen sliding scale   SubCutaneous three times a day before meals  insulin lispro (HumaLOG) corrective regimen sliding scale   SubCutaneous at bedtime  senna 2 Tablet(s) Oral at bedtime    MEDICATIONS  (PRN):  baclofen 10 milliGRAM(s) Oral three times a day PRN hiccups  dextrose Gel 1 Dose(s) Oral once PRN Blood Glucose LESS THAN 70 milliGRAM(s)/deciliter  glucagon  Injectable 1 milliGRAM(s) IntraMuscular once PRN Glucose LESS THAN 70 milligrams/deciliter        Objective:    Vitals: Vital Signs Last 24 Hrs  T(C): 36.4 (01-14-18 @ 14:31), Max: 36.8 (01-14-18 @ 06:07)  T(F): 97.6 (01-14-18 @ 14:31), Max: 98.3 (01-14-18 @ 06:07)  HR: 72 (01-14-18 @ 14:31) (59 - 72)  BP: 139/72 (01-14-18 @ 14:31) (136/77 - 144/88)  BP(mean): --  RR: 18 (01-14-18 @ 14:31) (18 - 18)  SpO2: 99% (01-14-18 @ 14:31) (98% - 99%)            I&O's Summary      PHYSICAL EXAM:  GENERAL: NAD  HEAD:  Atraumatic, Normocephalic  EYES: EOMI, conjunctiva and sclera clear  CHEST/LUNG: Clear to percussion bilaterally; No rales, rhonchi, wheezing, or rubs  HEART: Regular rate and rhythm; No murmurs, rubs, or gallops  ABDOMEN: Soft, Nontender, Nondistended;   SKIN: No rashes or lesions  NERVOUS SYSTEM:  Alert & Oriented X3, no focal deficit    LABS:  01-14    137  |  100  |  20  ----------------------------<  119<H>  4.4   |  25  |  1.03  01-13    138  |  102  |  12  ----------------------------<  110<H>  4.3   |  25  |  0.89  01-12    138  |  100  |  17  ----------------------------<  118<H>  3.6   |  27  |  0.91    Ca    8.7      14 Jan 2018 05:16  Ca    8.4      13 Jan 2018 05:33  Ca    8.2<L>      12 Jan 2018 06:00  Phos  3.3     01-14  Mg     2.0     01-14    TPro  6.9  /  Alb  3.2<L>  /  TBili  0.3  /  DBili  x   /  AST  33  /  ALT  28  /  AlkPhos  47  01-13                                              12.3   4.80  )-----------( 170      ( 14 Jan 2018 05:16 )             37.6                         11.9   4.17  )-----------( 152      ( 13 Jan 2018 05:33 )             34.5                         11.3   5.88  )-----------( 152      ( 12 Jan 2018 06:00 )             34.7     CAPILLARY BLOOD GLUCOSE      POCT Blood Glucose.: 159 mg/dL (14 Jan 2018 13:37)  POCT Blood Glucose.: 111 mg/dL (14 Jan 2018 09:03)  POCT Blood Glucose.: 154 mg/dL (13 Jan 2018 22:21)  POCT Blood Glucose.: 165 mg/dL (13 Jan 2018 17:14)      RADIOLOGY & ADDITIONAL TESTS:    Imaging Personally Reviewed:  [x ] YES  [ ] NO      Consultants involved in case:   Consultant(s) Notes Reviewed:  [x ] YES  [ ] NO:   Care Discussed with Consultants/Other Providers [x ] YES  [ ] NO

## 2018-01-14 NOTE — PROGRESS NOTE ADULT - ATTENDING COMMENTS
Agree with above.   -No further cardiac workup needed at this time    Mallika Sutton MD  [facility]  [address], [address]  [place] [address]  office: [phone]  pager: [phone]
Agree with above.   -No further inpatient cv workup needed at this time    Mallika Sutton MD  [facility]  [address], [address]  [place] [address]  office: [phone]  pager: [phone]
CARDIOLOGY ATTENDING    Agree with above. No further inpatient cardiac workup needed.
discharge planning and coordination ~ 45mins.

## 2018-01-14 NOTE — PROGRESS NOTE ADULT - PROBLEM SELECTOR PLAN 3
Well controlled,  Hgb A1c of 6.0  -home metformin held.  -Continue with diabetic diet and HISS with FS

## 2018-01-14 NOTE — DISCHARGE NOTE ADULT - PROVIDER TOKENS
TOKEN:'[id number]',FREE:[LAST:[Alan],FIRST:[Bethany],PHONE:[phone]],FAX:[(   )    -],ADDRESS:[[facility] [address],  [address] [place] [address]]]

## 2018-01-14 NOTE — DISCHARGE NOTE ADULT - CARE PROVIDER_API CALL
Pranav Bowles (DO), Family Medicine  [address] [place] [address]  Phone: [phone]  Fax: [phone]    Bethany Phillips  [address],  [address]  [place] [address]  Phone: [phone]  Fax: (       -

## 2018-01-14 NOTE — DISCHARGE NOTE ADULT - CONDITIONS AT DISCHARGE
Patient is alert and orientedx4, able to make needs known. patient is being discharged home. patient stable to be discharged.

## 2018-01-14 NOTE — DISCHARGE NOTE ADULT - MEDICATION SUMMARY - MEDICATIONS TO STOP TAKING
I will STOP taking the medications listed below when I get home from the hospital:    metroNIDAZOLE 500 mg oral tablet  -- 1 tab(s) by mouth every 8 hours    ciprofloxacin 500 mg oral tablet  -- 1 tab(s) by mouth every 12 hours

## 2018-01-14 NOTE — DISCHARGE NOTE ADULT - ADDITIONAL INSTRUCTIONS
For worsening signs and symptoms such as headache, fever, chills, diarrhea, nausea or vomiting, call 911 or come to the ED immediately.  Follow up with your PCP within a week of discharge to restart Lisinopril.   Follow up with cardiologist, Dr. Phillips. Call [phone] to make an appointment.

## 2018-01-14 NOTE — PROGRESS NOTE ADULT - PROBLEM SELECTOR PROBLEM 2
SIRS (systemic inflammatory response syndrome)
Hypertension, unspecified type

## 2019-06-01 ENCOUNTER — OUTPATIENT (OUTPATIENT)
Dept: OUTPATIENT SERVICES | Facility: HOSPITAL | Age: 79
LOS: 1 days | End: 2019-06-01
Payer: MEDICAID

## 2019-06-01 DIAGNOSIS — Z98.89 OTHER SPECIFIED POSTPROCEDURAL STATES: Chronic | ICD-10-CM

## 2019-06-01 DIAGNOSIS — Z09 ENCOUNTER FOR FOLLOW-UP EXAMINATION AFTER COMPLETED TREATMENT FOR CONDITIONS OTHER THAN MALIGNANT NEOPLASM: Chronic | ICD-10-CM

## 2019-06-01 PROCEDURE — G9001: CPT

## 2019-06-06 ENCOUNTER — EMERGENCY (EMERGENCY)
Facility: HOSPITAL | Age: 79
LOS: 1 days | Discharge: HOME | End: 2019-06-06
Attending: EMERGENCY MEDICINE | Admitting: EMERGENCY MEDICINE
Payer: MEDICAID

## 2019-06-06 VITALS
OXYGEN SATURATION: 100 % | TEMPERATURE: 98 F | HEART RATE: 60 BPM | DIASTOLIC BLOOD PRESSURE: 73 MMHG | RESPIRATION RATE: 18 BRPM | SYSTOLIC BLOOD PRESSURE: 156 MMHG

## 2019-06-06 DIAGNOSIS — Z98.89 OTHER SPECIFIED POSTPROCEDURAL STATES: Chronic | ICD-10-CM

## 2019-06-06 DIAGNOSIS — Z09 ENCOUNTER FOR FOLLOW-UP EXAMINATION AFTER COMPLETED TREATMENT FOR CONDITIONS OTHER THAN MALIGNANT NEOPLASM: Chronic | ICD-10-CM

## 2019-06-06 LAB
ALBUMIN SERPL ELPH-MCNC: 4.1 G/DL — SIGNIFICANT CHANGE UP (ref 3.3–5)
ALP SERPL-CCNC: 57 U/L — SIGNIFICANT CHANGE UP (ref 40–120)
ALT FLD-CCNC: 10 U/L — SIGNIFICANT CHANGE UP (ref 4–41)
ANION GAP SERPL CALC-SCNC: 11 MMO/L — SIGNIFICANT CHANGE UP (ref 7–14)
APPEARANCE UR: CLEAR — SIGNIFICANT CHANGE UP
AST SERPL-CCNC: 13 U/L — SIGNIFICANT CHANGE UP (ref 4–40)
BACTERIA # UR AUTO: NEGATIVE — SIGNIFICANT CHANGE UP
BASE EXCESS BLDV CALC-SCNC: 3.7 MMOL/L — SIGNIFICANT CHANGE UP
BASOPHILS # BLD AUTO: 0.02 K/UL — SIGNIFICANT CHANGE UP (ref 0–0.2)
BASOPHILS NFR BLD AUTO: 0.2 % — SIGNIFICANT CHANGE UP (ref 0–2)
BILIRUB SERPL-MCNC: 0.5 MG/DL — SIGNIFICANT CHANGE UP (ref 0.2–1.2)
BILIRUB UR-MCNC: NEGATIVE — SIGNIFICANT CHANGE UP
BLOOD GAS VENOUS - CREATININE: 0.97 MG/DL — SIGNIFICANT CHANGE UP (ref 0.5–1.3)
BLOOD UR QL VISUAL: NEGATIVE — SIGNIFICANT CHANGE UP
BUN SERPL-MCNC: 30 MG/DL — HIGH (ref 7–23)
CALCIUM SERPL-MCNC: 9.3 MG/DL — SIGNIFICANT CHANGE UP (ref 8.4–10.5)
CHLORIDE BLDV-SCNC: 106 MMOL/L — SIGNIFICANT CHANGE UP (ref 96–108)
CHLORIDE SERPL-SCNC: 102 MMOL/L — SIGNIFICANT CHANGE UP (ref 98–107)
CHOLEST SERPL-MCNC: 128 MG/DL — SIGNIFICANT CHANGE UP (ref 120–199)
CO2 SERPL-SCNC: 27 MMOL/L — SIGNIFICANT CHANGE UP (ref 22–31)
COLOR SPEC: YELLOW — SIGNIFICANT CHANGE UP
CREAT SERPL-MCNC: 0.99 MG/DL — SIGNIFICANT CHANGE UP (ref 0.5–1.3)
EOSINOPHIL # BLD AUTO: 0.03 K/UL — SIGNIFICANT CHANGE UP (ref 0–0.5)
EOSINOPHIL NFR BLD AUTO: 0.4 % — SIGNIFICANT CHANGE UP (ref 0–6)
GAS PNL BLDV: 138 MMOL/L — SIGNIFICANT CHANGE UP (ref 136–146)
GLUCOSE BLDV-MCNC: 153 MG/DL — HIGH (ref 70–99)
GLUCOSE SERPL-MCNC: 161 MG/DL — HIGH (ref 70–99)
GLUCOSE UR-MCNC: NEGATIVE — SIGNIFICANT CHANGE UP
HBA1C BLD-MCNC: 7.5 % — HIGH (ref 4–5.6)
HCO3 BLDV-SCNC: 25 MMOL/L — SIGNIFICANT CHANGE UP (ref 20–27)
HCT VFR BLD CALC: 40.4 % — SIGNIFICANT CHANGE UP (ref 39–50)
HCT VFR BLDV CALC: 41.1 % — SIGNIFICANT CHANGE UP (ref 39–51)
HDLC SERPL-MCNC: 35 MG/DL — SIGNIFICANT CHANGE UP (ref 35–55)
HGB BLD-MCNC: 13.3 G/DL — SIGNIFICANT CHANGE UP (ref 13–17)
HGB BLDV-MCNC: 13.4 G/DL — SIGNIFICANT CHANGE UP (ref 13–17)
HYALINE CASTS # UR AUTO: SIGNIFICANT CHANGE UP
IMM GRANULOCYTES NFR BLD AUTO: 0.5 % — SIGNIFICANT CHANGE UP (ref 0–1.5)
KETONES UR-MCNC: NEGATIVE — SIGNIFICANT CHANGE UP
LACTATE BLDV-MCNC: 1.2 MMOL/L — SIGNIFICANT CHANGE UP (ref 0.5–2)
LEUKOCYTE ESTERASE UR-ACNC: SIGNIFICANT CHANGE UP
LIPID PNL WITH DIRECT LDL SERPL: 88 MG/DL — SIGNIFICANT CHANGE UP
LYMPHOCYTES # BLD AUTO: 1.05 K/UL — SIGNIFICANT CHANGE UP (ref 1–3.3)
LYMPHOCYTES # BLD AUTO: 12.6 % — LOW (ref 13–44)
MCHC RBC-ENTMCNC: 31.9 PG — SIGNIFICANT CHANGE UP (ref 27–34)
MCHC RBC-ENTMCNC: 32.9 % — SIGNIFICANT CHANGE UP (ref 32–36)
MCV RBC AUTO: 96.9 FL — SIGNIFICANT CHANGE UP (ref 80–100)
MONOCYTES # BLD AUTO: 0.66 K/UL — SIGNIFICANT CHANGE UP (ref 0–0.9)
MONOCYTES NFR BLD AUTO: 7.9 % — SIGNIFICANT CHANGE UP (ref 2–14)
NEUTROPHILS # BLD AUTO: 6.51 K/UL — SIGNIFICANT CHANGE UP (ref 1.8–7.4)
NEUTROPHILS NFR BLD AUTO: 78.4 % — HIGH (ref 43–77)
NITRITE UR-MCNC: NEGATIVE — SIGNIFICANT CHANGE UP
NRBC # FLD: 0 K/UL — SIGNIFICANT CHANGE UP (ref 0–0)
PCO2 BLDV: 60 MMHG — HIGH (ref 41–51)
PH BLDV: 7.31 PH — LOW (ref 7.32–7.43)
PH UR: 6 — SIGNIFICANT CHANGE UP (ref 5–8)
PLATELET # BLD AUTO: 147 K/UL — LOW (ref 150–400)
PMV BLD: 10.7 FL — SIGNIFICANT CHANGE UP (ref 7–13)
PO2 BLDV: < 24 MMHG — LOW (ref 35–40)
POTASSIUM BLDV-SCNC: 4.1 MMOL/L — SIGNIFICANT CHANGE UP (ref 3.4–4.5)
POTASSIUM SERPL-MCNC: 4.4 MMOL/L — SIGNIFICANT CHANGE UP (ref 3.5–5.3)
POTASSIUM SERPL-SCNC: 4.4 MMOL/L — SIGNIFICANT CHANGE UP (ref 3.5–5.3)
PROT SERPL-MCNC: 7.7 G/DL — SIGNIFICANT CHANGE UP (ref 6–8.3)
PROT UR-MCNC: 100 — HIGH
RBC # BLD: 4.17 M/UL — LOW (ref 4.2–5.8)
RBC # FLD: 12.7 % — SIGNIFICANT CHANGE UP (ref 10.3–14.5)
RBC CASTS # UR COMP ASSIST: SIGNIFICANT CHANGE UP (ref 0–?)
SAO2 % BLDV: 28.3 % — LOW (ref 60–85)
SODIUM SERPL-SCNC: 140 MMOL/L — SIGNIFICANT CHANGE UP (ref 135–145)
SP GR SPEC: 1.03 — SIGNIFICANT CHANGE UP (ref 1–1.04)
SQUAMOUS # UR AUTO: SIGNIFICANT CHANGE UP
TRIGL SERPL-MCNC: 74 MG/DL — SIGNIFICANT CHANGE UP (ref 10–149)
UROBILINOGEN FLD QL: NORMAL — SIGNIFICANT CHANGE UP
WBC # BLD: 8.31 K/UL — SIGNIFICANT CHANGE UP (ref 3.8–10.5)
WBC # FLD AUTO: 8.31 K/UL — SIGNIFICANT CHANGE UP (ref 3.8–10.5)
WBC UR QL: HIGH (ref 0–?)

## 2019-06-06 PROCEDURE — 99218: CPT

## 2019-06-06 PROCEDURE — 70496 CT ANGIOGRAPHY HEAD: CPT | Mod: 26

## 2019-06-06 PROCEDURE — 70498 CT ANGIOGRAPHY NECK: CPT | Mod: 26

## 2019-06-06 RX ORDER — MECLIZINE HCL 12.5 MG
50 TABLET ORAL ONCE
Refills: 0 | Status: COMPLETED | OUTPATIENT
Start: 2019-06-06 | End: 2019-06-06

## 2019-06-06 RX ORDER — ASPIRIN/CALCIUM CARB/MAGNESIUM 324 MG
81 TABLET ORAL DAILY
Refills: 0 | Status: DISCONTINUED | OUTPATIENT
Start: 2019-06-06 | End: 2019-06-10

## 2019-06-06 RX ORDER — ONDANSETRON 8 MG/1
4 TABLET, FILM COATED ORAL ONCE
Refills: 0 | Status: COMPLETED | OUTPATIENT
Start: 2019-06-06 | End: 2019-06-06

## 2019-06-06 RX ORDER — SODIUM CHLORIDE 9 MG/ML
1000 INJECTION INTRAMUSCULAR; INTRAVENOUS; SUBCUTANEOUS ONCE
Refills: 0 | Status: COMPLETED | OUTPATIENT
Start: 2019-06-06 | End: 2019-06-06

## 2019-06-06 RX ORDER — ATORVASTATIN CALCIUM 80 MG/1
10 TABLET, FILM COATED ORAL AT BEDTIME
Refills: 0 | Status: DISCONTINUED | OUTPATIENT
Start: 2019-06-06 | End: 2019-06-10

## 2019-06-06 RX ADMIN — SODIUM CHLORIDE 1000 MILLILITER(S): 9 INJECTION INTRAMUSCULAR; INTRAVENOUS; SUBCUTANEOUS at 16:41

## 2019-06-06 RX ADMIN — ONDANSETRON 4 MILLIGRAM(S): 8 TABLET, FILM COATED ORAL at 16:41

## 2019-06-06 RX ADMIN — Medication 50 MILLIGRAM(S): at 17:35

## 2019-06-06 RX ADMIN — Medication 81 MILLIGRAM(S): at 23:33

## 2019-06-06 RX ADMIN — ATORVASTATIN CALCIUM 10 MILLIGRAM(S): 80 TABLET, FILM COATED ORAL at 22:23

## 2019-06-06 NOTE — ED CDU PROVIDER INITIAL DAY NOTE - MEDICAL DECISION MAKING DETAILS
78yM w/pmhx HTN, HLD, DM p/w nausea, vomiting and dizziness, symptoms resolved. CTa head and neck without acute findings, seen by neuro. In CDU for MRI and tele monitoring

## 2019-06-06 NOTE — ED PROVIDER NOTE - CLINICAL SUMMARY MEDICAL DECISION MAKING FREE TEXT BOX
- vertigo with N/V, consider intracranial pathology  - cbc, cmp, trop, coags, ekg, CT head, ua, urine culture  - neuro consult - vertigo with N/V, consider intracranial pathology  - cbc, cmp, trop, coags, ekg, CT head, ua, urine culture  - neuro consult  John Barclay, PGY1: acute vertigo w. 1x urine incontinence and "confusion". denied fever, cough, dysuria, concerning for peripheral vertigo vs central, possible intracranial etiology, seizure, UTI, normal pressure hydrocephalus. despite having normal gait in ER and acute presentation.

## 2019-06-06 NOTE — ED PROVIDER NOTE - OBJECTIVE STATEMENT
78M h/o HTN, HLD, DM, presents with N/V that started this morning.  Endorses dizziness that worsens with movement.  Also states he woke up having wetted his bed, which is unusual.  Per son, patient seems altered from baseline, more confused and "not himself."  Denies fever/chills, headaches, cp, sob, visual changes, diarrhea, focal weakness. 78M h/o HTN, HLD, DM, presents with N/V that started this morning.  Endorses dizziness that worsens with movement. dizziness described as vertigo like. Also states he woke up having wetted his bed, which is unusual.  Per son, patient seems altered from baseline, more confused and "not himself."  Denies fever/chills, headaches, cp, sob, visual changes, diarrhea, focal weakness.

## 2019-06-06 NOTE — ED CDU PROVIDER INITIAL DAY NOTE - OBJECTIVE STATEMENT
78yM w/pmhx DM, HTN, HLD presented to the ED with nausea, vomiting and dizziness. Pt states this morning he woke up and had urinated in his bed. He then felt nauseous and had 3 episodes of NBNB emesis. Pts family states he told them he was dizzy but he is currently denying dizziness. Pt denies chest pain, shortness of breath, weakness, numbness/tingling, abd pain, dysuria, urinary frequency, near syncope, back pain, recent travel or illness or any other concerns.  In ED pt with CTa head and neck without acute changes, seen by neuro, recommend CDU for MRI

## 2019-06-06 NOTE — ED CDU PROVIDER INITIAL DAY NOTE - PHYSICAL EXAMINATION
Neuro: A&Ox3, PERRL, CN II-VII intact, sensation intact and equal b/l, strength 5/5 in all extremities, normal finger to nose, normal rapid alternating movements, no gait abnormality

## 2019-06-06 NOTE — ED PROVIDER NOTE - PHYSICAL EXAMINATION
General: NAD, good hygiene, well developed  HENT: Atraumatic, PERRLA, EMOI, no conjunctivae injection   Neck: normal ROM and trachea midline   Cardiovascular: RRR, S1&2, no M or R, radial pulses equal and b/l  Respiratory: CTABL, no wheezes or crackles, no decreased breath sounds  Abdominal:  soft and non-tender non distended, neg CVA tenderness   Extremities: no edema of the legs/feet  Skin: warm, well perfused  Neuro: CN2-12 intact, EOMI. 5/5 strength in UE and LE b/l.  Sensation intact in UE/LE b/l. Able to perform AXEL/FTN/HTS. Gait nl   Psych: normal mood and affect

## 2019-06-06 NOTE — ED ADULT NURSE NOTE - OBJECTIVE STATEMENT
Pt aa&ox3 PMH HTN, HLD, DM, presents with N/V that started this morning.  Endorses dizziness that worsens with movement.  Also states he woke up having wetted his bed, which is unusual.  Per son, patient seems altered from baseline, more confused and "not himself."  Denies fever/chills, headaches, cp, sob, visual changes, diarrhea, focal weakness. 20g IV placed in RT Ac. labs sent. Will monitor.

## 2019-06-06 NOTE — CONSULT NOTE ADULT - SUBJECTIVE AND OBJECTIVE BOX
BIBI [id number] is a 78y old  Male who presents with a chief complaint of     HPI: 79 yo M with PMH of BPH, HLD, DM presents to the ER with vertigo. Son and patient provide the history. They report that since this morning, patient had dizziness. Dizziness reported as a room spinning sensation. He is also unsteady on his feet. No diplopia, dysarthria, weakness, numbness, tingling, tinnitus, lightheadedness, or hearing loss. Of note, the son reports that for the past few months, patient has been having cognitive decline; he has been having difficulty recognizing names.       MEDICATIONS  (STANDING):    MEDICATIONS  (PRN):    PAST MEDICAL & SURGICAL HISTORY:  BPH (benign prostatic hyperplasia)  HLD (hyperlipidemia)  HTN (hypertension)  DM (diabetes mellitus)  Diabetes  S/P abdominal surgery, follow-up exam: sbo  S/P hernia surgery: r.inguinal    FAMILY HISTORY:  No pertinent family history in first degree relatives    Allergies    No Known Allergies    Intolerances        SHx - No smoking, No ETOH, No drug abuse      Review of Systems:    see hpi      Vital Signs Last 24 Hrs  T(C): 36.7 (06 Jun 2019 14:02), Max: 36.7 (06 Jun 2019 14:02)  T(F): 98 (06 Jun 2019 14:02), Max: 98 (06 Jun 2019 14:02)  HR: 60 (06 Jun 2019 14:02) (60 - 60)  BP: 156/73 (06 Jun 2019 14:02) (156/73 - 156/73)  BP(mean): --  RR: 18 (06 Jun 2019 14:02) (18 - 18)  SpO2: 100% (06 Jun 2019 14:02) (100% - 100%)    Neurological Exam:  Mental Status: Orientated to self, date and place.  Attention intact.  No dysarthria. Speech fluent.  Cranial Nerves:   PERRL, EOMI, VFF, no nystagmus.    CN V1-3 intact to light touch .  No facial asymmetry.  Hearing intact to finger rub bilaterally.  Tongue, uvula and palate midline.  Sternocleidomastoid and Trapezius intact bilaterally.    Motor:   Tone: normal.                  Strength:     [] Upper extremity                      Delt       Bicep    Tricep                                                  R         5/5        5/5        5/5       5/5                                               L          5/5        5/5        5/5       5/5  [] Lower extremity                       HF          KE          KF        DF         PF                                               R        5/5 5/5 5/5 5/5 5/5                                               L         5/5 5/5 5/5 5/5 5/5  Pronator drift: none                 Dysmetria: None to finger-nose-finger or heel-shin-heel  No truncal ataxia.    Tremor: No resting, postural or action tremor.  No myoclonus.    Sensation: intact to light touch, pinprick, vibration and proprioception    Deep Tendon Reflexes:     Biceps          Triceps      BR        Patellar        Ankle         Babinski                                         R       2+                   2+           2+            2+               2+           downgoing                                         L        2+                  2+           2+            2+               2+           downgoing    Gait: normal.      Other:    06-06    140  |  102  |  30<H>  ----------------------------<  161<H>  4.4   |  27  |  0.99    Ca    9.3      06 Jun 2019 16:36    TPro  7.7  /  Alb  4.1  /  TBili  0.5  /  DBili  x   /  AST  13  /  ALT  10  /  AlkPhos  57  06-06                            13.3   8.31  )-----------( 147      ( 06 Jun 2019 16:36 )             40.4       Radiology    CT:   MRI  EKG:  tele:  TTE:  EEG: BIBI [id number] is a 78y old  Male who presents with a chief complaint of     HPI: 79 yo M with PMH of BPH, HLD, DM presents to the ER with vertigo. Son and patient provide the history. They report that since this morning, patient had dizziness. Dizziness reported as a room spinning sensation. He also reported unsteadiness on his feet. No diplopia, dysarthria, weakness, numbness, tingling, tinnitus, lightheadedness, or hearing loss. Of note, the son reports that for the past few months, patient has been having cognitive decline; he has been having difficulty recognizing names.       MEDICATIONS  (STANDING):    MEDICATIONS  (PRN):    PAST MEDICAL & SURGICAL HISTORY:  BPH (benign prostatic hyperplasia)  HLD (hyperlipidemia)  HTN (hypertension)  DM (diabetes mellitus)  Diabetes  S/P abdominal surgery, follow-up exam: sbo  S/P hernia surgery: r.inguinal    FAMILY HISTORY:  No pertinent family history in first degree relatives    Allergies    No Known Allergies    Intolerances        SHx - No smoking, No ETOH, No drug abuse      Review of Systems:    see hpi      Vital Signs Last 24 Hrs  T(C): 36.7 (06 Jun 2019 14:02), Max: 36.7 (06 Jun 2019 14:02)  T(F): 98 (06 Jun 2019 14:02), Max: 98 (06 Jun 2019 14:02)  HR: 60 (06 Jun 2019 14:02) (60 - 60)  BP: 156/73 (06 Jun 2019 14:02) (156/73 - 156/73)  BP(mean): --  RR: 18 (06 Jun 2019 14:02) (18 - 18)  SpO2: 100% (06 Jun 2019 14:02) (100% - 100%)    Neurological Exam:  Mental Status: Orientated to self, date and place.  Attention intact.  No dysarthria. Speech fluent.  Cranial Nerves:   PERRL, EOMI, VFF, no nystagmus.    CN V1-3 intact to light touch .  No facial asymmetry.  Hearing intact to finger rub bilaterally.  Tongue, uvula and palate midline.  Sternocleidomastoid and Trapezius intact bilaterally.    Motor:   Tone: normal.                  Strength:     [] Upper extremity                      Delt       Bicep    Tricep                                                  R         5/5        5/5        5/5       5/5                                               L          5/5        5/5        5/5       5/5  [] Lower extremity                       HF          KE          KF        DF         PF                                               R        5/5 5/5 5/5 5/5 5/5                                               L         5/5 5/5 5/5 5/5 5/5  Pronator drift: none                 Dysmetria: None to finger-nose-finger or heel-shin-heel  No truncal ataxia.    Tremor: No resting, postural or action tremor.  No myoclonus.    Sensation: intact to light touch, pinprick, vibration and proprioception    Deep Tendon Reflexes:     Biceps          Triceps      BR        Patellar        Ankle         Babinski                                         R       2+                   2+           2+            2+               2+           downgoing                                         L        2+                  2+           2+            2+               2+           downgoing    Gait: normal.      Other:    06-06    140  |  102  |  30<H>  ----------------------------<  161<H>  4.4   |  27  |  0.99    Ca    9.3      06 Jun 2019 16:36    TPro  7.7  /  Alb  4.1  /  TBili  0.5  /  DBili  x   /  AST  13  /  ALT  10  /  AlkPhos  57  06-06                            13.3   8.31  )-----------( 147      ( 06 Jun 2019 16:36 )             40.4       Radiology    CT:   MRI  EKG:  tele:  TTE:  EEG: BIBI [id number] is a 78y old  Male who presents with a chief complaint of     HPI: 77 yo M with PMH of BPH, HLD, DM presents to the ER with vertigo. Son and patient provide the history. They report that since this morning, patient had dizziness. Dizziness reported as a room spinning sensation. He also reported unsteadiness on his feet. No diplopia, dysarthria, weakness, numbness, tingling, tinnitus, lightheadedness, or hearing loss. Was not able to report whether vertigo was episodic or not.     Of note, the son reports that for the past few months, patient has been having cognitive decline; he has been having difficulty recognizing names, difficulty with short term memory. Also has had some difficulties with walking (and has tripped recently) along with an episode of urinary incontinence recently.    MEDICATIONS  (STANDING):    MEDICATIONS  (PRN):    PAST MEDICAL & SURGICAL HISTORY:  BPH (benign prostatic hyperplasia)  HLD (hyperlipidemia)  HTN (hypertension)  DM (diabetes mellitus)  Diabetes  S/P abdominal surgery, follow-up exam: sbo  S/P hernia surgery: r.inguinal    FAMILY HISTORY:  No pertinent family history in first degree relatives    Allergies    No Known Allergies    Intolerances        SHx - No smoking, No ETOH, No drug abuse      Review of Systems:    see hpi      Vital Signs Last 24 Hrs  T(C): 36.7 (06 Jun 2019 14:02), Max: 36.7 (06 Jun 2019 14:02)  T(F): 98 (06 Jun 2019 14:02), Max: 98 (06 Jun 2019 14:02)  HR: 60 (06 Jun 2019 14:02) (60 - 60)  BP: 156/73 (06 Jun 2019 14:02) (156/73 - 156/73)  BP(mean): --  RR: 18 (06 Jun 2019 14:02) (18 - 18)  SpO2: 100% (06 Jun 2019 14:02) (100% - 100%)    Neurological Exam:  Mental Status: Orientated to self, date and place.  Attention intact.  No dysarthria. Speech fluent. had difficulty stating how many quarters are in $2.75. follows all commands  Cranial Nerves: EOMI, VFF, no nystagmus. No facial asymmetry. Tongue midline.            Strength:     [] Upper extremity                      Delt       Bicep    Tricep                                                  R         5/5        5/5        5/5       5/5                                               L          5/5        5/5        5/5       5/5    [] Lower extremity                       HF          KE          KF        DF         PF                                               R        5/5 5/5 5/5 5/5 5/5                                               L         5/5 5/5 5/5 5/5 5/5  Pronator drift: none                   Deep Tendon Reflexes:     Biceps                  BR           Patellar        Babinski                                         R       2+              2+            2+                downgoing                                         L        2+              2+            2+                downgoing    Gait: normal. normal tandem walk, toe walk, and heel walk    Simpson-Hallpike: negative    Other:    06-06    140  |  102  |  30<H>  ----------------------------<  161<H>  4.4   |  27  |  0.99    Ca    9.3      06 Jun 2019 16:36    TPro  7.7  /  Alb  4.1  /  TBili  0.5  /  DBili  x   /  AST  13  /  ALT  10  /  AlkPhos  57  06-06                            13.3   8.31  )-----------( 147      ( 06 Jun 2019 16:36 )             40.4       Radiology    CT:   MRI  EKG:  tele:  TTE:  EEG: BIBI [id number] is a 78y old  Male who presents with a chief complaint of     HPI: 79 yo M with PMH of BPH, HLD, DM presents to the ER with vertigo. Son and patient provide the history. They report that since this morning, patient had dizziness. Dizziness reported as a room spinning sensation. He also reported unsteadiness on his feet. No diplopia, dysarthria, weakness, numbness, tingling, tinnitus, lightheadedness, or hearing loss. Was not able to report whether vertigo was episodic or not.     Of note, the son reports that for the past few months, patient has been having cognitive decline; he has been having difficulty recognizing names, difficulty with short term memory. Also has had some difficulties with walking (and has tripped recently) along with an episode of urinary incontinence recently.    MEDICATIONS  (STANDING):    MEDICATIONS  (PRN):    PAST MEDICAL & SURGICAL HISTORY:  BPH (benign prostatic hyperplasia)  HLD (hyperlipidemia)  HTN (hypertension)  DM (diabetes mellitus)  Diabetes  S/P abdominal surgery, follow-up exam: sbo  S/P hernia surgery: r.inguinal    FAMILY HISTORY:  No pertinent family history in first degree relatives    Allergies    No Known Allergies    Intolerances        SHx - No smoking, No ETOH, No drug abuse      Review of Systems:    see hpi      Vital Signs Last 24 Hrs  T(C): 36.7 (06 Jun 2019 14:02), Max: 36.7 (06 Jun 2019 14:02)  T(F): 98 (06 Jun 2019 14:02), Max: 98 (06 Jun 2019 14:02)  HR: 60 (06 Jun 2019 14:02) (60 - 60)  BP: 156/73 (06 Jun 2019 14:02) (156/73 - 156/73)  BP(mean): --  RR: 18 (06 Jun 2019 14:02) (18 - 18)  SpO2: 100% (06 Jun 2019 14:02) (100% - 100%)    Neurological Exam:  Mental Status: Orientated to self, date and place.  Attention intact.  No dysarthria. Speech fluent. had difficulty stating how many quarters are in $2.75. follows all commands  Cranial Nerves: EOMI, VFF, no nystagmus. No facial asymmetry. Tongue midline.            Strength:     [] Upper extremity                      Delt       Bicep    Tricep                                                  R         5/5        5/5        5/5       5/5                                               L          5/5        5/5        5/5       5/5    [] Lower extremity                       HF          KE          KF        DF         PF                                               R        5/5 5/5 5/5 5/5 5/5                                               L         5/5 5/5 5/5 5/5 5/5    Pronator drift: none                 no dysmetria    Deep Tendon Reflexes:     Biceps                  BR           Patellar        Babinski                                         R       2+              2+            2+                downgoing                                         L        2+              2+            2+                downgoing    Gait: normal. normal tandem walk, toe walk, and heel walk    Valentín-Hallpike: negative    Other:    06-06    140  |  102  |  30<H>  ----------------------------<  161<H>  4.4   |  27  |  0.99    Ca    9.3      06 Jun 2019 16:36    TPro  7.7  /  Alb  4.1  /  TBili  0.5  /  DBili  x   /  AST  13  /  ALT  10  /  AlkPhos  57  06-06                            13.3   8.31  )-----------( 147      ( 06 Jun 2019 16:36 )             40.4       Radiology    CT:   MRI  EKG:  tele:  TTE:  EEG:

## 2019-06-06 NOTE — ED PROVIDER NOTE - NS ED ROS FT
GENERAL: No fever or chills, weight changes, nightsweats  EYES: no change in vision  HEENT: no dysplasia, odynophagia, ear pain, rhinorrhea, epistasis   CARDIAC: no chest pain, palpitation   PULMONARY: no cough or SOB  GI: no abdominal pain, no nausea or no vomiting, no diarrhea or constipation  : No changes in urination for pain/freq.   SKIN: no rashes   NEURO: no headache, numbness/tingling, extremity weakness   MSK: No joint pain

## 2019-06-06 NOTE — ED ADULT NURSE NOTE - NSIMPLEMENTINTERV_GEN_ALL_ED
Implemented All Universal Safety Interventions:  [place] to call system. Call bell, personal items and telephone within reach. Instruct patient to call for assistance. Room bathroom lighting operational. Non-slip footwear when patient is off stretcher. Physically safe environment: no spills, clutter or unnecessary equipment. Stretcher in lowest position, wheels locked, appropriate side rails in place.

## 2019-06-06 NOTE — ED ADULT TRIAGE NOTE - CHIEF COMPLAINT QUOTE
Pt c/o n/v starting this morning denies abd pain, breathing even and unlabored, denies constipation no diarrhea.

## 2019-06-06 NOTE — ED CDU PROVIDER INITIAL DAY NOTE - ATTENDING CONTRIBUTION TO CARE
Dr. Hackett:  I performed a face to face bedside interview with patient regarding history of present illness, review of symptoms and past medical history. I completed an independent physical exam.  I have discussed patient's plan of care with PA.   I agree with note as stated above, having amended the EMR as needed to reflect my findings.   This includes HISTORY OF PRESENT ILLNESS, HIV, PAST MEDICAL/SURGICAL/FAMILY/SOCIAL HISTORY, ALLERGIES AND HOME MEDICATIONS, REVIEW OF SYSTEMS, PHYSICAL EXAM, and any PROGRESS NOTES during the time I functioned as the attending physician for this patient.    78M h/o HTN, DM, presented with vertigo, N/V, and one episode of urinary incontinence overnight.  CT no acute findings.    Exam:  - nad  - rrr   -ctab   -abd soft ntnd  - no focal neuro deficits    A/P  - r/o CVA, possibly gastroenteritis  - MR, f/u neuro

## 2019-06-07 VITALS
HEART RATE: 56 BPM | RESPIRATION RATE: 18 BRPM | TEMPERATURE: 98 F | OXYGEN SATURATION: 98 % | DIASTOLIC BLOOD PRESSURE: 81 MMHG | SYSTOLIC BLOOD PRESSURE: 153 MMHG

## 2019-06-07 DIAGNOSIS — Z71.89 OTHER SPECIFIED COUNSELING: ICD-10-CM

## 2019-06-07 PROCEDURE — 99217: CPT

## 2019-06-07 PROCEDURE — 70551 MRI BRAIN STEM W/O DYE: CPT | Mod: 26

## 2019-06-07 NOTE — ED CDU PROVIDER SUBSEQUENT DAY NOTE - HISTORY
GEMA Bhandari: pt feels better denies any physical complaints, pt tolerating PO, ambulatory without difficulty.  Pt seen and cleared by Neurology.  Discharge and results reviewed with patient.

## 2019-06-07 NOTE — ED CDU PROVIDER DISPOSITION NOTE - NSFOLLOWUPINSTRUCTIONS_ED_ALL_ED_FT
Follow up with your Primary Medical Doctor in 1-2 days.  Follow up with Neurology in 1-2 days see attached list.  Rest.  Drink plenty of fluids.   Do not take your Metformin for the next 48 hours.  Return to the ER for any persistent/worsening or new symptoms weakness, dizziness, chest pain, shortness of breath or any concerning symptoms.

## 2019-06-07 NOTE — ED CDU PROVIDER DISPOSITION NOTE - CLINICAL COURSE
admitted to cdu to rule out stroke, mr and cta neg. seen and cleared by neuro. follow up with neuro pmd

## 2019-11-13 PROBLEM — Z00.00 ENCOUNTER FOR PREVENTIVE HEALTH EXAMINATION: Noted: 2019-11-13

## 2019-12-02 ENCOUNTER — APPOINTMENT (OUTPATIENT)
Dept: GASTROENTEROLOGY | Facility: CLINIC | Age: 79
End: 2019-12-02
Payer: MEDICARE

## 2019-12-02 VITALS
BODY MASS INDEX: 27.89 KG/M2 | TEMPERATURE: 98 F | WEIGHT: 184 LBS | HEART RATE: 77 BPM | SYSTOLIC BLOOD PRESSURE: 140 MMHG | DIASTOLIC BLOOD PRESSURE: 70 MMHG | OXYGEN SATURATION: 97 % | HEIGHT: 68 IN

## 2019-12-02 DIAGNOSIS — Z84.89 FAMILY HISTORY OF OTHER SPECIFIED CONDITIONS: ICD-10-CM

## 2019-12-02 DIAGNOSIS — Z78.9 OTHER SPECIFIED HEALTH STATUS: ICD-10-CM

## 2019-12-02 DIAGNOSIS — Z12.12 ENCOUNTER FOR SCREENING FOR MALIGNANT NEOPLASM OF COLON: ICD-10-CM

## 2019-12-02 DIAGNOSIS — Z63.4 DISAPPEARANCE AND DEATH OF FAMILY MEMBER: ICD-10-CM

## 2019-12-02 DIAGNOSIS — Z12.11 ENCOUNTER FOR SCREENING FOR MALIGNANT NEOPLASM OF COLON: ICD-10-CM

## 2019-12-02 DIAGNOSIS — Z86.39 PERSONAL HISTORY OF OTHER ENDOCRINE, NUTRITIONAL AND METABOLIC DISEASE: ICD-10-CM

## 2019-12-02 DIAGNOSIS — E78.00 PURE HYPERCHOLESTEROLEMIA, UNSPECIFIED: ICD-10-CM

## 2019-12-02 PROCEDURE — 99203 OFFICE O/P NEW LOW 30 MIN: CPT

## 2019-12-02 RX ORDER — FINASTERIDE 5 MG/1
5 TABLET, FILM COATED ORAL
Refills: 0 | Status: ACTIVE | COMMUNITY

## 2019-12-02 RX ORDER — METFORMIN HYDROCHLORIDE 500 MG/1
500 TABLET, COATED ORAL
Refills: 0 | Status: ACTIVE | COMMUNITY

## 2019-12-02 SDOH — SOCIAL STABILITY - SOCIAL INSECURITY: DISSAPEARANCE AND DEATH OF FAMILY MEMBER: Z63.4

## 2019-12-02 NOTE — HISTORY OF PRESENT ILLNESS
[Heartburn] : denies heartburn [Nausea] : denies nausea [Vomiting] : denies vomiting [Diarrhea] : denies diarrhea [Constipation] : stable constipation [Yellow Skin Or Eyes (Jaundice)] : denies jaundice [Abdominal Pain] : denies abdominal pain [Abdominal Swelling] : denies abdominal swelling [Rectal Pain] : denies rectal pain [Wt Loss ___ Lbs] : no recent weight loss [GERD] : no gastroesophageal reflux disease [Wt Gain ___ Lbs] : no recent weight gain [Hiatus Hernia] : no hiatus hernia [Peptic Ulcer Disease] : no peptic ulcer disease [Cholelithiasis] : no cholelithiasis [Kidney Stone] : no kidney stone [Pancreatitis] : no pancreatitis [Inflammatory Bowel Disease] : no inflammatory bowel disease [Irritable Bowel Syndrome] : no irritable bowel syndrome [Malignancy] : no malignancy [Diverticulitis] : no diverticulitis [Alcohol Abuse] : no alcohol abuse [Abdominal Surgery] : no abdominal surgery [Cholecystectomy] : no cholecystectomy [de-identified] : 78 year old man referred for a screening colonoscopy.is last colonoscopy was over 15 years ago. the patient has mild anemia Hgb/Hct 12.3/36.4. He denies rectal bleeding, melena or hematemesis.

## 2019-12-02 NOTE — PHYSICAL EXAM
[General Appearance - Alert] : alert [General Appearance - In No Acute Distress] : in no acute distress [Outer Ear] : the ears and nose were normal in appearance [Extraocular Movements] : extraocular movements were intact [Sclera] : the sclera and conjunctiva were normal [PERRL With Normal Accommodation] : pupils were equal in size, round, and reactive to light [Neck Appearance] : the appearance of the neck was normal [Oropharynx] : the oropharynx was normal [Thyroid Nodule] : there were no palpable thyroid nodules [Neck Cervical Mass (___cm)] : no neck mass was observed [Thyroid Diffuse Enlargement] : the thyroid was not enlarged [Jugular Venous Distention Increased] : there was no jugular-venous distention [Heart Sounds Gallop] : no gallops [Heart Sounds] : normal S1 and S2 [Heart Rate And Rhythm] : heart rate was normal and rhythm regular [Murmurs] : no murmurs [Heart Sounds Pericardial Friction Rub] : no pericardial rub [Bowel Sounds] : normal bowel sounds [Abdomen Soft] : soft [Abdomen Tenderness] : non-tender [] : no hepato-splenomegaly [Abdomen Mass (___ Cm)] : no abdominal mass palpated [Cervical Lymph Nodes Enlarged Posterior Bilaterally] : posterior cervical [Cervical Lymph Nodes Enlarged Anterior Bilaterally] : anterior cervical [Supraclavicular Lymph Nodes Enlarged Bilaterally] : supraclavicular [No Spinal Tenderness] : no spinal tenderness [No CVA Tenderness] : no ~M costovertebral angle tenderness [Abnormal Walk] : normal gait [Musculoskeletal - Swelling] : no joint swelling seen [Nail Clubbing] : no clubbing  or cyanosis of the fingernails [Deep Tendon Reflexes (DTR)] : deep tendon reflexes were 2+ and symmetric [Motor Tone] : muscle strength and tone were normal [Sensation] : the sensory exam was normal to light touch and pinprick [No Focal Deficits] : no focal deficits [Impaired Insight] : insight and judgment were intact [Oriented To Time, Place, And Person] : oriented to person, place, and time [Affect] : the affect was normal

## 2019-12-02 NOTE — REVIEW OF SYSTEMS
[Abdominal Pain] : no abdominal pain [Vomiting] : no vomiting [Constipation] : no constipation [Heartburn] : no heartburn [Diarrhea] : no diarrhea [Melena] : no melena [Negative] : Heme/Lymph

## 2019-12-05 ENCOUNTER — RX RENEWAL (OUTPATIENT)
Age: 79
End: 2019-12-05

## 2019-12-13 ENCOUNTER — OUTPATIENT (OUTPATIENT)
Dept: OUTPATIENT SERVICES | Facility: HOSPITAL | Age: 79
LOS: 1 days | Discharge: HOME | End: 2019-12-13
Payer: MEDICAID

## 2019-12-13 ENCOUNTER — APPOINTMENT (OUTPATIENT)
Dept: GASTROENTEROLOGY | Facility: HOSPITAL | Age: 79
End: 2019-12-13
Payer: MEDICARE

## 2019-12-13 ENCOUNTER — RESULT REVIEW (OUTPATIENT)
Age: 79
End: 2019-12-13

## 2019-12-13 VITALS
DIASTOLIC BLOOD PRESSURE: 90 MMHG | RESPIRATION RATE: 18 BRPM | WEIGHT: 184.09 LBS | TEMPERATURE: 98 F | SYSTOLIC BLOOD PRESSURE: 136 MMHG | HEART RATE: 63 BPM | HEIGHT: 68 IN | OXYGEN SATURATION: 98 %

## 2019-12-13 VITALS
DIASTOLIC BLOOD PRESSURE: 55 MMHG | OXYGEN SATURATION: 96 % | SYSTOLIC BLOOD PRESSURE: 139 MMHG | RESPIRATION RATE: 18 BRPM | HEART RATE: 55 BPM

## 2019-12-13 DIAGNOSIS — Z09 ENCOUNTER FOR FOLLOW-UP EXAMINATION AFTER COMPLETED TREATMENT FOR CONDITIONS OTHER THAN MALIGNANT NEOPLASM: Chronic | ICD-10-CM

## 2019-12-13 DIAGNOSIS — Z98.89 OTHER SPECIFIED POSTPROCEDURAL STATES: Chronic | ICD-10-CM

## 2019-12-13 DIAGNOSIS — Z12.12 ENCOUNTER FOR SCREENING FOR MALIGNANT NEOPLASM OF RECTUM: ICD-10-CM

## 2019-12-13 LAB — GLUCOSE BLDC GLUCOMTR-MCNC: 111 MG/DL — HIGH (ref 70–99)

## 2019-12-13 PROCEDURE — 45385 COLONOSCOPY W/LESION REMOVAL: CPT

## 2019-12-13 PROCEDURE — 88305 TISSUE EXAM BY PATHOLOGIST: CPT | Mod: 26

## 2019-12-13 PROCEDURE — 45380 COLONOSCOPY AND BIOPSY: CPT | Mod: 33,59

## 2019-12-13 RX ORDER — SODIUM CHLORIDE 9 MG/ML
1000 INJECTION, SOLUTION INTRAVENOUS
Refills: 0 | Status: DISCONTINUED | OUTPATIENT
Start: 2019-12-13 | End: 2019-12-28

## 2019-12-13 RX ADMIN — SODIUM CHLORIDE 30 MILLILITER(S): 9 INJECTION, SOLUTION INTRAVENOUS at 09:21

## 2019-12-13 NOTE — ASU PATIENT PROFILE, ADULT - NS TRANSFER PATIENT BELONGINGS
Wrist Watch/locker#4/Cell Phone/PDA (specify)/Clothing Cell Phone/PDA (specify)/locker#4 and 5/Clothing/Wrist Watch
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
## 2019-12-17 LAB — SURGICAL PATHOLOGY STUDY: SIGNIFICANT CHANGE UP

## 2020-01-02 ENCOUNTER — APPOINTMENT (OUTPATIENT)
Dept: GASTROENTEROLOGY | Facility: CLINIC | Age: 80
End: 2020-01-02
Payer: MEDICARE

## 2020-01-02 VITALS
WEIGHT: 184 LBS | TEMPERATURE: 97.7 F | OXYGEN SATURATION: 96 % | DIASTOLIC BLOOD PRESSURE: 75 MMHG | RESPIRATION RATE: 17 BRPM | SYSTOLIC BLOOD PRESSURE: 135 MMHG | HEART RATE: 73 BPM | BODY MASS INDEX: 27.89 KG/M2 | HEIGHT: 68 IN

## 2020-01-02 PROCEDURE — 99213 OFFICE O/P EST LOW 20 MIN: CPT

## 2020-01-02 NOTE — PHYSICAL EXAM
[General Appearance - Alert] : alert [Neck Appearance] : the appearance of the neck was normal [General Appearance - In No Acute Distress] : in no acute distress [Neck Cervical Mass (___cm)] : no neck mass was observed [Jugular Venous Distention Increased] : there was no jugular-venous distention [Thyroid Diffuse Enlargement] : the thyroid was not enlarged [Thyroid Nodule] : there were no palpable thyroid nodules [Auscultation Breath Sounds / Voice Sounds] : lungs were clear to auscultation bilaterally [Heart Rate And Rhythm] : heart rate was normal and rhythm regular [Heart Sounds] : normal S1 and S2 [Heart Sounds Gallop] : no gallops [Murmurs] : no murmurs [Heart Sounds Pericardial Friction Rub] : no pericardial rub [Bowel Sounds] : normal bowel sounds [Abdomen Soft] : soft [Abdomen Tenderness] : non-tender [] : no hepato-splenomegaly [Abdomen Mass (___ Cm)] : no abdominal mass palpated [Cervical Lymph Nodes Enlarged Posterior Bilaterally] : posterior cervical [Cervical Lymph Nodes Enlarged Anterior Bilaterally] : anterior cervical [Supraclavicular Lymph Nodes Enlarged Bilaterally] : supraclavicular [No CVA Tenderness] : no ~M costovertebral angle tenderness [No Spinal Tenderness] : no spinal tenderness

## 2020-01-02 NOTE — HISTORY OF PRESENT ILLNESS
[de-identified] : 79 year old man underwent a colonoscopy on 12/13. He had several polyps removed. Pathology is tubular adenoma with no dysplasia. He is doing well.

## 2020-07-20 ENCOUNTER — APPOINTMENT (OUTPATIENT)
Dept: GASTROENTEROLOGY | Facility: CLINIC | Age: 80
End: 2020-07-20
Payer: MEDICARE

## 2020-07-20 VITALS
WEIGHT: 182 LBS | OXYGEN SATURATION: 95 % | HEART RATE: 78 BPM | BODY MASS INDEX: 27.67 KG/M2 | SYSTOLIC BLOOD PRESSURE: 93 MMHG | TEMPERATURE: 98.1 F | DIASTOLIC BLOOD PRESSURE: 57 MMHG

## 2020-07-20 DIAGNOSIS — K63.5 POLYP OF COLON: ICD-10-CM

## 2020-07-20 DIAGNOSIS — I10 ESSENTIAL (PRIMARY) HYPERTENSION: ICD-10-CM

## 2020-07-20 PROCEDURE — 99213 OFFICE O/P EST LOW 20 MIN: CPT

## 2020-07-20 RX ORDER — POLYETHYLENE GLYCOL 3350, SODIUM CHLORIDE, SODIUM BICARBONATE AND POTASSIUM CHLORIDE WITH LEMON FLAVOR 420; 11.2; 5.72; 1.48 G/4L; G/4L; G/4L; G/4L
420 POWDER, FOR SOLUTION ORAL
Qty: 1 | Refills: 0 | Status: COMPLETED | COMMUNITY
Start: 2019-12-02 | End: 2020-07-20

## 2020-07-20 RX ORDER — ERGOCALCIFEROL 1.25 MG/1
1.25 MG CAPSULE, LIQUID FILLED ORAL
Qty: 4 | Refills: 0 | Status: ACTIVE | COMMUNITY
Start: 2019-08-26

## 2020-07-20 NOTE — HISTORY OF PRESENT ILLNESS
[de-identified] : 59 year old man with mild anemia. his Hgb/HC is 12.2/36.7. He denies rectal bleeding, melena or hematemesis. He has heartburn. He underwent a colonoscopy on 12/13/19 and had several polyps removed. he is being treated for benign hypertension.

## 2020-07-20 NOTE — PHYSICAL EXAM
[General Appearance - Alert] : alert [General Appearance - In No Acute Distress] : in no acute distress [Neck Cervical Mass (___cm)] : no neck mass was observed [Jugular Venous Distention Increased] : there was no jugular-venous distention [Neck Appearance] : the appearance of the neck was normal [Thyroid Diffuse Enlargement] : the thyroid was not enlarged [Thyroid Nodule] : there were no palpable thyroid nodules [Auscultation Breath Sounds / Voice Sounds] : lungs were clear to auscultation bilaterally [Heart Sounds] : normal S1 and S2 [Heart Rate And Rhythm] : heart rate was normal and rhythm regular [Heart Sounds Gallop] : no gallops [Heart Sounds Pericardial Friction Rub] : no pericardial rub [Murmurs] : no murmurs [Abdomen Soft] : soft [Bowel Sounds] : normal bowel sounds [] : no hepato-splenomegaly [Abdomen Tenderness] : non-tender [Abdomen Mass (___ Cm)] : no abdominal mass palpated [Occult Blood Positive] : stool was negative for occult blood [Cervical Lymph Nodes Enlarged Posterior Bilaterally] : posterior cervical [Cervical Lymph Nodes Enlarged Anterior Bilaterally] : anterior cervical [Supraclavicular Lymph Nodes Enlarged Bilaterally] : supraclavicular [No CVA Tenderness] : no ~M costovertebral angle tenderness [No Spinal Tenderness] : no spinal tenderness

## 2020-07-31 LAB — HEMOCCULT STL QL IA: NEGATIVE

## 2020-08-03 ENCOUNTER — RX RENEWAL (OUTPATIENT)
Age: 80
End: 2020-08-03

## 2020-08-09 ENCOUNTER — APPOINTMENT (OUTPATIENT)
Dept: DISASTER EMERGENCY | Facility: CLINIC | Age: 80
End: 2020-08-09

## 2020-08-09 DIAGNOSIS — Z01.818 ENCOUNTER FOR OTHER PREPROCEDURAL EXAMINATION: ICD-10-CM

## 2020-08-10 LAB — SARS-COV-2 N GENE NPH QL NAA+PROBE: NOT DETECTED

## 2020-08-12 ENCOUNTER — RESULT REVIEW (OUTPATIENT)
Age: 80
End: 2020-08-12

## 2020-08-12 ENCOUNTER — OUTPATIENT (OUTPATIENT)
Dept: OUTPATIENT SERVICES | Facility: HOSPITAL | Age: 80
LOS: 1 days | Discharge: HOME | End: 2020-08-12
Payer: MEDICARE

## 2020-08-12 ENCOUNTER — APPOINTMENT (OUTPATIENT)
Dept: GASTROENTEROLOGY | Facility: HOSPITAL | Age: 80
End: 2020-08-12
Payer: MEDICARE

## 2020-08-12 VITALS
SYSTOLIC BLOOD PRESSURE: 165 MMHG | HEART RATE: 63 BPM | OXYGEN SATURATION: 96 % | DIASTOLIC BLOOD PRESSURE: 73 MMHG | RESPIRATION RATE: 18 BRPM

## 2020-08-12 VITALS
RESPIRATION RATE: 21 BRPM | HEART RATE: 58 BPM | HEIGHT: 68 IN | OXYGEN SATURATION: 97 % | WEIGHT: 171.96 LBS | TEMPERATURE: 98 F

## 2020-08-12 DIAGNOSIS — Z98.89 OTHER SPECIFIED POSTPROCEDURAL STATES: Chronic | ICD-10-CM

## 2020-08-12 DIAGNOSIS — Z09 ENCOUNTER FOR FOLLOW-UP EXAMINATION AFTER COMPLETED TREATMENT FOR CONDITIONS OTHER THAN MALIGNANT NEOPLASM: Chronic | ICD-10-CM

## 2020-08-12 DIAGNOSIS — D64.9 ANEMIA, UNSPECIFIED: ICD-10-CM

## 2020-08-12 LAB — GLUCOSE BLDC GLUCOMTR-MCNC: 121 MG/DL — HIGH (ref 70–99)

## 2020-08-12 PROCEDURE — 88312 SPECIAL STAINS GROUP 1: CPT | Mod: 26

## 2020-08-12 PROCEDURE — 88342 IMHCHEM/IMCYTCHM 1ST ANTB: CPT | Mod: 26,CS

## 2020-08-12 PROCEDURE — 88305 TISSUE EXAM BY PATHOLOGIST: CPT | Mod: 26

## 2020-08-12 PROCEDURE — 43239 EGD BIOPSY SINGLE/MULTIPLE: CPT

## 2020-08-12 RX ORDER — SODIUM CHLORIDE 9 MG/ML
1000 INJECTION, SOLUTION INTRAVENOUS
Refills: 0 | Status: DISCONTINUED | OUTPATIENT
Start: 2020-08-12 | End: 2020-08-27

## 2020-08-12 RX ADMIN — SODIUM CHLORIDE 30 MILLILITER(S): 9 INJECTION, SOLUTION INTRAVENOUS at 13:38

## 2020-08-12 NOTE — ASU PATIENT PROFILE, ADULT - FALLEN IN THE PAST
[facility]    [address] [place] [address]    Phone:  [phone]    Fax:  [phone]       Thank You for choosing us for your health care visit. We are glad to serve you and happy to provide you with this summary of your visit. Please help us to ensure we have accurate records. If you find anything that needs to be changed, please let our staff know as soon as possible.          Your Demographic Information     Patient Name Sex Estella Mercado Female 1983       Ethnic Group Patient Race    Not of  or  Origin White      Your Visit Details     Date & Time Provider Department    2017 10:00 AM Pete Viveros MD [facility]      Your Upcoming Appointment*(Max 10)     2017  9:00 AM CDT   Follow-Up Therapy with Jerry Frazier, PT   [place] [facility] ([facility])    [address]  [place] [address]   [phone]              8:45 AM CDT   Worker's Comp Follow Up Visit with Chivo Borjas DC   [facility] ([facility] [address] )    [address] [address] [phone]            2017  8:30 AM CDT   Follow-Up Therapy with Yoselin Phillips PTA   [place] [facility] ([facility])    [address]  [place] [address]   [phone]            2017 11:30 AM CDT   ENT VISIT with CHERRI Vazquez   [facility] ([facility])    [place] [address]   [phone]            2017 11:50 AM CDT   Follow-up Visit with YU Mackenzie   [facility] ([facility])    [place] [address]      [phone]            Tuesday July 25, 2017 10:30 AM CDT   Nurse Visit with [facility] OTOL CERUMEN NURSE   [facility] ([facility])    [address] [place] [address]   [phone]              Your To Do List     Future Orders Please Complete On or Around Expires    EMG - ROUTINE  Jun 20, 2017 Sep 20, 2017      Conditions Discussed Today or Order-Related Diagnoses        Comments    Right arm weakness    -  Primary     Right arm pain         Numbness and tingling in right hand           Your Vitals Were     BP Pulse SpO2 Smoking Status          112/92 82 99% Never Smoker        Medications Prescribed or Re-Ordered Today     None      Your Current Medications Are        Disp Refills Start End    predniSONE (DELTASONE) 10 MG tablet 30 tablet 0 6/19/2017 6/29/2017    Sig: Take two tablets by mouth twice daily for 6 days, then 1 tablet twice daily for 2 days, then 1 daily for 2 days.    Class: Eprescribe    fluconazole (DIFLUCAN) 150 MG tablet 3 tablet 0 6/8/2017     Sig: Take 1 tab po today, then 1 tab po in 3 days, then 1 tab po in another 3 days    Class: Eprescribe    hydrOXYzine (ATARAX) 25 MG tablet 90 tablet 1 5/31/2017     Sig - Route: Take 1 tablet by mouth 3 times daily as needed for Anxiety. - Oral    Class: Eprescribe    naproxen (NAPROSYN) 500 MG tablet 30 tablet 3 3/6/2017     Sig - Route: Take 1 tablet by mouth 2 times daily (with meals). - Oral    Class: Eprescribe    ammonium lactate (AMLACTIN) 12 % cream 385 g 1 12/5/2016     Sig: Apply to affected area twice daily.    Class: Eprescribe    CETIRIZINE HCL PO        Sig - Route: Take 2 tablets by mouth daily. - Oral    Class: Historical Med    ibuprofen (ADVIL) 200 MG tablet 60 tablet 5 3/22/2016     Sig - Route: Take 2 tablets by mouth every 8 hours as needed for Pain. Patient has intermittent pain and requires relief to perform her job - Oral      Allergies     Valium [Diazepam]  DIZZINESS, VISUAL DISTURBANCE    Oxycodone DIZZINESS    Visual changes    Vicodin [Hydrocodone-acetaminophen] DIZZINESS    Imodium [Loperamide] DIZZINESS    Tizanidine DIZZINESS    Zithromax [Azithromycin Dihydrate] RASH    Mobic [Meloxicam] Other (See Comments)    Chest pain      Immunizations History as of 6/20/2017     Name Date    Depo-provera 12/30/2014, 10/7/2014, 7/16/2014, 4/22/2014, 1/29/2014, 11/6/2013, 8/16/2013, 5/24/2013  3:30 PM, 3/6/2013, 12/14/2012, 9/26/2012, 7/10/2012, 4/20/2012, 1/31/2012, 11/15/2011, 8/23/2011, 6/3/2011, 3/11/2011, 12/17/2010, 9/28/2010, 7/9/2010  2:43 PM, 4/16/2010, 1/27/2010, 11/10/2009  3:42 PM, 8/21/2009    Influenza 9/27/2016, 10/7/2015, 9/30/2014, 9/6/2013, 9/6/2013, 9/7/2012    Tdap 12/17/2007      Problem List as of 6/20/2017     Pain in limb    Reflex sympathetic dystrophy, unspecified    Menorrhagia    h/o cognitive delay    Cervicalgia    Headache    TMJ tenderness    Anxiety    Right foot pain    CRPS (complex regional pain syndrome), lower limb    Bilateral low back pain without sciatica    Sternum pain    Costochondritis    Contusion of right hip    Lumbar back sprain    Plantar fasciitis    Neck muscle spasm              Patient Instructions      We will do an EMG - to assess for nerve damage.     Once ordered your tests have been completed and reviewed by  we will release them to your [employer] account. With this we will advise with any further instructions. If you have any questions pertaining to your results or further instructions, please feel free to respond through [facility] or call our office.    [place] office [phone]  Tuesday, Thursdays and Fridays 8:00 AM - 5:00 PM  [place] office [phone]  Wednesdays 8:00 AM - 5:00 PM               yes

## 2020-08-12 NOTE — ASU PATIENT PROFILE, ADULT - NS TRANSFER DENTURES
D: Pt w/ hx: Ischemic cardiomyopathy, chronic systolic heart failure, MR, TR. 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      I: Monitored vitals and assessed pt status.   Changed:   LVAD site w/ mod amt serosanguinous drainage.  Dressing changed.    Running:Heparin @ 1200 units/hr. Next Hep10a in am.      A: A0x4. AVSS. LAVD #'s WNL.  Paced rhythm with underlying afib/PVC's. Denies discomfort.  Blood sugar stable. Ambulating to BR w/ assist of 1 to manage lines/cords.    Temp:  [97.6  F (36.4  C)-98.3  F (36.8  C)] 97.8  F (36.6  C)  Heart Rate:  [104-117] 110  Resp:  [16-18] 16  BP: ()/(61-84) 96/69  SpO2:  [92 %-97 %] 95 %      P: Continue to monitor pt status and report changes to treatment team. Anticipate discharge home this week once INR therapeutic, fluid status optimized and VAD teaching completed.        n/a

## 2020-08-14 LAB — SURGICAL PATHOLOGY STUDY: SIGNIFICANT CHANGE UP

## 2020-09-10 ENCOUNTER — APPOINTMENT (OUTPATIENT)
Dept: GASTROENTEROLOGY | Facility: CLINIC | Age: 80
End: 2020-09-10
Payer: MEDICARE

## 2020-09-10 VITALS
DIASTOLIC BLOOD PRESSURE: 56 MMHG | TEMPERATURE: 97.8 F | HEIGHT: 68 IN | OXYGEN SATURATION: 98 % | RESPIRATION RATE: 17 BRPM | BODY MASS INDEX: 26.98 KG/M2 | WEIGHT: 178 LBS | HEART RATE: 72 BPM | SYSTOLIC BLOOD PRESSURE: 111 MMHG

## 2020-09-10 DIAGNOSIS — D64.9 ANEMIA, UNSPECIFIED: ICD-10-CM

## 2020-09-10 DIAGNOSIS — R12 HEARTBURN: ICD-10-CM

## 2020-09-10 DIAGNOSIS — N50.89 OTHER SPECIFIED DISORDERS OF THE MALE GENITAL ORGANS: ICD-10-CM

## 2020-09-10 PROCEDURE — 99213 OFFICE O/P EST LOW 20 MIN: CPT

## 2020-09-10 NOTE — PHYSICAL EXAM
[General Appearance - Alert] : alert [General Appearance - In No Acute Distress] : in no acute distress [Neck Appearance] : the appearance of the neck was normal [Neck Cervical Mass (___cm)] : no neck mass was observed [Jugular Venous Distention Increased] : there was no jugular-venous distention [Thyroid Diffuse Enlargement] : the thyroid was not enlarged [Thyroid Nodule] : there were no palpable thyroid nodules [Auscultation Breath Sounds / Voice Sounds] : lungs were clear to auscultation bilaterally [Heart Rate And Rhythm] : heart rate was normal and rhythm regular [Heart Sounds] : normal S1 and S2 [Heart Sounds Gallop] : no gallops [Murmurs] : no murmurs [Heart Sounds Pericardial Friction Rub] : no pericardial rub [Bowel Sounds] : normal bowel sounds [Abdomen Soft] : soft [Abdomen Tenderness] : non-tender [] : no hepato-splenomegaly [Abdomen Mass (___ Cm)] : no abdominal mass palpated [Cervical Lymph Nodes Enlarged Anterior Bilaterally] : anterior cervical [Cervical Lymph Nodes Enlarged Posterior Bilaterally] : posterior cervical [Supraclavicular Lymph Nodes Enlarged Bilaterally] : supraclavicular [Axillary Lymph Nodes Enlarged Bilaterally] : axillary [Inguinal Lymph Nodes Enlarged Bilaterally] : inguinal [No Spinal Tenderness] : no spinal tenderness [No CVA Tenderness] : no ~M costovertebral angle tenderness

## 2020-09-10 NOTE — HISTORY OF PRESENT ILLNESS
[de-identified] : 79 year old man with mild anemia. Colonoscopy in 10/2019 he had several small polyps removed. He underwent an EGD and biopsy on 812 that showed gastritis and some small benign gastric polyps. He recently noticed a small lump on his left testicle.

## 2020-12-23 PROBLEM — Z12.11 ENCOUNTER FOR COLORECTAL CANCER SCREENING: Status: RESOLVED | Noted: 2019-12-02 | Resolved: 2020-12-23

## 2021-01-17 ENCOUNTER — RX RENEWAL (OUTPATIENT)
Age: 81
End: 2021-01-17

## 2021-01-22 NOTE — H&P ADULT - NSCORESITESY/N_GEN_A_CORE_RD
NEUROSURGERY OPERATIVE REPORT:      Patient Name:Rajesh Lim Sr.     Patient MRN: [id number]     Patient YOB: 1950     Date of Procedure:1/21/2021     Pre-Procedure Diagnosis:   1. Posterior fossa hemorrhage in the resection cavity with extension to the fourth, third and lateral ventricle with ventriculomegaly and developing hydrocephalus.      Post-Procedure Diagnosis: SAME AS ABOVE      Procedure:   1. Right frontal twist drill craniostomy for right frontal external ventricular drain placement   2. Posterior fossa craniotomy re-exploration for evacuation of hemorrhage      Anesthesia: General Endotracheal anesthesia      Blood Loss: 100 cc     Surgeon: Valeria Roldan MD      Anesthesiologist: See Operative Record      Surgical Staff:   See Operative Record      Procedure in Detail:   The patient was transported to the back to the OR and placed under general endotracheal anesthesia. After a timeout was performed at the bedside. The right frontal area was shaved with clippers and the patient was prepped and draped in a standard sterile fashion.  Kocher's point was then identified, a right frontal 2 cm incision was made overlying Kocher's point.  A twist drill craniostomy was performed and an external ventricular drain was placed in the right lateral ventricle at a depth of 7 cm of the skin. The drain was tunneled subcutaneously 4 cm posterior to the incision. Drain was then connected to a drainage system.  The 2 cm incision was closed with a running nylon 4-0 suture. The drain was then secured at the exit site with a 4-0 nylon.  Then the drain was secured to the head with multiple staples.  The wound was cleaned and dressed with a clean Tegaderm and Biopatch at the exit site.  Patient tolerated the procedure well and the EVD is patent and she was under pressure upon entering the ventricular system with EVD catheter.  The Ross three-point head fixation was applied to the patient and tightened to 60 pounds per square inch. A new set of labs were sent off of the patient to look for any reason for coagulopathy. I ordered that the patient received 2 units of platelets prior to identifying that the patient had a new platelet count of 97. I also recommended the administration of DDAVP. Patient was then flipped to the prone position and secured to the [place] head frame affixed to the OR bed in a [place] position. The patient was then papoosed and padded appropriately taking care to protect all bony prominences. At this time the posterior mid-line incision sutures were removed and the wound was prepped and draped in a sterile fashion. A surgical pause time-out was performed at the beginning of the case prior to incision confirming procedure, sterility and functionality of instruments, surgical site, stability of patient, and anti-biotic administration. At this time the posterior midline posterior fossa incision was in opened bluntly and sharply with scissors cutting removing the galea stitches and stitches in the fascia. Hemostasis was achieved along the way and self-retaining retractors were placed. The DuraSeal and dural substitute was removed from overlying the posterior fossa craniectomy site the resection cavity was encountered with suction and bipolar cautery and a large hematoma was encountered deep in the resection cavity and was evacuated with suction and electrocautery. A significant gush of blood came out. This was evacuated the hematoma cavity was then lined on multiple occasions with hemostatic agent such as Surgi-Joseph, Gelfoam and pressure was held along with coagulation performed to obtain hemostasis. Initially the bleeding was brisk but hemostasis was able to be achieved and the bleeding was slowed significantly. The dura was now pulsatile consistent with a proper evacuation of the aforementioned hematoma with mass-effect.   The hemostatic agents were removed and the cavity inspected and any bleeding was once again treated with another round of hemostatic agent such as Surgicel and Gelfoam and pressure for a period of time greater than 10 minutes. This process was repeated 4 times ensuring adequate hemostasis be achieved. This time the Surgicel was left lining the cavity and a DuraGen dural substitute was placed in an onlay patch fashion over the the dura. Dura sealant was not used at this time. The wound was irrigated with copious antibiotic irrigation. Vancomycin powder was instilled in the wound. The wound was then closed in a layered fashion with 0 Vicryl sutures closing the muscle and fascial layers of the wound hemostasis was taken care along the way. The galea and subcutaneous subdermal tissues were closed with 2-0 Vicryl interrupted sutures. The skin was closed simple running 2-0 nylon suture. I then removed the patient from [place] three-point fixation and helped transfer him to the ICU bed carefully maintaining control of his head and neck at all times. All counts were correct at the end of the case and throughout the case. the patient was breathing spontaneously with closed however his pupils were 5 mm and not reactive. I immediately went went to discuss the results of the surgery and findings with the patient's eldest and second eldest sons.      Disposition: Transfer to ICU we will continue to drain CSF at 10 cm of water above the tragus, will continue to maintain SBP less than 140 and provide all other supportive ICU care. We will obtain a repeat CT head without contrast in the a.m.     Juwan Willis, [address] [place] [address] Yes

## 2021-01-26 NOTE — PATIENT PROFILE ADULT. - CENTRAL VENOUS CATHETER
Bed: 14  Expected date: 1/25/21  Expected time: 4:28 PM  Means of arrival: [facility]  Comments:  27F cc nausea & vomiting, 7 weeks pregnant    144/82  120  16  100%RA       Pt is A&O x 3, ambulatory, IV removed intact, understands dc instructions, and is leaving with .   no Discharged

## 2021-03-02 ENCOUNTER — APPOINTMENT (OUTPATIENT)
Dept: PULMONOLOGY | Facility: CLINIC | Age: 81
End: 2021-03-02
Payer: MEDICARE

## 2021-03-02 ENCOUNTER — TRANSCRIPTION ENCOUNTER (OUTPATIENT)
Age: 81
End: 2021-03-02

## 2021-03-02 ENCOUNTER — LABORATORY RESULT (OUTPATIENT)
Age: 81
End: 2021-03-02

## 2021-03-02 VITALS — BODY MASS INDEX: 27.43 KG/M2 | HEIGHT: 68 IN | WEIGHT: 181 LBS | OXYGEN SATURATION: 96 % | HEART RATE: 67 BPM

## 2021-03-02 DIAGNOSIS — Z78.9 OTHER SPECIFIED HEALTH STATUS: ICD-10-CM

## 2021-03-02 DIAGNOSIS — Z86.39 PERSONAL HISTORY OF OTHER ENDOCRINE, NUTRITIONAL AND METABOLIC DISEASE: ICD-10-CM

## 2021-03-02 DIAGNOSIS — K21.9 GASTRO-ESOPHAGEAL REFLUX DISEASE W/OUT ESOPHAGITIS: ICD-10-CM

## 2021-03-02 DIAGNOSIS — R05 COUGH: ICD-10-CM

## 2021-03-02 PROCEDURE — 99204 OFFICE O/P NEW MOD 45 MIN: CPT | Mod: CS,95

## 2021-03-02 RX ORDER — NYSTATIN 100000 [USP'U]/G
100000 CREAM TOPICAL
Qty: 15 | Refills: 0 | Status: DISCONTINUED | COMMUNITY
Start: 2020-04-28 | End: 2021-03-02

## 2021-03-02 RX ORDER — ATORVASTATIN CALCIUM 10 MG/1
10 TABLET, FILM COATED ORAL
Qty: 90 | Refills: 3 | Status: ACTIVE | COMMUNITY
Start: 2019-12-02

## 2021-03-02 RX ORDER — BETAMETHASONE DIPROPIONATE 0.5 MG/G
0.05 CREAM TOPICAL
Qty: 45 | Refills: 0 | Status: DISCONTINUED | COMMUNITY
Start: 2020-07-07 | End: 2021-03-02

## 2021-03-02 RX ORDER — TAMSULOSIN HYDROCHLORIDE 0.4 MG/1
0.4 CAPSULE ORAL
Qty: 30 | Refills: 0 | Status: DISCONTINUED | COMMUNITY
Start: 2020-06-26 | End: 2021-03-02

## 2021-03-02 RX ORDER — LOSARTAN POTASSIUM 50 MG/1
50 TABLET, FILM COATED ORAL
Qty: 90 | Refills: 0 | Status: DISCONTINUED | COMMUNITY
Start: 2020-07-13 | End: 2021-03-02

## 2021-03-02 RX ORDER — MIDODRINE HYDROCHLORIDE 2.5 MG/1
2.5 TABLET ORAL
Refills: 0 | Status: ACTIVE | COMMUNITY

## 2021-03-02 NOTE — CONSULT LETTER
[Dear  ___] : Dear  [unfilled], [Consult Letter:] : I had the pleasure of evaluating your patient, [unfilled]. [Please see my note below.] : Please see my note below. [Consult Closing:] : Thank you very much for allowing me to participate in the care of this patient.  If you have any questions, please do not hesitate to contact me. [Sincerely,] : Sincerely, [FreeTextEntry3] : Jared Melton MD, FCCP, D. ABSM\par Pulmonary and Sleep Medicine\par [facility]monary [facility] Sleep [facility] at [place]

## 2021-03-02 NOTE — PHYSICAL EXAM
[No Acute Distress] : no acute distress [Well Nourished] : well nourished [Well Developed] : well developed [Normal Appearance] : normal appearance [Supple] : supple [No Resp Distress] : no resp distress [No Acc Muscle Use] : no acc muscle use [Normal Rhythm and Effort] : normal rhythm and effort [TextBox_140] : Pt chooses to be mute

## 2021-03-02 NOTE — DISCUSSION/SUMMARY
[FreeTextEntry1] : \par #1. Will schedule PFTs in near future to assess lung function with Covid testing prior to PFTs if pt able\par #2. The patient does not appear to require chronic BD therapy at this time\par #3. Diet and exercise for weight loss\par #4. SOBOE is likely related to weight or deconditioning or from Covid infection\par #5. Pt without desaturation at this point with O2 sat of 96% currently\par #6. Pt with underlying DM and advanced age so may be candidate for Monoclonal Ab infusion; will refer\par #7. Consider f/u CT once recovers from Covid infection to f/u nodules if other imaging studies cannot be located confirming stability of the previously seen nodules\par #8. F/u in 3-4 days to review labwork\par #9. Eventual f/u in office when able\par \par D/w daughter

## 2021-03-02 NOTE — HISTORY OF PRESENT ILLNESS
[Former] : former [Never] : never [Home] : at home, [unfilled] , at the time of the visit. [Medical Office: ([facility])___] : at the medical office located in  [Family Member] : family member [FreeTextEntry3] : Daughter, Mackenzie [TextBox_4] : Pt is essentially mute now so family is talking for him as he chooses not to speak.\par Pt was dx'd with Covid infection on 2/26/21 because family members were positive for Covid infection. Pt had mild symptoms of N/V, with dry cough and poor appetite. Pt is afebrile without SOB. Pt with mild desaturation to 92% by hx but currently is 96% with HR of 67. He is otherwise feeling well. Pt without significant smoking hx and denies prior lung disease including asthma or COPD.\par \par Pt reportedly with h/o stable nodules for up to 2 years in the past and were considered to be benign. He was followed with pulmonary previously but no longer given reported stability of the nodules [On ___] : performed on [unfilled] [Caregiver] : the patient's caregiver [Indication ___] : for an indication of [unfilled] [FreeTextEntry9] : Chest CT [FreeTextEntry8] : Multiple b/l calcified and non-calcified nodules measuring up to 6 mm in size

## 2021-03-02 NOTE — REVIEW OF SYSTEMS
[Cough] : cough [GERD] : gerd [Nausea] : nausea [Anemia] : anemia [Dizziness] : dizziness [Diabetes] : diabetes [Fever] : no fever [Chills] : no chills [Nasal Congestion] : no nasal congestion [Postnasal Drip] : no postnasal drip [Sinus Problems] : no sinus problems [Chest Tightness] : no chest tightness [Sputum] : no sputum [Dyspnea] : no dyspnea [Pleuritic Pain] : no pleuritic pain [Wheezing] : no wheezing [SOB on Exertion] : no sob on exertion [Chest Discomfort] : no chest discomfort [Edema] : no edema [Syncope] : no syncope [Hay Fever] : no hay fever [Seasonal Allergies] : no seasonal allergies [Abdominal Pain] : no abdominal pain [Vomiting] : no vomiting [Diarrhea] : no diarrhea [Constipation] : no constipation [Back Pain] : no back pain [Headache] : no headache [Seizures] : no seizures [Numbness] : no numbness [Paralysis] : no paralysis [Confusion] : no confusion [Thyroid Problem] : no thyroid problem [TextBox_134] : Pt chooses to be mute

## 2021-03-03 ENCOUNTER — APPOINTMENT (OUTPATIENT)
Dept: DISASTER EMERGENCY | Facility: HOSPITAL | Age: 81
End: 2021-03-03

## 2021-03-03 ENCOUNTER — OUTPATIENT (OUTPATIENT)
Dept: OUTPATIENT SERVICES | Facility: HOSPITAL | Age: 81
LOS: 1 days | End: 2021-03-03
Payer: MEDICARE

## 2021-03-03 VITALS
SYSTOLIC BLOOD PRESSURE: 144 MMHG | OXYGEN SATURATION: 97 % | HEART RATE: 62 BPM | DIASTOLIC BLOOD PRESSURE: 84 MMHG | TEMPERATURE: 99 F | RESPIRATION RATE: 16 BRPM

## 2021-03-03 VITALS
TEMPERATURE: 98 F | SYSTOLIC BLOOD PRESSURE: 145 MMHG | DIASTOLIC BLOOD PRESSURE: 83 MMHG | HEIGHT: 72 IN | HEART RATE: 67 BPM | RESPIRATION RATE: 16 BRPM | OXYGEN SATURATION: 94 % | WEIGHT: 199.96 LBS

## 2021-03-03 DIAGNOSIS — Z98.89 OTHER SPECIFIED POSTPROCEDURAL STATES: Chronic | ICD-10-CM

## 2021-03-03 DIAGNOSIS — U07.1 COVID-19: ICD-10-CM

## 2021-03-03 DIAGNOSIS — Z09 ENCOUNTER FOR FOLLOW-UP EXAMINATION AFTER COMPLETED TREATMENT FOR CONDITIONS OTHER THAN MALIGNANT NEOPLASM: Chronic | ICD-10-CM

## 2021-03-03 PROCEDURE — M0239: CPT

## 2021-03-03 RX ORDER — SODIUM CHLORIDE 9 MG/ML
250 INJECTION INTRAMUSCULAR; INTRAVENOUS; SUBCUTANEOUS
Refills: 0 | Status: DISCONTINUED | OUTPATIENT
Start: 2021-03-03 | End: 2021-03-18

## 2021-03-03 RX ORDER — BAMLANIVIMAB 35 MG/ML
700 INJECTION, SOLUTION INTRAVENOUS ONCE
Refills: 0 | Status: COMPLETED | OUTPATIENT
Start: 2021-03-03 | End: 2021-03-03

## 2021-03-03 RX ADMIN — BAMLANIVIMAB 270 MILLIGRAM(S): 35 INJECTION, SOLUTION INTRAVENOUS at 13:45

## 2021-03-03 RX ADMIN — SODIUM CHLORIDE 25 MILLILITER(S): 9 INJECTION INTRAMUSCULAR; INTRAVENOUS; SUBCUTANEOUS at 13:45

## 2021-03-03 NOTE — CHART NOTE - NSCHARTNOTEFT_GEN_A_CORE
I have reviewed the Bamlanivimab Emergency Use Authorization (EUA) and I have provided the patient or patient's caregiver with the following information:      1. FDA has authorized emergency use Bamlanivimab, which is not an FDA-approved biological product.  2. The patient or patient's caregiver has the option to accept or refuse administration of Bamlanivimab.   3. The significant known and potential risks and benefits of Bamlanivimab and the extent to which such risks and benefits are unknown.  4. Information on available alternative treatments and risks and benefits of those alternatives. I have reviewed the Bamlanivimab Emergency Use Authorization (EUA) and I have provided the patient or patient's caregiver with the following information:      1. FDA has authorized emergency use Bamlanivimab, which is not an FDA-approved biological product.  2. The patient or patient's caregiver has the option to accept or refuse administration of Bamlanivimab.   3. The significant known and potential risks and benefits of Bamlanivimab and the extent to which such risks and benefits are unknown.  4. Information on available alternative treatments and risks and benefits of those alternatives.    Patient is cleared for discharge at 1549. He tolerated full infusion well and denies complaints of chest pain, shortness of breath, nausea/vomiting, dizziness, or palpitations. Vital signs stable upon discharge. Patient is medically stable and cleared for discharge home. Discharge instructions provided to patient with fact sheet included. Patient was instructed to continue to self-isolate and use infection control measures (e.g., wear mask, isolate, social distance, avoid sharing personal items, clean and disinfect “high touch” surfaces, and frequent handwashing) according to CDC guidelines. Patient instructed to follow up with PMD as needed.

## 2021-03-03 NOTE — CHART NOTE - PRIOR COVID TREATMENT
CC: Monoclonal Antibody Infusion/COVID 19 Positive  80yMale w past medical history of DM who presented with vomiting, malaise, and cough since 2/24 and was found with positive COVID swab on 2/26. His PCP referred him to a pulmonologist who referred him to the infusion tent.     exam/findings:  T(C): 36.9 (03-03-21 @ 13:35), Max: 36.9 (03-03-21 @ 13:35)  HR: 67 (03-03-21 @ 13:35) (67 - 67)  BP: 145/83 (03-03-21 @ 13:35) (145/83 - 145/83)  RR: 16 (03-03-21 @ 13:35) (16 - 16)  SpO2: 94% (03-03-21 @ 13:35) (94% - 94%)      PE:   Appearance: NAD	  HEENT:   Normal oral mucosa,   Lymphatic: No lymphadenopathy  Cardiovascular: Normal S1 S2, No JVD, No murmurs, No edema  Respiratory: Lungs clear to auscultation	  Gastrointestinal:  Soft, Non-tender, + BS	  Skin: warm and dry  Neurologic: Non-focal  Extremities: Normal range of motion,    ASSESSMENT:  Patient is a 80 year old male with a past medical history of DM who was found with + Covid swab on 2/26 referred by pulmonologist who presents to infusion center for Monoclonal antibody infusion (Bamlanivimab)  Symptoms/ Criteria: cough, malaise  Risk Profile includes: Age > 65, DM    PLAN:  - Infusion procedure explained to patient   - Consent for monoclonal antibody infusion obtained and placed in patient's bedside chart  - Risk and benefits discussed with the patient and all questions were answered  - Infuse Bamlanivimab 700mg IV over one hour  - Check vital signs immediately prior to starting infusion and then every 15 minutes while infusion is running   - Observe patient for one hour post infusion

## 2021-03-04 ENCOUNTER — TRANSCRIPTION ENCOUNTER (OUTPATIENT)
Age: 81
End: 2021-03-04

## 2021-03-09 ENCOUNTER — TRANSCRIPTION ENCOUNTER (OUTPATIENT)
Age: 81
End: 2021-03-09

## 2021-03-09 LAB
ALBUMIN SERPL ELPH-MCNC: 4 G/DL
ALP BLD-CCNC: 68 U/L
ALT SERPL-CCNC: 17 U/L
ANION GAP SERPL CALC-SCNC: 10 MMOL/L
AST SERPL-CCNC: 19 U/L
BASOPHILS # BLD AUTO: 0 K/UL
BASOPHILS NFR BLD AUTO: 0 %
BILIRUB SERPL-MCNC: 0.4 MG/DL
BUN SERPL-MCNC: 22 MG/DL
CALCIUM SERPL-MCNC: 9.1 MG/DL
CHLORIDE SERPL-SCNC: 97 MMOL/L
CO2 SERPL-SCNC: 30 MMOL/L
CREAT SERPL-MCNC: 1.07 MG/DL
CRP SERPL-MCNC: 7 MG/L
DEPRECATED D DIMER PPP IA-ACNC: 227 NG/ML DDU
EOSINOPHIL # BLD AUTO: 0.05 K/UL
EOSINOPHIL NFR BLD AUTO: 1.8 %
FERRITIN SERPL-MCNC: 81 NG/ML
GLUCOSE SERPL-MCNC: 119 MG/DL
HCT VFR BLD CALC: 39.6 %
HGB BLD-MCNC: 13.1 G/DL
LYMPHOCYTES # BLD AUTO: 0.82 K/UL
LYMPHOCYTES NFR BLD AUTO: 28.3 %
MAN DIFF?: NORMAL
MCHC RBC-ENTMCNC: 32.2 PG
MCHC RBC-ENTMCNC: 33.1 GM/DL
MCV RBC AUTO: 97.3 FL
MONOCYTES # BLD AUTO: 0.97 K/UL
MONOCYTES NFR BLD AUTO: 33.6 %
NEUTROPHILS # BLD AUTO: 0.9 K/UL
NEUTROPHILS NFR BLD AUTO: 31 %
PLATELET # BLD AUTO: 119 K/UL
POTASSIUM SERPL-SCNC: 4.6 MMOL/L
PROCALCITONIN SERPL-MCNC: 0.03 NG/ML
PROT SERPL-MCNC: 7.6 G/DL
RBC # BLD: 4.07 M/UL
RBC # FLD: 13.1 %
SODIUM SERPL-SCNC: 137 MMOL/L
WBC # FLD AUTO: 2.9 K/UL

## 2021-03-12 ENCOUNTER — APPOINTMENT (OUTPATIENT)
Dept: PULMONOLOGY | Facility: CLINIC | Age: 81
End: 2021-03-12
Payer: MEDICARE

## 2021-03-12 DIAGNOSIS — R91.8 OTHER NONSPECIFIC ABNORMAL FINDING OF LUNG FIELD: ICD-10-CM

## 2021-03-12 DIAGNOSIS — U07.1 COVID-19: ICD-10-CM

## 2021-03-12 DIAGNOSIS — R06.02 SHORTNESS OF BREATH: ICD-10-CM

## 2021-03-12 PROCEDURE — 99213 OFFICE O/P EST LOW 20 MIN: CPT | Mod: CS,95

## 2021-03-12 NOTE — REASON FOR VISIT
[Follow-Up] : a follow-up visit [Abnormal CXR/ Chest CT] : an abnormal CXR/ chest CT [Cough] : cough [Shortness of Breath] : shortness of breath [Pulmonary Nodules] : pulmonary nodules [Patient Declined  Services] : - None: Patient declined  services [TextBox_44] : Covid infection [FreeTextEntry2] : Mackenzie [TWNoteComboBox1] : [place]

## 2021-03-12 NOTE — DISCUSSION/SUMMARY
[FreeTextEntry1] : \par #1. Will schedule PFTs in near future to assess lung function with Covid testing prior to PFTs if pt able and if desired by family\par #2. The patient does not appear to require chronic BD therapy at this time\par #3. Diet and exercise for weight loss\par #4. SOBOE is likely related to weight or deconditioning or from Covid infection\par #5. Pt without desaturation during Covid infection; s/p monoclonal ab infusion\par #6. Pt to f/u with PCP regarding abnormal CBC; reviewed all Covid related labwork with pt and family\par #7. Consider f/u CT once recovers from Covid infection to f/u nodules if other imaging studies cannot be located confirming stability of the previously seen nodules if desired by pt and family\par #8. Pt to f/u locally with PCP and possibly pulmonary but offered pt to be followed in this office if desired by pt and family\par \par D/w daughter

## 2021-03-12 NOTE — HISTORY OF PRESENT ILLNESS
[Former] : former [Never] : never [TextBox_4] : Pt is essentially mute now so family is talking for him as he chooses not to speak.\par Pt was dx'd with Covid infection on 2/26/21 because family members were positive for Covid infection. Pt had mild symptoms of N/V, with dry cough and poor appetite. Pt is afebrile without SOB. Pt with mild desaturation to 92% by hx but currently is 96% with HR of 67. He is otherwise feeling well. Pt without significant smoking hx and denies prior lung disease including asthma or COPD.\par He is s/p Monoclonal Ab infusion. Pt with mild congestion and cough.\par \par Pt reportedly with h/o stable nodules for up to 2 years in the past and were considered to be benign. He was followed with pulmonary previously but no longer given reported stability of the nodules. [Home] : at home, [unfilled] , at the time of the visit. [Medical Office: ([facility])___] : at the medical office located in  [Family Member] : family member [FreeTextEntry3] : Daughter, Mackenzie [On ___] : performed on [unfilled] [Caregiver] : the patient's caregiver [Indication ___] : for an indication of [unfilled] [FreeTextEntry9] : Chest CT [FreeTextEntry8] : Multiple b/l calcified and non-calcified nodules measuring up to 6 mm in size

## 2021-03-12 NOTE — CONSULT LETTER
[Dear  ___] : Dear  [unfilled], [Consult Letter:] : I had the pleasure of evaluating your patient, [unfilled]. [Please see my note below.] : Please see my note below. [Consult Closing:] : Thank you very much for allowing me to participate in the care of this patient.  If you have any questions, please do not hesitate to contact me. [Sincerely,] : Sincerely, [FreeTextEntry3] : Jared Melton MD, FCCP, D. ABSM\par Pulmonary and Sleep Medicine\par [facility] [place]

## 2021-04-08 NOTE — ED ADULT TRIAGE NOTE - CCCP TRG CHIEF CMPLNT
Reason for Call:  Home Health Care    Deann with [facility] called regarding (reason for call):     Orders are needed for this patient.     Skilled Nursin-2 times per month for INR checks, depending on INR schedule, for the next 60 days    Phone Number Homecare Nurse can be reached at: [phone]    Can we leave a detailed message on this number? YES    Phone number patient can be reached at: [phone]    Best Time: any    Call taken on 2021 at 10:43 AM by Rosa Garcia      
Verbal approval given for the homecare request below. Homecare/Hospice agency to fax orders for provider signature.  Jessica Charlton RN  [facility]      
dizziness, weakness

## 2021-08-04 ENCOUNTER — RX RENEWAL (OUTPATIENT)
Age: 81
End: 2021-08-04

## 2021-08-04 RX ORDER — DEXLANSOPRAZOLE 60 MG/1
60 CAPSULE, DELAYED RELEASE ORAL DAILY
Qty: 30 | Refills: 5 | Status: ACTIVE | COMMUNITY
Start: 2019-12-05 | End: 1900-01-01

## 2021-09-14 NOTE — PATIENT PROFILE ADULT. - HEALTHCARE QUESTIONS, PROFILE
[Former] : former [TextBox_4] :  Doing much better on singulair. Ok to trial off.\par \par less cough and mucus\par occasional Flonase\par \par \par \par  [TextBox_11] : 1 [TextBox_13] : 30 [YearQuit] : 1992 current healthy condition

## 2021-09-27 ENCOUNTER — INPATIENT (INPATIENT)
Facility: HOSPITAL | Age: 81
LOS: 3 days | Discharge: HOME | End: 2021-10-01
Attending: INTERNAL MEDICINE | Admitting: INTERNAL MEDICINE
Payer: MEDICARE

## 2021-09-27 VITALS
RESPIRATION RATE: 20 BRPM | TEMPERATURE: 98 F | DIASTOLIC BLOOD PRESSURE: 62 MMHG | WEIGHT: 179.9 LBS | HEIGHT: 72 IN | HEART RATE: 98 BPM | SYSTOLIC BLOOD PRESSURE: 96 MMHG | OXYGEN SATURATION: 100 %

## 2021-09-27 DIAGNOSIS — F03.90 UNSPECIFIED DEMENTIA WITHOUT BEHAVIORAL DISTURBANCE: ICD-10-CM

## 2021-09-27 DIAGNOSIS — Z09 ENCOUNTER FOR FOLLOW-UP EXAMINATION AFTER COMPLETED TREATMENT FOR CONDITIONS OTHER THAN MALIGNANT NEOPLASM: Chronic | ICD-10-CM

## 2021-09-27 DIAGNOSIS — Z98.89 OTHER SPECIFIED POSTPROCEDURAL STATES: Chronic | ICD-10-CM

## 2021-09-27 DIAGNOSIS — E11.9 TYPE 2 DIABETES MELLITUS WITHOUT COMPLICATIONS: ICD-10-CM

## 2021-09-27 DIAGNOSIS — I63.9 CEREBRAL INFARCTION, UNSPECIFIED: ICD-10-CM

## 2021-09-27 DIAGNOSIS — N40.0 BENIGN PROSTATIC HYPERPLASIA WITHOUT LOWER URINARY TRACT SYMPTOMS: ICD-10-CM

## 2021-09-27 DIAGNOSIS — G93.49 OTHER ENCEPHALOPATHY: ICD-10-CM

## 2021-09-27 LAB
ALBUMIN SERPL ELPH-MCNC: 2.8 G/DL — LOW (ref 3.3–5)
ALP SERPL-CCNC: 59 U/L — SIGNIFICANT CHANGE UP (ref 40–120)
ALT FLD-CCNC: 24 U/L — SIGNIFICANT CHANGE UP (ref 12–78)
ANION GAP SERPL CALC-SCNC: 7 MMOL/L — SIGNIFICANT CHANGE UP (ref 5–17)
ANISOCYTOSIS BLD QL: SLIGHT — SIGNIFICANT CHANGE UP
APPEARANCE UR: CLEAR — SIGNIFICANT CHANGE UP
APTT BLD: 27.9 SEC — SIGNIFICANT CHANGE UP (ref 27.5–35.5)
AST SERPL-CCNC: 20 U/L — SIGNIFICANT CHANGE UP (ref 15–37)
BACTERIA # UR AUTO: ABNORMAL
BASOPHILS # BLD AUTO: 0 K/UL — SIGNIFICANT CHANGE UP (ref 0–0.2)
BASOPHILS NFR BLD AUTO: 0 % — SIGNIFICANT CHANGE UP (ref 0–2)
BILIRUB SERPL-MCNC: 0.6 MG/DL — SIGNIFICANT CHANGE UP (ref 0.2–1.2)
BILIRUB UR-MCNC: NEGATIVE — SIGNIFICANT CHANGE UP
BUN SERPL-MCNC: 25 MG/DL — HIGH (ref 7–23)
CALCIUM SERPL-MCNC: 8.7 MG/DL — SIGNIFICANT CHANGE UP (ref 8.5–10.1)
CHLORIDE SERPL-SCNC: 103 MMOL/L — SIGNIFICANT CHANGE UP (ref 96–108)
CO2 SERPL-SCNC: 29 MMOL/L — SIGNIFICANT CHANGE UP (ref 22–31)
COLOR SPEC: YELLOW — SIGNIFICANT CHANGE UP
CREAT SERPL-MCNC: 1.17 MG/DL — SIGNIFICANT CHANGE UP (ref 0.5–1.3)
DIFF PNL FLD: NEGATIVE — SIGNIFICANT CHANGE UP
EOSINOPHIL # BLD AUTO: 0 K/UL — SIGNIFICANT CHANGE UP (ref 0–0.5)
EOSINOPHIL NFR BLD AUTO: 0 % — SIGNIFICANT CHANGE UP (ref 0–6)
EPI CELLS # UR: SIGNIFICANT CHANGE UP
FLUAV AG NPH QL: SIGNIFICANT CHANGE UP
FLUBV AG NPH QL: SIGNIFICANT CHANGE UP
GLUCOSE BLDC GLUCOMTR-MCNC: 131 MG/DL — HIGH (ref 70–99)
GLUCOSE SERPL-MCNC: 159 MG/DL — HIGH (ref 70–99)
GLUCOSE UR QL: NEGATIVE MG/DL — SIGNIFICANT CHANGE UP
HCT VFR BLD CALC: 34 % — LOW (ref 39–50)
HGB BLD-MCNC: 11.4 G/DL — LOW (ref 13–17)
INR BLD: 1.23 RATIO — HIGH (ref 0.88–1.16)
KETONES UR-MCNC: NEGATIVE — SIGNIFICANT CHANGE UP
LACTATE SERPL-SCNC: 2.4 MMOL/L — HIGH (ref 0.7–2)
LACTATE SERPL-SCNC: 2.6 MMOL/L — HIGH (ref 0.7–2)
LEUKOCYTE ESTERASE UR-ACNC: ABNORMAL
LIDOCAIN IGE QN: 246 U/L — SIGNIFICANT CHANGE UP (ref 73–393)
LYMPHOCYTES # BLD AUTO: 1.27 K/UL — SIGNIFICANT CHANGE UP (ref 1–3.3)
LYMPHOCYTES # BLD AUTO: 5 % — LOW (ref 13–44)
MANUAL SMEAR VERIFICATION: SIGNIFICANT CHANGE UP
MCHC RBC-ENTMCNC: 31.7 PG — SIGNIFICANT CHANGE UP (ref 27–34)
MCHC RBC-ENTMCNC: 33.5 GM/DL — SIGNIFICANT CHANGE UP (ref 32–36)
MCV RBC AUTO: 94.4 FL — SIGNIFICANT CHANGE UP (ref 80–100)
MONOCYTES # BLD AUTO: 0.51 K/UL — SIGNIFICANT CHANGE UP (ref 0–0.9)
MONOCYTES NFR BLD AUTO: 2 % — SIGNIFICANT CHANGE UP (ref 2–14)
NEUTROPHILS # BLD AUTO: 23.61 K/UL — HIGH (ref 1.8–7.4)
NEUTROPHILS NFR BLD AUTO: 59 % — SIGNIFICANT CHANGE UP (ref 43–77)
NEUTS BAND # BLD: 34 % — HIGH (ref 0–8)
NITRITE UR-MCNC: NEGATIVE — SIGNIFICANT CHANGE UP
NRBC # BLD: 0 /100 — SIGNIFICANT CHANGE UP (ref 0–0)
NRBC # BLD: SIGNIFICANT CHANGE UP /100 WBCS (ref 0–0)
PH UR: 8 — SIGNIFICANT CHANGE UP (ref 5–8)
PLAT MORPH BLD: NORMAL — SIGNIFICANT CHANGE UP
PLATELET # BLD AUTO: 166 K/UL — SIGNIFICANT CHANGE UP (ref 150–400)
POTASSIUM SERPL-MCNC: 3.6 MMOL/L — SIGNIFICANT CHANGE UP (ref 3.5–5.3)
POTASSIUM SERPL-SCNC: 3.6 MMOL/L — SIGNIFICANT CHANGE UP (ref 3.5–5.3)
PROT SERPL-MCNC: 7.6 GM/DL — SIGNIFICANT CHANGE UP (ref 6–8.3)
PROT UR-MCNC: NEGATIVE MG/DL — SIGNIFICANT CHANGE UP
PROTHROM AB SERPL-ACNC: 14.1 SEC — HIGH (ref 10.6–13.6)
RBC # BLD: 3.6 M/UL — LOW (ref 4.2–5.8)
RBC # FLD: 13.5 % — SIGNIFICANT CHANGE UP (ref 10.3–14.5)
RBC BLD AUTO: SIGNIFICANT CHANGE UP
SALICYLATES SERPL-MCNC: <1.7 MG/DL — LOW (ref 2.8–20)
SARS-COV-2 RNA SPEC QL NAA+PROBE: SIGNIFICANT CHANGE UP
SODIUM SERPL-SCNC: 139 MMOL/L — SIGNIFICANT CHANGE UP (ref 135–145)
SP GR SPEC: 1.01 — SIGNIFICANT CHANGE UP (ref 1.01–1.02)
TROPONIN I SERPL-MCNC: 0.02 NG/ML — SIGNIFICANT CHANGE UP (ref 0.01–0.04)
TSH SERPL-MCNC: 0.37 UIU/ML — SIGNIFICANT CHANGE UP (ref 0.36–3.74)
UROBILINOGEN FLD QL: NEGATIVE MG/DL — SIGNIFICANT CHANGE UP
WBC # BLD: 25.39 K/UL — HIGH (ref 3.8–10.5)
WBC # FLD AUTO: 25.39 K/UL — HIGH (ref 3.8–10.5)
WBC UR QL: SIGNIFICANT CHANGE UP

## 2021-09-27 PROCEDURE — 71250 CT THORAX DX C-: CPT | Mod: 26,MA

## 2021-09-27 PROCEDURE — 71045 X-RAY EXAM CHEST 1 VIEW: CPT | Mod: 26

## 2021-09-27 PROCEDURE — 93010 ELECTROCARDIOGRAM REPORT: CPT

## 2021-09-27 PROCEDURE — 70450 CT HEAD/BRAIN W/O DYE: CPT | Mod: 26,MA

## 2021-09-27 PROCEDURE — 99223 1ST HOSP IP/OBS HIGH 75: CPT | Mod: GC

## 2021-09-27 PROCEDURE — 99285 EMERGENCY DEPT VISIT HI MDM: CPT

## 2021-09-27 RX ORDER — DEXTROSE 50 % IN WATER 50 %
12.5 SYRINGE (ML) INTRAVENOUS ONCE
Refills: 0 | Status: DISCONTINUED | OUTPATIENT
Start: 2021-09-27 | End: 2021-10-01

## 2021-09-27 RX ORDER — ASPIRIN/CALCIUM CARB/MAGNESIUM 324 MG
81 TABLET ORAL DAILY
Refills: 0 | Status: DISCONTINUED | OUTPATIENT
Start: 2021-09-27 | End: 2021-10-01

## 2021-09-27 RX ORDER — CEFEPIME 1 G/1
2000 INJECTION, POWDER, FOR SOLUTION INTRAMUSCULAR; INTRAVENOUS EVERY 12 HOURS
Refills: 0 | Status: DISCONTINUED | OUTPATIENT
Start: 2021-09-27 | End: 2021-09-28

## 2021-09-27 RX ORDER — ACETAMINOPHEN 500 MG
650 TABLET ORAL EVERY 6 HOURS
Refills: 0 | Status: DISCONTINUED | OUTPATIENT
Start: 2021-09-27 | End: 2021-10-01

## 2021-09-27 RX ORDER — TAMSULOSIN HYDROCHLORIDE 0.4 MG/1
0.4 CAPSULE ORAL AT BEDTIME
Refills: 0 | Status: DISCONTINUED | OUTPATIENT
Start: 2021-09-27 | End: 2021-10-01

## 2021-09-27 RX ORDER — PANTOPRAZOLE SODIUM 20 MG/1
40 TABLET, DELAYED RELEASE ORAL
Refills: 0 | Status: DISCONTINUED | OUTPATIENT
Start: 2021-09-27 | End: 2021-10-01

## 2021-09-27 RX ORDER — OLANZAPINE 15 MG/1
5 TABLET, FILM COATED ORAL ONCE
Refills: 0 | Status: COMPLETED | OUTPATIENT
Start: 2021-09-27 | End: 2021-09-27

## 2021-09-27 RX ORDER — CEFTRIAXONE 500 MG/1
1000 INJECTION, POWDER, FOR SOLUTION INTRAMUSCULAR; INTRAVENOUS ONCE
Refills: 0 | Status: COMPLETED | OUTPATIENT
Start: 2021-09-27 | End: 2021-09-27

## 2021-09-27 RX ORDER — DEXTROSE 50 % IN WATER 50 %
25 SYRINGE (ML) INTRAVENOUS ONCE
Refills: 0 | Status: DISCONTINUED | OUTPATIENT
Start: 2021-09-27 | End: 2021-10-01

## 2021-09-27 RX ORDER — SODIUM CHLORIDE 9 MG/ML
1000 INJECTION INTRAMUSCULAR; INTRAVENOUS; SUBCUTANEOUS
Refills: 0 | Status: DISCONTINUED | OUTPATIENT
Start: 2021-09-27 | End: 2021-09-30

## 2021-09-27 RX ORDER — SODIUM CHLORIDE 9 MG/ML
1000 INJECTION, SOLUTION INTRAVENOUS
Refills: 0 | Status: DISCONTINUED | OUTPATIENT
Start: 2021-09-27 | End: 2021-10-01

## 2021-09-27 RX ORDER — DEXTROSE 50 % IN WATER 50 %
15 SYRINGE (ML) INTRAVENOUS ONCE
Refills: 0 | Status: DISCONTINUED | OUTPATIENT
Start: 2021-09-27 | End: 2021-09-29

## 2021-09-27 RX ORDER — ATORVASTATIN CALCIUM 80 MG/1
10 TABLET, FILM COATED ORAL AT BEDTIME
Refills: 0 | Status: DISCONTINUED | OUTPATIENT
Start: 2021-09-27 | End: 2021-10-01

## 2021-09-27 RX ORDER — VANCOMYCIN HCL 1 G
750 VIAL (EA) INTRAVENOUS EVERY 12 HOURS
Refills: 0 | Status: DISCONTINUED | OUTPATIENT
Start: 2021-09-27 | End: 2021-09-28

## 2021-09-27 RX ORDER — LISINOPRIL 2.5 MG/1
1 TABLET ORAL
Qty: 0 | Refills: 0 | DISCHARGE

## 2021-09-27 RX ORDER — GLUCAGON INJECTION, SOLUTION 0.5 MG/.1ML
1 INJECTION, SOLUTION SUBCUTANEOUS ONCE
Refills: 0 | Status: DISCONTINUED | OUTPATIENT
Start: 2021-09-27 | End: 2021-10-01

## 2021-09-27 RX ORDER — ATORVASTATIN CALCIUM 80 MG/1
10 TABLET, FILM COATED ORAL AT BEDTIME
Refills: 0 | Status: DISCONTINUED | OUTPATIENT
Start: 2021-09-27 | End: 2021-09-27

## 2021-09-27 RX ORDER — ERGOCALCIFEROL 1.25 MG/1
1 CAPSULE ORAL
Qty: 0 | Refills: 0 | DISCHARGE

## 2021-09-27 RX ORDER — SODIUM CHLORIDE 9 MG/ML
2430 INJECTION, SOLUTION INTRAVENOUS ONCE
Refills: 0 | Status: COMPLETED | OUTPATIENT
Start: 2021-09-27 | End: 2021-09-27

## 2021-09-27 RX ORDER — INSULIN LISPRO 100/ML
VIAL (ML) SUBCUTANEOUS
Refills: 0 | Status: DISCONTINUED | OUTPATIENT
Start: 2021-09-27 | End: 2021-10-01

## 2021-09-27 RX ORDER — FINASTERIDE 5 MG/1
5 TABLET, FILM COATED ORAL DAILY
Refills: 0 | Status: DISCONTINUED | OUTPATIENT
Start: 2021-09-27 | End: 2021-10-01

## 2021-09-27 RX ORDER — INSULIN LISPRO 100/ML
VIAL (ML) SUBCUTANEOUS AT BEDTIME
Refills: 0 | Status: DISCONTINUED | OUTPATIENT
Start: 2021-09-27 | End: 2021-10-01

## 2021-09-27 RX ORDER — MIDAZOLAM HYDROCHLORIDE 1 MG/ML
1 INJECTION, SOLUTION INTRAMUSCULAR; INTRAVENOUS ONCE
Refills: 0 | Status: DISCONTINUED | OUTPATIENT
Start: 2021-09-27 | End: 2021-09-27

## 2021-09-27 RX ORDER — ENOXAPARIN SODIUM 100 MG/ML
40 INJECTION SUBCUTANEOUS DAILY
Refills: 0 | Status: DISCONTINUED | OUTPATIENT
Start: 2021-09-27 | End: 2021-10-01

## 2021-09-27 RX ORDER — VANCOMYCIN HCL 1 G
1250 VIAL (EA) INTRAVENOUS ONCE
Refills: 0 | Status: COMPLETED | OUTPATIENT
Start: 2021-09-27 | End: 2021-09-27

## 2021-09-27 RX ORDER — VANCOMYCIN HCL 1 G
1000 VIAL (EA) INTRAVENOUS
Refills: 0 | Status: DISCONTINUED | OUTPATIENT
Start: 2021-09-27 | End: 2021-09-27

## 2021-09-27 RX ORDER — DONEPEZIL HYDROCHLORIDE 10 MG/1
5 TABLET, FILM COATED ORAL AT BEDTIME
Refills: 0 | Status: DISCONTINUED | OUTPATIENT
Start: 2021-09-27 | End: 2021-10-01

## 2021-09-27 RX ORDER — INFLUENZA VIRUS VACCINE 15; 15; 15; 15 UG/.5ML; UG/.5ML; UG/.5ML; UG/.5ML
0.5 SUSPENSION INTRAMUSCULAR ONCE
Refills: 0 | Status: DISCONTINUED | OUTPATIENT
Start: 2021-09-27 | End: 2021-10-01

## 2021-09-27 RX ADMIN — ATORVASTATIN CALCIUM 10 MILLIGRAM(S): 80 TABLET, FILM COATED ORAL at 22:01

## 2021-09-27 RX ADMIN — SODIUM CHLORIDE 100 MILLILITER(S): 9 INJECTION INTRAMUSCULAR; INTRAVENOUS; SUBCUTANEOUS at 20:43

## 2021-09-27 RX ADMIN — OLANZAPINE 5 MILLIGRAM(S): 15 TABLET, FILM COATED ORAL at 15:37

## 2021-09-27 RX ADMIN — Medication 166.67 MILLIGRAM(S): at 16:14

## 2021-09-27 RX ADMIN — SODIUM CHLORIDE 2430 MILLILITER(S): 9 INJECTION, SOLUTION INTRAVENOUS at 15:41

## 2021-09-27 RX ADMIN — OLANZAPINE 5 MILLIGRAM(S): 15 TABLET, FILM COATED ORAL at 22:08

## 2021-09-27 RX ADMIN — TAMSULOSIN HYDROCHLORIDE 0.4 MILLIGRAM(S): 0.4 CAPSULE ORAL at 22:01

## 2021-09-27 RX ADMIN — MIDAZOLAM HYDROCHLORIDE 1 MILLIGRAM(S): 1 INJECTION, SOLUTION INTRAMUSCULAR; INTRAVENOUS at 17:17

## 2021-09-27 RX ADMIN — DONEPEZIL HYDROCHLORIDE 5 MILLIGRAM(S): 10 TABLET, FILM COATED ORAL at 21:59

## 2021-09-27 RX ADMIN — FINASTERIDE 5 MILLIGRAM(S): 5 TABLET, FILM COATED ORAL at 21:58

## 2021-09-27 RX ADMIN — CEFTRIAXONE 100 MILLIGRAM(S): 500 INJECTION, POWDER, FOR SOLUTION INTRAMUSCULAR; INTRAVENOUS at 15:40

## 2021-09-27 NOTE — ED PROVIDER NOTE - OBJECTIVE STATEMENT
79 yo m pmhx sig for DM, CVA, HTN, HLD BIBA worsening mental status over the course of the last 24 hr, today family noted pt was not getting out of bed so EMS was called, on arival to the ED pt appears to be somnolent responds to voice commands and wakes up. Systolic hypotensive clear lung sounds no respiratory distress and resting comfortably.    I have reviewed available current nursing and previous documentation of past medical, surgical, family, and/or social history.

## 2021-09-27 NOTE — ED ADULT NURSE NOTE - NSICDXPASTSURGICALHX_GEN_ALL_CORE_FT
PAST SURGICAL HISTORY:  S/P abdominal surgery, follow-up exam sbo    S/P hernia surgery r.inguinal

## 2021-09-27 NOTE — H&P ADULT - NSHPPHYSICALEXAM_GEN_ALL_CORE
T(C): 37.3 (09-27-21 @ 15:14), Max: 37.3 (09-27-21 @ 15:14)  HR: 80 (09-27-21 @ 18:08) (80 - 98)  BP: 126/52 (09-27-21 @ 18:08) (96/62 - 126/52)  RR: 15 (09-27-21 @ 18:08) (15 - 20)  SpO2: 100% (09-27-21 @ 18:08) (100% - 100%)    GEN: (fe)male in NAD, appears comfortable, no diaphoresis  EYES: No scleral injection, PERRL, EOMI  ENTM: neck supple & symmetric without tracheal deviation, moist membranes, no gross hearing impairment, thyroid gland not enlarged  CV: +S1/S2, no m/r/g, no abdominal bruit, no LE edema  RESP: breathing comfortably, no respiratory accessory muscle use, CTAB, no w/r/r  GI: normoactive BS, soft, NTND, no rebounding/guarding, no palpable masses  LYMPHATICS: no LAD or tenderness to palpation  NEURO: AOx3, no focal deficits, CNII-XII grossly intact  PSYCH: No SI/HI/AVH, appropriate affect, appropriate insight/judgment   SKIN: no petechiae, ecchymosis or maculopapular rash noted T(C): 37.3 (09-27-21 @ 15:14), Max: 37.3 (09-27-21 @ 15:14)  HR: 80 (09-27-21 @ 18:08) (80 - 98)  BP: 126/52 (09-27-21 @ 18:08) (96/62 - 126/52)  RR: 15 (09-27-21 @ 18:08) (15 - 20)  SpO2: 100% (09-27-21 @ 18:08) (100% - 100%)    GEN: male in NAD, appears comfortable, no diaphoresis  EYES: No scleral injection, PERRL, EOMI  ENTM: neck supple & symmetric without tracheal deviation, moist membranes, no gross hearing impairment, thyroid gland not enlarged  CV: +S1/S2, no m/r/g, no abdominal bruit, no LE edema  RESP: breathing comfortably, no respiratory accessory muscle use, CTAB, no w/r/r  GI: normoactive BS, soft, NTND, no rebounding/guarding, no palpable masses  LYMPHATICS: no LAD or tenderness to palpation  NEURO:  no focal deficits, CNII-XII grossly intact  PSYCH: No SI/HI/AVH, appropriate affect, appropriate insight/judgment   SKIN: no petechiae, ecchymosis or maculopapular rash noted

## 2021-09-27 NOTE — H&P ADULT - PROBLEM SELECTOR PLAN 1
-Given no source will treat broadly with vancomycin and cefepime  -Follow up blood and urine cultures  -Send MRSA PCR

## 2021-09-27 NOTE — ED PROVIDER NOTE - CLINICAL SUMMARY MEDICAL DECISION MAKING FREE TEXT BOX
81 yo m pmhx sig for DM, CVA, HTN, HLD BIBA worsening mental status over the course of the last 24 hr, today family noted pt was not getting out of bed so EMS was called, on arival to the ED pt appears to be somnolent responds to voice commands and wakes up. Systolic hypotensive clear lung sounds no respiratory distress and resting comfortably. Pt is A&Ox0 with soft non tender abdomen -- wbc 25 with bandemia 30% and nl imaging pending urinalysis admitted to inpatient floor given broad spectrum abx and IV fluids.

## 2021-09-27 NOTE — ED PROVIDER NOTE - PHYSICAL EXAMINATION
Physical Exam    Vital Signs: I have reviewed the initial vital signs.  Constitutional: well-nourished, appears stated age, no acute distress  Eyes: PERRLA, and symmetrical lids.  ENT: Neck supple with no adenopathy, moist MM.  Cardiovascular: regular rate, regular rhythm, well-perfused extremities  Respiratory: unlabored respiratory effort, clear to auscultation bilaterally  Gastrointestinal: soft, non-tender abdomen, no pulsatile mass  Musculoskeletal: supple neck, no lower extremity edema  Integumentary: warm, dry, no rash  Neurologic: extremities’ motor and sensory functions grossly intact  Psychiatric: A&Ox0

## 2021-09-27 NOTE — ED ADULT NURSE NOTE - NSICDXPASTMEDICALHX_GEN_ALL_CORE_FT
PAST MEDICAL HISTORY:  BPH (benign prostatic hyperplasia)     Diabetes     DM (diabetes mellitus)     HLD (hyperlipidemia)     HTN (hypertension)

## 2021-09-27 NOTE — ED ADULT NURSE NOTE - OBJECTIVE STATEMENT
Alert and oriented, pt hx of dementia. family concerned that pt is not eating or drinking. denies any injuries

## 2021-09-27 NOTE — H&P ADULT - NSICDXPASTMEDICALHX_GEN_ALL_CORE_FT
PAST MEDICAL HISTORY:  BPH (benign prostatic hyperplasia)     Diabetes     HLD (hyperlipidemia)     HTN (hypertension)

## 2021-09-27 NOTE — H&P ADULT - ASSESSMENT
80M with PMHx of T2DM, HTN, BPH, CVA, and dementia brought in by family for increasing lethargy and confusion. There is concern for acute infectious encephalopathy given leukocytosis and bandemia, but no source currently.

## 2021-09-27 NOTE — H&P ADULT - HISTORY OF PRESENT ILLNESS
80M with PMHx of T2DM, HTN, BPH, CVA, and dementia brought in by family for increasing lethargy and confusion. It seems it happened suddenly over past 24 hours to the point where it was difficult to get patient out of bed. On arrival to ED patient was lethargic, but seems to have improved after receiving ceftriaxone, vancomycin, LR 2.4 L. NO known localizing symptoms and patient himself not endorsing complaints (but may be limited due to dementia). In the ED noted to have leukocytosis with bandemia. CT head and CT chest was unremarkable. Interestingly, similar set of events occurred in 2018 when patient was admitted to [facility] and given broad spectrum antibiotics and improved without finding a source.

## 2021-09-28 DIAGNOSIS — A41.59 OTHER GRAM-NEGATIVE SEPSIS: ICD-10-CM

## 2021-09-28 LAB
-  ESBL: SIGNIFICANT CHANGE UP
ALBUMIN SERPL ELPH-MCNC: 2.3 G/DL — LOW (ref 3.3–5)
ALP SERPL-CCNC: 48 U/L — SIGNIFICANT CHANGE UP (ref 40–120)
ALT FLD-CCNC: 19 U/L — SIGNIFICANT CHANGE UP (ref 12–78)
ANION GAP SERPL CALC-SCNC: 4 MMOL/L — LOW (ref 5–17)
AST SERPL-CCNC: 24 U/L — SIGNIFICANT CHANGE UP (ref 15–37)
BASOPHILS # BLD AUTO: 0.03 K/UL — SIGNIFICANT CHANGE UP (ref 0–0.2)
BASOPHILS NFR BLD AUTO: 0.3 % — SIGNIFICANT CHANGE UP (ref 0–2)
BILIRUB SERPL-MCNC: 0.3 MG/DL — SIGNIFICANT CHANGE UP (ref 0.2–1.2)
BUN SERPL-MCNC: 25 MG/DL — HIGH (ref 7–23)
CALCIUM SERPL-MCNC: 8.7 MG/DL — SIGNIFICANT CHANGE UP (ref 8.5–10.1)
CHLORIDE SERPL-SCNC: 105 MMOL/L — SIGNIFICANT CHANGE UP (ref 96–108)
CO2 SERPL-SCNC: 30 MMOL/L — SIGNIFICANT CHANGE UP (ref 22–31)
COVID-19 SPIKE DOMAIN AB INTERP: POSITIVE
COVID-19 SPIKE DOMAIN ANTIBODY RESULT: >250 U/ML — HIGH
CREAT SERPL-MCNC: 0.82 MG/DL — SIGNIFICANT CHANGE UP (ref 0.5–1.3)
CRP SERPL-MCNC: 64 MG/L — HIGH
E COLI DNA BLD POS QL NAA+NON-PROBE: SIGNIFICANT CHANGE UP
EOSINOPHIL # BLD AUTO: 0.12 K/UL — SIGNIFICANT CHANGE UP (ref 0–0.5)
EOSINOPHIL NFR BLD AUTO: 1.2 % — SIGNIFICANT CHANGE UP (ref 0–6)
ERYTHROCYTE [SEDIMENTATION RATE] IN BLOOD: 53 MM/HR — HIGH (ref 0–20)
GLUCOSE BLDC GLUCOMTR-MCNC: 135 MG/DL — HIGH (ref 70–99)
GLUCOSE BLDC GLUCOMTR-MCNC: 140 MG/DL — HIGH (ref 70–99)
GLUCOSE BLDC GLUCOMTR-MCNC: 172 MG/DL — HIGH (ref 70–99)
GLUCOSE SERPL-MCNC: 158 MG/DL — HIGH (ref 70–99)
GRAM STN FLD: SIGNIFICANT CHANGE UP
HCT VFR BLD CALC: 30.1 % — LOW (ref 39–50)
HGB BLD-MCNC: 9.9 G/DL — LOW (ref 13–17)
IMM GRANULOCYTES NFR BLD AUTO: 1 % — SIGNIFICANT CHANGE UP (ref 0–1.5)
LACTATE SERPL-SCNC: 1.3 MMOL/L — SIGNIFICANT CHANGE UP (ref 0.7–2)
LYMPHOCYTES # BLD AUTO: 0.94 K/UL — LOW (ref 1–3.3)
LYMPHOCYTES # BLD AUTO: 9.4 % — LOW (ref 13–44)
MCHC RBC-ENTMCNC: 31.3 PG — SIGNIFICANT CHANGE UP (ref 27–34)
MCHC RBC-ENTMCNC: 32.9 GM/DL — SIGNIFICANT CHANGE UP (ref 32–36)
MCV RBC AUTO: 95.3 FL — SIGNIFICANT CHANGE UP (ref 80–100)
METHOD TYPE: SIGNIFICANT CHANGE UP
MONOCYTES # BLD AUTO: 1.18 K/UL — HIGH (ref 0–0.9)
MONOCYTES NFR BLD AUTO: 11.8 % — SIGNIFICANT CHANGE UP (ref 2–14)
NEUTROPHILS # BLD AUTO: 7.67 K/UL — HIGH (ref 1.8–7.4)
NEUTROPHILS NFR BLD AUTO: 76.3 % — SIGNIFICANT CHANGE UP (ref 43–77)
NRBC # BLD: 0 /100 WBCS — SIGNIFICANT CHANGE UP (ref 0–0)
PLATELET # BLD AUTO: 141 K/UL — LOW (ref 150–400)
POTASSIUM SERPL-MCNC: 3.4 MMOL/L — LOW (ref 3.5–5.3)
POTASSIUM SERPL-SCNC: 3.4 MMOL/L — LOW (ref 3.5–5.3)
PROCALCITONIN SERPL-MCNC: 11.8 NG/ML — HIGH (ref 0.02–0.1)
PROCALCITONIN SERPL-MCNC: 11.9 NG/ML — HIGH (ref 0.02–0.1)
PROT SERPL-MCNC: 6.4 GM/DL — SIGNIFICANT CHANGE UP (ref 6–8.3)
RBC # BLD: 3.16 M/UL — LOW (ref 4.2–5.8)
RBC # FLD: 13.8 % — SIGNIFICANT CHANGE UP (ref 10.3–14.5)
SARS-COV-2 IGG+IGM SERPL QL IA: >250 U/ML — HIGH
SARS-COV-2 IGG+IGM SERPL QL IA: POSITIVE
SODIUM SERPL-SCNC: 139 MMOL/L — SIGNIFICANT CHANGE UP (ref 135–145)
SPECIMEN SOURCE: SIGNIFICANT CHANGE UP
T3 SERPL-MCNC: 90 NG/DL — SIGNIFICANT CHANGE UP (ref 80–200)
T4 AB SER-ACNC: 7.8 UG/DL — SIGNIFICANT CHANGE UP (ref 4.6–12)
WBC # BLD: 10.04 K/UL — SIGNIFICANT CHANGE UP (ref 3.8–10.5)
WBC # FLD AUTO: 10.04 K/UL — SIGNIFICANT CHANGE UP (ref 3.8–10.5)

## 2021-09-28 PROCEDURE — 72148 MRI LUMBAR SPINE W/O DYE: CPT | Mod: 26

## 2021-09-28 PROCEDURE — 99222 1ST HOSP IP/OBS MODERATE 55: CPT

## 2021-09-28 PROCEDURE — 74177 CT ABD & PELVIS W/CONTRAST: CPT | Mod: 26

## 2021-09-28 PROCEDURE — 99233 SBSQ HOSP IP/OBS HIGH 50: CPT

## 2021-09-28 RX ORDER — MEROPENEM 1 G/30ML
1000 INJECTION INTRAVENOUS ONCE
Refills: 0 | Status: COMPLETED | OUTPATIENT
Start: 2021-09-28 | End: 2021-09-28

## 2021-09-28 RX ORDER — IOHEXOL 300 MG/ML
30 INJECTION, SOLUTION INTRAVENOUS ONCE
Refills: 0 | Status: COMPLETED | OUTPATIENT
Start: 2021-09-28 | End: 2021-09-28

## 2021-09-28 RX ORDER — MEROPENEM 1 G/30ML
INJECTION INTRAVENOUS
Refills: 0 | Status: DISCONTINUED | OUTPATIENT
Start: 2021-09-28 | End: 2021-09-30

## 2021-09-28 RX ORDER — MEROPENEM 1 G/30ML
1000 INJECTION INTRAVENOUS EVERY 8 HOURS
Refills: 0 | Status: DISCONTINUED | OUTPATIENT
Start: 2021-09-28 | End: 2021-09-30

## 2021-09-28 RX ADMIN — TAMSULOSIN HYDROCHLORIDE 0.4 MILLIGRAM(S): 0.4 CAPSULE ORAL at 21:38

## 2021-09-28 RX ADMIN — ENOXAPARIN SODIUM 40 MILLIGRAM(S): 100 INJECTION SUBCUTANEOUS at 13:17

## 2021-09-28 RX ADMIN — Medication 1: at 08:25

## 2021-09-28 RX ADMIN — Medication 81 MILLIGRAM(S): at 13:17

## 2021-09-28 RX ADMIN — Medication 0.5 MILLIGRAM(S): at 19:01

## 2021-09-28 RX ADMIN — IOHEXOL 30 MILLILITER(S): 300 INJECTION, SOLUTION INTRAVENOUS at 17:19

## 2021-09-28 RX ADMIN — Medication 250 MILLIGRAM(S): at 05:23

## 2021-09-28 RX ADMIN — FINASTERIDE 5 MILLIGRAM(S): 5 TABLET, FILM COATED ORAL at 13:16

## 2021-09-28 RX ADMIN — DONEPEZIL HYDROCHLORIDE 5 MILLIGRAM(S): 10 TABLET, FILM COATED ORAL at 21:38

## 2021-09-28 RX ADMIN — ATORVASTATIN CALCIUM 10 MILLIGRAM(S): 80 TABLET, FILM COATED ORAL at 21:37

## 2021-09-28 RX ADMIN — PANTOPRAZOLE SODIUM 40 MILLIGRAM(S): 20 TABLET, DELAYED RELEASE ORAL at 08:26

## 2021-09-28 RX ADMIN — MEROPENEM 100 MILLIGRAM(S): 1 INJECTION INTRAVENOUS at 21:38

## 2021-09-28 RX ADMIN — CEFEPIME 100 MILLIGRAM(S): 1 INJECTION, POWDER, FOR SOLUTION INTRAMUSCULAR; INTRAVENOUS at 05:24

## 2021-09-28 NOTE — SWALLOW BEDSIDE ASSESSMENT ADULT - SLP GENERAL OBSERVATIONS
alert and aware of eating/swallowing context; followed simple directions but appeared confused when speaking ; Speech and vocal quality remained clear throughout po swallows alert and aware of eating/swallowing context; followed simple directions but confused when speaking ; Speech and vocal quality remained clear throughout po swallows

## 2021-09-28 NOTE — CONSULT NOTE ADULT - ASSESSMENT
E. coli septicemia.  Urinalysis without significant pyuria, and for what is worth the patient has no urinary symptoms.  He has no CVA tenderness to invoke pyelonephritis.  White blood cell count is now normal.  Platelet count is low, but only minimally so.  Renal function is normal.  Liver function test not significantly elevated.  There is no concern of skin or soft tissue infection on the basis of examination.  Origin of the bacteremia is not clear.  As such, would obtain CT scan of abdomen pelvis ideally with oral and IV contrast to search for an occult source of infection.    Suggestions  Recheck Blood cx  Stop cefepime  Meropenem 1g Q8H  Given unclear origin would obtain CT A/P ideally with IV and PO contrast, though abdominal exam benign  F/U full sensi of isolate.   Thank you for the courtesy of this referral.  Twin Gonzalez MD  Attending Physician  [facility]  Division of Infectious Diseases  [phone]

## 2021-09-28 NOTE — CONSULT NOTE ADULT - SUBJECTIVE AND OBJECTIVE BOX
Full note to follow  +BC for GNR, PCR c/w an ESBL E. coli  Patient is a nonhistorian, does not know where he is (with prompting- hospital), but cannot tell me why here is here, year, etc.   Benign abdomen  U/A bland  UC&S pending  Recheck Blood cx  Stop cefepime  Meropenem 1g Q8H  Given unclear origin would obtain CT A/P ideally with IV and PO contrast, though abdominal exam benign  F/U full sensi of isolate.   Thank you for the courtesy of this referral.  Twin Gonzalez MD  Attending Physician  [facility]  Division of Infectious Diseases  [phone]  ---------------------  [facility]  [phone]    BIBI SEXTON  80y, Male  [id number]    HPI--      PMH/PSH--  Diabetes    DM (diabetes mellitus)    HTN (hypertension)    HLD (hyperlipidemia)    BPH (benign prostatic hyperplasia)    S/P hernia surgery    S/P abdominal surgery, follow-up exam    No significant past surgical history        Allergies--  penicillin (Rash (Mild))      Medications--  Antibiotics: cefepime   IVPB 2000 milliGRAM(s) IV Intermittent every 12 hours    Immunologic: influenza   Vaccine 0.5 milliLiter(s) IntraMuscular once    Other: acetaminophen   Tablet .. PRN  aspirin  chewable  atorvastatin  dextrose 40% Gel  dextrose 5%.  dextrose 5%.  dextrose 50% Injectable  dextrose 50% Injectable  dextrose 50% Injectable  donepezil  enoxaparin Injectable  finasteride  glucagon  Injectable  insulin lispro (ADMELOG) corrective regimen sliding scale  insulin lispro (ADMELOG) corrective regimen sliding scale  pantoprazole    Tablet  sodium chloride 0.9%.  tamsulosin    Antimicrobials last 90 days per EMR: MEDICATIONS  (STANDING):  cefepime   IVPB   100 mL/Hr IV Intermittent (09-28-21 @ 05:24)    cefTRIAXone   IVPB   100 mL/Hr IV Intermittent (09-27-21 @ 15:40)    vancomycin  IVPB   166.67 mL/Hr IV Intermittent (09-27-21 @ 16:14)    vancomycin  IVPB   250 mL/Hr IV Intermittent (09-28-21 @ 05:23)        Social History--  EtOH: denies   Tobacco: denies   Drug Use: denies     Family/Marital History--  No pertinent family history in first degree relatives    No pertinent family history in first degree relatives          Travel/Environmental/Occupational History:      Review of Systems:  A >=10-point review of systems was obtained.     Pertinent positives and negatives--  Constitutional: No fevers. No Chills. No Rigors.   Eyes:  ENMT:  Cardiovascular: No chest pain. No palpitations.  Respiratory: No shortness of breath. No cough.  Gastrointestinal: No nausea or vomiting. No diarrhea or constipation.   Genitourinary:  Musculoskeletal:  Skin:  Neurologic:  Psychiatric: Pleasant. Appropriate affect.  Endocrine:  Heme/Lymphatic:  Allergy/Immunologic:    Review of systems otherwise negative except as previously noted.    Physical Exam--  Vital Signs: T(F): 98 (09-28-21 @ 05:35), Max: 98.6 (09-27-21 @ 20:24)  HR: 67 (09-28-21 @ 05:35)  BP: 146/84 (09-28-21 @ 05:35)  RR: 18 (09-28-21 @ 05:35)  SpO2: 96% (09-28-21 @ 05:35)  Wt(kg): --  General: Nontoxic-appearing Male in no acute distress.  HEENT: AT/NC. PERRL. EOMI. Anicteric. Conjunctiva pink and moist. Oropharynx clear. Dentition fair.  Neck: Not rigid. No sense of mass.  Nodes: None palpable.  Lungs: Clear bilaterally without rales, wheezing or rhonchi  Heart: Regular rate and rhythm. No Murmur. No rub. No gallop. No palpable thrill.  Abdomen: Bowel sounds present and normoactive. Soft. Nondistended. Nontender. No sense of mass. No organomegaly.  Back: No spinal tenderness. No costovertebral angle tenderness.   Extremities: No cyanosis or clubbing. No edema.   Skin: Warm. Dry. Good turgor. No rash. No vasculitic stigmata.  Psychiatric: Appropriate affect and mood for situation.         Laboratory & Imaging Data--  CBC                        9.9    10.04 )-----------( 141      ( 28 Sep 2021 07:28 )             30.1       Chemistries  09-28    139  |  105  |  25<H>  ----------------------------<  158<H>  3.4<L>   |  30  |  0.82    Ca    8.7      28 Sep 2021 07:28    TPro  6.4  /  Alb  2.3<L>  /  TBili  0.3  /  DBili  x   /  AST  24  /  ALT  19  /  AlkPhos  48  09-28      Culture Data    Culture - Blood (collected 28 Sep 2021 00:49)  Source: .Blood Blood-Peripheral  Gram Stain (prelim) (28 Sep 2021 11:00):    Growth in aerobic bottle: Gram Negative Rods  Preliminary Report (28 Sep 2021 14:46):    Growth in aerobic bottle: Gram Negative Rods    MDRO detected in BCID PCR, resistance marker = CTX-M (ESBL)    ***Blood Panel PCR results on this specimen are available    approximately 3 hours after the Gram stain result.***    Gram stain, PCR, and/or culture results may not always    correspond due to difference in methodologies.    ************************************************************    This PCR assay was performed by multiplex PCR. This    Assay tests for 66 bacterial and resistance gene targets.    Please refer to the [facility] test directory    at [email or web address] for details.  Organism: Blood Culture PCR (28 Sep 2021 14:50)  Organism: Blood Culture PCR (28 Sep 2021 14:50)         Full note to follow  +BC for GNR, PCR c/w an ESBL E. coli  Patient is a nonhistorian, does not know where he is (with prompting- hospital), but cannot tell me why here is here, year, etc.   Benign abdomen  U/A bland  UC&S pending  Recheck Blood cx  Stop cefepime  Meropenem 1g Q8H  Given unclear origin would obtain CT A/P ideally with IV and PO contrast, though abdominal exam benign  F/U full sensi of isolate.   Thank you for the courtesy of this referral.  Twin Gonzalez MD  Attending Physician  [facility]  Division of Infectious Diseases  [phone]  ---------------------  [facility]  [phone]    BIBI SEXTON  80y, Male  [id number]    HPI--  Patient a poor historian, answers questions but slow mentation, does not know where he is, no recall regarding recent events. As per H&P HPI:  80M with PMHx of T2DM, HTN, BPH, CVA, and dementia brought in by family for increasing lethargy and confusion. It seems it happened suddenly over past 24 hours to the point where it was difficult to get patient out of bed. On arrival to ED patient was lethargic, but seems to have improved after receiving ceftriaxone, vancomycin, LR 2.4 L. NO known localizing symptoms and patient himself not endorsing complaints (but may be limited due to dementia). In the ED noted to have leukocytosis with bandemia. CT head and CT chest was unremarkable. Interestingly, similar set of events occurred in 2018 when patient was admitted to [facility] and given broad spectrum antibiotics and improved without finding a source. (27 Sep 2021 20:20)    He is now known to have a positive blood culture for gram-negative rods, subsequently identified as a ESBL producing E. coli.    For what its worth the patient has no complaints    PMH/PSH--  Diabetes    DM (diabetes mellitus)    HTN (hypertension)    HLD (hyperlipidemia)    BPH (benign prostatic hyperplasia)    S/P hernia surgery    S/P abdominal surgery, follow-up exam    No significant past surgical history        Allergies--  penicillin (Rash (Mild))      Medications--  Antibiotics: cefepime   IVPB 2000 milliGRAM(s) IV Intermittent every 12 hours    Immunologic: influenza   Vaccine 0.5 milliLiter(s) IntraMuscular once    Other: acetaminophen   Tablet .. PRN  aspirin  chewable  atorvastatin  dextrose 40% Gel  dextrose 5%.  dextrose 5%.  dextrose 50% Injectable  dextrose 50% Injectable  dextrose 50% Injectable  donepezil  enoxaparin Injectable  finasteride  glucagon  Injectable  insulin lispro (ADMELOG) corrective regimen sliding scale  insulin lispro (ADMELOG) corrective regimen sliding scale  pantoprazole    Tablet  sodium chloride 0.9%.  tamsulosin    Antimicrobials last 90 days per EMR: MEDICATIONS  (STANDING):  cefepime   IVPB   100 mL/Hr IV Intermittent (09-28-21 @ 05:24)    cefTRIAXone   IVPB   100 mL/Hr IV Intermittent (09-27-21 @ 15:40)    vancomycin  IVPB   166.67 mL/Hr IV Intermittent (09-27-21 @ 16:14)    vancomycin  IVPB   250 mL/Hr IV Intermittent (09-28-21 @ 05:23)        Social History--  EtOH: denies   Tobacco: denies   Drug Use: denies     Family/Marital History--  No pertinent family history in first degree relatives    No pertinent family history in first degree relatives          Travel/Environmental/Occupational History:  Patient states he is originally from [place], here in the [place].  He states he worked for the "[employer] in [place]" but does not elaborate on what he actually did.  He appears to be retired.    Review of Systems:  Review of systems otherwise negative except as previously noted though of unclear reliability.    Physical Exam--  Vital Signs: T(F): 98 (09-28-21 @ 05:35), Max: 98.6 (09-27-21 @ 20:24)  HR: 67 (09-28-21 @ 05:35)  BP: 146/84 (09-28-21 @ 05:35)  RR: 18 (09-28-21 @ 05:35)  SpO2: 96% (09-28-21 @ 05:35)  Wt(kg): --  General: Nontoxic-appearing Male in no acute distress.  HEENT: AT/NC. Anicteric. Conjunctiva pink and moist. Oropharynx clear.   Neck: Not rigid. No sense of mass.  Nodes: None palpable.  Lungs: Diminished BS bilaterally without rales, wheezing or rhonchi  Heart: Regular rate and rhythm. No Murmur. No rub. No gallop. No palpable thrill.  Abdomen: Bowel sounds present and normoactive. Soft. Nondistended. Nontender. No sense of mass. No organomegaly. Obese.   Back: No spinal tenderness. No costovertebral angle tenderness.   Extremities: No cyanosis or clubbing. No edema.   Skin: Warm. Dry. Good turgor. No rash. No vasculitic stigmata.  Psychiatric: Grossly appropriate affect and mood for situation.         Laboratory & Imaging Data--  CBC                        9.9    10.04 )-----------( 141      ( 28 Sep 2021 07:28 )             30.1       Chemistries  09-28    139  |  105  |  25<H>  ----------------------------<  158<H>  3.4<L>   |  30  |  0.82    Ca    8.7      28 Sep 2021 07:28    TPro  6.4  /  Alb  2.3<L>  /  TBili  0.3  /  DBili  x   /  AST  24  /  ALT  19  /  AlkPhos  48  09-28    Urinalysis (09.27.21 @ 18:02)    Glucose Qualitative, Urine: Negative mg/dL    Blood, Urine: Negative    pH Urine: 8.0    Color: Yellow    Urine Appearance: Clear    Bilirubin: Negative    Ketone - Urine: Negative    Specific Gravity: 1.010    Protein, Urine: Negative mg/dL    Urobilinogen: Negative mg/dL    Nitrite: Negative    Leukocyte Esterase Concentration: Moderate  Urine Microscopic-Add On (NC) (09.27.21 @ 18:02)    White Blood Cell - Urine: 3-5    Bacteria: Few    Epithelial Cells: Few        Culture Data  Culture - Blood (collected 28 Sep 2021 00:49)  Source: .Blood Blood-Peripheral  Gram Stain (prelim) (28 Sep 2021 11:00):    Growth in aerobic bottle: Gram Negative Rods  Preliminary Report (28 Sep 2021 14:46):    Growth in aerobic bottle: Gram Negative Rods    MDRO detected in BCID PCR, resistance marker = CTX-M (ESBL)    ***Blood Panel PCR results on this specimen are available    approximately 3 hours after the Gram stain result.***    Gram stain, PCR, and/or culture results may not always    correspond due to difference in methodologies.    ************************************************************    This PCR assay was performed by multiplex PCR. This    Assay tests for 66 bacterial and resistance gene targets.    Please refer to the [facility] test directory    at [email or web address] for details.  Organism: Blood Culture PCR (28 Sep 2021 14:50)  Organism: Blood Culture PCR (28 Sep 2021 14:50)

## 2021-09-28 NOTE — SWALLOW BEDSIDE ASSESSMENT ADULT - SWALLOW EVAL: DIAGNOSIS
Oropharyngeal swallow mechanism is WFL for modified consistencies; feeding behaviors and intake are consistent with Dementia dx; there are no overt s/s aspiration on exam Oropharyngeal swallow mechanism is WFL for modified consistencies; feeding behaviors and reduced intake are consistent with Dementia dx; there are no overt s/s aspiration on exam

## 2021-09-28 NOTE — SWALLOW BEDSIDE ASSESSMENT ADULT - COMMENTS
PMHx of T2DM, HTN, BPH, CVA, and dementia  9/27 CT head and chest with no acute findings; CXR no active disease cleared with thin liquids cleared with thin liquids with good airway protection

## 2021-09-28 NOTE — SWALLOW BEDSIDE ASSESSMENT ADULT - SPECIFY REASON(S)
Clinical assessment of swallow function; r/o occult aspiration; anticipated dc date 9/29 Clinical assessment of swallow function; r/o occult aspiration; anticipated dc date 9/29; RN reports poor mental status and not eating 9/27

## 2021-09-28 NOTE — PROGRESS NOTE ADULT - SUBJECTIVE AND OBJECTIVE BOX
Patient seen and examined at bedside  brother at bedside  more alert today.sitting up in chair  tolerating diet  has intermittment periods of confusion although able to briefly converse in english  patient on interview denies fever chills pain in flanks abdomen hips and kneez  vitals stable afebrile  white count remarkable improved  blood cx ++ ESBL Gram negative rods  MRSA pCR pending  on vanc and cefepime  speech evaluated patient:: cleared for dysphagia 3    Review of Systems:  General:denies fever chills, headache, weakness  HEENT: denies blurry vision,diffculty swallowing, difficulty hearing, tinnitus  Cardiovascular: denies chest pain  ,palpitations  Pulmonary:denies shortness of breath, cough, wheezing, hemoptysis  Gastrointestinal: denies abdominal pain, constipation, diarrhea,nausea , vomiting, hematochezia  : denies hematuria, dysuria, or incontinence  Neurological: denies weakness, numbness , tingling, dizziness, tremors  MSK: denies muscle pain, difficulty ambulating, swelling, back pain  skin: denies skin rash, itching, burning, or  skin lesions  Psychiatrical: denies mood disturbances, anxierty, feeling depressed, depression , or difficulty sleeping    Objective:  Vitals  T(C): 36.7 (09-28-21 @ 05:35), Max: 37.3 (09-27-21 @ 15:14)  HR: 67 (09-28-21 @ 05:35) (67 - 80)  BP: 146/84 (09-28-21 @ 05:35) (98/51 - 146/84)  RR: 18 (09-28-21 @ 05:35) (15 - 18)  SpO2: 96% (09-28-21 @ 05:35) (96% - 100%)    Physical Exam:  General: comfortable, no acute distress, well nourished  HEENT: Atraumatic, no LAD, trachea midline, PERRLA  Cardiovascular: normal s1s2, no murmurs, gallops or fricition rubs  Pulmonary: clear to ausculation Bilaterally, no wheezing , rhonchi  Gastrointestinal: soft non tender, discomfort on deep palpation of abdomen, non distended, no masses felt, no organomegally  Muscloskeletal: no lower extremity edema, intact bilateral lower extremity pulses  Neurological: CN II-12 intact. No focal weakness. has periods of intermittment confusion  Psychiatrical: normal mood, cooperative  SKIN: no rash, lesions or ulcers    Labs:                          9.9    10.04 )-----------( 141      ( 28 Sep 2021 07:28 )             30.1     09-28    139  |  105  |  25<H>  ----------------------------<  158<H>  3.4<L>   |  30  |  0.82    Ca    8.7      28 Sep 2021 07:28    TPro  6.4  /  Alb  2.3<L>  /  TBili  0.3  /  DBili  x   /  AST  24  /  ALT  19  /  AlkPhos  48  09-28    LIVER FUNCTIONS - ( 28 Sep 2021 07:28 )  Alb: 2.3 g/dL / Pro: 6.4 gm/dL / ALK PHOS: 48 U/L / ALT: 19 U/L / AST: 24 U/L / GGT: x           PT/INR - ( 27 Sep 2021 14:47 )   PT: 14.1 sec;   INR: 1.23 ratio         PTT - ( 27 Sep 2021 14:47 )  PTT:27.9 sec      Active Medications  MEDICATIONS  (STANDING):  aspirin  chewable 81 milliGRAM(s) Oral daily  atorvastatin 10 milliGRAM(s) Oral at bedtime  cefepime   IVPB 2000 milliGRAM(s) IV Intermittent every 12 hours  dextrose 40% Gel 15 Gram(s) Oral once  dextrose 5%. 1000 milliLiter(s) (50 mL/Hr) IV Continuous <Continuous>  dextrose 5%. 1000 milliLiter(s) (100 mL/Hr) IV Continuous <Continuous>  dextrose 50% Injectable 25 Gram(s) IV Push once  dextrose 50% Injectable 12.5 Gram(s) IV Push once  dextrose 50% Injectable 25 Gram(s) IV Push once  donepezil 5 milliGRAM(s) Oral at bedtime  enoxaparin Injectable 40 milliGRAM(s) SubCutaneous daily  finasteride 5 milliGRAM(s) Oral daily  glucagon  Injectable 1 milliGRAM(s) IntraMuscular once  influenza   Vaccine 0.5 milliLiter(s) IntraMuscular once  insulin lispro (ADMELOG) corrective regimen sliding scale   SubCutaneous three times a day before meals  insulin lispro (ADMELOG) corrective regimen sliding scale   SubCutaneous at bedtime  pantoprazole    Tablet 40 milliGRAM(s) Oral before breakfast  sodium chloride 0.9%. 1000 milliLiter(s) (100 mL/Hr) IV Continuous <Continuous>  tamsulosin 0.4 milliGRAM(s) Oral at bedtime    MEDICATIONS  (PRN):  acetaminophen   Tablet .. 650 milliGRAM(s) Oral every 6 hours PRN Temp greater or equal to 38C (100.4F), Mild Pain (1 - 3), Moderate Pain (4 - 6), Severe Pain (7 - 10)

## 2021-09-28 NOTE — SWALLOW BEDSIDE ASSESSMENT ADULT - SLP PERTINENT HISTORY OF CURRENT PROBLEM
Infectious encephalopathy; Weakness/Somnolence; brought in by family for increasing lethargy and confusion that seems happened suddenly over past 24 hours to the point where it was difficult to get patient out of bed.

## 2021-09-29 DIAGNOSIS — A41.51 SEPSIS DUE TO ESCHERICHIA COLI [E. COLI]: ICD-10-CM

## 2021-09-29 LAB
ANION GAP SERPL CALC-SCNC: 4 MMOL/L — LOW (ref 5–17)
ANISOCYTOSIS BLD QL: SLIGHT — SIGNIFICANT CHANGE UP
BASOPHILS # BLD AUTO: 0 K/UL — SIGNIFICANT CHANGE UP (ref 0–0.2)
BASOPHILS NFR BLD AUTO: 0 % — SIGNIFICANT CHANGE UP (ref 0–2)
BUN SERPL-MCNC: 15 MG/DL — SIGNIFICANT CHANGE UP (ref 7–23)
CALCIUM SERPL-MCNC: 8.4 MG/DL — LOW (ref 8.5–10.1)
CHLORIDE SERPL-SCNC: 104 MMOL/L — SIGNIFICANT CHANGE UP (ref 96–108)
CO2 SERPL-SCNC: 32 MMOL/L — HIGH (ref 22–31)
CREAT SERPL-MCNC: 0.76 MG/DL — SIGNIFICANT CHANGE UP (ref 0.5–1.3)
CULTURE RESULTS: SIGNIFICANT CHANGE UP
EOSINOPHIL # BLD AUTO: 0.15 K/UL — SIGNIFICANT CHANGE UP (ref 0–0.5)
EOSINOPHIL NFR BLD AUTO: 2 % — SIGNIFICANT CHANGE UP (ref 0–6)
GLUCOSE BLDC GLUCOMTR-MCNC: 133 MG/DL — HIGH (ref 70–99)
GLUCOSE BLDC GLUCOMTR-MCNC: 141 MG/DL — HIGH (ref 70–99)
GLUCOSE BLDC GLUCOMTR-MCNC: 190 MG/DL — HIGH (ref 70–99)
GLUCOSE BLDC GLUCOMTR-MCNC: 207 MG/DL — HIGH (ref 70–99)
GLUCOSE SERPL-MCNC: 129 MG/DL — HIGH (ref 70–99)
HCT VFR BLD CALC: 33 % — LOW (ref 39–50)
HGB BLD-MCNC: 10.9 G/DL — LOW (ref 13–17)
HYPOCHROMIA BLD QL: SLIGHT — SIGNIFICANT CHANGE UP
LEGIONELLA AG UR QL: NEGATIVE — SIGNIFICANT CHANGE UP
LYMPHOCYTES # BLD AUTO: 0.81 K/UL — LOW (ref 1–3.3)
LYMPHOCYTES # BLD AUTO: 11 % — LOW (ref 13–44)
MAGNESIUM SERPL-MCNC: 1.8 MG/DL — SIGNIFICANT CHANGE UP (ref 1.6–2.6)
MANUAL SMEAR VERIFICATION: SIGNIFICANT CHANGE UP
MCHC RBC-ENTMCNC: 31 PG — SIGNIFICANT CHANGE UP (ref 27–34)
MCHC RBC-ENTMCNC: 33 GM/DL — SIGNIFICANT CHANGE UP (ref 32–36)
MCV RBC AUTO: 93.8 FL — SIGNIFICANT CHANGE UP (ref 80–100)
METAMYELOCYTES # FLD: 3 % — HIGH (ref 0–0)
MONOCYTES # BLD AUTO: 1.54 K/UL — HIGH (ref 0–0.9)
MONOCYTES NFR BLD AUTO: 21 % — HIGH (ref 2–14)
MRSA PCR RESULT.: SIGNIFICANT CHANGE UP
NEUTROPHILS # BLD AUTO: 4.47 K/UL — SIGNIFICANT CHANGE UP (ref 1.8–7.4)
NEUTROPHILS NFR BLD AUTO: 55 % — SIGNIFICANT CHANGE UP (ref 43–77)
NEUTS BAND # BLD: 6 % — SIGNIFICANT CHANGE UP (ref 0–8)
NRBC # BLD: 0 /100 — SIGNIFICANT CHANGE UP (ref 0–0)
NRBC # BLD: SIGNIFICANT CHANGE UP /100 WBCS (ref 0–0)
PHOSPHATE SERPL-MCNC: 3.1 MG/DL — SIGNIFICANT CHANGE UP (ref 2.5–4.5)
PLAT MORPH BLD: NORMAL — SIGNIFICANT CHANGE UP
PLATELET # BLD AUTO: 150 K/UL — SIGNIFICANT CHANGE UP (ref 150–400)
POTASSIUM SERPL-MCNC: 3.7 MMOL/L — SIGNIFICANT CHANGE UP (ref 3.5–5.3)
POTASSIUM SERPL-SCNC: 3.7 MMOL/L — SIGNIFICANT CHANGE UP (ref 3.5–5.3)
PROMYELOCYTES # FLD: 1 % — HIGH (ref 0–0)
RBC # BLD: 3.52 M/UL — LOW (ref 4.2–5.8)
RBC # FLD: 13.7 % — SIGNIFICANT CHANGE UP (ref 10.3–14.5)
RBC BLD AUTO: NORMAL — SIGNIFICANT CHANGE UP
S AUREUS DNA NOSE QL NAA+PROBE: DETECTED
SODIUM SERPL-SCNC: 140 MMOL/L — SIGNIFICANT CHANGE UP (ref 135–145)
SPECIMEN SOURCE: SIGNIFICANT CHANGE UP
TOXIC GRANULES BLD QL SMEAR: PRESENT — SIGNIFICANT CHANGE UP
VARIANT LYMPHS # BLD: 1 % — SIGNIFICANT CHANGE UP (ref 0–6)
WBC # BLD: 7.32 K/UL — SIGNIFICANT CHANGE UP (ref 3.8–10.5)
WBC # FLD AUTO: 7.32 K/UL — SIGNIFICANT CHANGE UP (ref 3.8–10.5)

## 2021-09-29 PROCEDURE — 99232 SBSQ HOSP IP/OBS MODERATE 35: CPT

## 2021-09-29 PROCEDURE — 99233 SBSQ HOSP IP/OBS HIGH 50: CPT

## 2021-09-29 RX ORDER — AMLODIPINE BESYLATE 2.5 MG/1
5 TABLET ORAL DAILY
Refills: 0 | Status: DISCONTINUED | OUTPATIENT
Start: 2021-09-29 | End: 2021-10-01

## 2021-09-29 RX ADMIN — MEROPENEM 100 MILLIGRAM(S): 1 INJECTION INTRAVENOUS at 05:58

## 2021-09-29 RX ADMIN — ATORVASTATIN CALCIUM 10 MILLIGRAM(S): 80 TABLET, FILM COATED ORAL at 21:35

## 2021-09-29 RX ADMIN — MEROPENEM 100 MILLIGRAM(S): 1 INJECTION INTRAVENOUS at 13:48

## 2021-09-29 RX ADMIN — PANTOPRAZOLE SODIUM 40 MILLIGRAM(S): 20 TABLET, DELAYED RELEASE ORAL at 08:20

## 2021-09-29 RX ADMIN — Medication 81 MILLIGRAM(S): at 12:07

## 2021-09-29 RX ADMIN — FINASTERIDE 5 MILLIGRAM(S): 5 TABLET, FILM COATED ORAL at 12:07

## 2021-09-29 RX ADMIN — AMLODIPINE BESYLATE 5 MILLIGRAM(S): 2.5 TABLET ORAL at 12:07

## 2021-09-29 RX ADMIN — DONEPEZIL HYDROCHLORIDE 5 MILLIGRAM(S): 10 TABLET, FILM COATED ORAL at 21:35

## 2021-09-29 RX ADMIN — ENOXAPARIN SODIUM 40 MILLIGRAM(S): 100 INJECTION SUBCUTANEOUS at 12:08

## 2021-09-29 RX ADMIN — Medication 2: at 12:07

## 2021-09-29 RX ADMIN — TAMSULOSIN HYDROCHLORIDE 0.4 MILLIGRAM(S): 0.4 CAPSULE ORAL at 21:35

## 2021-09-29 RX ADMIN — MEROPENEM 100 MILLIGRAM(S): 1 INJECTION INTRAVENOUS at 21:36

## 2021-09-29 NOTE — PROGRESS NOTE ADULT - SUBJECTIVE AND OBJECTIVE BOX
BIBI SEXTON  MRN-[id number]    Interval History: The pt was seen and examined earlier, no distress. The pt is afebrile, on RA, WBC 7.32, 6% bands, no new complains from the pt.     PAST MEDICAL & SURGICAL HISTORY:  Diabetes    HTN (hypertension)    HLD (hyperlipidemia)    BPH (benign prostatic hyperplasia)    S/P hernia surgery  r.inguinal    S/P abdominal surgery, follow-up exam  sbo        ROS:    [ ] Unobtainable because:  [x ] All other systems negative    Constitutional: no fever, no chills  Head: no trauma  Eyes: no vision changes, no eye pain  ENT:  no sore throat, no rhinorrhea  Cardiovascular:  no chest pain, no palpitation  Respiratory:  no SOB, no cough  GI:  no abd pain, no vomiting, no diarrhea  urinary: no dysuria, no hematuria, no flank pain  musculoskeletal:  no joint pain, no joint swelling  skin:  no rash  neurology:  no headache, no seizure, no change in mental status  psych: no anxiety, no depression         Allergies  penicillin (Rash (Mild))        ANTIMICROBIALS:  meropenem  IVPB    meropenem  IVPB 1000 every 8 hours      OTHER MEDS:  acetaminophen   Tablet .. 650 milliGRAM(s) Oral every 6 hours PRN  amLODIPine   Tablet 5 milliGRAM(s) Oral daily  aspirin  chewable 81 milliGRAM(s) Oral daily  atorvastatin 10 milliGRAM(s) Oral at bedtime  dextrose 5%. 1000 milliLiter(s) IV Continuous <Continuous>  dextrose 5%. 1000 milliLiter(s) IV Continuous <Continuous>  dextrose 50% Injectable 25 Gram(s) IV Push once  dextrose 50% Injectable 12.5 Gram(s) IV Push once  dextrose 50% Injectable 25 Gram(s) IV Push once  donepezil 5 milliGRAM(s) Oral at bedtime  enoxaparin Injectable 40 milliGRAM(s) SubCutaneous daily  finasteride 5 milliGRAM(s) Oral daily  glucagon  Injectable 1 milliGRAM(s) IntraMuscular once  influenza   Vaccine 0.5 milliLiter(s) IntraMuscular once  insulin lispro (ADMELOG) corrective regimen sliding scale   SubCutaneous three times a day before meals  insulin lispro (ADMELOG) corrective regimen sliding scale   SubCutaneous at bedtime  pantoprazole    Tablet 40 milliGRAM(s) Oral before breakfast  sodium chloride 0.9%. 1000 milliLiter(s) IV Continuous <Continuous>  tamsulosin 0.4 milliGRAM(s) Oral at bedtime      Vital Signs Last 24 Hrs  T(C): 36.6 (29 Sep 2021 16:13), Max: 37 (29 Sep 2021 04:53)  T(F): 97.8 (29 Sep 2021 16:13), Max: 98.6 (29 Sep 2021 04:53)  HR: 60 (29 Sep 2021 16:13) (60 - 81)  BP: 138/69 (29 Sep 2021 16:13) (138/69 - 185/94)  BP(mean): --  RR: 17 (29 Sep 2021 16:13) (17 - 20)  SpO2: 97% (29 Sep 2021 16:13) (97% - 99%)    Physical Exam:  General: Nontoxic-appearing Male in no acute distress.  HEENT: AT/NC. Anicteric. Conjunctiva pink and moist. Oropharynx clear.   Neck: Not rigid. No sense of mass.  Nodes: None palpable.  Lungs: Diminished BS bilaterally without rales, wheezing or rhonchi  Heart: Regular rate and rhythm. No Murmur. No rub. No gallop. No palpable thrill.  Abdomen: Bowel sounds present and normoactive. Soft. Nondistended. Nontender. No sense of mass. No organomegaly. Obese.   Back: No spinal tenderness. No costovertebral angle tenderness.   Extremities: No cyanosis or clubbing. No edema.   Skin: Warm. Dry. Good turgor. No rash. No vasculitic stigmata.  Psychiatric: Grossly appropriate affect and mood for situation.     WBC Count: 7.32 K/uL ( @ 07:21)  WBC Count: 10.04 K/uL ( @ 07:28)  WBC Count: 25.39 K/uL ( @ 14:47)                            10.9   7.32  )-----------( 150      ( 29 Sep 2021 07:21 )             33.0           140  |  104  |  15  ----------------------------<  129<H>  3.7   |  32<H>  |  0.76    Ca    8.4<L>      29 Sep 2021 07:21  Phos  3.1     09-29  Mg     1.8         TPro  6.4  /  Alb  2.3<L>  /  TBili  0.3  /  DBili  x   /  AST  24  /  ALT  19  /  AlkPhos  48        Urinalysis Basic - ( 27 Sep 2021 18:02 )    Color: Yellow / Appearance: Clear / S.010 / pH: x  Gluc: x / Ketone: Negative  / Bili: Negative / Urobili: Negative mg/dL   Blood: x / Protein: Negative mg/dL / Nitrite: Negative   Leuk Esterase: Moderate / RBC: x / WBC 3-5   Sq Epi: x / Non Sq Epi: Few / Bacteria: Few        Creatinine Trend: 0.76<--, 0.82<--, 1.17<--  Lactate, Blood: 1.3 mmol/L (21 @ 07:24)  Lactate, Blood: 2.6 mmol/L (21 @ 19:04)      MICROBIOLOGY:  v  Clean Catch Clean Catch (Midstream)  21   <10,000 CFU/mL Normal Urogenital Gayle  --  --      .Blood Blood-Peripheral  21   Growth in aerobic bottle: Escherichia coli  MDRO detected in BCID PCR, resistance marker = CTX-M (ESBL)  ***Blood Panel PCR results on this specimen are available  approximately 3 hours after the Gram stain result.***  Gram stain, PCR, and/or culture results may not always  correspond due to difference in methodologies.  ************************************************************  This PCR assay was performed by multiplex PCR. This  Assay tests for 66 bacterial and resistance gene targets.  Please refer to the [facility] test directory  at [email or web address] for details.  --  Blood Culture PCR      C-Reactive Protein, Serum: 64 ()          Procalcitonin, Serum: 11.80 (21 @ 15:25)  Procalcitonin, Serum: 11.90 (21 @ 12:48)      RADIOLOGY:    < from: MR Lumbar Spine No Cont (21 @ 15:32) >    EXAM:  MR SPINE LUMBAR                            PROCEDURE DATE:  2021          INTERPRETATION:  MRI lumbar spine without contrast    History inability to ambulate    There is no compression deformity or subluxation or marrow infiltration oredema. The conus is normal. The L1-L2 and L2-L3 levels are normal    At L3-L4 there is mild degenerative loss of disc height and signal. There is a small broad-based left paracentral disc protrusion mildly deforming the left thecal sac and narrowing the left lateral recess. There is mild ligamentous hypertrophy with only slight central canal narrowing    There is severe degenerative loss of disc height and signal at L4-L5. There is mild annular osteophyte complex exerting negligible mass effect on the ventral thecal sac and extending far laterally on either side. There is mild ligamentous hypertrophy with overall mild spinal stenosis.    There is moderately severe degenerative loss of disc height and signal at L5-S1. Far lateral osteophyte resulting in mild right foraminal stenosis without central canal stenosis    IMPRESSION:  No acute findings. Spondylosis and a small disc protrusion contributing to mild spinal stenosis as above    --- End of Report ---            CRISTIAN HANSEN MD; Attending Radiologist  This document has been electronically signed. Sep 28 2021  3:50PM    < end of copied text >    < from: CT Abdomen and Pelvis w/ Oral Cont and w/ IV Cont (21 @ 19:58) >    EXAM:  CT ABDOMEN AND PELVIS OC IC                            PROCEDURE DATE:  2021          INTERPRETATION:  CLINICAL INFORMATION: Altered mental status and sepsis    COMPARISON: 2018    CONTRAST/COMPLICATIONS:  IV Contrast: Omnipaque 350  90 cc administered   10 cc discarded  Oral Contrast: NONE  Complications: None reported at time of study completion    PROCEDURE:  CT of the Abdomen and Pelvis was performed.  Sagittal and coronal reformats were performed. The patient was unable to raise the arms above the head for the study    FINDINGS:  LOWER CHEST: Scattered areas of linear atelectasis or scarring. Coronary artery calcification .    LIVER: Within normal limits.  BILE DUCTS: Normal caliber.  GALLBLADDER: Within normal limits.  SPLEEN: Within normal limits.  PANCREAS: Within normal limits.  ADRENALS: Within normal limits.  KIDNEYS/URETERS: Within normal limits.    BLADDER: Mildly thickened wall may be due to underdistention versus outlet obstruction versus cystitis  REPRODUCTIVE ORGANS: Prostate is enlarged.    BOWEL: No bowel obstruction. Appendix is normal.  PERITONEUM: No ascites.  VESSELS: Atherosclerotic changes.  RETROPERITONEUM/LYMPH NODES: No lymphadenopathy.  ABDOMINAL WALL: Small fat-containing left inguinalhernia.  BONES: Degenerative changes.    IMPRESSION:  Mild nonspecific bladder wall thickening    Otherwise unremarkable study    --- End of Report ---            MEENAKSHI VINSON MD; Attending Radiologist  This document has been electronically signed. Sep 28 2021  8:13PM    < end of copied text >

## 2021-09-29 NOTE — PROGRESS NOTE ADULT - PROBLEM SELECTOR PLAN 2
esbl e-ecoli   ct scan cap noted   one can possibly presumed  secondary to UTI  and /or prostate   wbc normalized   continue with antibx' f/u surveillance blood cx. esbl e-ecoli   ct scan cap noted  one can possibly presumed  secondary to UTI  and /or prostate   wbc normalized   continue with antibx' f/u surveillance blood cx.

## 2021-09-29 NOTE — PROGRESS NOTE ADULT - SUBJECTIVE AND OBJECTIVE BOX
I am inheriting this patient on today 2021     Patient is a 80y old  Male who presents with a chief complaint of Weakness/Somnolence (28 Sep 2021 16:27)      OVERNIGHT EVENTS:      REVIEW OF SYSTEMS: denies chest pain/SOB, diaphoresis, no F/C, cough, dizziness, headache, blurry vision, nausea, vomiting, abdominal pain. Rest unremarkable     MEDICATIONS  (STANDING):  amLODIPine   Tablet 5 milliGRAM(s) Oral daily  aspirin  chewable 81 milliGRAM(s) Oral daily  atorvastatin 10 milliGRAM(s) Oral at bedtime  dextrose 5%. 1000 milliLiter(s) (50 mL/Hr) IV Continuous <Continuous>  dextrose 5%. 1000 milliLiter(s) (100 mL/Hr) IV Continuous <Continuous>  dextrose 50% Injectable 25 Gram(s) IV Push once  dextrose 50% Injectable 12.5 Gram(s) IV Push once  dextrose 50% Injectable 25 Gram(s) IV Push once  donepezil 5 milliGRAM(s) Oral at bedtime  enoxaparin Injectable 40 milliGRAM(s) SubCutaneous daily  finasteride 5 milliGRAM(s) Oral daily  glucagon  Injectable 1 milliGRAM(s) IntraMuscular once  influenza   Vaccine 0.5 milliLiter(s) IntraMuscular once  insulin lispro (ADMELOG) corrective regimen sliding scale   SubCutaneous three times a day before meals  insulin lispro (ADMELOG) corrective regimen sliding scale   SubCutaneous at bedtime  meropenem  IVPB      meropenem  IVPB 1000 milliGRAM(s) IV Intermittent every 8 hours  pantoprazole    Tablet 40 milliGRAM(s) Oral before breakfast  sodium chloride 0.9%. 1000 milliLiter(s) (100 mL/Hr) IV Continuous <Continuous>  tamsulosin 0.4 milliGRAM(s) Oral at bedtime    MEDICATIONS  (PRN):  acetaminophen   Tablet .. 650 milliGRAM(s) Oral every 6 hours PRN Temp greater or equal to 38C (100.4F), Mild Pain (1 - 3), Moderate Pain (4 - 6), Severe Pain (7 - 10)      Allergies    penicillin (Rash (Mild))    Intolerances        SUBJECTIVE: in bed in NAD, no acute events overnight     T(F): 98.6 (21 @ 04:53), Max: 98.6 (21 @ 04:53)  HR: 66 (21 @ 04:53) (66 - 77)  BP: 166/78 (21 @ 04:53) (166/78 - 185/94)  RR: 19 (21 @ 04:53) (19 - 20)  SpO2: 98% (21 @ 04:53) (97% - 98%)  Wt(kg): --    PHYSICAL EXAM:  GENERAL: NAD, well-groomed, well-developed  HEAD:  Atraumatic, Normocephalic  EYES: EOMI, PERRLA, conjunctiva and sclera clear  ENMT: No tonsillar erythema, exudates, or enlargement; Moist mucous membranes, Good dentition, No lesions  NECK: Supple, No JVD, Normal thyroid  CHEST/LUNG: Clear to  auscultation bilaterally; No rales, rhonchi, wheezing, or rubs  bilaterally  HEART: Regular rate and rhythm; No murmurs, rubs, or gallops  ABDOMEN: Soft, Nontender, Nondistended; Bowel sounds present  EXTREMITIES:  2+ Peripheral Pulses, No clubbing, cyanosis, or edema BL LE  LYMPH: No lymphadenopathy noted  SKIN: No rashes or lesions  NERVOUS SYSTEM:  Alert & Oriented X3, Good concentration; Motor Strength 5/5 B/L upper and lower extremities;   DTRs 2+ intact and symmetric, sensation intact BL    LABS:                        10.9   7.32  )-----------( 150      ( 29 Sep 2021 07:21 )             33.0         140  |  104  |  15  ----------------------------<  129<H>  3.7   |  32<H>  |  0.76    Ca    8.4<L>      29 Sep 2021 07:21  Phos  3.1       Mg     1.8         TPro  6.4  /  Alb  2.3<L>  /  TBili  0.3  /  DBili  x   /  AST  24  /  ALT  19  /  AlkPhos  48  -28    PT/INR - ( 27 Sep 2021 14:47 )   PT: 14.1 sec;   INR: 1.23 ratio         PTT - ( 27 Sep 2021 14:47 )  PTT:27.9 sec  Urinalysis Basic - ( 27 Sep 2021 18:02 )    Color: Yellow / Appearance: Clear / S.010 / pH: x  Gluc: x / Ketone: Negative  / Bili: Negative / Urobili: Negative mg/dL   Blood: x / Protein: Negative mg/dL / Nitrite: Negative   Leuk Esterase: Moderate / RBC: x / WBC 3-5   Sq Epi: x / Non Sq Epi: Few / Bacteria: Few      Cultures;   CAPILLARY BLOOD GLUCOSE      POCT Blood Glucose.: 141 mg/dL (29 Sep 2021 07:50)  POCT Blood Glucose.: 140 mg/dL (28 Sep 2021 21:04)  POCT Blood Glucose.: 135 mg/dL (28 Sep 2021 15:57)    Lipid panel:     CARDIAC MARKERS ( 27 Sep 2021 14:47 )  .019 ng/mL / x     / x     / x     / x            RADIOLOGY & ADDITIONAL TESTS:  < from: CT Abdomen and Pelvis w/ Oral Cont and w/ IV Cont (21 @ 19:58) >  IMPRESSION:  Mild nonspecific bladder wall thickening    Otherwise unremarkable study    --- End of Report ---    < end of copied text >    < from: CT Chest No Cont (21 @ 17:02) >  IMPRESSION:  No acute intrathoracic process      < end of copied text >  < from: MR Lumbar Spine No Cont (21 @ 15:32) >  IMPRESSION:  No acute findings. Spondylosis and a small disc protrusion contributing to mild spinal stenosis as above      < end of copied text >        Imaging Personally Reviewed:  [ x] YES      Consultant(s) Notes Reviewed:  [x ] YES     Care Discussed with [x ] Consultants [X ] Patient [x ] Family  [x ]    [x ]  Other; RN   I am inheriting this patient on today 2021     Patient is a 80y old  Male who presents with a chief complaint of Weakness/Somnolence (28 Sep 2021 16:27)      OVERNIGHT EVENTS:      REVIEW OF SYSTEMS: denies chest pain/SOB, diaphoresis, no F/C, cough, dizziness, headache, blurry vision, nausea, vomiting, abdominal pain. Rest unremarkable     MEDICATIONS  (STANDING):  amLODIPine   Tablet 5 milliGRAM(s) Oral daily  aspirin  chewable 81 milliGRAM(s) Oral daily  atorvastatin 10 milliGRAM(s) Oral at bedtime  dextrose 5%. 1000 milliLiter(s) (50 mL/Hr) IV Continuous <Continuous>  dextrose 5%. 1000 milliLiter(s) (100 mL/Hr) IV Continuous <Continuous>  dextrose 50% Injectable 25 Gram(s) IV Push once  dextrose 50% Injectable 12.5 Gram(s) IV Push once  dextrose 50% Injectable 25 Gram(s) IV Push once  donepezil 5 milliGRAM(s) Oral at bedtime  enoxaparin Injectable 40 milliGRAM(s) SubCutaneous daily  finasteride 5 milliGRAM(s) Oral daily  glucagon  Injectable 1 milliGRAM(s) IntraMuscular once  influenza   Vaccine 0.5 milliLiter(s) IntraMuscular once  insulin lispro (ADMELOG) corrective regimen sliding scale   SubCutaneous three times a day before meals  insulin lispro (ADMELOG) corrective regimen sliding scale   SubCutaneous at bedtime  meropenem  IVPB      meropenem  IVPB 1000 milliGRAM(s) IV Intermittent every 8 hours  pantoprazole    Tablet 40 milliGRAM(s) Oral before breakfast  sodium chloride 0.9%. 1000 milliLiter(s) (100 mL/Hr) IV Continuous <Continuous>  tamsulosin 0.4 milliGRAM(s) Oral at bedtime    MEDICATIONS  (PRN):  acetaminophen   Tablet .. 650 milliGRAM(s) Oral every 6 hours PRN Temp greater or equal to 38C (100.4F), Mild Pain (1 - 3), Moderate Pain (4 - 6), Severe Pain (7 - 10)      Allergies    penicillin (Rash (Mild))    Intolerances        SUBJECTIVE: in bed in NAD, no acute events overnight     T(F): 98.6 (21 @ 04:53), Max: 98.6 (21 @ 04:53)  HR: 66 (21 @ 04:53) (66 - 77)  BP: 166/78 (21 @ 04:53) (166/78 - 185/94)  RR: 19 (21 @ 04:53) (19 - 20)  SpO2: 98% (21 @ 04:53) (97% - 98%)  Wt(kg): --    PHYSICAL EXAM:  GENERAL: NAD, well-groomed, well-developed  HEAD:  Atraumatic, Normocephalic  EYES: EOMI, PERRLA, conjunctiva and sclera clear  ENMT: No tonsillar erythema, exudates, or enlargement; Moist mucous membranes, Good dentition, No lesions  NECK: Supple, No JVD, Normal thyroid  CHEST/LUNG: Clear to  auscultation bilaterally; No rales, rhonchi, wheezing, or rubs  bilaterally  HEART: Regular rate and rhythm; No murmurs, rubs, or gallops  ABDOMEN: Soft, Nontender, Nondistended; Bowel sounds present  EXTREMITIES:  2+ Peripheral Pulses, No clubbing, cyanosis, or edema BL LE  LYMPH: No lymphadenopathy noted  SKIN: No rashes or lesions  NERVOUS SYSTEM:  sleepy but arousable  has baseline Dementia     LABS:                        10.9   7.32  )-----------( 150      ( 29 Sep 2021 07:21 )             33.0         140  |  104  |  15  ----------------------------<  129<H>  3.7   |  32<H>  |  0.76    Ca    8.4<L>      29 Sep 2021 07:21  Phos  3.1       Mg     1.8         TPro  6.4  /  Alb  2.3<L>  /  TBili  0.3  /  DBili  x   /  AST  24  /  ALT  19  /  AlkPhos  48      PT/INR - ( 27 Sep 2021 14:47 )   PT: 14.1 sec;   INR: 1.23 ratio         PTT - ( 27 Sep 2021 14:47 )  PTT:27.9 sec  Urinalysis Basic - ( 27 Sep 2021 18:02 )    Color: Yellow / Appearance: Clear / S.010 / pH: x  Gluc: x / Ketone: Negative  / Bili: Negative / Urobili: Negative mg/dL   Blood: x / Protein: Negative mg/dL / Nitrite: Negative   Leuk Esterase: Moderate / RBC: x / WBC 3-5   Sq Epi: x / Non Sq Epi: Few / Bacteria: Few      Cultures;   CAPILLARY BLOOD GLUCOSE      POCT Blood Glucose.: 141 mg/dL (29 Sep 2021 07:50)  POCT Blood Glucose.: 140 mg/dL (28 Sep 2021 21:04)  POCT Blood Glucose.: 135 mg/dL (28 Sep 2021 15:57)    Lipid panel:     CARDIAC MARKERS ( 27 Sep 2021 14:47 )  .019 ng/mL / x     / x     / x     / x            RADIOLOGY & ADDITIONAL TESTS:  < from: CT Abdomen and Pelvis w/ Oral Cont and w/ IV Cont (21 @ 19:58) >  IMPRESSION:  Mild nonspecific bladder wall thickening    Otherwise unremarkable study    --- End of Report ---    < end of copied text >    < from: CT Chest No Cont (21 @ 17:02) >  IMPRESSION:  No acute intrathoracic process      < end of copied text >  < from: MR Lumbar Spine No Cont (21 @ 15:32) >  IMPRESSION:  No acute findings. Spondylosis and a small disc protrusion contributing to mild spinal stenosis as above      < end of copied text >        Imaging Personally Reviewed:  [ x] YES      Consultant(s) Notes Reviewed:  [x ] YES     Care Discussed with [x ] Consultants [X ] Patient [x ] Family  [x ]    [x ]  Other; RN

## 2021-09-30 LAB
-  AMIKACIN: SIGNIFICANT CHANGE UP
-  AMPICILLIN/SULBACTAM: SIGNIFICANT CHANGE UP
-  AMPICILLIN: SIGNIFICANT CHANGE UP
-  AZTREONAM: SIGNIFICANT CHANGE UP
-  CEFAZOLIN: SIGNIFICANT CHANGE UP
-  CEFEPIME: SIGNIFICANT CHANGE UP
-  CEFOXITIN: SIGNIFICANT CHANGE UP
-  CEFTRIAXONE: SIGNIFICANT CHANGE UP
-  CIPROFLOXACIN: SIGNIFICANT CHANGE UP
-  ERTAPENEM: SIGNIFICANT CHANGE UP
-  GENTAMICIN: SIGNIFICANT CHANGE UP
-  IMIPENEM: SIGNIFICANT CHANGE UP
-  LEVOFLOXACIN: SIGNIFICANT CHANGE UP
-  MEROPENEM: SIGNIFICANT CHANGE UP
-  PIPERACILLIN/TAZOBACTAM: SIGNIFICANT CHANGE UP
-  TOBRAMYCIN: SIGNIFICANT CHANGE UP
-  TRIMETHOPRIM/SULFAMETHOXAZOLE: SIGNIFICANT CHANGE UP
A1C WITH ESTIMATED AVERAGE GLUCOSE RESULT: 6.7 % — HIGH (ref 4–5.6)
A1C WITH ESTIMATED AVERAGE GLUCOSE RESULT: 6.7 % — HIGH (ref 4–5.6)
ANION GAP SERPL CALC-SCNC: 5 MMOL/L — SIGNIFICANT CHANGE UP (ref 5–17)
BUN SERPL-MCNC: 18 MG/DL — SIGNIFICANT CHANGE UP (ref 7–23)
CALCIUM SERPL-MCNC: 9 MG/DL — SIGNIFICANT CHANGE UP (ref 8.5–10.1)
CHLORIDE SERPL-SCNC: 103 MMOL/L — SIGNIFICANT CHANGE UP (ref 96–108)
CO2 SERPL-SCNC: 30 MMOL/L — SIGNIFICANT CHANGE UP (ref 22–31)
CREAT SERPL-MCNC: 0.7 MG/DL — SIGNIFICANT CHANGE UP (ref 0.5–1.3)
CULTURE RESULTS: SIGNIFICANT CHANGE UP
ESTIMATED AVERAGE GLUCOSE: 146 MG/DL — HIGH (ref 68–114)
ESTIMATED AVERAGE GLUCOSE: 146 MG/DL — HIGH (ref 68–114)
GLUCOSE BLDC GLUCOMTR-MCNC: 118 MG/DL — HIGH (ref 70–99)
GLUCOSE BLDC GLUCOMTR-MCNC: 151 MG/DL — HIGH (ref 70–99)
GLUCOSE BLDC GLUCOMTR-MCNC: 166 MG/DL — HIGH (ref 70–99)
GLUCOSE BLDC GLUCOMTR-MCNC: 175 MG/DL — HIGH (ref 70–99)
GLUCOSE SERPL-MCNC: 138 MG/DL — HIGH (ref 70–99)
GRAM STN FLD: SIGNIFICANT CHANGE UP
HCT VFR BLD CALC: 34 % — LOW (ref 39–50)
HGB BLD-MCNC: 11 G/DL — LOW (ref 13–17)
MAGNESIUM SERPL-MCNC: 2.2 MG/DL — SIGNIFICANT CHANGE UP (ref 1.6–2.6)
MCHC RBC-ENTMCNC: 30.9 PG — SIGNIFICANT CHANGE UP (ref 27–34)
MCHC RBC-ENTMCNC: 32.4 GM/DL — SIGNIFICANT CHANGE UP (ref 32–36)
MCV RBC AUTO: 95.5 FL — SIGNIFICANT CHANGE UP (ref 80–100)
METHOD TYPE: SIGNIFICANT CHANGE UP
NRBC # BLD: 0 /100 WBCS — SIGNIFICANT CHANGE UP (ref 0–0)
ORGANISM # SPEC MICROSCOPIC CNT: SIGNIFICANT CHANGE UP
PLATELET # BLD AUTO: 160 K/UL — SIGNIFICANT CHANGE UP (ref 150–400)
POTASSIUM SERPL-MCNC: 4.1 MMOL/L — SIGNIFICANT CHANGE UP (ref 3.5–5.3)
POTASSIUM SERPL-SCNC: 4.1 MMOL/L — SIGNIFICANT CHANGE UP (ref 3.5–5.3)
RBC # BLD: 3.56 M/UL — LOW (ref 4.2–5.8)
RBC # FLD: 13.7 % — SIGNIFICANT CHANGE UP (ref 10.3–14.5)
SODIUM SERPL-SCNC: 138 MMOL/L — SIGNIFICANT CHANGE UP (ref 135–145)
SPECIMEN SOURCE: SIGNIFICANT CHANGE UP
WBC # BLD: 4.8 K/UL — SIGNIFICANT CHANGE UP (ref 3.8–10.5)
WBC # FLD AUTO: 4.8 K/UL — SIGNIFICANT CHANGE UP (ref 3.8–10.5)

## 2021-09-30 PROCEDURE — 99231 SBSQ HOSP IP/OBS SF/LOW 25: CPT

## 2021-09-30 PROCEDURE — 99232 SBSQ HOSP IP/OBS MODERATE 35: CPT

## 2021-09-30 RX ORDER — OLANZAPINE 15 MG/1
5 TABLET, FILM COATED ORAL ONCE
Refills: 0 | Status: COMPLETED | OUTPATIENT
Start: 2021-09-30 | End: 2021-09-30

## 2021-09-30 RX ORDER — SODIUM CHLORIDE 9 MG/ML
1000 INJECTION INTRAMUSCULAR; INTRAVENOUS; SUBCUTANEOUS
Refills: 0 | Status: DISCONTINUED | OUTPATIENT
Start: 2021-09-30 | End: 2021-10-01

## 2021-09-30 RX ADMIN — MEROPENEM 100 MILLIGRAM(S): 1 INJECTION INTRAVENOUS at 05:23

## 2021-09-30 RX ADMIN — MEROPENEM 100 MILLIGRAM(S): 1 INJECTION INTRAVENOUS at 13:15

## 2021-09-30 RX ADMIN — PANTOPRAZOLE SODIUM 40 MILLIGRAM(S): 20 TABLET, DELAYED RELEASE ORAL at 05:43

## 2021-09-30 RX ADMIN — Medication 1 TABLET(S): at 19:16

## 2021-09-30 RX ADMIN — AMLODIPINE BESYLATE 5 MILLIGRAM(S): 2.5 TABLET ORAL at 05:43

## 2021-09-30 RX ADMIN — FINASTERIDE 5 MILLIGRAM(S): 5 TABLET, FILM COATED ORAL at 11:41

## 2021-09-30 RX ADMIN — TAMSULOSIN HYDROCHLORIDE 0.4 MILLIGRAM(S): 0.4 CAPSULE ORAL at 21:47

## 2021-09-30 RX ADMIN — ENOXAPARIN SODIUM 40 MILLIGRAM(S): 100 INJECTION SUBCUTANEOUS at 11:41

## 2021-09-30 RX ADMIN — SODIUM CHLORIDE 100 MILLILITER(S): 9 INJECTION INTRAMUSCULAR; INTRAVENOUS; SUBCUTANEOUS at 12:49

## 2021-09-30 RX ADMIN — SODIUM CHLORIDE 100 MILLILITER(S): 9 INJECTION INTRAMUSCULAR; INTRAVENOUS; SUBCUTANEOUS at 20:14

## 2021-09-30 RX ADMIN — ATORVASTATIN CALCIUM 10 MILLIGRAM(S): 80 TABLET, FILM COATED ORAL at 21:47

## 2021-09-30 RX ADMIN — OLANZAPINE 5 MILLIGRAM(S): 15 TABLET, FILM COATED ORAL at 19:14

## 2021-09-30 RX ADMIN — Medication 81 MILLIGRAM(S): at 11:41

## 2021-09-30 RX ADMIN — DONEPEZIL HYDROCHLORIDE 5 MILLIGRAM(S): 10 TABLET, FILM COATED ORAL at 21:47

## 2021-09-30 RX ADMIN — Medication 1: at 08:05

## 2021-09-30 RX ADMIN — Medication 1: at 16:37

## 2021-09-30 NOTE — PROGRESS NOTE ADULT - PROBLEM SELECTOR PLAN 1
likely due to gram negative bacteremia. sources include  vs GI  blood culture notted  urine cx in process  lactic acid 2.6 now 1.3  on vanc and cefepime  mrsa pending  as per brother patients mental status at baseline  plan;  ID consulted  stop vanc  followup MRSA  repeat blood cultures  ordered for Ctabdomen  holding off on mri brain at this time
in setting of known dementia,   per documentation at baseline  supportive care
in setting of known dementia,   per documentation at baseline  supportive care

## 2021-09-30 NOTE — PHARMACOTHERAPY INTERVENTION NOTE - COMMENTS
Modified penicillin allergy to state patient tolerated cefepime 9/2021
Modified penicillin allergy to state patient tolerated meropenem 9/2021
Modified penicillin allergy to state patient tolerated ceftriaxone 9/2021

## 2021-09-30 NOTE — PROGRESS NOTE ADULT - PROBLEM SELECTOR PLAN 3
-Continue with Finasteride & Flomax
-Hold home metformin  -FS TID AC & insulin sliding scale   check a1c
-Hold home metformin  -FS TID AC & insulin sliding scale   check a1c

## 2021-09-30 NOTE — PROGRESS NOTE ADULT - PROBLEM SELECTOR PLAN 2
esbl e-ecoli   ct scan cap noted  one can possibly presumed  secondary to UTI  and /or prostate   wbc normalized   continue with antibx' f/u surveillance blood cx.  9/30/2021 blood cx ngtd from 9/29/2021

## 2021-09-30 NOTE — PROVIDER CONTACT NOTE (CRITICAL VALUE NOTIFICATION) - PERSON GIVING RESULT:
Lev Das
lab, [facility]
clinical lab/Jacqu[place]

## 2021-09-30 NOTE — PHARMACOTHERAPY INTERVENTION NOTE - NSPHARMCOMMASP
ASP - Therapy recommended/ Alternative therapy

## 2021-09-30 NOTE — PROGRESS NOTE ADULT - PROBLEM SELECTOR PLAN 6
Noted ESBL gram negative marcus on blood culture  will need to identify source  followup ct abdomen mrsa. c/w cefepime.. stop vanco
-Continue with Donepezil  - PT evaluation
-Continue with Donepezil  - PT evaluation

## 2021-09-30 NOTE — PROGRESS NOTE ADULT - PROBLEM SELECTOR PLAN 4
-Continue with Finasteride & Flomax
-Continue with aspirin & statin for secondary prevention
-Continue with Finasteride & Flomax

## 2021-09-30 NOTE — PROGRESS NOTE ADULT - PROBLEM SELECTOR PLAN 5
brother at bedside. noted dementia long standing issue  patient able to walk with the help and assistance of two daughters  brother states patient mental status now at baseline  -Continue with Donepezil  -will need PT
-Continue with aspirin & statin for secondary prevention
-Continue with aspirin & statin for secondary prevention

## 2021-09-30 NOTE — PROGRESS NOTE ADULT - SUBJECTIVE AND OBJECTIVE BOX
BIBI SEXTON  MRN-[id number]    Interval History: The pt was seen and examined earlier, no distress, no new complains. The pt is afebrile, on RA.     PAST MEDICAL & SURGICAL HISTORY:  Diabetes    HTN (hypertension)    HLD (hyperlipidemia)    BPH (benign prostatic hyperplasia)    S/P hernia surgery  r.inguinal    S/P abdominal surgery, follow-up exam  sbo        ROS:    [ ] Unobtainable because:  [x ] All other systems negative    Constitutional: no fever, no chills  Head: no trauma  Eyes: no vision changes, no eye pain  ENT:  no sore throat, no rhinorrhea  Cardiovascular:  no chest pain, no palpitation  Respiratory:  no SOB, no cough  GI:  no abd pain, no vomiting, no diarrhea  urinary: no dysuria, no hematuria, no flank pain  musculoskeletal:  no joint pain, no joint swelling  skin:  no rash  neurology:  no headache, no seizure, no change in mental status  psych: no anxiety, no depression         Allergies  penicillin (Rash (Mild))        ANTIMICROBIALS:  meropenem  IVPB    meropenem  IVPB 1000 every 8 hours      OTHER MEDS:  acetaminophen   Tablet .. 650 milliGRAM(s) Oral every 6 hours PRN  amLODIPine   Tablet 5 milliGRAM(s) Oral daily  aspirin  chewable 81 milliGRAM(s) Oral daily  atorvastatin 10 milliGRAM(s) Oral at bedtime  dextrose 5%. 1000 milliLiter(s) IV Continuous <Continuous>  dextrose 5%. 1000 milliLiter(s) IV Continuous <Continuous>  dextrose 50% Injectable 25 Gram(s) IV Push once  dextrose 50% Injectable 12.5 Gram(s) IV Push once  dextrose 50% Injectable 25 Gram(s) IV Push once  donepezil 5 milliGRAM(s) Oral at bedtime  enoxaparin Injectable 40 milliGRAM(s) SubCutaneous daily  finasteride 5 milliGRAM(s) Oral daily  glucagon  Injectable 1 milliGRAM(s) IntraMuscular once  influenza   Vaccine 0.5 milliLiter(s) IntraMuscular once  insulin lispro (ADMELOG) corrective regimen sliding scale   SubCutaneous three times a day before meals  insulin lispro (ADMELOG) corrective regimen sliding scale   SubCutaneous at bedtime  pantoprazole    Tablet 40 milliGRAM(s) Oral before breakfast  sodium chloride 0.9%. 1000 milliLiter(s) IV Continuous <Continuous>  tamsulosin 0.4 milliGRAM(s) Oral at bedtime      Vital Signs Last 24 Hrs  T(C): 36.7 (30 Sep 2021 10:46), Max: 36.7 (30 Sep 2021 10:46)  T(F): 98.1 (30 Sep 2021 10:46), Max: 98.1 (30 Sep 2021 10:46)  HR: 87 (30 Sep 2021 10:46) (81 - 87)  BP: 149/76 (30 Sep 2021 10:46) (105/67 - 149/76)  BP(mean): --  RR: 19 (30 Sep 2021 10:46) (18 - 19)  SpO2: 96% (30 Sep 2021 10:46) (94% - 96%)    Physical Exam:  General: Nontoxic-appearing Male in no acute distress.  HEENT: AT/NC. Anicteric. Conjunctiva pink and moist. Oropharynx clear.   Neck: Not rigid. No sense of mass.  Nodes: None palpable.  Lungs: Diminished BS bilaterally without rales, wheezing or rhonchi  Heart: Regular rate and rhythm. No Murmur. No rub. No gallop. No palpable thrill.  Abdomen: Bowel sounds present and normoactive. Soft. Nondistended. Nontender. No sense of mass. No organomegaly. Obese.   Back: No spinal tenderness. No costovertebral angle tenderness.   Extremities: No cyanosis or clubbing. No edema.   Skin: Warm. Dry. Good turgor. No rash. No vasculitic stigmata.  Psychiatric: Grossly appropriate affect and mood for situation.     WBC Count: 4.80 K/uL (09-30 @ 08:03)  WBC Count: 7.32 K/uL (09-29 @ 07:21)  WBC Count: 10.04 K/uL (09-28 @ 07:28)  WBC Count: 25.39 K/uL (09-27 @ 14:47)                            11.0   4.80  )-----------( 160      ( 30 Sep 2021 08:03 )             34.0       09-30    138  |  103  |  18  ----------------------------<  138<H>  4.1   |  30  |  0.70    Ca    9.0      30 Sep 2021 08:03  Phos  3.1     09-29  Mg     2.2     09-30            Creatinine Trend: 0.70<--, 0.76<--, 0.82<--, 1.17<--      MICROBIOLOGY:  v  .Blood Blood  09-29-21   No growth to date.  --  --      Clean Catch Clean Catch (Midstream)  09-28-21   <10,000 CFU/mL Normal Urogenital Gayle  --  --      .Blood Blood-Peripheral  09-28-21   Growth in aerobic bottle: Escherichia coli ESBL  MDRO detected in BCID PCR, resistance marker = CTX-M (ESBL)  ***Blood Panel PCR results on this specimen are available  approximately 3 hours after the Gram stain result.***  Gram stain, PCR, and/or culture results may not always  correspond due to difference in methodologies.  ************************************************************  This PCR assay was performed by multiplex PCR. This  Assay tests for 66 bacterial and resistance gene targets.  Please refer to the [facility] test directory  at [email or web address] for details.  --  Blood Culture PCR  Escherichia coli ESBL      C-Reactive Protein, Serum: 64 (09-28)          Procalcitonin, Serum: 11.80 (09-28-21 @ 15:25)  Procalcitonin, Serum: 11.90 (09-28-21 @ 12:48)      RADIOLOGY:

## 2021-09-30 NOTE — PROGRESS NOTE ADULT - SUBJECTIVE AND OBJECTIVE BOX
I am inheriting this patient on today 9/29/2021     Patient is a 80y old  Male who presents with a chief complaint of Weakness/Somnolence (28 Sep 2021 16:27)      OVERNIGHT EVENTS:  none    MEDICATIONS  (STANDING):  amLODIPine   Tablet 5 milliGRAM(s) Oral daily  aspirin  chewable 81 milliGRAM(s) Oral daily  atorvastatin 10 milliGRAM(s) Oral at bedtime  dextrose 5%. 1000 milliLiter(s) (50 mL/Hr) IV Continuous <Continuous>  dextrose 5%. 1000 milliLiter(s) (100 mL/Hr) IV Continuous <Continuous>  dextrose 50% Injectable 25 Gram(s) IV Push once  dextrose 50% Injectable 12.5 Gram(s) IV Push once  dextrose 50% Injectable 25 Gram(s) IV Push once  donepezil 5 milliGRAM(s) Oral at bedtime  enoxaparin Injectable 40 milliGRAM(s) SubCutaneous daily  finasteride 5 milliGRAM(s) Oral daily  glucagon  Injectable 1 milliGRAM(s) IntraMuscular once  influenza   Vaccine 0.5 milliLiter(s) IntraMuscular once  insulin lispro (ADMELOG) corrective regimen sliding scale   SubCutaneous three times a day before meals  insulin lispro (ADMELOG) corrective regimen sliding scale   SubCutaneous at bedtime  meropenem  IVPB      meropenem  IVPB 1000 milliGRAM(s) IV Intermittent every 8 hours  pantoprazole    Tablet 40 milliGRAM(s) Oral before breakfast  sodium chloride 0.9%. 1000 milliLiter(s) (100 mL/Hr) IV Continuous <Continuous>  tamsulosin 0.4 milliGRAM(s) Oral at bedtime    MEDICATIONS  (PRN):  acetaminophen   Tablet .. 650 milliGRAM(s) Oral every 6 hours PRN Temp greater or equal to 38C (100.4F), Mild Pain (1 - 3), Moderate Pain (4 - 6), Severe Pain (7 - 10)      Allergies    penicillin (Rash (Mild))    Intolerances        SUBJECTIVE: in bed in NAD, no acute events overnight     Vital Signs Last 24 Hrs  T(C): 36.7 (30 Sep 2021 10:46), Max: 36.7 (30 Sep 2021 10:46)  T(F): 98.1 (30 Sep 2021 10:46), Max: 98.1 (30 Sep 2021 10:46)  HR: 87 (30 Sep 2021 10:46) (60 - 87)  BP: 149/76 (30 Sep 2021 10:46) (105/67 - 149/76)  BP(mean): --  RR: 19 (30 Sep 2021 10:46) (17 - 19)  SpO2: 96% (30 Sep 2021 10:46) (94% - 97%)    PHYSICAL EXAM:  GENERAL: NAD, well-groomed, well-developed  HEAD:  Atraumatic, Normocephalic  EYES: EOMI, PERRLA, conjunctiva and sclera clear  ENMT: No tonsillar erythema, exudates, or enlargement; Moist mucous membranes, Good dentition, No lesions  NECK: Supple, No JVD, Normal thyroid  CHEST/LUNG: Clear to  auscultation bilaterally; No rales, rhonchi, wheezing, or rubs  bilaterally  HEART: Regular rate and rhythm; No murmurs, rubs, or gallops  ABDOMEN: Soft, Nontender, Nondistended; Bowel sounds present  EXTREMITIES:  2+ Peripheral Pulses, No clubbing, cyanosis, or edema BL LE  LYMPH: No lymphadenopathy noted  SKIN: No rashes or lesions  NERVOUS SYSTEM:  sleepy but arousable  has baseline Dementia     LABS:                             11.0   4.80  )-----------( 160      ( 30 Sep 2021 08:03 )             34.0   09-30    138  |  103  |  18  ----------------------------<  138<H>  4.1   |  30  |  0.70    Ca    9.0      30 Sep 2021 08:03  Phos  3.1     09-29  Mg     2.2     09-30      A1C with Estimated Average Glucose in AM (09.30.21 @ 11:08)    A1C with Estimated Average Glucose Result: 6.7: Method: Immunoassay       Reference Range                4.0-5.6%       High risk (prediabetic)        5.7-6.4%       Diabetic, diagnostic             >=6.5%       ADA diabetic treatment goal       <7.0%  The Hemoglobin A1c testing is NGSP-certified.Reference ranges are based  upon the 2010 recommendations of  the American Diabetes Association.  Interpretation may vary for children  and adolescents. %    Estimated Average Glucose: 146: The Estimated Average Glucose (eAG) or Mean Plasma Glucose (MPG) value is  calculated from the hemoglobin A1c value and covers the same time period.   The American Diabetes Association (ADA) and other professional  organizations recommend reporting the eAG with the HgbA1c. mg/dL      Cultures;   CAPILLARY BLOOD GLUCOSE    CAPILLARY BLOOD GLUCOSE      POCT Blood Glucose.: 118 mg/dL (30 Sep 2021 10:57)  POCT Blood Glucose.: 151 mg/dL (30 Sep 2021 07:23)  POCT Blood Glucose.: 190 mg/dL (29 Sep 2021 20:59)  POCT Blood Glucose.: 133 mg/dL (29 Sep 2021 15:39)      Lipid panel:     CARDIAC MARKERS ( 27 Sep 2021 14:47 )  .019 ng/mL / x     / x     / x     / x            RADIOLOGY & ADDITIONAL TESTS:  < from: CT Abdomen and Pelvis w/ Oral Cont and w/ IV Cont (09.28.21 @ 19:58) >  IMPRESSION:  Mild nonspecific bladder wall thickening    Otherwise unremarkable study    --- End of Report ---    < end of copied text >    < from: CT Chest No Cont (09.27.21 @ 17:02) >  IMPRESSION:  No acute intrathoracic process      < end of copied text >  < from: MR Lumbar Spine No Cont (09.28.21 @ 15:32) >  IMPRESSION:  No acute findings. Spondylosis and a small disc protrusion contributing to mild spinal stenosis as above      < end of copied text >        Imaging Personally Reviewed:  [ x] YES      Consultant(s) Notes Reviewed:  [x ] YES     Care Discussed with [x ] Consultants [X ] Patient [x ] Family  [x ]    [x ]  Other; RN

## 2021-10-01 ENCOUNTER — TRANSCRIPTION ENCOUNTER (OUTPATIENT)
Age: 81
End: 2021-10-01

## 2021-10-01 VITALS
RESPIRATION RATE: 18 BRPM | DIASTOLIC BLOOD PRESSURE: 78 MMHG | OXYGEN SATURATION: 95 % | SYSTOLIC BLOOD PRESSURE: 138 MMHG | HEART RATE: 68 BPM | TEMPERATURE: 98 F

## 2021-10-01 LAB
ANION GAP SERPL CALC-SCNC: 2 MMOL/L — LOW (ref 5–17)
BUN SERPL-MCNC: 13 MG/DL — SIGNIFICANT CHANGE UP (ref 7–23)
CALCIUM SERPL-MCNC: 8.4 MG/DL — LOW (ref 8.5–10.1)
CHLORIDE SERPL-SCNC: 108 MMOL/L — SIGNIFICANT CHANGE UP (ref 96–108)
CO2 SERPL-SCNC: 30 MMOL/L — SIGNIFICANT CHANGE UP (ref 22–31)
CREAT SERPL-MCNC: 0.68 MG/DL — SIGNIFICANT CHANGE UP (ref 0.5–1.3)
FLUAV AG NPH QL: SIGNIFICANT CHANGE UP
FLUBV AG NPH QL: SIGNIFICANT CHANGE UP
GLUCOSE BLDC GLUCOMTR-MCNC: 115 MG/DL — HIGH (ref 70–99)
GLUCOSE BLDC GLUCOMTR-MCNC: 187 MG/DL — HIGH (ref 70–99)
GLUCOSE BLDC GLUCOMTR-MCNC: 225 MG/DL — HIGH (ref 70–99)
GLUCOSE SERPL-MCNC: 112 MG/DL — HIGH (ref 70–99)
HCT VFR BLD CALC: 34.1 % — LOW (ref 39–50)
HGB BLD-MCNC: 11 G/DL — LOW (ref 13–17)
MAGNESIUM SERPL-MCNC: 2.1 MG/DL — SIGNIFICANT CHANGE UP (ref 1.6–2.6)
MCHC RBC-ENTMCNC: 30.8 PG — SIGNIFICANT CHANGE UP (ref 27–34)
MCHC RBC-ENTMCNC: 32.3 GM/DL — SIGNIFICANT CHANGE UP (ref 32–36)
MCV RBC AUTO: 95.5 FL — SIGNIFICANT CHANGE UP (ref 80–100)
NRBC # BLD: 0 /100 WBCS — SIGNIFICANT CHANGE UP (ref 0–0)
PHOSPHATE SERPL-MCNC: 2.6 MG/DL — SIGNIFICANT CHANGE UP (ref 2.5–4.5)
PLATELET # BLD AUTO: 176 K/UL — SIGNIFICANT CHANGE UP (ref 150–400)
POTASSIUM SERPL-MCNC: 3.9 MMOL/L — SIGNIFICANT CHANGE UP (ref 3.5–5.3)
POTASSIUM SERPL-SCNC: 3.9 MMOL/L — SIGNIFICANT CHANGE UP (ref 3.5–5.3)
RBC # BLD: 3.57 M/UL — LOW (ref 4.2–5.8)
RBC # FLD: 13.5 % — SIGNIFICANT CHANGE UP (ref 10.3–14.5)
SARS-COV-2 RNA SPEC QL NAA+PROBE: SIGNIFICANT CHANGE UP
SODIUM SERPL-SCNC: 140 MMOL/L — SIGNIFICANT CHANGE UP (ref 135–145)
WBC # BLD: 3.86 K/UL — SIGNIFICANT CHANGE UP (ref 3.8–10.5)
WBC # FLD AUTO: 3.86 K/UL — SIGNIFICANT CHANGE UP (ref 3.8–10.5)

## 2021-10-01 PROCEDURE — 99232 SBSQ HOSP IP/OBS MODERATE 35: CPT

## 2021-10-01 PROCEDURE — 99238 HOSP IP/OBS DSCHRG MGMT 30/<: CPT

## 2021-10-01 RX ORDER — AZTREONAM 2 G
1 VIAL (EA) INJECTION
Qty: 52 | Refills: 0
Start: 2021-10-01 | End: 2021-10-26

## 2021-10-01 RX ORDER — AMLODIPINE BESYLATE 2.5 MG/1
1 TABLET ORAL
Qty: 30 | Refills: 0
Start: 2021-10-01 | End: 2021-10-30

## 2021-10-01 RX ORDER — ASPIRIN/CALCIUM CARB/MAGNESIUM 324 MG
1 TABLET ORAL
Qty: 0 | Refills: 0 | DISCHARGE
Start: 2021-10-01

## 2021-10-01 RX ADMIN — Medication 1 TABLET(S): at 17:34

## 2021-10-01 RX ADMIN — Medication 81 MILLIGRAM(S): at 11:55

## 2021-10-01 RX ADMIN — PANTOPRAZOLE SODIUM 40 MILLIGRAM(S): 20 TABLET, DELAYED RELEASE ORAL at 06:45

## 2021-10-01 RX ADMIN — Medication 2: at 17:33

## 2021-10-01 RX ADMIN — ENOXAPARIN SODIUM 40 MILLIGRAM(S): 100 INJECTION SUBCUTANEOUS at 11:54

## 2021-10-01 RX ADMIN — FINASTERIDE 5 MILLIGRAM(S): 5 TABLET, FILM COATED ORAL at 11:55

## 2021-10-01 RX ADMIN — AMLODIPINE BESYLATE 5 MILLIGRAM(S): 2.5 TABLET ORAL at 06:45

## 2021-10-01 RX ADMIN — Medication 1 TABLET(S): at 06:47

## 2021-10-01 RX ADMIN — Medication 1: at 11:53

## 2021-10-01 NOTE — PHYSICAL THERAPY INITIAL EVALUATION ADULT - GAIT TRAINING, PT EVAL
Independent in ambulation with use of RW device up to 100feet observing proper gait pattern, posture and use of walking device safely.

## 2021-10-01 NOTE — DISCHARGE NOTE NURSING/CASE MANAGEMENT/SOCIAL WORK - PATIENT PORTAL LINK FT
You can access the FollowMyHealth Patient Portal offered by [facility] by registering at the following website: [email or web address]. By joining [employer]’s FollowMyHealth portal, you will also be able to view your health information using other applications (apps) compatible with our system.

## 2021-10-01 NOTE — PROGRESS NOTE ADULT - ASSESSMENT
E. coli septicemia.  Urinalysis without significant pyuria, and for what is worth the patient has no urinary symptoms.  He has no CVA tenderness to invoke pyelonephritis.  White blood cell count is now normal.  Platelet count is low, but only minimally so.  Renal function is normal.  Liver function test not significantly elevated.  There is no concern of skin or soft tissue infection on the basis of examination.  Origin of the bacteremia is not clear.  As such, would obtain CT scan of abdomen pelvis ideally with oral and IV contrast to search for an occult source of infection.    9/29: no fevers, WBC 7.32, 6% bands, Cr ok, MR LS - no acute findings, CT abd/pelvis - bladder thickening, 1/4 blood cultures with ESBL E. Coli, repeat pending, Meropenem IV continued.   9/30: remains afebrile, no WBC, repeat blood cultures with no growth. Blood culture sensitivity is back, sensitive to Bactrim, will treat as prostatitis, the pt will need four weeks of abx total.     Suggestions  follow all culture data   stop Meropenem 1g Q8H  start Bactrim DS one tab po bid x 4 weeks total, last dose on October 25th, 2021   CT A/P - bladder thickening   monitor temperatures and WBC    Discussed with Dr. Gonzalez   Msg sent to Dr. Bateman 
80M with PMHx of T2DM, HTN, BPH, CVA, and dementia brought in by family for increasing lethargy and confusion. There is concern for acute infectious encephalopathy given leukocytosis and bandemia, but no source currently.
E. coli septicemia.  Urinalysis without significant pyuria, and for what is worth the patient has no urinary symptoms.  He has no CVA tenderness to invoke pyelonephritis.  White blood cell count is now normal.  Platelet count is low, but only minimally so.  Renal function is normal.  Liver function test not significantly elevated.  There is no concern of skin or soft tissue infection on the basis of examination.  Origin of the bacteremia is not clear.  As such, would obtain CT scan of abdomen pelvis ideally with oral and IV contrast to search for an occult source of infection.    9/29: no fevers, WBC 7.32, 6% bands, Cr ok, MR LS - no acute findings, CT abd/pelvis - bladder thickening, 1/4 blood cultures with ESBL E. Coli, repeat pending, Meropenem IV continued.     Suggestions  Recheck Blood cx - pending   continue Meropenem 1g Q8H  CT A/P - bladder thickening   F/U full sensi of isolate  monitor temperatures and WBC
E. coli septicemia.  Urinalysis without significant pyuria, and for what is worth the patient has no urinary symptoms.  He has no CVA tenderness to invoke pyelonephritis.  White blood cell count is now normal.  Platelet count is low, but only minimally so.  Renal function is normal.  Liver function test not significantly elevated.  There is no concern of skin or soft tissue infection on the basis of examination.  Origin of the bacteremia is not clear.  As such, would obtain CT scan of abdomen pelvis ideally with oral and IV contrast to search for an occult source of infection.    9/29: no fevers, WBC 7.32, 6% bands, Cr ok, MR LS - no acute findings, CT abd/pelvis - bladder thickening, 1/4 blood cultures with ESBL E. Coli, repeat pending, Meropenem IV continued.   9/30: remains afebrile, no WBC, repeat blood cultures with no growth. Blood culture sensitivity is back, sensitive to Bactrim, will treat as prostatitis, the pt will need four weeks of abx total.   10/1: No concern for active or uncontrolled infection on exam.  White blood cell count now slightly leukopenic, I doubt this has anything to do with starting the trimethoprim sulfamethoxazole.  While this agent can cause bone marrow suppression, I would not expect such after just a single days worth of therapy.  White blood cell count should be trended, but I would not alter therapy at the present time.    Suggestions  Bactrim DS one tab po bid x 4 weeks total, last dose on October 25th, 2021   Trend CBC  Labs once a week with CBC and differential while on therapy  No infectious disease objection to discharge    d/w Dr. Fransico Gonzalez MD  Attending Physician  [facility]  Division of Infectious Diseases  [phone]  
80M with PMHx of T2DM, HTN, BPH, CVA, and dementia brought in by family for increasing lethargy and confusion. There is concern for acute infectious encephalopathy given leukocytosis and bandemia, but no source currently.
80M with PMHx of T2DM, HTN, BPH, CVA, and dementia brought in by family for increasing lethargy and confusion. There is concern for acute infectious encephalopathy given leukocytosis and bandemia, but no source currently.

## 2021-10-01 NOTE — DISCHARGE NOTE PROVIDER - NSDCFUADDINST_GEN_ALL_CORE_FT
weekly CBC with diff and BMP beginning on 10/4/2021 , 10/11/202 , 10/18/202 and then 10/25/2021 faxed over to dr. Twin Gonzalez at [phone]

## 2021-10-01 NOTE — PROGRESS NOTE ADULT - REASON FOR ADMISSION
Weakness/Somnolence

## 2021-10-01 NOTE — DISCHARGE NOTE NURSING/CASE MANAGEMENT/SOCIAL WORK - NSDCPNINST_GEN_ALL_CORE
Discharge instructions given to pts Zofia waller, understanding of discharge instructions, meds, scheduling follow up appt with MD verbalized

## 2021-10-01 NOTE — DISCHARGE NOTE PROVIDER - HOSPITAL COURSE
Assessment and Plan:   · Assessment	  80M with PMHx of T2DM, HTN, BPH, CVA, and dementia brought in by family for increasing lethargy and confusion. There is concern for acute infectious encephalopathy given leukocytosis and bandemia, but no source currently.     Problem/Plan - 1:  ·  Problem: Infectious encephalopathy.   ·  Plan: in setting of known dementia,   per documentation at baseline  supportive care.     Problem/Plan - 2:  ·  Problem: Sepsis due to Escherichia coli.   ·  Plan: esbl e-ecoli   ct scan cap noted  one can possibly presumed  secondary to UTI  and /or prostate   wbc normalized   continue with antibx' f/u surveillance blood cx.  9/30/2021 blood cx ngtd from 9/29/2021.     Problem/Plan - 3:  ·  Problem: Diabetes.   ·  Plan: -Hold home metformin  -FS TID AC & insulin sliding scale   check a1c.     Problem/Plan - 4:  ·  Problem: BPH without urinary obstruction.   ·  Plan: -Continue with Finasteride & Flomax.     Problem/Plan - 5:  ·  Problem: CVA (cerebrovascular accident).   ·  Plan: -Continue with aspirin & statin for secondary prevention.     Problem/Plan - 6:  ·  Problem: Dementia.   ·  Plan: -Continue with Donepezil  - PT evaluation done home with home pt   TIME SPENT COMPLETING DISCHARGE AND COORDINATING CARE WITH NURSING,  AND CASE MANAGEMENT APPROX 40MINUTES

## 2021-10-01 NOTE — PROGRESS NOTE ADULT - SUBJECTIVE AND OBJECTIVE BOX
[facility]  Division of Infectious Diseases  [phone]    Name: BIBI SEXTON  Age: 80y  Gender: Male  MRN: [id number]    Interval History--  Notes reviewed.     Past Medical History--  Diabetes    DM (diabetes mellitus)    HTN (hypertension)    HLD (hyperlipidemia)    BPH (benign prostatic hyperplasia)    S/P hernia surgery    S/P abdominal surgery, follow-up exam    No significant past surgical history        For details regarding the patient's social history, family history, and other miscellaneous elements, please refer the initial infectious diseases consultation and/or the admitting history and physical examination for this admission.    Allergies    penicillin (Rash (Mild))    Intolerances        Medications--  Antibiotics:  trimethoprim  160 mG/sulfamethoxazole 800 mG 1 Tablet(s) Oral two times a day    Immunologic:  influenza   Vaccine 0.5 milliLiter(s) IntraMuscular once    Other:  acetaminophen   Tablet .. PRN  amLODIPine   Tablet  aspirin  chewable  atorvastatin  dextrose 5%.  dextrose 5%.  dextrose 50% Injectable  dextrose 50% Injectable  dextrose 50% Injectable  donepezil  enoxaparin Injectable  finasteride  glucagon  Injectable  insulin lispro (ADMELOG) corrective regimen sliding scale  insulin lispro (ADMELOG) corrective regimen sliding scale  pantoprazole    Tablet  sodium chloride 0.9%.  tamsulosin      Review of Systems--  A 10-point review of systems was obtained.     Pertinent positives and negatives--  Constitutional: No fevers. No Chills. No Rigors.   Cardiovascular: No chest pain. No palpitations.  Respiratory: No shortness of breath. No cough.  Gastrointestinal: No nausea or vomiting. No diarrhea or constipation.   Psychiatric: Pleasant. Appropriate affect.    Review of systems otherwise negative except as previously noted.    Physical Examination--  Vital Signs: T(F): 97.5 (10-01-21 @ 11:10), Max: 97.8 (09-30-21 @ 21:42)  HR: 69 (10-01-21 @ 11:10)  BP: 135/79 (10-01-21 @ 11:10)  RR: 18 (10-01-21 @ 11:10)  SpO2: 94% (10-01-21 @ 11:10)  Wt(kg): --  General: Nontoxic-appearing Male in no acute distress.  HEENT: AT/NC. PERRL. EOMI. Anicteric. Conjunctiva pink and moist. Oropharynx clear. Dentition fair.  Neck: Not rigid. No sense of mass.  Nodes: None palpable.  Lungs: Clear bilaterally without rales, wheezing or rhonchi  Heart: Regular rate and rhythm. No Murmur. No rub. No gallop. No palpable thrill.  Abdomen: Bowel sounds present and normoactive. Soft. Nondistended. Nontender. No sense of mass. No organomegaly.  Back: No spinal tenderness. No costovertebral angle tenderness.   Extremities: No cyanosis or clubbing. No edema.   Skin: Warm. Dry. Good turgor. No rash. No vasculitic stigmata.  Psychiatric: Appropriate affect and mood for situation.         Laboratory Studies--  CBC                        11.0   3.86  )-----------( 176      ( 01 Oct 2021 06:38 )             34.1       Chemistries  10-01    140  |  108  |  13  ----------------------------<  112<H>  3.9   |  30  |  0.68    Ca    8.4<L>      01 Oct 2021 06:38  Phos  2.6     10-01  Mg     2.1     10-01        Culture Data    Culture - Blood (collected 29 Sep 2021 00:26)  Source: .Blood Blood  Preliminary Report (30 Sep 2021 01:01):    No growth to date.    Culture - Urine (collected 28 Sep 2021 01:01)  Source: Clean Catch Clean Catch (Midstream)  Final Report (29 Sep 2021 10:01):    <10,000 CFU/mL Normal Urogenital Gayle    Culture - Blood (collected 28 Sep 2021 00:49)  Source: .Blood Blood-Peripheral  Preliminary Report (29 Sep 2021 01:01):    No growth to date.    Culture - Blood (collected 28 Sep 2021 00:49)  Source: .Blood Blood-Peripheral  Gram Stain (30 Sep 2021 09:45):    Growth in aerobic bottle: Gram Negative Rods    Growth in anaerobic bottle: Gram Negative Rods  Final Report (30 Sep 2021 09:45):    Growth in aerobic bottle: Escherichia coli ESBL    MDRO detected in BCID PCR, resistance marker = CTX-M (ESBL)    ***Blood Panel PCR results on this specimen are available    approximately 3 hours after the Gram stain result.***    Gram stain, PCR, and/or culture results may not always    correspond due to difference in methodologies.    ************************************************************    This PCR assay was performed by multiplex PCR. This    Assay tests for 66 bacterial and resistance gene targets.    Please refer to the [facility] test directory    at [email or web address] for details.  Organism: Blood Culture PCR  Escherichia coli ESBL (30 Sep 2021 09:45)  Organism: Escherichia coli ESBL (30 Sep 2021 09:45)  Organism: Blood Culture PCR (30 Sep 2021 09:45)             [facility]  Division of Infectious Diseases  [phone]    Name: BIBI SEXTON  Age: 80y  Gender: Male  MRN: [id number]    Interval History--  Notes reviewed. Seen earlier today.  Resting comfortably in bed.  No acute issues.  Case reviewed with Dr. Bateman (hospitalist).    Past Medical History--  Diabetes    DM (diabetes mellitus)    HTN (hypertension)    HLD (hyperlipidemia)    BPH (benign prostatic hyperplasia)    S/P hernia surgery    S/P abdominal surgery, follow-up exam    No significant past surgical history        For details regarding the patient's social history, family history, and other miscellaneous elements, please refer the initial infectious diseases consultation and/or the admitting history and physical examination for this admission.    Allergies    penicillin (Rash (Mild))    Intolerances        Medications--  Antibiotics:  trimethoprim  160 mG/sulfamethoxazole 800 mG 1 Tablet(s) Oral two times a day    Immunologic:  influenza   Vaccine 0.5 milliLiter(s) IntraMuscular once    Other:  acetaminophen   Tablet .. PRN  amLODIPine   Tablet  aspirin  chewable  atorvastatin  dextrose 5%.  dextrose 5%.  dextrose 50% Injectable  dextrose 50% Injectable  dextrose 50% Injectable  donepezil  enoxaparin Injectable  finasteride  glucagon  Injectable  insulin lispro (ADMELOG) corrective regimen sliding scale  insulin lispro (ADMELOG) corrective regimen sliding scale  pantoprazole    Tablet  sodium chloride 0.9%.  tamsulosin      Review of Systems--  A 10-point review of systems was obtained.   Review of systems otherwise negative except as previously noted, though not clear he is reliable.    Physical Examination--  Vital Signs: T(F): 97.5 (10-01-21 @ 11:10), Max: 97.8 (09-30-21 @ 21:42)  HR: 69 (10-01-21 @ 11:10).  BP: 135/79 (10-01-21 @ 11:10)  RR: 18 (10-01-21 @ 11:10)  SpO2: 94% (10-01-21 @ 11:10)  Wt(kg): --  General: Nontoxic-appearing Male in no acute distress. Sleepuy, rouses easily.   HEENT: AT/NC. Anicteric. Conjunctiva pink and moist. Oropharynx clear.   Neck: Not rigid. No sense of mass.  Nodes: None palpable.  Lungs: Diminished BS bilaterally without rales, wheezing or rhonchi  Heart: Regular rate and rhythm. No Murmur. No rub. No gallop. No palpable thrill.  Abdomen: Bowel sounds present and normoactive. Soft. Nondistended. Nontender. No sense of mass. No organomegaly. Obese.   Extremities: No cyanosis or clubbing. No edema.   Skin: Warm. Dry. Good turgor. No rash. No vasculitic stigmata.  Psychiatric: Grossly appropriate affect and mood for situation.       Laboratory Studies--  CBC                        11.0   3.86  )-----------( 176      ( 01 Oct 2021 06:38 )             34.1     WBC trend  WBC Count: 3.86 K/uL (10-01-21 @ 06:38)  WBC Count: 4.80 K/uL (09-30-21 @ 08:03)  WBC Count: 7.32 K/uL (09-29-21 @ 07:21)  WBC Count: 10.04 K/uL (09-28-21 @ 07:28)  WBC Count: 25.39 K/uL (09-27-21 @ 14:47)      Chemistries  10-01    140  |  108  |  13  ----------------------------<  112<H>  3.9   |  30  |  0.68    Ca    8.4<L>      01 Oct 2021 06:38  Phos  2.6     10-01  Mg     2.1     10-01        Culture Data    Culture - Blood (collected 29 Sep 2021 00:26)  Source: .Blood Blood  Preliminary Report (30 Sep 2021 01:01):    No growth to date.    Culture - Urine (collected 28 Sep 2021 01:01)  Source: Clean Catch Clean Catch (Midstream)  Final Report (29 Sep 2021 10:01):    <10,000 CFU/mL Normal Urogenital Gayle    Culture - Blood (collected 28 Sep 2021 00:49)  Source: .Blood Blood-Peripheral  Preliminary Report (29 Sep 2021 01:01):    No growth to date.    Culture - Blood (collected 28 Sep 2021 00:49)  Source: .Blood Blood-Peripheral  Gram Stain (30 Sep 2021 09:45):    Growth in aerobic bottle: Gram Negative Rods    Growth in anaerobic bottle: Gram Negative Rods  Final Report (30 Sep 2021 09:45):    Growth in aerobic bottle: Escherichia coli ESBL    MDRO detected in BCID PCR, resistance marker = CTX-M (ESBL)    ***Blood Panel PCR results on this specimen are available    approximately 3 hours after the Gram stain result.***    Gram stain, PCR, and/or culture results may not always    correspond due to difference in methodologies.    ************************************************************    This PCR assay was performed by multiplex PCR. This    Assay tests for 66 bacterial and resistance gene targets.    Please refer to the [facility] test directory    at [email or web address] for details.  Organism: Blood Culture PCR  Escherichia coli ESBL (30 Sep 2021 09:45)  Organism: Escherichia coli ESBL (30 Sep 2021 09:45)  Organism: Blood Culture PCR (30 Sep 2021 09:45)

## 2021-10-01 NOTE — DISCHARGE NOTE PROVIDER - NSDCMRMEDTOKEN_GEN_ALL_CORE_FT
amLODIPine 5 mg oral tablet: 1 tab(s) orally once a day  aspirin 81 mg oral tablet, chewable: 1 tab(s) orally once a day  atorvastatin 10 mg oral tablet: 1 tab(s) orally once a day  Bactrim  mg-160 mg oral tablet: 1 tab(s) orally 2 times a day   Dexilant 60 mg oral delayed release capsule: 1 cap(s) orally once a day  donepezil 5 mg oral tablet: 1 tab(s) orally once a day (at bedtime)  finasteride 5 mg oral tablet: 1 tab(s) orally once a day  metFORMIN 500 mg oral tablet: 1 tab(s) orally 3 times a day  tamsulosin 0.4 mg oral capsule: 1 cap(s) orally once a day

## 2021-10-01 NOTE — PHYSICAL THERAPY INITIAL EVALUATION ADULT - ADDITIONAL COMMENTS
PT contacted daughter (Mackenzie) and pt lives with family in a house with 5 steps B railings to enter. Pt ambulates with RW with assist. Pt has CDPAP 18hrs. DME: RW.

## 2021-10-01 NOTE — DISCHARGE NOTE PROVIDER - NSDCCPCAREPLAN_GEN_ALL_CORE_FT
PRINCIPAL DISCHARGE DIAGNOSIS  Diagnosis: Sepsis due to Escherichia coli  Assessment and Plan of Treatment:       SECONDARY DISCHARGE DIAGNOSES  Diagnosis: BPH without urinary obstruction  Assessment and Plan of Treatment:     Diagnosis: Diabetes  Assessment and Plan of Treatment:     Diagnosis: Infectious encephalopathy  Assessment and Plan of Treatment:     Diagnosis: Dementia  Assessment and Plan of Treatment:

## 2021-10-02 LAB — CULTURE RESULTS: SIGNIFICANT CHANGE UP

## 2021-10-03 LAB
CULTURE RESULTS: SIGNIFICANT CHANGE UP
SPECIMEN SOURCE: SIGNIFICANT CHANGE UP

## 2021-10-04 LAB
CULTURE RESULTS: SIGNIFICANT CHANGE UP
SPECIMEN SOURCE: SIGNIFICANT CHANGE UP

## 2021-10-13 DIAGNOSIS — E11.9 TYPE 2 DIABETES MELLITUS WITHOUT COMPLICATIONS: ICD-10-CM

## 2021-10-13 DIAGNOSIS — Z16.12 EXTENDED SPECTRUM BETA LACTAMASE (ESBL) RESISTANCE: ICD-10-CM

## 2021-10-13 DIAGNOSIS — F03.90 UNSPECIFIED DEMENTIA WITHOUT BEHAVIORAL DISTURBANCE: ICD-10-CM

## 2021-10-13 DIAGNOSIS — N41.9 INFLAMMATORY DISEASE OF PROSTATE, UNSPECIFIED: ICD-10-CM

## 2021-10-13 DIAGNOSIS — I95.9 HYPOTENSION, UNSPECIFIED: ICD-10-CM

## 2021-10-13 DIAGNOSIS — A41.51 SEPSIS DUE TO ESCHERICHIA COLI [E. COLI]: ICD-10-CM

## 2021-10-13 DIAGNOSIS — I10 ESSENTIAL (PRIMARY) HYPERTENSION: ICD-10-CM

## 2021-10-13 DIAGNOSIS — G93.41 METABOLIC ENCEPHALOPATHY: ICD-10-CM

## 2021-10-13 DIAGNOSIS — E78.5 HYPERLIPIDEMIA, UNSPECIFIED: ICD-10-CM

## 2021-10-13 DIAGNOSIS — N40.0 BENIGN PROSTATIC HYPERPLASIA WITHOUT LOWER URINARY TRACT SYMPTOMS: ICD-10-CM

## 2021-10-13 DIAGNOSIS — Z86.73 PERSONAL HISTORY OF TRANSIENT ISCHEMIC ATTACK (TIA), AND CEREBRAL INFARCTION WITHOUT RESIDUAL DEFICITS: ICD-10-CM

## 2021-10-13 DIAGNOSIS — Z88.0 ALLERGY STATUS TO PENICILLIN: ICD-10-CM

## 2022-06-06 NOTE — PATIENT PROFILE ADULT. - FUNCTIONAL SCREEN CURRENT LEVEL: COMMUNICATION, MLM
patient with increased sob and increased le edema with what appears to be cellulitis.  Patient to be admitted for iv abx, IV Lasix, and an echocardiogram.  TO follow closely while admitted.
(0) understands/communicates without difficulty

## 2022-06-07 NOTE — PHYSICAL THERAPY INITIAL EVALUATION ADULT - RANGE OF MOTION EXAMINATION, REHAB EVAL
Temperature low
bilateral upper extremity ROM was WFL (within functional limits)/bilateral lower extremity ROM was WFL (within functional limits)/(not formally assessed)

## 2022-06-28 ENCOUNTER — INPATIENT (INPATIENT)
Facility: HOSPITAL | Age: 82
LOS: 5 days | Discharge: HOSPICE | End: 2022-07-04
Attending: HOSPITALIST | Admitting: HOSPITALIST

## 2022-06-28 VITALS
RESPIRATION RATE: 18 BRPM | HEIGHT: 72 IN | DIASTOLIC BLOOD PRESSURE: 63 MMHG | HEART RATE: 80 BPM | TEMPERATURE: 98 F | OXYGEN SATURATION: 98 % | SYSTOLIC BLOOD PRESSURE: 122 MMHG

## 2022-06-28 DIAGNOSIS — Z09 ENCOUNTER FOR FOLLOW-UP EXAMINATION AFTER COMPLETED TREATMENT FOR CONDITIONS OTHER THAN MALIGNANT NEOPLASM: Chronic | ICD-10-CM

## 2022-06-28 DIAGNOSIS — Z98.89 OTHER SPECIFIED POSTPROCEDURAL STATES: Chronic | ICD-10-CM

## 2022-06-28 PROCEDURE — 99285 EMERGENCY DEPT VISIT HI MDM: CPT

## 2022-06-28 NOTE — ED ADULT TRIAGE NOTE - CHIEF COMPLAINT QUOTE
As Per Daughter Gareth " His blood was low my sister called, we walked him to bathroom with walker he became pale and very weak was able to walk him with asst back to bed..bp 90/40 he is ok now this is his normal..but he is .very difficult to feed him wont eat or drink on own. has been in hospital for dehydration 2 weeks ago. ." As per EMT" 20:14 family reports While in bathroom became pale and weak was not going to bathroom episode lasted apprx 10 min on arrival he 60/palp. Seems orthostatic...this is his 3rd episode...treated for dehydration. ,...FBS on scene 210, awake and alert very weak .  " hx of constipation, BPH, htn,  DM, highchol. Dementia, non verbal.

## 2022-06-29 DIAGNOSIS — R13.10 DYSPHAGIA, UNSPECIFIED: ICD-10-CM

## 2022-06-29 DIAGNOSIS — Z29.9 ENCOUNTER FOR PROPHYLACTIC MEASURES, UNSPECIFIED: ICD-10-CM

## 2022-06-29 DIAGNOSIS — E11.9 TYPE 2 DIABETES MELLITUS WITHOUT COMPLICATIONS: ICD-10-CM

## 2022-06-29 DIAGNOSIS — N39.0 URINARY TRACT INFECTION, SITE NOT SPECIFIED: ICD-10-CM

## 2022-06-29 DIAGNOSIS — I10 ESSENTIAL (PRIMARY) HYPERTENSION: ICD-10-CM

## 2022-06-29 DIAGNOSIS — G93.41 METABOLIC ENCEPHALOPATHY: ICD-10-CM

## 2022-06-29 DIAGNOSIS — D64.9 ANEMIA, UNSPECIFIED: ICD-10-CM

## 2022-06-29 DIAGNOSIS — F03.90 UNSPECIFIED DEMENTIA WITHOUT BEHAVIORAL DISTURBANCE: ICD-10-CM

## 2022-06-29 DIAGNOSIS — D63.8 ANEMIA IN OTHER CHRONIC DISEASES CLASSIFIED ELSEWHERE: ICD-10-CM

## 2022-06-29 LAB
ALBUMIN SERPL ELPH-MCNC: 3.5 G/DL — SIGNIFICANT CHANGE UP (ref 3.3–5)
ALP SERPL-CCNC: 68 U/L — SIGNIFICANT CHANGE UP (ref 40–120)
ALT FLD-CCNC: 8 U/L — SIGNIFICANT CHANGE UP (ref 4–41)
AMMONIA BLD-MCNC: 15 UMOL/L — SIGNIFICANT CHANGE UP (ref 11–55)
ANION GAP SERPL CALC-SCNC: 12 MMOL/L — SIGNIFICANT CHANGE UP (ref 7–14)
ANION GAP SERPL CALC-SCNC: 8 MMOL/L — SIGNIFICANT CHANGE UP (ref 7–14)
APPEARANCE UR: CLEAR — SIGNIFICANT CHANGE UP
APTT BLD: 26.2 SEC — LOW (ref 27–36.3)
AST SERPL-CCNC: 15 U/L — SIGNIFICANT CHANGE UP (ref 4–40)
B CEREUS GROUP DNA BLD POS QL NAA+PROBE: SIGNIFICANT CHANGE UP
BACTERIA # UR AUTO: ABNORMAL
BASE EXCESS BLDV CALC-SCNC: 1.9 MMOL/L — SIGNIFICANT CHANGE UP (ref -2–3)
BASOPHILS # BLD AUTO: 0.01 K/UL — SIGNIFICANT CHANGE UP (ref 0–0.2)
BASOPHILS NFR BLD AUTO: 0.1 % — SIGNIFICANT CHANGE UP (ref 0–2)
BILIRUB SERPL-MCNC: 0.2 MG/DL — SIGNIFICANT CHANGE UP (ref 0.2–1.2)
BILIRUB UR-MCNC: NEGATIVE — SIGNIFICANT CHANGE UP
BLD GP AB SCN SERPL QL: NEGATIVE — SIGNIFICANT CHANGE UP
BLOOD GAS VENOUS COMPREHENSIVE RESULT: SIGNIFICANT CHANGE UP
BUN SERPL-MCNC: 21 MG/DL — SIGNIFICANT CHANGE UP (ref 7–23)
BUN SERPL-MCNC: 29 MG/DL — HIGH (ref 7–23)
CALCIUM SERPL-MCNC: 8.4 MG/DL — SIGNIFICANT CHANGE UP (ref 8.4–10.5)
CALCIUM SERPL-MCNC: 9 MG/DL — SIGNIFICANT CHANGE UP (ref 8.4–10.5)
CHLORIDE BLDV-SCNC: 103 MMOL/L — SIGNIFICANT CHANGE UP (ref 96–108)
CHLORIDE SERPL-SCNC: 100 MMOL/L — SIGNIFICANT CHANGE UP (ref 98–107)
CHLORIDE SERPL-SCNC: 104 MMOL/L — SIGNIFICANT CHANGE UP (ref 98–107)
CO2 BLDV-SCNC: 30 MMOL/L — HIGH (ref 22–26)
CO2 SERPL-SCNC: 25 MMOL/L — SIGNIFICANT CHANGE UP (ref 22–31)
CO2 SERPL-SCNC: 26 MMOL/L — SIGNIFICANT CHANGE UP (ref 22–31)
COLOR SPEC: YELLOW — SIGNIFICANT CHANGE UP
CREAT SERPL-MCNC: 0.66 MG/DL — SIGNIFICANT CHANGE UP (ref 0.5–1.3)
CREAT SERPL-MCNC: 0.87 MG/DL — SIGNIFICANT CHANGE UP (ref 0.5–1.3)
DIFF PNL FLD: NEGATIVE — SIGNIFICANT CHANGE UP
EGFR: 86 ML/MIN/1.73M2 — SIGNIFICANT CHANGE UP
EGFR: 94 ML/MIN/1.73M2 — SIGNIFICANT CHANGE UP
EOSINOPHIL # BLD AUTO: 0.03 K/UL — SIGNIFICANT CHANGE UP (ref 0–0.5)
EOSINOPHIL NFR BLD AUTO: 0.4 % — SIGNIFICANT CHANGE UP (ref 0–6)
EPI CELLS # UR: 2 /HPF — SIGNIFICANT CHANGE UP (ref 0–5)
FLUAV AG NPH QL: SIGNIFICANT CHANGE UP
FLUBV AG NPH QL: SIGNIFICANT CHANGE UP
GAS PNL BLDV: 138 MMOL/L — SIGNIFICANT CHANGE UP (ref 136–145)
GAS PNL BLDV: SIGNIFICANT CHANGE UP
GLUCOSE BLDC GLUCOMTR-MCNC: 108 MG/DL — HIGH (ref 70–99)
GLUCOSE BLDC GLUCOMTR-MCNC: 126 MG/DL — HIGH (ref 70–99)
GLUCOSE BLDC GLUCOMTR-MCNC: 180 MG/DL — HIGH (ref 70–99)
GLUCOSE BLDV-MCNC: 99 MG/DL — SIGNIFICANT CHANGE UP (ref 70–99)
GLUCOSE SERPL-MCNC: 106 MG/DL — HIGH (ref 70–99)
GLUCOSE SERPL-MCNC: 106 MG/DL — HIGH (ref 70–99)
GLUCOSE UR QL: NEGATIVE — SIGNIFICANT CHANGE UP
GRAM STN FLD: SIGNIFICANT CHANGE UP
HCO3 BLDV-SCNC: 28 MMOL/L — SIGNIFICANT CHANGE UP (ref 22–29)
HCT VFR BLD CALC: 22.9 % — LOW (ref 39–50)
HCT VFR BLD CALC: 23.6 % — LOW (ref 39–50)
HCT VFR BLD CALC: 24.6 % — LOW (ref 39–50)
HCT VFR BLDA CALC: 21 % — CRITICAL LOW (ref 39–51)
HGB BLD CALC-MCNC: 7.1 G/DL — LOW (ref 13–17)
HGB BLD-MCNC: 7 G/DL — CRITICAL LOW (ref 13–17)
HGB BLD-MCNC: 7.2 G/DL — LOW (ref 13–17)
HGB BLD-MCNC: 7.4 G/DL — LOW (ref 13–17)
IANC: 3.73 K/UL — SIGNIFICANT CHANGE UP (ref 1.8–7.4)
IMM GRANULOCYTES NFR BLD AUTO: 1.2 % — SIGNIFICANT CHANGE UP (ref 0–1.5)
INR BLD: 1.2 RATIO — HIGH (ref 0.88–1.16)
KETONES UR-MCNC: NEGATIVE — SIGNIFICANT CHANGE UP
LACTATE BLDV-MCNC: 2.6 MMOL/L — HIGH (ref 0.5–2)
LACTATE SERPL-SCNC: 1 MMOL/L — SIGNIFICANT CHANGE UP (ref 0.5–2)
LEUKOCYTE ESTERASE UR-ACNC: ABNORMAL
LIDOCAIN IGE QN: 55 U/L — SIGNIFICANT CHANGE UP (ref 7–60)
LYMPHOCYTES # BLD AUTO: 1.52 K/UL — SIGNIFICANT CHANGE UP (ref 1–3.3)
LYMPHOCYTES # BLD AUTO: 22.4 % — SIGNIFICANT CHANGE UP (ref 13–44)
MAGNESIUM SERPL-MCNC: 1.8 MG/DL — SIGNIFICANT CHANGE UP (ref 1.6–2.6)
MCHC RBC-ENTMCNC: 27.8 PG — SIGNIFICANT CHANGE UP (ref 27–34)
MCHC RBC-ENTMCNC: 28 PG — SIGNIFICANT CHANGE UP (ref 27–34)
MCHC RBC-ENTMCNC: 28.6 PG — SIGNIFICANT CHANGE UP (ref 27–34)
MCHC RBC-ENTMCNC: 30.1 GM/DL — LOW (ref 32–36)
MCHC RBC-ENTMCNC: 30.5 GM/DL — LOW (ref 32–36)
MCHC RBC-ENTMCNC: 30.6 GM/DL — LOW (ref 32–36)
MCV RBC AUTO: 91.8 FL — SIGNIFICANT CHANGE UP (ref 80–100)
MCV RBC AUTO: 92.5 FL — SIGNIFICANT CHANGE UP (ref 80–100)
MCV RBC AUTO: 93.5 FL — SIGNIFICANT CHANGE UP (ref 80–100)
METHOD TYPE: SIGNIFICANT CHANGE UP
MONOCYTES # BLD AUTO: 1.42 K/UL — HIGH (ref 0–0.9)
MONOCYTES NFR BLD AUTO: 20.9 % — HIGH (ref 2–14)
NEUTROPHILS # BLD AUTO: 3.73 K/UL — SIGNIFICANT CHANGE UP (ref 1.8–7.4)
NEUTROPHILS NFR BLD AUTO: 55 % — SIGNIFICANT CHANGE UP (ref 43–77)
NITRITE UR-MCNC: POSITIVE
NRBC # BLD: 0 /100 WBCS — SIGNIFICANT CHANGE UP
NRBC # FLD: 0 K/UL — SIGNIFICANT CHANGE UP
PCO2 BLDV: 55 MMHG — SIGNIFICANT CHANGE UP (ref 42–55)
PH BLDV: 7.32 — SIGNIFICANT CHANGE UP (ref 7.32–7.43)
PH UR: 6.5 — SIGNIFICANT CHANGE UP (ref 5–8)
PHOSPHATE SERPL-MCNC: 2.6 MG/DL — SIGNIFICANT CHANGE UP (ref 2.5–4.5)
PLATELET # BLD AUTO: 180 K/UL — SIGNIFICANT CHANGE UP (ref 150–400)
PLATELET # BLD AUTO: 190 K/UL — SIGNIFICANT CHANGE UP (ref 150–400)
PLATELET # BLD AUTO: 217 K/UL — SIGNIFICANT CHANGE UP (ref 150–400)
PO2 BLDV: 30 MMHG — SIGNIFICANT CHANGE UP
POTASSIUM BLDV-SCNC: 4.4 MMOL/L — SIGNIFICANT CHANGE UP (ref 3.5–5.1)
POTASSIUM SERPL-MCNC: 4.3 MMOL/L — SIGNIFICANT CHANGE UP (ref 3.5–5.3)
POTASSIUM SERPL-MCNC: 4.4 MMOL/L — SIGNIFICANT CHANGE UP (ref 3.5–5.3)
POTASSIUM SERPL-SCNC: 4.3 MMOL/L — SIGNIFICANT CHANGE UP (ref 3.5–5.3)
POTASSIUM SERPL-SCNC: 4.4 MMOL/L — SIGNIFICANT CHANGE UP (ref 3.5–5.3)
PROT SERPL-MCNC: 7.3 G/DL — SIGNIFICANT CHANGE UP (ref 6–8.3)
PROT UR-MCNC: ABNORMAL
PROTHROM AB SERPL-ACNC: 14 SEC — HIGH (ref 10.5–13.4)
RBC # BLD: 2.45 M/UL — LOW (ref 4.2–5.8)
RBC # BLD: 2.57 M/UL — LOW (ref 4.2–5.8)
RBC # BLD: 2.66 M/UL — LOW (ref 4.2–5.8)
RBC # FLD: 14 % — SIGNIFICANT CHANGE UP (ref 10.3–14.5)
RBC # FLD: 14.3 % — SIGNIFICANT CHANGE UP (ref 10.3–14.5)
RBC # FLD: 14.4 % — SIGNIFICANT CHANGE UP (ref 10.3–14.5)
RBC CASTS # UR COMP ASSIST: 8 /HPF — HIGH (ref 0–4)
RH IG SCN BLD-IMP: POSITIVE — SIGNIFICANT CHANGE UP
RH IG SCN BLD-IMP: POSITIVE — SIGNIFICANT CHANGE UP
RSV RNA NPH QL NAA+NON-PROBE: SIGNIFICANT CHANGE UP
SAO2 % BLDV: 40.2 % — SIGNIFICANT CHANGE UP
SARS-COV-2 RNA SPEC QL NAA+PROBE: SIGNIFICANT CHANGE UP
SODIUM SERPL-SCNC: 137 MMOL/L — SIGNIFICANT CHANGE UP (ref 135–145)
SODIUM SERPL-SCNC: 138 MMOL/L — SIGNIFICANT CHANGE UP (ref 135–145)
SP GR SPEC: 1.03 — SIGNIFICANT CHANGE UP (ref 1–1.05)
SPECIMEN SOURCE: SIGNIFICANT CHANGE UP
UROBILINOGEN FLD QL: ABNORMAL
WBC # BLD: 4.04 K/UL — SIGNIFICANT CHANGE UP (ref 3.8–10.5)
WBC # BLD: 4.33 K/UL — SIGNIFICANT CHANGE UP (ref 3.8–10.5)
WBC # BLD: 6.79 K/UL — SIGNIFICANT CHANGE UP (ref 3.8–10.5)
WBC # FLD AUTO: 4.04 K/UL — SIGNIFICANT CHANGE UP (ref 3.8–10.5)
WBC # FLD AUTO: 4.33 K/UL — SIGNIFICANT CHANGE UP (ref 3.8–10.5)
WBC # FLD AUTO: 6.79 K/UL — SIGNIFICANT CHANGE UP (ref 3.8–10.5)
WBC UR QL: 4 /HPF — SIGNIFICANT CHANGE UP (ref 0–5)

## 2022-06-29 PROCEDURE — 71045 X-RAY EXAM CHEST 1 VIEW: CPT | Mod: 26

## 2022-06-29 PROCEDURE — 99223 1ST HOSP IP/OBS HIGH 75: CPT

## 2022-06-29 RX ORDER — DONEPEZIL HYDROCHLORIDE 10 MG/1
1 TABLET, FILM COATED ORAL
Qty: 0 | Refills: 0 | DISCHARGE

## 2022-06-29 RX ORDER — POLYETHYLENE GLYCOL 3350 17 G/17G
17 POWDER, FOR SOLUTION ORAL
Qty: 0 | Refills: 0 | DISCHARGE

## 2022-06-29 RX ORDER — ERTAPENEM SODIUM 1 G/1
1000 INJECTION, POWDER, LYOPHILIZED, FOR SOLUTION INTRAMUSCULAR; INTRAVENOUS EVERY 24 HOURS
Refills: 0 | Status: DISCONTINUED | OUTPATIENT
Start: 2022-06-30 | End: 2022-06-30

## 2022-06-29 RX ORDER — DEXTROSE 50 % IN WATER 50 %
25 SYRINGE (ML) INTRAVENOUS ONCE
Refills: 0 | Status: DISCONTINUED | OUTPATIENT
Start: 2022-06-29 | End: 2022-07-04

## 2022-06-29 RX ORDER — DEXLANSOPRAZOLE 30 MG/1
1 CAPSULE, DELAYED RELEASE ORAL
Qty: 0 | Refills: 0 | DISCHARGE

## 2022-06-29 RX ORDER — ERGOCALCIFEROL 1.25 MG/1
1 CAPSULE ORAL
Qty: 0 | Refills: 0 | DISCHARGE

## 2022-06-29 RX ORDER — ATORVASTATIN CALCIUM 80 MG/1
1 TABLET, FILM COATED ORAL
Qty: 0 | Refills: 0 | DISCHARGE

## 2022-06-29 RX ORDER — ATORVASTATIN CALCIUM 80 MG/1
10 TABLET, FILM COATED ORAL AT BEDTIME
Refills: 0 | Status: DISCONTINUED | OUTPATIENT
Start: 2022-06-29 | End: 2022-07-04

## 2022-06-29 RX ORDER — METFORMIN HYDROCHLORIDE 850 MG/1
1 TABLET ORAL
Qty: 0 | Refills: 0 | DISCHARGE

## 2022-06-29 RX ORDER — ACETAMINOPHEN 500 MG
650 TABLET ORAL EVERY 6 HOURS
Refills: 0 | Status: DISCONTINUED | OUTPATIENT
Start: 2022-06-29 | End: 2022-07-04

## 2022-06-29 RX ORDER — TAMSULOSIN HYDROCHLORIDE 0.4 MG/1
1 CAPSULE ORAL
Qty: 0 | Refills: 0 | DISCHARGE

## 2022-06-29 RX ORDER — FINASTERIDE 5 MG/1
1 TABLET, FILM COATED ORAL
Qty: 0 | Refills: 0 | DISCHARGE

## 2022-06-29 RX ORDER — GLUCAGON INJECTION, SOLUTION 0.5 MG/.1ML
1 INJECTION, SOLUTION SUBCUTANEOUS ONCE
Refills: 0 | Status: DISCONTINUED | OUTPATIENT
Start: 2022-06-29 | End: 2022-07-04

## 2022-06-29 RX ORDER — DEXTROSE 50 % IN WATER 50 %
15 SYRINGE (ML) INTRAVENOUS ONCE
Refills: 0 | Status: DISCONTINUED | OUTPATIENT
Start: 2022-06-29 | End: 2022-07-04

## 2022-06-29 RX ORDER — VALSARTAN 80 MG/1
40 TABLET ORAL DAILY
Refills: 0 | Status: DISCONTINUED | OUTPATIENT
Start: 2022-06-29 | End: 2022-07-04

## 2022-06-29 RX ORDER — ERTAPENEM SODIUM 1 G/1
INJECTION, POWDER, LYOPHILIZED, FOR SOLUTION INTRAMUSCULAR; INTRAVENOUS
Refills: 0 | Status: DISCONTINUED | OUTPATIENT
Start: 2022-06-29 | End: 2022-06-30

## 2022-06-29 RX ORDER — DEXTROSE 50 % IN WATER 50 %
12.5 SYRINGE (ML) INTRAVENOUS ONCE
Refills: 0 | Status: DISCONTINUED | OUTPATIENT
Start: 2022-06-29 | End: 2022-07-04

## 2022-06-29 RX ORDER — ONDANSETRON 8 MG/1
4 TABLET, FILM COATED ORAL EVERY 8 HOURS
Refills: 0 | Status: DISCONTINUED | OUTPATIENT
Start: 2022-06-29 | End: 2022-07-04

## 2022-06-29 RX ORDER — FINASTERIDE 5 MG/1
5 TABLET, FILM COATED ORAL DAILY
Refills: 0 | Status: DISCONTINUED | OUTPATIENT
Start: 2022-06-29 | End: 2022-07-04

## 2022-06-29 RX ORDER — SODIUM CHLORIDE 9 MG/ML
1000 INJECTION, SOLUTION INTRAVENOUS
Refills: 0 | Status: DISCONTINUED | OUTPATIENT
Start: 2022-06-29 | End: 2022-07-04

## 2022-06-29 RX ORDER — SODIUM CHLORIDE 9 MG/ML
1500 INJECTION INTRAMUSCULAR; INTRAVENOUS; SUBCUTANEOUS ONCE
Refills: 0 | Status: COMPLETED | OUTPATIENT
Start: 2022-06-29 | End: 2022-06-29

## 2022-06-29 RX ORDER — INSULIN LISPRO 100/ML
VIAL (ML) SUBCUTANEOUS AT BEDTIME
Refills: 0 | Status: DISCONTINUED | OUTPATIENT
Start: 2022-06-29 | End: 2022-07-04

## 2022-06-29 RX ORDER — POLYETHYLENE GLYCOL 3350 17 G/17G
17 POWDER, FOR SOLUTION ORAL DAILY
Refills: 0 | Status: DISCONTINUED | OUTPATIENT
Start: 2022-06-29 | End: 2022-07-04

## 2022-06-29 RX ORDER — ERTAPENEM SODIUM 1 G/1
1000 INJECTION, POWDER, LYOPHILIZED, FOR SOLUTION INTRAMUSCULAR; INTRAVENOUS ONCE
Refills: 0 | Status: COMPLETED | OUTPATIENT
Start: 2022-06-29 | End: 2022-06-29

## 2022-06-29 RX ORDER — INSULIN LISPRO 100/ML
VIAL (ML) SUBCUTANEOUS
Refills: 0 | Status: DISCONTINUED | OUTPATIENT
Start: 2022-06-29 | End: 2022-07-04

## 2022-06-29 RX ADMIN — ATORVASTATIN CALCIUM 10 MILLIGRAM(S): 80 TABLET, FILM COATED ORAL at 23:03

## 2022-06-29 RX ADMIN — VALSARTAN 40 MILLIGRAM(S): 80 TABLET ORAL at 15:43

## 2022-06-29 RX ADMIN — FINASTERIDE 5 MILLIGRAM(S): 5 TABLET, FILM COATED ORAL at 15:43

## 2022-06-29 RX ADMIN — ERTAPENEM SODIUM 1000 MILLIGRAM(S): 1 INJECTION, POWDER, LYOPHILIZED, FOR SOLUTION INTRAMUSCULAR; INTRAVENOUS at 12:34

## 2022-06-29 RX ADMIN — SODIUM CHLORIDE 1500 MILLILITER(S): 9 INJECTION INTRAMUSCULAR; INTRAVENOUS; SUBCUTANEOUS at 01:04

## 2022-06-29 NOTE — ED PROVIDER NOTE - CLINICAL SUMMARY MEDICAL DECISION MAKING FREE TEXT BOX
pt with dementia with weakness and hypotension.  Need to consider sepsis, encephalopathy, lyte abnormality, anemia.  Will need broad lab workup and UA.  Will give some IVF.  Hold ABx as no SIRS as of yet.  If workup is negative will consider adding a CT.  Will need admission to the hospital.  GOC discussion with family and they wish him to remain FULL CODE.

## 2022-06-29 NOTE — H&P ADULT - NSICDXPASTMEDICALHX_GEN_ALL_CORE_FT
PAST MEDICAL HISTORY:  Anemia of chronic disease     BPH (benign prostatic hyperplasia)     Dementia     Diabetes     HLD (hyperlipidemia)     HTN (hypertension)

## 2022-06-29 NOTE — ED PROVIDER NOTE - PHYSICAL EXAMINATION
Vitals: I have reviewed the patients vital signs  General: nontoxic appearing  HEENT: Atraumatic, normocephalic, airway patent  Eyes: EOMI, tracking appropriately  Neck: no tracheal deviation  Chest/Lungs: no trauma, symmetric chest rise, speaking in complete sentences,  no resp distress  Heart: skin and extremities well perfused, regular rate and rhythm  Neuro: Alert unable to participate in exam  MSK: no deformities  Skin: no cyanosis, no jaundice

## 2022-06-29 NOTE — ED ADULT NURSE NOTE - OBJECTIVE STATEMENT
82 yo with history of dementia presenting to the hospital with reports of weakness and poor PO intake.  Family reports poor po intake, and pt became very weak and was unable to ambulate. Pt bps was also low so the called 911. Family denies fever. 20g placed in right forearm, labs sent. Pt straight cath, tolerated well. Pt medicated will monitor pt. NSR on cardiac monitor. Pt combative during blood draws. will monitor pt.

## 2022-06-29 NOTE — ED PROVIDER NOTE - OBJECTIVE STATEMENT
80 yo with history of dementia presenting to the hospital with reports of weakness and poor PO intake.  Pt is non verbal secondary to his dementia and does not provide any history.  Family reports that today the patient stopped taking PO (although typically eats and drinks very little).  Became very weak tonight and was unable to ambulate to the bathroom prompting them to call 911.  They noted a low BP in the house as well.  Denies any fevers.  Has had previous episodes in the past like this where he had infections.

## 2022-06-29 NOTE — SWALLOW BEDSIDE ASSESSMENT ADULT - SWALLOW EVAL: DIAGNOSIS
1. Functional oral stage for puree and mildly thick liquids marked by adequate oral acceptance, collection and transfer. 2. Mild oral dysphagia for regular solids marked by adequate oral acceptance with mildly slow chewing/collection with eventual transfer. 3. Functional pharyngeal phase for puree, regular solids, and mildly thick liquids marked by a present pharyngeal swallow trigger with hyolaryngeal elevation noted upon digital palpation without evidence of airway penetration/aspiration. Of note, patient declined PO trials of thin liquids by shaking head and waving hand to indicate "no'.

## 2022-06-29 NOTE — H&P ADULT - CONVERSATION DETAILS
Discussed that the patient has been having a decline over the past year and that at this time Sierra and her sister should have a conversation regarding what their father would want in the event of an emergency. I explained CPR and mechanical ventilation. At this time they would like to pursue all medical treatments and will discuss amongst each other if they would want CPR/life support.

## 2022-06-29 NOTE — H&P ADULT - PROBLEM SELECTOR PLAN 3
Pt with chronic anemia per daughter, unclear source or baseline hemoglobin, attempted to contact PMD but unable to reach. Low suspicion for GI bleed as patient is constipated, has no history of melena or hematochezia, unable to perform rectal as pt becomes violent  - s/p 1U prbc  - follow up repeat CBC  - follow up iron studies, B12, folate added on to ED labs

## 2022-06-29 NOTE — ED PROVIDER NOTE - PROGRESS NOTE DETAILS
Francisca Calix MD PGY2: Patient /80, HR 82 at bedside. Francisca Calix MD PGY2: Patient consented to blood transfusion over the phone with family member. Patient TBA for r/o GI bleed. Attempt guaiac but patient refused.

## 2022-06-29 NOTE — H&P ADULT - ASSESSMENT
81y M with dementia, HTN, HLD, T2DM, BPH, Anemia, CVA who presents from home with failure to thrive and metabolic encephalopathy in the setting of UTI

## 2022-06-29 NOTE — CONSULT NOTE ADULT - SUBJECTIVE AND OBJECTIVE BOX
Cardiology    HISTORY OF PRESENT ILLNESS: HPI:  81y M with dementia, HTN, HLD, T2DM, BPH, CVA (29 Jun 2022 12:05)  Essentially here w/ failure to thrive, poor PO intake, weakness.  Unable to give a good interval HPI due to dementia, which as worsened in the last few years.  10pt ROS is not able to be obtained.    Dr Phillips last note:  ...with hypertension, hypercholesterolemia, and diabetes with preserved LV systolic function on a remote echo, no ischemia on a very remote stress echo, who  presents to us today for a stress echocardiogram for evaluation of chest discomfort. The patient states he  experienced intermittent left-sided chest discomfort sometimes at rest and other times with exertion. He was in  the hospital recently for nausea and vomiting likely of a GI etiology, underwent a CT scan and MRI of his brain  which is within normal limits. The patient denies orthopnea, PND, or lower extremity edema. He has not  passed out.  PMHX: Diabetes; Hypercholesteremia; Hypertension  PSHX: Hernia repair  Social HX: Drugs - NO HISTORY OF DRUG ABUSE; Smoking - FORMER SMOKER - remotely in  teenage years  Fam HX: Noncontributory - Healthy  Allergies: Lisinoprilcough  Medications: aspirin 81 mg tablet,delayed release 1 TABLET DAILY; valsartan 80 mg tablet 1 TABLET  DAILY; atorvastatin 10 mg tablet 1 TABLET DAILY; metformin 500 mg tablet 1 TABLET DAILY;    PAST MEDICAL & SURGICAL HISTORY:  Diabetes  HTN (hypertension)  HLD (hyperlipidemia)  BPH (benign prostatic hyperplasia)  Dementia  Anemia of chronic disease  S/P hernia surgery  r.inguinal  S/P abdominal surgery, follow-up exam  sbo    MEDICATIONS  (STANDING):  atorvastatin 10 milliGRAM(s) Oral at bedtime  ertapenem  IVPB      finasteride 5 milliGRAM(s) Oral daily  multivitamin 1 Tablet(s) Oral daily  polyethylene glycol 3350 17 Gram(s) Oral daily    Allergies    penicillin (Rash (Mild))    Intolerances    FAMILY HISTORY:    Non-contributary for premature coronary disease or sudden cardiac death    SOCIAL HISTORY:    [ x] Non-smoker  [ ] Smoker  [ ] Alcohol      PHYSICAL EXAM:  T(C): 36.3 (06-29-22 @ 09:00), Max: 36.9 (06-29-22 @ 04:30)  HR: 62 (06-29-22 @ 09:00) (61 - 80)  BP: 126/82 (06-29-22 @ 09:00) (122/63 - 178/81)  RR: 18 (06-29-22 @ 09:00) (17 - 19)  SpO2: 100% (06-29-22 @ 09:00) (98% - 100%)  Wt(kg): --    Appearance: thin elderly man, resting comfortably.  HEENT:   Normal oral mucosa, PERRL, EOMI	  Lymphatic: No lymphadenopathy , no edema  Cardiovascular: Normal S1 S2, No JVD, No murmurs , Peripheral pulses palpable 2+ bilaterally  Respiratory: Lungs clear to auscultation, normal effort 	  Gastrointestinal:  Soft, Non-tender, + BS	  Skin: No rashes, No ecchymoses, No cyanosis, warm to touch  Musculoskeletal: Normal range of motion, normal strength  Psychiatry:  oriented x name.  no acute distress.    TELEMETRY: NSR	    ECG: NSR  	  LABS:	 	                          7.2    4.33  )-----------( 180      ( 29 Jun 2022 12:30 )             23.6     06-29    137  |  100  |  29<H>  ----------------------------<  106<H>  4.3   |  25  |  0.87    Ca    9.0      29 Jun 2022 00:15    TPro  7.3  /  Alb  3.5  /  TBili  0.2  /  DBili  x   /  AST  15  /  ALT  8   /  AlkPhos  68  06-29    ASSESSMENT/PLAN: 	81y Male with HTN, HLD, prior CVA, here with failure to thrive.  No prior MI.      Check an echocardiogram, rule out CHF (not edematous or in pulmonary edema as he appears dehydrated).  (Last echo and stress testing was in ~3249-7059, normal but out of date)  Kidney function is normal, OK to resume Valsartan.  Will follow.  No invasive workup planned, re: dementia.  Would address family's goals of care before ordering more CV testing.      Gómez Pacheco M.D.  Cardiac Electrophysiology    office [phone]  pager [phone]

## 2022-06-29 NOTE — H&P ADULT - PROBLEM SELECTOR PLAN 4
Dementia progressive per daughter, pt more bed bound  - PT consult  - consider palliative consult if in line with family GOC

## 2022-06-29 NOTE — PHARMACOTHERAPY INTERVENTION NOTE - COMMENTS
Medication history is complete. Medication list updated in Outpatient Medication Record (OMR). Spoke w/daughter (Sierra [phone]). provided medication list and verified with outpatient pharmacy ([facility]).   Of Note:  - Per Daughter: pt was on Donepezil 10mg QD, however stopped giving to patient (~1 month ago).

## 2022-06-29 NOTE — H&P ADULT - PROBLEM SELECTOR PLAN 1
Pt with AMS and +UA, prior culture data with ESBL E Coli   - Start ertapenem pending urine and blood culture results  - follow up urine and blood cultures

## 2022-06-29 NOTE — H&P ADULT - NSHPLABSRESULTS_GEN_ALL_CORE
7.0    6.79  )-----------( 217      ( 2022 00:15 )             22.9         137  |  100  |  29<H>  ----------------------------<  106<H>  4.3   |  25  |  0.87    Ca    9.0      2022 00:15    TPro  7.3  /  Alb  3.5  /  TBili  0.2  /  DBili  x   /  AST  15  /  ALT  8   /  AlkPhos  68      PT/INR - ( 2022 00:15 )   PT: 14.0 sec;   INR: 1.20 ratio         PTT - ( 2022 00:15 )  PTT:26.2 sec    CAPILLARY BLOOD GLUCOSE      POCT Blood Glucose.: 197 mg/dL (2022 22:11)      Urinalysis Basic - ( 2022 00:50 )  Color: Yellow / Appearance: Clear / S.026 / pH: x  Gluc: x / Ketone: Negative  / Bili: Negative / Urobili: 3 mg/dL   Blood: x / Protein: 30 mg/dL / Nitrite: Positive   Leuk Esterase: Small / RBC: 8 /HPF / WBC 4 /HPF   Sq Epi: x / Non Sq Epi: 2 /HPF / Bacteria: Moderate      < from: Xray Chest 1 View- PORTABLE-Urgent (22 @ 00:57) >    INTERPRETATION:  EXAMINATION: XR CHEST URGENT    CLINICAL INDICATION: Sepsis    TECHNIQUE: Single frontal, portable view of the chest wasobtained.    COMPARISON: Chest radiograph 2021.    FINDINGS:  The heart size is normal.  The lungs are clear aside from a calcified granuloma in the right midlung   field.  No pleural effusion or pneumothorax.    IMPRESSION:  Clear lungs.    ---End of Report ---    < end of copied text >

## 2022-06-29 NOTE — H&P ADULT - HISTORY OF PRESENT ILLNESS
81y M with dementia, HTN, HLD, T2DM, BPH, CVA 81y M with dementia, HTN, HLD, T2DM, BPH, Anemia, CVA who presents from home with low blood pressure. Pt is minimally verbal and unable to participate in history taking, history was obtained by discussing with patients daughter, Sierra, at [phone]. Over the past few weeks the patient has had decreased PO intake, is now mostly bed bound, and will only rarely get out of bed to ambulate. The daughter took his blood pressure yesterday because he had been refusing to get out of bed and she noted that his BP was 90s/40s so she brought him to the ER. She states that the patient has been afebrile, no coughing or shortness of breath, no melena or hematochezia. Pt has been constipated requiring laxatives. He has had foul smelling urinePt had an episode of yellow emesis 2 weeks ago.    While in the ED pt had /60, HR 80and was afebrile. Pt s/p 1.5L of NS. Pt was also found to be anemic with Hb of 7 and is s/p 1U pRBC.     The daughter notes that over the past few years the patient has had a decline in functional status, has slowly stopped talking and has now stopped walking most of the time. He sometimes chokes and has been transitioned to thickened liquids with his diet.

## 2022-06-29 NOTE — ED ADULT NURSE REASSESSMENT NOTE - NS ED NURSE REASSESS COMMENT FT1
[facility] started ar per [facility] policy unable to educate pt regarding indication and risks due to dementia, will monitor pt.

## 2022-06-29 NOTE — PATIENT PROFILE ADULT - FALL HARM RISK - HARM RISK INTERVENTIONS
Assistance with ambulation/Assistance OOB with selected safe patient handling equipment/Communicate Risk of Fall with Harm to all staff/Discuss with provider need for PT consult/Monitor gait and stability/Reinforce activity limits and safety measures with patient and family/Tailored Fall Risk Interventions/Visual Cue: Yellow wristband and red socks/Bed in lowest position, wheels locked, appropriate side rails in place/Call bell, personal items and telephone in reach/Instruct patient to call for assistance before getting out of bed or chair/Non-slip footwear when patient is out of bed/[place] to call system/Physically safe environment - no spills, clutter or unnecessary equipment/Purposeful Proactive Rounding/Room/bathroom lighting operational, light cord in reach

## 2022-06-29 NOTE — H&P ADULT - PROBLEM SELECTOR PLAN 2
2/2 UTI, also with progression of dementia  - continue to treat infection  - correct electrolyte abnormalities  - avoid benzos, can give low dose haldol if needed for agitation

## 2022-06-29 NOTE — SWALLOW BEDSIDE ASSESSMENT ADULT - COMMENTS
H&P "81y M with dementia, HTN, HLD, T2DM, BPH, Anemia, CVA who presents from home with low blood pressure. Pt is minimally verbal and unable to participate in history taking, history was obtained by discussing with patients daughter, Sierra, at [phone]. Over the past few weeks the patient has had decreased PO intake, is now mostly bed bound, and will only rarely get out of bed to ambulate. The daughter took his blood pressure yesterday because he had been refusing to get out of bed and she noted that his BP was 90s/40s so she brought him to the ER. She states that the patient has been afebrile, no coughing or shortness of breath, no melena or hematochezia. Pt has been constipated requiring laxatives. He has had foul smelling urinePt had an episode of yellow emesis 2 weeks ago."    CXR 6/29 "Clear lungs."    Patient seen at bedside in the [facility] this afternoon, at which time patient was alert. Attempted to utilize [employer] [facility]  (ID [id number]), however, patient did not verbalize or follow directives. Patient accepted PO trials when presented via teaspoon/cup and able to gesture yes/no.

## 2022-06-29 NOTE — SWALLOW BEDSIDE ASSESSMENT ADULT - ASR SWALLOW REFERRAL
RD consult to ensure adequate caloric intake and provide nutritional supplements (i.e. ensure shakes)

## 2022-06-30 DIAGNOSIS — K92.2 GASTROINTESTINAL HEMORRHAGE, UNSPECIFIED: ICD-10-CM

## 2022-06-30 LAB
A1C WITH ESTIMATED AVERAGE GLUCOSE RESULT: 5.8 % — HIGH (ref 4–5.6)
ANION GAP SERPL CALC-SCNC: 15 MMOL/L — HIGH (ref 7–14)
BASOPHILS # BLD AUTO: 0.01 K/UL — SIGNIFICANT CHANGE UP (ref 0–0.2)
BASOPHILS NFR BLD AUTO: 0.1 % — SIGNIFICANT CHANGE UP (ref 0–2)
BUN SERPL-MCNC: 17 MG/DL — SIGNIFICANT CHANGE UP (ref 7–23)
CALCIUM SERPL-MCNC: 9.4 MG/DL — SIGNIFICANT CHANGE UP (ref 8.4–10.5)
CHLORIDE SERPL-SCNC: 97 MMOL/L — LOW (ref 98–107)
CO2 SERPL-SCNC: 24 MMOL/L — SIGNIFICANT CHANGE UP (ref 22–31)
CREAT SERPL-MCNC: 0.68 MG/DL — SIGNIFICANT CHANGE UP (ref 0.5–1.3)
CULTURE RESULTS: SIGNIFICANT CHANGE UP
EGFR: 93 ML/MIN/1.73M2 — SIGNIFICANT CHANGE UP
EOSINOPHIL # BLD AUTO: 0.07 K/UL — SIGNIFICANT CHANGE UP (ref 0–0.5)
EOSINOPHIL NFR BLD AUTO: 0.5 % — SIGNIFICANT CHANGE UP (ref 0–6)
ESTIMATED AVERAGE GLUCOSE: 120 — SIGNIFICANT CHANGE UP
FERRITIN SERPL-MCNC: 45 NG/ML — SIGNIFICANT CHANGE UP (ref 30–400)
GLUCOSE BLDC GLUCOMTR-MCNC: 120 MG/DL — HIGH (ref 70–99)
GLUCOSE BLDC GLUCOMTR-MCNC: 132 MG/DL — HIGH (ref 70–99)
GLUCOSE BLDC GLUCOMTR-MCNC: 137 MG/DL — HIGH (ref 70–99)
GLUCOSE BLDC GLUCOMTR-MCNC: 168 MG/DL — HIGH (ref 70–99)
GLUCOSE SERPL-MCNC: 129 MG/DL — HIGH (ref 70–99)
HCT VFR BLD CALC: 29.6 % — LOW (ref 39–50)
HGB BLD-MCNC: 9.3 G/DL — LOW (ref 13–17)
IANC: 11.02 K/UL — HIGH (ref 1.8–7.4)
IMM GRANULOCYTES NFR BLD AUTO: 0.5 % — SIGNIFICANT CHANGE UP (ref 0–1.5)
IRON SATN MFR SERPL: 20 % — SIGNIFICANT CHANGE UP (ref 14–50)
IRON SATN MFR SERPL: 61 UG/DL — SIGNIFICANT CHANGE UP (ref 45–165)
LYMPHOCYTES # BLD AUTO: 1.31 K/UL — SIGNIFICANT CHANGE UP (ref 1–3.3)
LYMPHOCYTES # BLD AUTO: 9.4 % — LOW (ref 13–44)
MAGNESIUM SERPL-MCNC: 1.9 MG/DL — SIGNIFICANT CHANGE UP (ref 1.6–2.6)
MCHC RBC-ENTMCNC: 27.9 PG — SIGNIFICANT CHANGE UP (ref 27–34)
MCHC RBC-ENTMCNC: 31.4 GM/DL — LOW (ref 32–36)
MCV RBC AUTO: 88.9 FL — SIGNIFICANT CHANGE UP (ref 80–100)
MONOCYTES # BLD AUTO: 1.5 K/UL — HIGH (ref 0–0.9)
MONOCYTES NFR BLD AUTO: 10.7 % — SIGNIFICANT CHANGE UP (ref 2–14)
NEUTROPHILS # BLD AUTO: 11.02 K/UL — HIGH (ref 1.8–7.4)
NEUTROPHILS NFR BLD AUTO: 78.8 % — HIGH (ref 43–77)
NRBC # BLD: 0 /100 WBCS — SIGNIFICANT CHANGE UP
NRBC # FLD: 0.02 K/UL — HIGH
OB PNL STL: POSITIVE
ORGANISM # SPEC MICROSCOPIC CNT: SIGNIFICANT CHANGE UP
ORGANISM # SPEC MICROSCOPIC CNT: SIGNIFICANT CHANGE UP
PHOSPHATE SERPL-MCNC: 2.8 MG/DL — SIGNIFICANT CHANGE UP (ref 2.5–4.5)
PLATELET # BLD AUTO: 252 K/UL — SIGNIFICANT CHANGE UP (ref 150–400)
POTASSIUM SERPL-MCNC: 4.1 MMOL/L — SIGNIFICANT CHANGE UP (ref 3.5–5.3)
POTASSIUM SERPL-SCNC: 4.1 MMOL/L — SIGNIFICANT CHANGE UP (ref 3.5–5.3)
RBC # BLD: 3.33 M/UL — LOW (ref 4.2–5.8)
RBC # FLD: 14.3 % — SIGNIFICANT CHANGE UP (ref 10.3–14.5)
SODIUM SERPL-SCNC: 136 MMOL/L — SIGNIFICANT CHANGE UP (ref 135–145)
SPECIMEN SOURCE: SIGNIFICANT CHANGE UP
TIBC SERPL-MCNC: 312 UG/DL — SIGNIFICANT CHANGE UP (ref 220–430)
UIBC SERPL-MCNC: 251 UG/DL — SIGNIFICANT CHANGE UP (ref 110–370)
WBC # BLD: 13.98 K/UL — HIGH (ref 3.8–10.5)
WBC # FLD AUTO: 13.98 K/UL — HIGH (ref 3.8–10.5)

## 2022-06-30 PROCEDURE — 99254 IP/OBS CNSLTJ NEW/EST MOD 60: CPT | Mod: GC

## 2022-06-30 PROCEDURE — 99233 SBSQ HOSP IP/OBS HIGH 50: CPT

## 2022-06-30 PROCEDURE — 99222 1ST HOSP IP/OBS MODERATE 55: CPT | Mod: GC

## 2022-06-30 RX ADMIN — ERTAPENEM SODIUM 120 MILLIGRAM(S): 1 INJECTION, POWDER, LYOPHILIZED, FOR SOLUTION INTRAMUSCULAR; INTRAVENOUS at 11:57

## 2022-06-30 RX ADMIN — VALSARTAN 40 MILLIGRAM(S): 80 TABLET ORAL at 05:37

## 2022-06-30 RX ADMIN — ONDANSETRON 4 MILLIGRAM(S): 8 TABLET, FILM COATED ORAL at 11:15

## 2022-06-30 RX ADMIN — ATORVASTATIN CALCIUM 10 MILLIGRAM(S): 80 TABLET, FILM COATED ORAL at 22:20

## 2022-06-30 RX ADMIN — Medication 1: at 11:57

## 2022-06-30 NOTE — PHYSICAL THERAPY INITIAL EVALUATION ADULT - PERTINENT HX OF CURRENT PROBLEM, REHAB EVAL
Pt is an 81 year old male presenting with failure to thrive and metabolic encephalopathy, in the setting of UTI. PMH listed below.

## 2022-06-30 NOTE — CONSULT NOTE ADULT - ASSESSMENT
#Anemia  Patient presents with significant anemia in the absence of overt GI bleeding. Differential diagnosis remains broad and includes malignancy, esophagitis, peptic ulcer disease, H pylori infection, erosive gastropathy, varices, portal hypertensive gastropathy, GAVE, arteriovenous malformation [AVM], Yanira-Negron tear, Dieulafoy lesion, Geronimo lesions, diverticulosis with hemorrhage, angioectasias, hemorrhoids, or colitis.    Recommendations:  -trend vitals, CBC, and monitor for clinical signs of bleeding  -maintain active type and screen  -transfusion goal to maintain hemoglobin >/= 7.0  -rule out other causes for anemia [consider iron studies, ferritin, vitamin B12, folate]  -avoid NSAIDs  -PPI IV BID acceptable for now  -please keep NPO at midnight for tentative endoscopy tomorrow    **THIS NOTE IS NOT FINALIZED UNTIL SIGNED BY THE ATTENDING**    Nakita Lopez MD  GI Fellow, PGY-4  Available via Microsoft Teams    NON-URGENT CONSULTS:  Please email [email or web address]  AT NIGHT AND ON WEEKENDS:  Contact on-call GI fellow via answering service ([phone]) from 5pm-8am and on weekends/holidays  MONDAY-FRIDAY 8AM-5PM:  Pager# [phone] ([facility]) or [phone] ([facility])  GI Phone# [phone] ([facility])       81y M with dementia (AOx1 at baseline), HTN, HLD, T2DM, BPH, Anemia (BL Hgb 10-11), CVA who presents from home with low blood pressure, near syncope. Found to have UTI this admission and anemia this admission. GI consulted for anemia.    Impression:  #acute on chronic normocytic anemia  Patient presents with significant anemia in the absence of overt GI bleeding. Hgb 7 on admission (BL 10-11), no overt signs of GIB. 2-3x/month daughter gives some Aleve for HA and myalgias. Has been having decreased PO over the last 3-4 months with 20 lb unintentional weight loss, no night sweats. Last EGD 2020 showing normal esophagus, medium amount of food (residue) in the stomach, erythematous mucosa in the stomach (bx gastritis, neg H pylori), single pyloric canal/duodenal bulb polyp (bx small intestine mucosa). Last colonoscopy 2019 showing Three 5 to 8 mm polyps in the ascending colon, removed with a hot snare. Resected and retrieved (path- TA); one 10 mm polyp in the descending colon, removed with a hot snare. Resected and retrieved (path- TA); one 10 mm polyp in the sigmoid colon (path- TA); distal rectum and anal verge are normal on retroflexion view. Differential diagnosis remains broad and includes malignancy, esophagitis, peptic ulcer disease, H pylori infection, erosive gastropathy, Yanira-Negron tear, angioectasias.    #h/o aspiration- seen by SLP, rec soft and bite sized foods, mildly thicken liquids  #chronic intermittent N/V- Over the last month pt has vomiting 4-5x with multiple episodes emesis, mostly yellow or undigested food, no hematemesis or coffee ground emesis. Last EGD 2020 showing medium amount of food (residue) in the stomach, c/f possible underlying gastroparesis. ddx includes gastroparesis given underlying DM, gastroenteritis, PUD, vs other infectious sources.  #dementia (AOx1 at baseline)-  pt's 2 daughters/HCP, Sierra and Mackenzie [phone]  #UTI on ertapenem    Recommendations:  -trend vitals, CBC, and monitor for clinical signs of bleeding  -maintain active type and screen  -transfusion goal to maintain hemoglobin >/= 7.0  -rule out other causes for anemia [please order iron studies, ferritin, vitamin B12, folate from pre-transfusion labs]  -avoid NSAIDs  -aspiration precautions  -appreciate SLP eval  -PPI IV BID acceptable for now  -please change to full thickened liquids given last EGD 2020 with medium amount of food in stomach c/f gastroparesis  -please keep NPO at midnight for tentative endoscopy tomorrow  -please order labs (CBC, CMP, INR) for 3 AM so that results will be available early tomorrow morning; replete lytes and transfuse as needed  -Spoke with pt's 2 daughters/HCP, Sierra maria Mackenzie [phone] regarding clinical course and above findings; pt's daughters would like pt to undergo EGD to eval for possible UGIB but would like to defer colonoscopy at this time given pt's dementia, limited mobility    **THIS NOTE IS NOT FINALIZED UNTIL SIGNED BY THE ATTENDING**    Nakita Lopez MD  GI Fellow, PGY-4  Available via [employer] Teams    NON-URGENT CONSULTS:  Please email [email or web address]  AT NIGHT AND ON WEEKENDS:  Contact on-call GI fellow via answering service ([phone]) from 5pm-8am and on weekends/holidays  MONDAY-FRIDAY 8AM-5PM:  Pager# [phone] ([facility]) or [phone] ([facility])  GI Phone# [phone] ([facility])       81y M with dementia (AOx1 at baseline), HTN, HLD, T2DM, BPH, Anemia (BL Hgb 10-11), CVA who presents from home with low blood pressure, near syncope. Found to have UTI this admission and acute on chronic anemia this admission. GI consulted for anemia.    Impression:  #acute on chronic normocytic anemia  Patient presents with significant anemia in the absence of overt GI bleeding. Hgb 7 on admission (BL 10-11), no overt signs of GIB. 2-3x/month daughter gives some Aleve for HA and myalgias. Has been having decreased PO over the last 3-4 months with 20 lb unintentional weight loss, no night sweats. Last EGD 2020 showing normal esophagus, medium amount of food (residue) in the stomach, erythematous mucosa in the stomach (bx gastritis, neg H pylori), single pyloric canal/duodenal bulb polyp (bx small intestine mucosa). Last colonoscopy 2019 showing Three 5 to 8 mm polyps in the ascending colon, removed with a hot snare. Resected and retrieved (path- TA); one 10 mm polyp in the descending colon, removed with a hot snare. Resected and retrieved (path- TA); one 10 mm polyp in the sigmoid colon (path- TA); distal rectum and anal verge are normal on retroflexion view. Differential diagnosis remains broad and includes malignancy, esophagitis, peptic ulcer disease, H pylori infection, erosive gastropathy, Yanira-Negron tear, angioectasias.    #h/o aspiration- seen by SLP, rec soft and bite sized foods, mildly thicken liquids  #chronic intermittent N/V- Over the last month pt has vomiting 4-5x with multiple episodes emesis, mostly yellow or undigested food, no hematemesis or coffee ground emesis. Last EGD 2020 showing medium amount of food (residue) in the stomach, c/f possible underlying gastroparesis. ddx includes gastroparesis given underlying DM, gastroenteritis, PUD, vs other infectious sources.  #dementia (AOx1 at baseline)-  pt's 2 daughters/HCP, Sierra and Mackenzie [phone]  #UTI on ertapenem    Recommendations:  -please change to full thickened liquids given last EGD 2020 with medium amount of food in stomach c/f gastroparesis  -please keep NPO at midnight for tentative endoscopy tomorrow pending clinical course  -please order labs (CBC, CMP, INR) for 3 AM so that results will be available early tomorrow morning; replete lytes and transfuse as needed  -Spoke with pt's 2 daughters/HCP, Isi [phone] regarding clinical course and above findings; pt's daughters would like pt to undergo EGD to eval for possible UGIB but would like to defer colonoscopy at this time given pt's dementia, limited mobility  -PPI IV BID acceptable for now  -aspiration precautions  -appreciate SLP eval  -trend vitals, CBC, and monitor for clinical signs of bleeding  -maintain active type and screen  -transfusion goal to maintain hemoglobin >/= 7.0  -rule out other causes for anemia [please order iron studies, ferritin, vitamin B12, folate from pre-transfusion labs]  -avoid NSAIDs  --Outpatient follow up with established GI, Dr. Bales    **THIS NOTE IS NOT FINALIZED UNTIL SIGNED BY THE ATTENDING**    Nakita Lopez MD  GI Fellow, PGY-4  Available via [employer] Teams    NON-URGENT CONSULTS:  Please email [email or web address]  AT NIGHT AND ON WEEKENDS:  Contact on-call GI fellow via answering service ([phone]) from 5pm-8am and on weekends/holidays  MONDAY-FRIDAY 8AM-5PM:  Pager# [phone] ([facility]) or [phone] ([facility])  GI Phone# [phone] ([facility])

## 2022-06-30 NOTE — PHYSICAL THERAPY INITIAL EVALUATION ADULT - FOLLOWS COMMANDS/ANSWERS QUESTIONS, REHAB EVAL
Through duration of encounter (~20 minutes), pt followed one single step command (squeezing right hand).

## 2022-06-30 NOTE — PHYSICAL THERAPY INITIAL EVALUATION ADULT - PATIENT PROFILE REVIEW, REHAB EVAL
PT evaluate and treat orders received: no formal activity orders. Consult with RICHA EUCEDA, pt may participate in PT evaluation./yes

## 2022-06-30 NOTE — PROGRESS NOTE ADULT - SUBJECTIVE AND OBJECTIVE BOX
Cardiology    HISTORY OF PRESENT ILLNESS: HPI:  81y M with dementia, HTN, HLD, T2DM, BPH, CVA (29 Jun 2022 12:05)  Essentially here w/ failure to thrive, poor PO intake, weakness.  Unable to give a good interval HPI due to dementia, which as worsened in the last few years.  10pt ROS is not able to be obtained.    Dr Phillips last note:  ...with hypertension, hypercholesterolemia, and diabetes with preserved LV systolic function on a remote echo, no ischemia on a very remote stress echo, who  presents to us today for a stress echocardiogram for evaluation of chest discomfort. The patient states he  experienced intermittent left-sided chest discomfort sometimes at rest and other times with exertion. He was in  the hospital recently for nausea and vomiting likely of a GI etiology, underwent a CT scan and MRI of his brain  which is within normal limits. The patient denies orthopnea, PND, or lower extremity edema. He has not  passed out.    Date of service 6/30- had vomiting this morning.  lethargic on interview/exam today, unable to provide any interval ROS.      acetaminophen     Tablet .. 650 milliGRAM(s) Oral every 6 hours PRN  atorvastatin 10 milliGRAM(s) Oral at bedtime  dextrose 5%. 1000 milliLiter(s) IV Continuous <Continuous>  dextrose 5%. 1000 milliLiter(s) IV Continuous <Continuous>  dextrose 50% Injectable 25 Gram(s) IV Push once  dextrose 50% Injectable 12.5 Gram(s) IV Push once  dextrose 50% Injectable 25 Gram(s) IV Push once  dextrose Oral Gel 15 Gram(s) Oral once PRN  ertapenem  IVPB      ertapenem  IVPB 1000 milliGRAM(s) IV Intermittent every 24 hours  finasteride 5 milliGRAM(s) Oral daily  glucagon  Injectable 1 milliGRAM(s) IntraMuscular once  insulin lispro (ADMELOG) corrective regimen sliding scale   SubCutaneous three times a day before meals  insulin lispro (ADMELOG) corrective regimen sliding scale   SubCutaneous at bedtime  multivitamin 1 Tablet(s) Oral daily  ondansetron Injectable 4 milliGRAM(s) IV Push every 8 hours PRN  polyethylene glycol 3350 17 Gram(s) Oral daily  valsartan 40 milliGRAM(s) Oral daily                          9.3    13.98 )-----------( 252      ( 30 Jun 2022 06:35 )             29.6       06-30    136  |  97<L>  |  17  ----------------------------<  129<H>  4.1   |  24  |  0.68    Ca    9.4      30 Jun 2022 06:35  Phos  2.8     06-30  Mg     1.90     06-30    TPro  7.3  /  Alb  3.5  /  TBili  0.2  /  DBili  x   /  AST  15  /  ALT  8   /  AlkPhos  68  06-29      T(C): 36.8 (06-30-22 @ 11:30), Max: 36.8 (06-30-22 @ 11:13)  HR: 96 (06-30-22 @ 11:30) (64 - 97)  BP: 149/77 (06-30-22 @ 11:30) (147/60 - 183/74)  RR: 18 (06-30-22 @ 11:30) (18 - 19)  SpO2: 99% (06-30-22 @ 11:30) (98% - 100%)  Wt(kg): --      Appearance: thin elderly man, resting comfortably.  HEENT:   Normal oral mucosa, PERRL, EOMI	  Lymphatic: No lymphadenopathy , no edema  Cardiovascular: Normal S1 S2, No JVD, No murmurs , Peripheral pulses palpable 2+ bilaterally  Respiratory: Lungs clear to auscultation, normal effort 	  Gastrointestinal:  Soft, Non-tender, + BS	  Skin: No rashes, No ecchymoses, No cyanosis, warm to touch  Musculoskeletal: Normal range of motion, normal strength  Psychiatry:  oriented x name.  no acute distress.    TELEMETRY: NSR	    ECG: NSR      ASSESSMENT/PLAN: 	81y Male with HTN, HLD, prior CVA, here with failure to thrive.  No prior MI.      Check an echocardiogram, rule out CHF (not edematous or in pulmonary edema as he appears dehydrated).  (Last echo and stress testing was in ~0936-1784, normal but out of date)  Kidney function is normal, OK to resume Valsartan.  Will follow.  Supportive care / antibiotics for UTI.  No invasive workup planned, re: dementia.  Would address family's goals of care before ordering more CV testing.      Gómez Pacheco M.D.  Cardiac Electrophysiology    office [phone]  pager [phone]

## 2022-06-30 NOTE — CONSULT NOTE ADULT - ATTENDING COMMENTS
#Acute on chronic anemia without overt bleeding: No overt bleeding, but may have underlying ulcers in setting of NSAIDs or esophagitis. Less likely malignancy given prior EGD findings. Unable to rule out colonic (or SB) source to slow, occult GI blood losses, but would defer repeat colonoscopy at this time given GOC/patient's inability to prep  #Intermittent nausea, vomiting: Possibly 2/2 gastroparesis given history of DM and prior EGD findings with retained food  #History of colon polyps    --Tentatively plan for EGD tomorrow if medically optimized  --Avoid NSAIDs  --PPI  --Given possibility of underlying gastroparesis, would keep on liquids today pending EGD tomorrow (to minimize risk of retained food)

## 2022-06-30 NOTE — PHYSICAL THERAPY INITIAL EVALUATION ADULT - GENERAL OBSERVATIONS, REHAB EVAL
Pt received semisupine in bed, in NAD. Pt left semisupine in bed as found, all lines intact and RN aware.

## 2022-06-30 NOTE — CONSULT NOTE ADULT - ATTENDING COMMENTS
82 yo man with dementia, non verbal, DM with leucocytosis 14K   afebrile  UA 4 wbc    Doubt UTI  blood culture 1 bottle bacillus likely represents contaminant     ertapenem 6/29-    would d/c ertapenem  trend temp, WBC 80 yo man with dementia, DM with leucocytosis, anemia  occult blood+    ertapenem 6/29-    Blood culture 1 bottle bacillus cereus likely represents contaminant  UA 4 WBC doubt UTI  Leucocytosis could be related to non infectious etiology    would observe off ertapenem   trend temp, wbc

## 2022-06-30 NOTE — PHYSICAL THERAPY INITIAL EVALUATION ADULT - IMPAIRMENTS CONTRIBUTING IMPAIRED BED MOBILITY, REHAB EVAL
+episode of emesis prior to encounter (RN aware) -- assisting in donning fresh gown (required dependent assist to dress)/impaired balance/decreased strength

## 2022-06-30 NOTE — PROVIDER CONTACT NOTE (OTHER) - BACKGROUND
81y M with dementia, HTN, HLD, T2DM, BPH, Anemia, CVA who presents from home with failure to thrive and metabolic encephalopathy in the setting of UTI.

## 2022-06-30 NOTE — PROGRESS NOTE ADULT - SUBJECTIVE AND OBJECTIVE BOX
Patient is a 81y old  Male who presents with a chief complaint of Encephalopathy (2022 15:03)      SUBJECTIVE / OVERNIGHT EVENTS: Pt seen and examined at 12:15pm, no overnight events, pt unable to give any history due to his  mental status, per nsg vomited twice this am, no other new issues reported.    MEDICATIONS  (STANDING):  atorvastatin 10 milliGRAM(s) Oral at bedtime  dextrose 5%. 1000 milliLiter(s) (50 mL/Hr) IV Continuous <Continuous>  dextrose 5%. 1000 milliLiter(s) (100 mL/Hr) IV Continuous <Continuous>  dextrose 50% Injectable 25 Gram(s) IV Push once  dextrose 50% Injectable 12.5 Gram(s) IV Push once  dextrose 50% Injectable 25 Gram(s) IV Push once  finasteride 5 milliGRAM(s) Oral daily  glucagon  Injectable 1 milliGRAM(s) IntraMuscular once  insulin lispro (ADMELOG) corrective regimen sliding scale   SubCutaneous three times a day before meals  insulin lispro (ADMELOG) corrective regimen sliding scale   SubCutaneous at bedtime  multivitamin 1 Tablet(s) Oral daily  polyethylene glycol 3350 17 Gram(s) Oral daily  valsartan 40 milliGRAM(s) Oral daily    MEDICATIONS  (PRN):  acetaminophen     Tablet .. 650 milliGRAM(s) Oral every 6 hours PRN Temp greater or equal to 38C (100.4F), Mild Pain (1 - 3)  dextrose Oral Gel 15 Gram(s) Oral once PRN Blood Glucose LESS THAN 70 milliGRAM(s)/deciliter  ondansetron Injectable 4 milliGRAM(s) IV Push every 8 hours PRN Nausea and/or Vomiting      Vital Signs Last 24 Hrs  T(C): 36.8 (2022 11:30), Max: 36.8 (2022 11:13)  T(F): 98.2 (2022 11:30), Max: 98.2 (2022 11:13)  HR: 96 (2022 11:30) (69 - 97)  BP: 149/77 (2022 11:30) (147/60 - 159/79)  BP(mean): --  RR: 18 (2022 11:30) (18 - 19)  SpO2: 99% (2022 11:30) (98% - 100%)  CAPILLARY BLOOD GLUCOSE      POCT Blood Glucose.: 132 mg/dL (2022 16:31)  POCT Blood Glucose.: 168 mg/dL (2022 11:15)  POCT Blood Glucose.: 137 mg/dL (2022 07:32)  POCT Blood Glucose.: 180 mg/dL (2022 20:36)  POCT Blood Glucose.: 126 mg/dL (2022 17:52)    I&O's Summary      PHYSICAL EXAM:  GENERAL: NAD, well-developed  CHEST/LUNG: Clear to auscultation bilaterally; No wheeze  HEART: Regular rate and rhythm  ABDOMEN: Soft, Nontender, Nondistended  EXTREMITIES: no LE edema  PSYCH: Gets agitated easily pushing my hand away  NEUROLOGY: Eyes closed, does not engage in interview  SKIN: No rashes or lesions    LABS:                        9.3    13.98 )-----------( 252      ( 2022 06:35 )             29.6     06-30    136  |  97<L>  |  17  ----------------------------<  129<H>  4.1   |  24  |  0.68    Ca    9.4      2022 06:35  Phos  2.8     06-30  Mg     1.90     06-30    TPro  7.3  /  Alb  3.5  /  TBili  0.2  /  DBili  x   /  AST  15  /  ALT  8   /  AlkPhos  68  06-29    PT/INR - ( 2022 00:15 )   PT: 14.0 sec;   INR: 1.20 ratio         PTT - ( 2022 00:15 )  PTT:26.2 sec      Urinalysis Basic - ( 2022 00:50 )    Color: Yellow / Appearance: Clear / S.026 / pH: x  Gluc: x / Ketone: Negative  / Bili: Negative / Urobili: 3 mg/dL   Blood: x / Protein: 30 mg/dL / Nitrite: Positive   Leuk Esterase: Small / RBC: 8 /HPF / WBC 4 /HPF   Sq Epi: x / Non Sq Epi: 2 /HPF / Bacteria: Moderate        RADIOLOGY & ADDITIONAL TESTS:    Imaging Personally Reviewed:    Consultant(s) Notes Reviewed:      Care Discussed with Consultants/Other Providers:

## 2022-06-30 NOTE — CONSULT NOTE ADULT - ASSESSMENT
82 y/o M PMHx dementia, HTN, HLD, T2DM, BPH, anemia, and CVA, who presents from home with low blood pressure. ID now consulted for rising WBCs.  82 y/o M PMHx dementia, HTN, HLD, T2DM, BPH, anemia, and CVA, who presents from home with low blood pressure. ID now consulted for rising WBCs. Blood cultures growing bacillus cereus in 1 bottle.    Impression:  #Leukocytosis - unclear etiology, no localizing findings on exam. Minimal pyuria, low suspicion for UTI  #Positive Blood Culture - b. cereus grew in 1 bottle, represents contaminant in the absence of symptoms    Recs:  - d/c ertapenem  - monitor off antibiotics   - trend leukocytosis, monitor temps    Wayne Guzman MD  Infectious Disease Fellow  Available on [employer]  Before 9AM or after 5PM: [phone]

## 2022-06-30 NOTE — CONSULT NOTE ADULT - SUBJECTIVE AND OBJECTIVE BOX
Chief Complaint:  Patient is a 81y old  Male who presents with a chief complaint of Encephalopathy (2022 12:56)      HPI: 81y M with dementia (AOx1 at baseline), HTN, HLD, T2DM, BPH, Anemia (BL Hgb 10-11), CVA who presents from home with low blood pressure, near syncope. Pt is minimally verbal and unable to participate in history taking, history was obtained by discussing with patients daughters, Sierra and Mackenzie [phone]. Pt lives with his 2 daughters. Over the last month pt has vomiting 4-5x with multiple episodes emesis, mostly yellow or undigested food, no hematemesis or coffee ground emesis. 2-3x/month daughter gives some Aleve for HA and myalgias. Has been having decreased PO over the last 3-4 months with 20 lb unintentional weight loss, no night sweats. Daughters note pt has been choking with drinking water, no dysphagia or odynophagia, abdominal pain, diarrhea, melena, hematemesis, hematochezia. GI consulted for anemia.    Allergies:  penicillin (Rash (Mild))      Home Medications:    Hospital Medications:  acetaminophen     Tablet .. 650 milliGRAM(s) Oral every 6 hours PRN  atorvastatin 10 milliGRAM(s) Oral at bedtime  dextrose 5%. 1000 milliLiter(s) IV Continuous <Continuous>  dextrose 5%. 1000 milliLiter(s) IV Continuous <Continuous>  dextrose 50% Injectable 25 Gram(s) IV Push once  dextrose 50% Injectable 12.5 Gram(s) IV Push once  dextrose 50% Injectable 25 Gram(s) IV Push once  dextrose Oral Gel 15 Gram(s) Oral once PRN  ertapenem  IVPB      ertapenem  IVPB 1000 milliGRAM(s) IV Intermittent every 24 hours  finasteride 5 milliGRAM(s) Oral daily  glucagon  Injectable 1 milliGRAM(s) IntraMuscular once  insulin lispro (ADMELOG) corrective regimen sliding scale   SubCutaneous three times a day before meals  insulin lispro (ADMELOG) corrective regimen sliding scale   SubCutaneous at bedtime  multivitamin 1 Tablet(s) Oral daily  ondansetron Injectable 4 milliGRAM(s) IV Push every 8 hours PRN  polyethylene glycol 3350 17 Gram(s) Oral daily  valsartan 40 milliGRAM(s) Oral daily      PMHX/PSHX:  Diabetes    DM (diabetes mellitus)    HTN (hypertension)    HLD (hyperlipidemia)    BPH (benign prostatic hyperplasia)    Dementia    Anemia of chronic disease    S/P hernia surgery    S/P abdominal surgery, follow-up exam    No significant past surgical history        Family history:  No pertinent family history in first degree relatives    No pertinent family history in first degree relatives     Denies any family history of GI-related disease or cancers.    Social History:   ETOH: +prior social drinker  Tobacco: denies  Illicit drug use: denies    ROS: 14 point ROS negative unless otherwise stated in HPI      Vital Signs:  Vital Signs Last 24 Hrs  T(C): 36.8 (2022 11:30), Max: 36.8 (2022 11:13)  T(F): 98.2 (2022 11:30), Max: 98.2 (2022 11:13)  HR: 96 (2022 11:30) (69 - 97)  BP: 149/77 (2022 11:30) (147/60 - 159/79)  BP(mean): --  RR: 18 (2022 11:30) (18 - 19)  SpO2: 99% (2022 11:30) (98% - 100%)  Daily Height in cm: 187.96 (2022 20:47)    Daily     PHYSICAL EXAM:     GENERAL:  Appears stated age, well-groomed, well-nourished, no distress  HEENT:  NC/AT,  conjunctivae clear and pink  CHEST:  Full & symmetric excursion, no increased effort, breath sounds clear  HEART:  Regular rhythm, S1, S2, no murmur/rub/S3/S4  ABDOMEN:  Soft, non-tender, non-distended, +bowel sounds,    EXTREMITIES:  no cyanosis,clubbing or edema  SKIN:  No rash/erythema/ecchymoses/petechiae/wounds/abscess/warm/dry  NEURO:  Alert, oriented      LABS:                        9.3    13.98 )-----------( 252      ( 2022 06:35 )             29.6     06-30    136  |  97<L>  |  17  ----------------------------<  129<H>  4.1   |  24  |  0.68    Ca    9.4      2022 06:35  Phos  2.8     06-30  Mg     1.90     06-30    TPro  7.3  /  Alb  3.5  /  TBili  0.2  /  DBili  x   /  AST  15  /  ALT  8   /  AlkPhos  68  -29    LIVER FUNCTIONS - ( 2022 00:15 )  Alb: 3.5 g/dL / Pro: 7.3 g/dL / ALK PHOS: 68 U/L / ALT: 8 U/L / AST: 15 U/L / GGT: x           PT/INR - ( 2022 00:15 )   PT: 14.0 sec;   INR: 1.20 ratio         PTT - ( 2022 00:15 )  PTT:26.2 sec  Urinalysis Basic - ( 2022 00:50 )    Color: Yellow / Appearance: Clear / S.026 / pH: x  Gluc: x / Ketone: Negative  / Bili: Negative / Urobili: 3 mg/dL   Blood: x / Protein: 30 mg/dL / Nitrite: Positive   Leuk Esterase: Small / RBC: 8 /HPF / WBC 4 /HPF   Sq Epi: x / Non Sq Epi: 2 /HPF / Bacteria: Moderate          Imaging: No new abdominal imaging               Chief Complaint:  Patient is a 81y old  Male who presents with a chief complaint of Encephalopathy (2022 12:56)      HPI: 81y M with dementia (AOx1 at baseline), HTN, HLD, T2DM, BPH, Anemia (BL Hgb 10-11), CVA who presents from home with low blood pressure, near syncope. Pt is minimally verbal and unable to participate in history taking, history was obtained by discussing with patients daughters, Sierra and Mackenzie [phone]. Pt lives with his 2 daughters. Over the last month pt has vomiting 4-5x with multiple episodes emesis, mostly yellow or undigested food, no hematemesis or coffee ground emesis. 2-3x/month daughter gives some Aleve for HA and myalgias. Has been having decreased PO over the last 3-4 months with 20 lb unintentional weight loss, no night sweats. Some reported cold intolerance. Daughters note pt has been choking with drinking water, no dysphagia or odynophagia, abdominal pain, diarrhea, melena, hematemesis, hematochezia. GI consulted for anemia.    Allergies:  penicillin (Rash (Mild))      Home Medications:    Hospital Medications:  acetaminophen     Tablet .. 650 milliGRAM(s) Oral every 6 hours PRN  atorvastatin 10 milliGRAM(s) Oral at bedtime  dextrose 5%. 1000 milliLiter(s) IV Continuous <Continuous>  dextrose 5%. 1000 milliLiter(s) IV Continuous <Continuous>  dextrose 50% Injectable 25 Gram(s) IV Push once  dextrose 50% Injectable 12.5 Gram(s) IV Push once  dextrose 50% Injectable 25 Gram(s) IV Push once  dextrose Oral Gel 15 Gram(s) Oral once PRN  ertapenem  IVPB      ertapenem  IVPB 1000 milliGRAM(s) IV Intermittent every 24 hours  finasteride 5 milliGRAM(s) Oral daily  glucagon  Injectable 1 milliGRAM(s) IntraMuscular once  insulin lispro (ADMELOG) corrective regimen sliding scale   SubCutaneous three times a day before meals  insulin lispro (ADMELOG) corrective regimen sliding scale   SubCutaneous at bedtime  multivitamin 1 Tablet(s) Oral daily  ondansetron Injectable 4 milliGRAM(s) IV Push every 8 hours PRN  polyethylene glycol 3350 17 Gram(s) Oral daily  valsartan 40 milliGRAM(s) Oral daily      PMHX/PSHX:  Diabetes    DM (diabetes mellitus)    HTN (hypertension)    HLD (hyperlipidemia)    BPH (benign prostatic hyperplasia)    Dementia    Anemia of chronic disease    S/P hernia surgery    S/P abdominal surgery, follow-up exam    No significant past surgical history        Family history:  No pertinent family history in first degree relatives    No pertinent family history in first degree relatives     Denies any family history of GI-related disease or cancers.    Social History:   ETOH: +prior social drinker  Tobacco: denies  Illicit drug use: denies    ROS: unable to obtain as pt has underlying dementia      Vital Signs:  Vital Signs Last 24 Hrs  T(C): 36.8 (2022 11:30), Max: 36.8 (2022 11:13)  T(F): 98.2 (2022 11:30), Max: 98.2 (2022 11:13)  HR: 96 (2022 11:30) (69 - 97)  BP: 149/77 (2022 11:30) (147/60 - 159/79)  BP(mean): --  RR: 18 (2022 11:30) (18 - 19)  SpO2: 99% (2022 11:30) (98% - 100%)  Daily Height in cm: 187.96 (2022 20:47)    Daily     PHYSICAL EXAM:     GENERAL:  Appears stated age, confused; orientedx1  HEENT:  NC/AT,  conjunctivae clear and pink  CHEST:  Full & symmetric excursion, no increased effort, breath sounds clear  HEART:  Regular rhythm, S1, S2, no murmur/rub/S3/S4  ABDOMEN:  Soft, non-tender, non-distended, +bowel sounds,    RECTAL: +brown stool; no melena, no hematochezia; unable to assess for hemorrhoids or rectal mass due to positioning  EXTREMITIES:  no cyanosis,clubbing or edema  SKIN:  No rash/erythema/ecchymoses/petechiae/wounds/abscess/warm/dry  NEURO:  Alert, orientedx1      LABS:                        9.3    13.98 )-----------( 252      ( 2022 06:35 )             29.6     06-30    136  |  97<L>  |  17  ----------------------------<  129<H>  4.1   |  24  |  0.68    Ca    9.4      2022 06:35  Phos  2.8     06-  Mg     1.90     -    TPro  7.3  /  Alb  3.5  /  TBili  0.2  /  DBili  x   /  AST  15  /  ALT  8   /  AlkPhos  68  -    LIVER FUNCTIONS - ( 2022 00:15 )  Alb: 3.5 g/dL / Pro: 7.3 g/dL / ALK PHOS: 68 U/L / ALT: 8 U/L / AST: 15 U/L / GGT: x           PT/INR - ( 2022 00:15 )   PT: 14.0 sec;   INR: 1.20 ratio         PTT - ( 2022 00:15 )  PTT:26.2 sec  Urinalysis Basic - ( 2022 00:50 )    Color: Yellow / Appearance: Clear / S.026 / pH: x  Gluc: x / Ketone: Negative  / Bili: Negative / Urobili: 3 mg/dL   Blood: x / Protein: 30 mg/dL / Nitrite: Positive   Leuk Esterase: Small / RBC: 8 /HPF / WBC 4 /HPF   Sq Epi: x / Non Sq Epi: 2 /HPF / Bacteria: Moderate          Imaging: No new abdominal imaging             HPI: 81y M with dementia (AOx1 at baseline), HTN, HLD, T2DM, BPH, Anemia (BL Hgb 10-11), CVA who presents from home with low blood pressure, near syncope.     Pt is minimally verbal and unable to participate in history taking, history was obtained by discussing with patients daughters, Sierra and Mackenzie [phone]. Pt lives with his 2 daughters. Over the last month pt has vomiting 4-5x with multiple episodes emesis, mostly yellow or undigested food, no hematemesis or coffee ground emesis. 2-3x/month daughter gives some Aleve for HA and myalgias. Has been having decreased PO over the last 3-4 months with 20 lb unintentional weight loss, no night sweats. Some reported cold intolerance. Daughters note pt has been choking with drinking water, no dysphagia or odynophagia, abdominal pain, diarrhea, melena, hematemesis, hematochezia. GI consulted for anemia.    Allergies:  penicillin (Rash (Mild))      Home Medications:    Hospital Medications:  acetaminophen     Tablet .. 650 milliGRAM(s) Oral every 6 hours PRN  atorvastatin 10 milliGRAM(s) Oral at bedtime  dextrose 5%. 1000 milliLiter(s) IV Continuous <Continuous>  dextrose 5%. 1000 milliLiter(s) IV Continuous <Continuous>  dextrose 50% Injectable 25 Gram(s) IV Push once  dextrose 50% Injectable 12.5 Gram(s) IV Push once  dextrose 50% Injectable 25 Gram(s) IV Push once  dextrose Oral Gel 15 Gram(s) Oral once PRN  ertapenem  IVPB      ertapenem  IVPB 1000 milliGRAM(s) IV Intermittent every 24 hours  finasteride 5 milliGRAM(s) Oral daily  glucagon  Injectable 1 milliGRAM(s) IntraMuscular once  insulin lispro (ADMELOG) corrective regimen sliding scale   SubCutaneous three times a day before meals  insulin lispro (ADMELOG) corrective regimen sliding scale   SubCutaneous at bedtime  multivitamin 1 Tablet(s) Oral daily  ondansetron Injectable 4 milliGRAM(s) IV Push every 8 hours PRN  polyethylene glycol 3350 17 Gram(s) Oral daily  valsartan 40 milliGRAM(s) Oral daily      PMHX/PSHX:  Diabetes    DM (diabetes mellitus)    HTN (hypertension)    HLD (hyperlipidemia)    BPH (benign prostatic hyperplasia)    Dementia    Anemia of chronic disease    S/P hernia surgery    S/P abdominal surgery, follow-up exam    No significant past surgical history        Family history:  No pertinent family history in first degree relatives    No pertinent family history in first degree relatives     Denies any family history of GI-related disease or cancers.    Social History:   ETOH: +prior social drinker  Tobacco: denies  Illicit drug use: denies    ROS: unable to obtain as pt has underlying dementia      Vital Signs:  Vital Signs Last 24 Hrs  T(C): 36.8 (2022 11:30), Max: 36.8 (2022 11:13)  T(F): 98.2 (2022 11:30), Max: 98.2 (2022 11:13)  HR: 96 (2022 11:30) (69 - 97)  BP: 149/77 (2022 11:30) (147/60 - 159/79)  BP(mean): --  RR: 18 (2022 11:30) (18 - 19)  SpO2: 99% (2022 11:30) (98% - 100%)  Daily Height in cm: 187.96 (2022 20:47)    Daily     PHYSICAL EXAM:     GENERAL:  Appears stated age, confused  HEENT:  NC/AT,  conjunctivae clear and pink  CHEST:  Full & symmetric excursion, no increased effort, breath sounds clear  HEART:  Regular rhythm, S1, S2, no murmur/rub/S3/S4  ABDOMEN:  Soft, non-tender, non-distended, +bowel sounds,    RECTAL: +brown stool; no melena, no hematochezia; unable to assess for hemorrhoids or rectal mass due to positioning  EXTREMITIES:  no cyanosis, clubbing or edema  SKIN:  No rash/erythema/ecchymoses/petechiae/wounds/abscess/warm/dry  PSYCH/NEURO:  Alert, orientedx1, unable to cooperate with exam        LABS:                        9.3    13.98 )-----------( 252      ( 2022 06:35 )             29.6     06-30    136  |  97<L>  |  17  ----------------------------<  129<H>  4.1   |  24  |  0.68    Ca    9.4      2022 06:35  Phos  2.8     06-30  Mg     1.90     06-30    TPro  7.3  /  Alb  3.5  /  TBili  0.2  /  DBili  x   /  AST  15  /  ALT  8   /  AlkPhos  68  -    LIVER FUNCTIONS - ( 2022 00:15 )  Alb: 3.5 g/dL / Pro: 7.3 g/dL / ALK PHOS: 68 U/L / ALT: 8 U/L / AST: 15 U/L / GGT: x           PT/INR - ( 2022 00:15 )   PT: 14.0 sec;   INR: 1.20 ratio         PTT - ( 2022 00:15 )  PTT:26.2 sec  Urinalysis Basic - ( 2022 00:50 )    Color: Yellow / Appearance: Clear / S.026 / pH: x  Gluc: x / Ketone: Negative  / Bili: Negative / Urobili: 3 mg/dL   Blood: x / Protein: 30 mg/dL / Nitrite: Positive   Leuk Esterase: Small / RBC: 8 /HPF / WBC 4 /HPF   Sq Epi: x / Non Sq Epi: 2 /HPF / Bacteria: Moderate          Imaging: No new abdominal imaging

## 2022-06-30 NOTE — CONSULT NOTE ADULT - SUBJECTIVE AND OBJECTIVE BOX
Patient is a 81y old  Male who presents with a chief complaint of Encephalopathy (2022 14:28)    HPI:  81y M with dementia, HTN, HLD, T2DM, BPH, Anemia, CVA who presents from home with low blood pressure. Pt is minimally verbal and unable to participate in history taking, history was obtained by discussing with patients daughter, Sierra, at [phone]. Over the past few weeks the patient has had decreased PO intake, is now mostly bed bound, and will only rarely get out of bed to ambulate. The daughter took his blood pressure yesterday because he had been refusing to get out of bed and she noted that his BP was 90s/40s so she brought him to the ER. She states that the patient has been afebrile, no coughing or shortness of breath, no melena or hematochezia. Pt has been constipated requiring laxatives. He has had foul smelling urinePt had an episode of yellow emesis 2 weeks ago.    While in the ED pt had /60, HR 80and was afebrile. Pt s/p 1.5L of NS. Pt was also found to be anemic with Hb of 7 and is s/p 1U pRBC.     The daughter notes that over the past few years the patient has had a decline in functional status, has slowly stopped talking and has now stopped walking most of the time. He sometimes chokes and has been transitioned to thickened liquids with his diet.  (2022 12:05)       REVIEW OF SYSTEMS  [ x ] ROS unobtainable because: dementia  [  ] All other systems negative except as noted below    Constitutional:  [ ] fever [ ] chills  [ ] weight loss  [ ]night sweat  [ ]poor appetite/PO intake [ ]fatigue   Skin:  [ ] rash [ ] phlebitis	  Eyes: [ ] icterus [ ] pain  [ ] discharge	  ENMT: [ ] sore throat  [ ] thrush [ ] ulcers [ ] exudates [ ]anosmia  Respiratory: [ ] dyspnea [ ] hemoptysis [ ] cough [ ] sputum	  Cardiovascular:  [ ] chest pain [ ] palpitations [ ] edema	  Gastrointestinal:  [ ] nausea [ ] vomiting [ ] diarrhea [ ] constipation [ ] pain	  Genitourinary:  [ ] dysuria [ ] frequency [ ] hematuria [ ] discharge [ ] flank pain  [ ] incontinence  Musculoskeletal:  [ ] myalgias [ ] arthralgias [ ] arthritis  [ ] back pain  Neurological:  [ ] headache [ ] weakness [ ] seizures  [ ] confusion/altered mental status    prior hospital charts reviewed [V]  primary team notes reviewed [V]  other consultant notes reviewed [V]    PAST MEDICAL & SURGICAL HISTORY:  Diabetes  HTN (hypertension)  HLD (hyperlipidemia)  BPH (benign prostatic hyperplasia)  Dementia  Anemia of chronic disease  S/P hernia surgery  r.inguinal  S/P abdominal surgery, follow-up exam      FAMILY HISTORY:  No pertinent family history in first degree relatives    SOCIAL HISTORY:  Denied smoking    Allergies  penicillin (Rash (Mild))    ANTIMICROBIALS:  ertapenem  IVPB    ertapenem  IVPB 1000 every 24 hours      ANTIMICROBIALS (past 90 days):   MEDICATIONS  (STANDING):    ertapenem  IVPB   1000 milliGRAM(s) IV Intermittent (22 @ 12:34)    ertapenem  IVPB   120 mL/Hr IV Intermittent (22 @ 11:57)    MEDICATIONS  (STANDING):  acetaminophen     Tablet .. 650 every 6 hours PRN  atorvastatin 10 at bedtime  dextrose 50% Injectable 25 once  dextrose 50% Injectable 12.5 once  dextrose 50% Injectable 25 once  dextrose Oral Gel 15 once PRN  finasteride 5 daily  glucagon  Injectable 1 once  insulin lispro (ADMELOG) corrective regimen sliding scale  three times a day before meals  insulin lispro (ADMELOG) corrective regimen sliding scale  at bedtime  ondansetron Injectable 4 every 8 hours PRN  polyethylene glycol 3350 17 daily  valsartan 40 daily      VITALS:  Vital Signs Last 24 Hrs  T(F): 98.2 (22 @ 11:30), Max: 98.4 (22 @ 04:30)  Vital Signs Last 24 Hrs  HR: 96 (22 @ 11:30) (69 - 97)  BP: 149/77 (22 @ 11:30) (147/60 - 159/79)  RR: 18 (22 @ 11:30)  SpO2: 99% (22 @ 11:30) (98% - 100%)  Wt(kg): --    PHYSICAL EXAM:  Constitutional: non-toxic, no distress  HEAD/EYES: anicteric, no conjunctival injection  ENT:  supple, no thrush  Cardiovascular:   +S1/S2  Respiratory:  +BS bilaterally  GI:  soft, non-tender, +bowel sounds  :  no CVA tenderness  Musculoskeletal:  no synovitis, normal ROM  Neurologic: asleep, bed-bound  Skin:  no rash, no erythema, no phlebitis  Heme/Onc: no lymphadenopathy   Psychiatric:  minimally verbal      Labs:                        9.3    13.98 )-----------( 252      ( 2022 06:35 )             29.6         136  |  97<L>  |  17  ----------------------------<  129<H>  4.1   |  24  |  0.68    Ca    9.4      2022 06:35  Phos  2.8       Mg     1.90         TPro  7.3  /  Alb  3.5  /  TBili  0.2  /  DBili  x   /  AST  15  /  ALT  8   /  AlkPhos  68        WBC Trend:  WBC Count: 13.98 (22 @ 06:35)  WBC Count: 4.04 (22 @ 19:33)  WBC Count: 4.33 (22 @ 12:30)  WBC Count: 6.79 (22 @ 00:15)    Auto Neutrophil #: 11.02 K/uL (22 @ 06:35)  Auto Neutrophil #: 3.73 K/uL (22 @ 00:15)  Auto Neutrophil #: 4.47 K/uL (21 @ 07:21)  Band Neutrophils %: 6.0 % (21 @ 07:21)  Auto Neutrophil #: 7.67 K/uL (21 @ 07:28)    Auto Eosinophil %: 0.5 % (22 @ 06:35)  Auto Eosinophil %: 0.4 % (22 @ 00:15)    Urinalysis Basic - ( 2022 00:50 )    Color: Yellow / Appearance: Clear / S.026 / pH: x  Gluc: x / Ketone: Negative  / Bili: Negative / Urobili: 3 mg/dL   Blood: x / Protein: 30 mg/dL / Nitrite: Positive   Leuk Esterase: Small / RBC: 8 /HPF / WBC 4 /HPF   Sq Epi: x / Non Sq Epi: 2 /HPF / Bacteria: Moderate        MICROBIOLOGY:    [facility].: [facility] (21 @ 00:07[facility]      Culture - Blood (collected 2022 00:29)  Source: .Blood Blood-Peripheral  Preliminary Report:    No growth to date.    Culture - Blood (collected 2022 00:00)  Source: .Blood Blood-Peripheral  Preliminary Report:    Growth in aerobic bottle: Gram Positive Rods    ***Blood Panel PCR results on this specimen are available    approximately 3 hours after the Gram stain result.***    Gram stain, PCR, and/or culture results may not always    correspond due to difference in methodologies.    ************************************************************    This PCR assay was performed by multiplex PCR. This    Assay tests for 66 bacterial and resistance gene targets.    Please refer to the [facility] Labs test directory    at [email or web address] for details.  Organism: Blood Culture PCR  Organism: Blood Culture PCR    Sensitivities:      -  Bacillus cereus group: Detec      Method Type: PCR    Culture - Blood (collected 29 Sep 2021 00:26)  Source: .Blood Blood  Final Report:    No Growth Final    Culture - Urine (collected 28 Sep 2021 01:01)  Source: Clean Catch Clean Catch (Midstream)  Final Report:    <10,000 CFU/mL Normal Urogenital Gayle    Culture - Blood (collected 28 Sep 2021 00:49)  Source: .Blood Blood-Peripheral  Final Report:    No Growth Final    Culture - Blood (collected 28 Sep 2021 00:49)  Source: .Blood Blood-Peripheral  Final Report:    Growth in aerobic bottle: Escherichia coli ESBL    MDRO detected in BCID PCR, resistance marker = CTX-M (ESBL)    Bacteroides distasonis "Susceptibilities not performed"    ***Blood Panel PCR results on this specimen are available    approximately 3 hours after the Gram stain result.***    Gram stain, PCR, and/or culture results may not always    correspond due to difference in methodologies.    ************************************************************    This PCR assay was performed by multiplex PCR. This    Assay tests for 66 bacterial and resistance gene targets.    Please refer to the [facility] Labs test directory    at [email or web address] for details.  Organism: Blood Culture PCR  Escherichia coli ESBL  Organism: Escherichia coli ESBL    Sensitivities:      -  Amikacin: S <=16      -  Ampicillin: R >16 These ampicillin results predict results for amoxicillin      -  Ampicillin/Sulbactam: R >16/8 Enterobacter, Citrobacter, and Serratia may develop resistance during prolonged therapy (3-4 days)      -  Aztreonam: R >16      -  Cefazolin: R >16 Enterobacter, Citrobacter, and Serratia may develop resistance during prolonged therapy (3-4 days)      -  Cefepime: R >16      -  Cefoxitin: S <=8      -  Ceftriaxone: R >32 Enterobacter, Citrobacter, and Serratia may develop resistance during prolonged therapy      -  Ciprofloxacin: R >2      -  Ertapenem: S <=0.5      -  Gentamicin: S <=2      -  Imipenem: S <=1      -  Levofloxacin: R >4      -  Meropenem: S <=1      -  Piperacillin/Tazobactam: R <=8      -  Tobramycin: R >8      -  Trimethoprim/Sulfamethoxazole: S <=0.5/9.5      Method Type: JYOTI  Organism: Blood Culture PCR    Sensitivities:      -  ESBL: Detec      -  Escherichia coli: Detec      Method Type: PCR    COVID-19 Matias Domain AB Interp: Positive (21 @ 12:35)    RADIOLOGY:  imaging below personally reviewed    < from: CT Abdomen and Pelvis w/ Oral Cont and w/ IV Cont (21 @ 19:58) >  IMPRESSION:  Mild nonspecific bladder wall thickening  < end of copied text >   Patient is a 81y old  Male who presents with a chief complaint of Encephalopathy (2022 14:28)    HPI:  81y M with dementia, HTN, HLD, T2DM, BPH, Anemia, CVA who presents from home with low blood pressure. Pt is minimally verbal and unable to participate in history taking, history was obtained by discussing with patients daughter, Sierra, at [phone]. Over the past few weeks the patient has had decreased PO intake, is now mostly bed bound, and will only rarely get out of bed to ambulate. The daughter took his blood pressure yesterday because he had been refusing to get out of bed and she noted that his BP was 90s/40s so she brought him to the ER. She states that the patient has been afebrile, no coughing or shortness of breath, no melena or hematochezia. Pt has been constipated requiring laxatives. He has had foul smelling urinePt had an episode of yellow emesis 2 weeks ago.    While in the ED pt had /60, HR 80and was afebrile. Pt s/p 1.5L of NS. Pt was also found to be anemic with Hb of 7 and is s/p 1U pRBC.     The daughter notes that over the past few years the patient has had a decline in functional status, has slowly stopped talking and has now stopped walking most of the time. He sometimes chokes and has been transitioned to thickened liquids with his diet.  (2022 12:05)    Asked to evaluate for antibiotics       REVIEW OF SYSTEMS  [ x ] ROS unobtainable because: dementia  [  ] All other systems negative except as noted below    Constitutional:  [ ] fever [ ] chills  [ ] weight loss  [ ]night sweat  [ ]poor appetite/PO intake [ ]fatigue   Skin:  [ ] rash [ ] phlebitis	  Eyes: [ ] icterus [ ] pain  [ ] discharge	  ENMT: [ ] sore throat  [ ] thrush [ ] ulcers [ ] exudates [ ]anosmia  Respiratory: [ ] dyspnea [ ] hemoptysis [ ] cough [ ] sputum	  Cardiovascular:  [ ] chest pain [ ] palpitations [ ] edema	  Gastrointestinal:  [ ] nausea [ ] vomiting [ ] diarrhea [ ] constipation [ ] pain	  Genitourinary:  [ ] dysuria [ ] frequency [ ] hematuria [ ] discharge [ ] flank pain  [ ] incontinence  Musculoskeletal:  [ ] myalgias [ ] arthralgias [ ] arthritis  [ ] back pain  Neurological:  [ ] headache [ ] weakness [ ] seizures  [ ] confusion/altered mental status    prior hospital charts reviewed [V]  primary team notes reviewed [V]  other consultant notes reviewed [V]    PAST MEDICAL & SURGICAL HISTORY:  Diabetes  HTN (hypertension)  HLD (hyperlipidemia)  BPH (benign prostatic hyperplasia)  Dementia  Anemia of chronic disease  S/P hernia surgery  r.inguinal  S/P abdominal surgery, follow-up exam      FAMILY HISTORY:  No pertinent family history in first degree relatives    SOCIAL HISTORY:  Denied smoking    Allergies  penicillin (Rash (Mild))    ANTIMICROBIALS:  ertapenem  IVPB    ertapenem  IVPB 1000 every 24 hours      ANTIMICROBIALS (past 90 days):   MEDICATIONS  (STANDING):    ertapenem  IVPB   1000 milliGRAM(s) IV Intermittent (22 @ 12:34)    ertapenem  IVPB   120 mL/Hr IV Intermittent (22 @ 11:57)    MEDICATIONS  (STANDING):  acetaminophen     Tablet .. 650 every 6 hours PRN  atorvastatin 10 at bedtime  dextrose 50% Injectable 25 once  dextrose 50% Injectable 12.5 once  dextrose 50% Injectable 25 once  dextrose Oral Gel 15 once PRN  finasteride 5 daily  glucagon  Injectable 1 once  insulin lispro (ADMELOG) corrective regimen sliding scale  three times a day before meals  insulin lispro (ADMELOG) corrective regimen sliding scale  at bedtime  ondansetron Injectable 4 every 8 hours PRN  polyethylene glycol 3350 17 daily  valsartan 40 daily      VITALS:  Vital Signs Last 24 Hrs  T(F): 98.2 (22 @ 11:30), Max: 98.4 (22 @ 04:30)  Vital Signs Last 24 Hrs  HR: 96 (22 @ 11:30) (69 - 97)  BP: 149/77 (22 @ 11:30) (147/60 - 159/79)  RR: 18 (22 @ 11:30)  SpO2: 99% (22 @ 11:30) (98% - 100%)  Wt(kg): --    PHYSICAL EXAM:  Constitutional: non-toxic, no distress  HEAD/EYES: anicteric, no conjunctival injection  ENT:  supple, no thrush  Cardiovascular:   +S1/S2  Respiratory:  +BS bilaterally  GI:  soft, non-tender, +bowel sounds  :  no CVA tenderness, no jenkins  Musculoskeletal:  no synovitis, normal ROM  Neurologic: asleep, bed-bound  Skin:  no rash, no erythema, no phlebitis  Heme/Onc: no lymphadenopathy   Psychiatric:  minimally verbal      Labs:                        9.3    13.98 )-----------( 252      ( 2022 06:35 )             29.6         136  |  97<L>  |  17  ----------------------------<  129<H>  4.1   |  24  |  0.68    Ca    9.4      2022 06:35  Phos  2.8       Mg     1.90         TPro  7.3  /  Alb  3.5  /  TBili  0.2  /  DBili  x   /  AST  15  /  ALT  8   /  AlkPhos  68        WBC Trend:  WBC Count: 13.98 (22 @ 06:35)  WBC Count: 4.04 (22 @ 19:33)  WBC Count: 4.33 (22 @ 12:30)  WBC Count: 6.79 (22 @ 00:15)    Auto Neutrophil #: 11.02 K/uL (22 @ 06:35)  Auto Neutrophil #: 3.73 K/uL (22 @ 00:15)  Auto Neutrophil #: 4.47 K/uL (21 @ 07:21)  Band Neutrophils %: 6.0 % (21 @ 07:21)  Auto Neutrophil #: 7.67 K/uL (21 @ 07:28)    Auto Eosinophil %: 0.5 % (22 @ 06:35)  Auto Eosinophil %: 0.4 % (22 @ 00:15)    Urinalysis Basic - ( 2022 00:50 )    Color: Yellow / Appearance: Clear / S.026 / pH: x  Gluc: x / Ketone: Negative  / Bili: Negative / Urobili: 3 mg/dL   Blood: x / Protein: 30 mg/dL / Nitrite: Positive   Leuk Esterase: Small / RBC: 8 /HPF / WBC 4 /HPF   Sq Epi: x / Non Sq Epi: 2 /HPF / Bacteria: Moderate        MICROBIOLOGY:    MRSA PCR Result.: Tim (21 @ 00:07)      Culture - Blood (collected 2022 00:29)  Source: .Blood Blood-Peripheral  Preliminary Report:    No growth to date.    Culture - Blood (collected 2022 00:00)  Source: .Blood Blood-Peripheral  Preliminary Report:    Growth in aerobic bottle: Gram Positive Rods    ***Blood Panel PCR results on this specimen are available    approximately 3 hours after the Gram stain result.***    Gram stain, PCR, and/or culture results may not always    correspond due to difference in methodologies.    ************************************************************    This PCR assay was performed by multiplex PCR. This    Assay tests for 66 bacterial and resistance gene targets.    Please refer to the [facility] Labs test directory    at [email or web address] for details.  Organism: Blood Culture PCR  Organism: Blood Culture PCR    Sensitivities:      -  Bacillus cereus group: Detec      Method Type: PCR    Culture - Blood (collected 29 Sep 2021 00:26)  Source: .Blood Blood  Final Report:    No Growth Final    Culture - Urine (collected 28 Sep 2021 01:01)  Source: Clean Catch Clean Catch (Midstream)  Final Report:    <10,000 CFU/mL Normal Urogenital Gayle    Culture - Blood (collected 28 Sep 2021 00:49)  Source: .Blood Blood-Peripheral  Final Report:    No Growth Final    Culture - Blood (collected 28 Sep 2021 00:49)  Source: .Blood Blood-Peripheral  Final Report:    Growth in aerobic bottle: Escherichia coli ESBL    MDRO detected in BCID PCR, resistance marker = CTX-M (ESBL)    Bacteroides distasonis "Susceptibilities not performed"    ***Blood Panel PCR results on this specimen are available    approximately 3 hours after the Gram stain result.***    Gram stain, PCR, and/or culture results may not always    correspond due to difference in methodologies.    ************************************************************    This PCR assay was performed by multiplex PCR. This    Assay tests for 66 bacterial and resistance gene targets.    Please refer to the [facility] Labs test directory    at [email or web address] for details.  Organism: Blood Culture PCR  Escherichia coli ESBL  Organism: Escherichia coli ESBL    Sensitivities:      -  Amikacin: S <=16      -  Ampicillin: R >16 These ampicillin results predict results for amoxicillin      -  Ampicillin/Sulbactam: R >16/8 Enterobacter, Citrobacter, and Serratia may develop resistance during prolonged therapy (3-4 days)      -  Aztreonam: R >16      -  Cefazolin: R >16 Enterobacter, Citrobacter, and Serratia may develop resistance during prolonged therapy (3-4 days)      -  Cefepime: R >16      -  Cefoxitin: S <=8      -  Ceftriaxone: R >32 Enterobacter, Citrobacter, and Serratia may develop resistance during prolonged therapy      -  Ciprofloxacin: R >2      -  Ertapenem: S <=0.5      -  Gentamicin: S <=2      -  Imipenem: S <=1      -  Levofloxacin: R >4      -  Meropenem: S <=1      -  Piperacillin/Tazobactam: R <=8      -  Tobramycin: R >8      -  Trimethoprim/Sulfamethoxazole: S <=0.5/9.5      Method Type: JYOTI  Organism: Blood Culture PCR    Sensitivities:      -  ESBL: Detec      -  Escherichia coli: Detec      Method Type: PCR    COVID-19 Matias Domain AB Interp: Positive (21 @ 12:35)    RADIOLOGY:  imaging below personally reviewed    < from: CT Abdomen and Pelvis w/ Oral Cont and w/ IV Cont (21 @ 19:58) >  IMPRESSION:  Mild nonspecific bladder wall thickening  < end of copied text >   Patient is a 81y old  Male who presents with a chief complaint of Encephalopathy (2022 14:28)    HPI:  81y M with dementia, HTN, HLD, T2DM, BPH, Anemia, CVA who presents from home with low blood pressure. Pt is minimally verbal and unable to participate in history taking, history was obtained by discussing with patients daughter, Sierra, at [phone]. Over the past few weeks the patient has had decreased PO intake, is now mostly bed bound, and will only rarely get out of bed to ambulate. The daughter took his blood pressure yesterday because he had been refusing to get out of bed and she noted that his BP was 90s/40s so she brought him to the ER. She states that the patient has been afebrile, no coughing or shortness of breath, no melena or hematochezia. Pt has been constipated requiring laxatives. He has had foul smelling urinePt had an episode of yellow emesis 2 weeks ago.    While in the ED pt had /60, HR 80and was afebrile. Pt s/p 1.5L of NS. Pt was also found to be anemic with Hb of 7 and is s/p 1U pRBC.     The daughter notes that over the past few years the patient has had a decline in functional status, has slowly stopped talking and has now stopped walking most of the time. He sometimes chokes and has been transitioned to thickened liquids with his diet.  (2022 12:05)    Asked to evaluate for antibiotics       REVIEW OF SYSTEMS  [ x ] ROS unobtainable because: dementia  [  ] All other systems negative except as noted below    Constitutional:  [ ] fever [ ] chills  [ ] weight loss  [ ]night sweat  [ ]poor appetite/PO intake [ ]fatigue   Skin:  [ ] rash [ ] phlebitis	  Eyes: [ ] icterus [ ] pain  [ ] discharge	  ENMT: [ ] sore throat  [ ] thrush [ ] ulcers [ ] exudates [ ]anosmia  Respiratory: [ ] dyspnea [ ] hemoptysis [ ] cough [ ] sputum	  Cardiovascular:  [ ] chest pain [ ] palpitations [ ] edema	  Gastrointestinal:  [ ] nausea [ ] vomiting [ ] diarrhea [ ] constipation [ ] pain	  Genitourinary:  [ ] dysuria [ ] frequency [ ] hematuria [ ] discharge [ ] flank pain  [ ] incontinence  Musculoskeletal:  [ ] myalgias [ ] arthralgias [ ] arthritis  [ ] back pain  Neurological:  [ ] headache [ ] weakness [ ] seizures  [ ] confusion/altered mental status    prior hospital charts reviewed [V]  primary team notes reviewed [V]  other consultant notes reviewed [V]    PAST MEDICAL & SURGICAL HISTORY:  Diabetes  HTN (hypertension)  HLD (hyperlipidemia)  BPH (benign prostatic hyperplasia)  Dementia  Anemia of chronic disease  S/P hernia surgery  r.inguinal  S/P abdominal surgery, follow-up exam      FAMILY HISTORY:  No pertinent family history in first degree relatives    SOCIAL HISTORY:  Denied smoking    Allergies  penicillin (Rash (Mild))    ANTIMICROBIALS:  ertapenem  IVPB    ertapenem  IVPB 1000 every 24 hours      ANTIMICROBIALS (past 90 days):   MEDICATIONS  (STANDING):    ertapenem  IVPB   1000 milliGRAM(s) IV Intermittent (22 @ 12:34)    ertapenem  IVPB   120 mL/Hr IV Intermittent (22 @ 11:57)    MEDICATIONS  (STANDING):  acetaminophen     Tablet .. 650 every 6 hours PRN  atorvastatin 10 at bedtime  dextrose 50% Injectable 25 once  dextrose 50% Injectable 12.5 once  dextrose 50% Injectable 25 once  dextrose Oral Gel 15 once PRN  finasteride 5 daily  glucagon  Injectable 1 once  insulin lispro (ADMELOG) corrective regimen sliding scale  three times a day before meals  insulin lispro (ADMELOG) corrective regimen sliding scale  at bedtime  ondansetron Injectable 4 every 8 hours PRN  polyethylene glycol 3350 17 daily  valsartan 40 daily      VITALS:  Vital Signs Last 24 Hrs  T(F): 98.2 (22 @ 11:30), Max: 98.4 (22 @ 04:30)  Vital Signs Last 24 Hrs  HR: 96 (22 @ 11:30) (69 - 97)  BP: 149/77 (22 @ 11:30) (147/60 - 159/79)  RR: 18 (22 @ 11:30)  SpO2: 99% (22 @ 11:30) (98% - 100%)  Wt(kg): --    PHYSICAL EXAM:  Constitutional: non-toxic, no distress  HEAD/EYES: anicteric, no conjunctival injection  ENT:  supple, no thrush  Cardiovascular:   +S1/S2  Respiratory:  +BS bilaterally  GI:  soft, non-tender, +bowel sounds, midline scar  :  no CVA tenderness, no jenkins  Musculoskeletal:  no synovitis, normal ROM  Neurologic: asleep, bed-bound  Skin:  no rash, no erythema, no phlebitis  Heme/Onc: no lymphadenopathy   Psychiatric:  minimally verbal      Labs:                        9.3    13.98 )-----------( 252      ( 2022 06:35 )             29.6         136  |  97<L>  |  17  ----------------------------<  129<H>  4.1   |  24  |  0.68    Ca    9.4      2022 06:35  Phos  2.8       Mg     1.90         TPro  7.3  /  Alb  3.5  /  TBili  0.2  /  DBili  x   /  AST  15  /  ALT  8   /  AlkPhos  68        WBC Trend:  WBC Count: 13.98 (22 @ 06:35)  WBC Count: 4.04 (22 @ 19:33)  WBC Count: 4.33 (22 @ 12:30)  WBC Count: 6.79 (22 @ 00:15)    Auto Neutrophil #: 11.02 K/uL (22 @ 06:35)  Auto Neutrophil #: 3.73 K/uL (22 @ 00:15)  Auto Neutrophil #: 4.47 K/uL (21 @ 07:21)  Band Neutrophils %: 6.0 % (21 @ 07:21)  Auto Neutrophil #: 7.67 K/uL (21 @ 07:28)    Auto Eosinophil %: 0.5 % (22 @ 06:35)  Auto Eosinophil %: 0.4 % (22 @ 00:15)    Urinalysis Basic - ( 2022 00:50 )    Color: Yellow / Appearance: Clear / S.026 / pH: x  Gluc: x / Ketone: Negative  / Bili: Negative / Urobili: 3 mg/dL   Blood: x / Protein: 30 mg/dL / Nitrite: Positive   Leuk Esterase: Small / RBC: 8 /HPF / WBC 4 /HPF   Sq Epi: x / Non Sq Epi: 2 /HPF / Bacteria: Moderate        MICROBIOLOGY:    MRSA PCR Result.: Mort[facility] (21 @ 00:07)      Culture - Blood (collected 2022 00:29)  Source: .Blood Blood-Peripheral  Preliminary Report:    No growth to date.    Culture - Blood (collected 2022 00:00)  Source: .Blood Blood-Peripheral  Preliminary Report:    Growth in aerobic bottle: Gram Positive Rods    ***Blood Panel PCR results on this specimen are available    approximately 3 hours after the Gram stain result.***    Gram stain, PCR, and/or culture results may not always    correspond due to difference in methodologies.    ************************************************************    This PCR assay was performed by multiplex PCR. This    Assay tests for 66 bacterial and resistance gene targets.    Please refer to the [facility] Labs test directory    at [email or web address] for details.  Organism: Blood Culture PCR  Organism: Blood Culture PCR    Sensitivities:      -  Bacillus cereus group: Detec      Method Type: PCR    Culture - Blood (collected 29 Sep 2021 00:26)  Source: .Blood Blood  Final Report:    No Growth Final    Culture - Urine (collected 28 Sep 2021 01:01)  Source: Clean Catch Clean Catch (Midstream)  Final Report:    <10,000 CFU/mL Normal Urogenital Gayle    Culture - Blood (collected 28 Sep 2021 00:49)  Source: .Blood Blood-Peripheral  Final Report:    No Growth Final    Culture - Blood (collected 28 Sep 2021 00:49)  Source: .Blood Blood-Peripheral  Final Report:    Growth in aerobic bottle: Escherichia coli ESBL    MDRO detected in BCID PCR, resistance marker = CTX-M (ESBL)    Bacteroides distasonis "Susceptibilities not performed"    ***Blood Panel PCR results on this specimen are available    approximately 3 hours after the Gram stain result.***    Gram stain, PCR, and/or culture results may not always    correspond due to difference in methodologies.    ************************************************************    This PCR assay was performed by multiplex PCR. This    Assay tests for 66 bacterial and resistance gene targets.    Please refer to the [facility] test directory    at [email or web address] for details.  Organism: Blood Culture PCR  Escherichia coli ESBL  Organism: Escherichia coli ESBL    Sensitivities:      -  Amikacin: S <=16      -  Ampicillin: R >16 These ampicillin results predict results for amoxicillin      -  Ampicillin/Sulbactam: R >16/8 Enterobacter, Citrobacter, and Serratia may develop resistance during prolonged therapy (3-4 days)      -  Aztreonam: R >16      -  Cefazolin: R >16 Enterobacter, Citrobacter, and Serratia may develop resistance during prolonged therapy (3-4 days)      -  Cefepime: R >16      -  Cefoxitin: S <=8      -  Ceftriaxone: R >32 Enterobacter, Citrobacter, and Serratia may develop resistance during prolonged therapy      -  Ciprofloxacin: R >2      -  Ertapenem: S <=0.5      -  Gentamicin: S <=2      -  Imipenem: S <=1      -  Levofloxacin: R >4      -  Meropenem: S <=1      -  Piperacillin/Tazobactam: R <=8      -  Tobramycin: R >8      -  Trimethoprim/Sulfamethoxazole: S <=0.5/9.5      Method Type: JYOTI  Organism: Blood Culture PCR    Sensitivities:      -  ESBL: Detec      -  Escherichia coli: Detec      Method Type: PCR    COVID-19 Matias Domain AB Interp: Positive (21 @ 12:35)    RADIOLOGY:  imaging below personally reviewed    < from: CT Abdomen and Pelvis w/ Oral Cont and w/ IV Cont (21 @ 19:58) >  IMPRESSION:  Mild nonspecific bladder wall thickening  < end of copied text >

## 2022-06-30 NOTE — PHYSICAL THERAPY INITIAL EVALUATION ADULT - PLANNED THERAPY INTERVENTIONS, PT EVAL
balance training/bed mobility training/strengthening/transfer training

## 2022-07-01 ENCOUNTER — RESULT REVIEW (OUTPATIENT)
Age: 82
End: 2022-07-01

## 2022-07-01 LAB
ALBUMIN SERPL ELPH-MCNC: 3.1 G/DL — LOW (ref 3.3–5)
ALP SERPL-CCNC: 59 U/L — SIGNIFICANT CHANGE UP (ref 40–120)
ALT FLD-CCNC: 9 U/L — SIGNIFICANT CHANGE UP (ref 4–41)
ANION GAP SERPL CALC-SCNC: 10 MMOL/L — SIGNIFICANT CHANGE UP (ref 7–14)
ANISOCYTOSIS BLD QL: SLIGHT — SIGNIFICANT CHANGE UP
AST SERPL-CCNC: 24 U/L — SIGNIFICANT CHANGE UP (ref 4–40)
BASOPHILS # BLD AUTO: 0.07 K/UL — SIGNIFICANT CHANGE UP (ref 0–0.2)
BASOPHILS NFR BLD AUTO: 0.9 % — SIGNIFICANT CHANGE UP (ref 0–2)
BILIRUB SERPL-MCNC: 0.3 MG/DL — SIGNIFICANT CHANGE UP (ref 0.2–1.2)
BUN SERPL-MCNC: 26 MG/DL — HIGH (ref 7–23)
CALCIUM SERPL-MCNC: 8.9 MG/DL — SIGNIFICANT CHANGE UP (ref 8.4–10.5)
CHLORIDE SERPL-SCNC: 98 MMOL/L — SIGNIFICANT CHANGE UP (ref 98–107)
CO2 SERPL-SCNC: 26 MMOL/L — SIGNIFICANT CHANGE UP (ref 22–31)
CREAT SERPL-MCNC: 0.98 MG/DL — SIGNIFICANT CHANGE UP (ref 0.5–1.3)
CULTURE RESULTS: SIGNIFICANT CHANGE UP
EGFR: 77 ML/MIN/1.73M2 — SIGNIFICANT CHANGE UP
EOSINOPHIL # BLD AUTO: 0.19 K/UL — SIGNIFICANT CHANGE UP (ref 0–0.5)
EOSINOPHIL NFR BLD AUTO: 2.6 % — SIGNIFICANT CHANGE UP (ref 0–6)
FERRITIN SERPL-MCNC: 43 NG/ML — SIGNIFICANT CHANGE UP (ref 30–400)
FOLATE SERPL-MCNC: 18.7 NG/ML — HIGH (ref 3.1–17.5)
GIANT PLATELETS BLD QL SMEAR: PRESENT — SIGNIFICANT CHANGE UP
GLUCOSE BLDC GLUCOMTR-MCNC: 131 MG/DL — HIGH (ref 70–99)
GLUCOSE BLDC GLUCOMTR-MCNC: 135 MG/DL — HIGH (ref 70–99)
GLUCOSE BLDC GLUCOMTR-MCNC: 138 MG/DL — HIGH (ref 70–99)
GLUCOSE BLDC GLUCOMTR-MCNC: 147 MG/DL — HIGH (ref 70–99)
GLUCOSE BLDC GLUCOMTR-MCNC: 188 MG/DL — HIGH (ref 70–99)
GLUCOSE SERPL-MCNC: 139 MG/DL — HIGH (ref 70–99)
HCT VFR BLD CALC: 25.9 % — LOW (ref 39–50)
HGB BLD-MCNC: 8.1 G/DL — LOW (ref 13–17)
HYPOCHROMIA BLD QL: SLIGHT — SIGNIFICANT CHANGE UP
IANC: 4.05 K/UL — SIGNIFICANT CHANGE UP (ref 1.8–7.4)
INR BLD: 1.3 RATIO — HIGH (ref 0.88–1.16)
IRON SATN MFR SERPL: 12 UG/DL — LOW (ref 45–165)
IRON SATN MFR SERPL: 16 UG/DL — LOW (ref 45–165)
IRON SATN MFR SERPL: 5 % — LOW (ref 14–50)
IRON SATN MFR SERPL: 6 % — LOW (ref 14–50)
LDH SERPL L TO P-CCNC: 175 U/L — SIGNIFICANT CHANGE UP (ref 135–225)
LYMPHOCYTES # BLD AUTO: 0.98 K/UL — LOW (ref 1–3.3)
LYMPHOCYTES # BLD AUTO: 13.3 % — SIGNIFICANT CHANGE UP (ref 13–44)
MACROCYTES BLD QL: SLIGHT — SIGNIFICANT CHANGE UP
MANUAL SMEAR VERIFICATION: SIGNIFICANT CHANGE UP
MCHC RBC-ENTMCNC: 27.9 PG — SIGNIFICANT CHANGE UP (ref 27–34)
MCHC RBC-ENTMCNC: 31.3 GM/DL — LOW (ref 32–36)
MCV RBC AUTO: 89.3 FL — SIGNIFICANT CHANGE UP (ref 80–100)
MONOCYTES # BLD AUTO: 0.92 K/UL — HIGH (ref 0–0.9)
MONOCYTES NFR BLD AUTO: 12.4 % — SIGNIFICANT CHANGE UP (ref 2–14)
NEUTROPHILS # BLD AUTO: 5.09 K/UL — SIGNIFICANT CHANGE UP (ref 1.8–7.4)
NEUTROPHILS NFR BLD AUTO: 67.2 % — SIGNIFICANT CHANGE UP (ref 43–77)
NEUTS BAND # BLD: 1.8 % — SIGNIFICANT CHANGE UP (ref 0–6)
NRBC # BLD: 1 /100 — HIGH (ref 0–0)
OVALOCYTES BLD QL SMEAR: SLIGHT — SIGNIFICANT CHANGE UP
PLAT MORPH BLD: ABNORMAL
PLATELET # BLD AUTO: 218 K/UL — SIGNIFICANT CHANGE UP (ref 150–400)
PLATELET COUNT - ESTIMATE: NORMAL — SIGNIFICANT CHANGE UP
POIKILOCYTOSIS BLD QL AUTO: SLIGHT — SIGNIFICANT CHANGE UP
POLYCHROMASIA BLD QL SMEAR: SIGNIFICANT CHANGE UP
POTASSIUM SERPL-MCNC: 3.8 MMOL/L — SIGNIFICANT CHANGE UP (ref 3.5–5.3)
POTASSIUM SERPL-SCNC: 3.8 MMOL/L — SIGNIFICANT CHANGE UP (ref 3.5–5.3)
PROT SERPL-MCNC: 6.9 G/DL — SIGNIFICANT CHANGE UP (ref 6–8.3)
PROTHROM AB SERPL-ACNC: 15.1 SEC — HIGH (ref 10.5–13.4)
RBC # BLD: 2.9 M/UL — LOW (ref 4.2–5.8)
RBC # BLD: 2.9 M/UL — LOW (ref 4.2–5.8)
RBC # FLD: 14.1 % — SIGNIFICANT CHANGE UP (ref 10.3–14.5)
RBC BLD AUTO: ABNORMAL
RETICS #: 70.5 K/UL — SIGNIFICANT CHANGE UP (ref 25–125)
RETICS/RBC NFR: 2.4 % — SIGNIFICANT CHANGE UP (ref 0.5–2.5)
SODIUM SERPL-SCNC: 134 MMOL/L — LOW (ref 135–145)
SPECIMEN SOURCE: SIGNIFICANT CHANGE UP
TIBC SERPL-MCNC: 246 UG/DL — SIGNIFICANT CHANGE UP (ref 220–430)
TIBC SERPL-MCNC: 255 UG/DL — SIGNIFICANT CHANGE UP (ref 220–430)
TRANSFERRIN SERPL-MCNC: 219 MG/DL — SIGNIFICANT CHANGE UP (ref 200–360)
UIBC SERPL-MCNC: 234 UG/DL — SIGNIFICANT CHANGE UP (ref 110–370)
UIBC SERPL-MCNC: 239 UG/DL — SIGNIFICANT CHANGE UP (ref 110–370)
VARIANT LYMPHS # BLD: 1.8 % — SIGNIFICANT CHANGE UP (ref 0–6)
VIT B12 SERPL-MCNC: 762 PG/ML — SIGNIFICANT CHANGE UP (ref 200–900)
WBC # BLD: 7.38 K/UL — SIGNIFICANT CHANGE UP (ref 3.8–10.5)
WBC # FLD AUTO: 7.38 K/UL — SIGNIFICANT CHANGE UP (ref 3.8–10.5)

## 2022-07-01 PROCEDURE — 93306 TTE W/DOPPLER COMPLETE: CPT | Mod: 26

## 2022-07-01 PROCEDURE — 99233 SBSQ HOSP IP/OBS HIGH 50: CPT

## 2022-07-01 PROCEDURE — 88305 TISSUE EXAM BY PATHOLOGIST: CPT | Mod: 26

## 2022-07-01 PROCEDURE — 43239 EGD BIOPSY SINGLE/MULTIPLE: CPT | Mod: GC

## 2022-07-01 RX ORDER — PANTOPRAZOLE SODIUM 20 MG/1
40 TABLET, DELAYED RELEASE ORAL EVERY 12 HOURS
Refills: 0 | Status: DISCONTINUED | OUTPATIENT
Start: 2022-07-01 | End: 2022-07-02

## 2022-07-01 RX ADMIN — VALSARTAN 40 MILLIGRAM(S): 80 TABLET ORAL at 05:25

## 2022-07-01 RX ADMIN — Medication 650 MILLIGRAM(S): at 22:33

## 2022-07-01 RX ADMIN — Medication 650 MILLIGRAM(S): at 22:03

## 2022-07-01 RX ADMIN — ATORVASTATIN CALCIUM 10 MILLIGRAM(S): 80 TABLET, FILM COATED ORAL at 22:03

## 2022-07-01 RX ADMIN — Medication 1: at 16:46

## 2022-07-01 RX ADMIN — PANTOPRAZOLE SODIUM 40 MILLIGRAM(S): 20 TABLET, DELAYED RELEASE ORAL at 17:03

## 2022-07-01 NOTE — PROGRESS NOTE ADULT - SUBJECTIVE AND OBJECTIVE BOX
Patient is a 81y old  Male who presents with a chief complaint of Encephalopathy (01 Jul 2022 11:40)      SUBJECTIVE / OVERNIGHT EVENTS: Pt seen and examined at 11:55am at the endoscopy suite, no overnight events, pt unable to give any history due to his  mental status, appears comfortable, no other new issues reported.        MEDICATIONS  (STANDING):  atorvastatin 10 milliGRAM(s) Oral at bedtime  dextrose 5%. 1000 milliLiter(s) (50 mL/Hr) IV Continuous <Continuous>  dextrose 5%. 1000 milliLiter(s) (100 mL/Hr) IV Continuous <Continuous>  dextrose 50% Injectable 25 Gram(s) IV Push once  dextrose 50% Injectable 12.5 Gram(s) IV Push once  dextrose 50% Injectable 25 Gram(s) IV Push once  finasteride 5 milliGRAM(s) Oral daily  glucagon  Injectable 1 milliGRAM(s) IntraMuscular once  insulin lispro (ADMELOG) corrective regimen sliding scale   SubCutaneous three times a day before meals  insulin lispro (ADMELOG) corrective regimen sliding scale   SubCutaneous at bedtime  multivitamin 1 Tablet(s) Oral daily  pantoprazole  Injectable 40 milliGRAM(s) IV Push every 12 hours  polyethylene glycol 3350 17 Gram(s) Oral daily  valsartan 40 milliGRAM(s) Oral daily    MEDICATIONS  (PRN):  acetaminophen     Tablet .. 650 milliGRAM(s) Oral every 6 hours PRN Temp greater or equal to 38C (100.4F), Mild Pain (1 - 3)  dextrose Oral Gel 15 Gram(s) Oral once PRN Blood Glucose LESS THAN 70 milliGRAM(s)/deciliter  ondansetron Injectable 4 milliGRAM(s) IV Push every 8 hours PRN Nausea and/or Vomiting      Vital Signs Last 24 Hrs  T(C): 36.2 (01 Jul 2022 12:29), Max: 36.8 (30 Jun 2022 20:16)  T(F): 97.2 (01 Jul 2022 12:29), Max: 98.2 (30 Jun 2022 20:16)  HR: 60 (01 Jul 2022 12:40) (60 - 80)  BP: 118/60 (01 Jul 2022 12:40) (80/48 - 152/91)  BP(mean): --  RR: 16 (01 Jul 2022 12:40) (15 - 19)  SpO2: 100% (01 Jul 2022 12:40) (93% - 100%)  CAPILLARY BLOOD GLUCOSE      POCT Blood Glucose.: 135 mg/dL (01 Jul 2022 12:53)  POCT Blood Glucose.: 131 mg/dL (01 Jul 2022 11:38)  POCT Blood Glucose.: 138 mg/dL (01 Jul 2022 07:40)  POCT Blood Glucose.: 120 mg/dL (30 Jun 2022 21:38)  POCT Blood Glucose.: 132 mg/dL (30 Jun 2022 16:31)    I&O's Summary      PHYSICAL EXAM:  GENERAL: NAD, well-developed  CHEST/LUNG: Clear to auscultation bilaterally; No wheeze  HEART: Regular rate and rhythm  ABDOMEN: Soft, Nontender, Nondistended  EXTREMITIES: no LE edema  PSYCH: Calm  NEUROLOGY: opens eyes, does not engage in interview  SKIN: No rashes or lesions    LABS:                        8.1    7.38  )-----------( 218      ( 01 Jul 2022 06:10 )             25.9     07-01    134<L>  |  98  |  26<H>  ----------------------------<  139<H>  3.8   |  26  |  0.98    Ca    8.9      01 Jul 2022 06:10  Phos  2.8     06-30  Mg     1.90     06-30    TPro  6.9  /  Alb  3.1<L>  /  TBili  0.3  /  DBili  x   /  AST  24  /  ALT  9   /  AlkPhos  59  07-01    PT/INR - ( 01 Jul 2022 06:10 )   PT: 15.1 sec;   INR: 1.30 ratio                   RADIOLOGY & ADDITIONAL TESTS:    Imaging Personally Reviewed:    Consultant(s) Notes Reviewed:      Care Discussed with Consultants/Other Providers:

## 2022-07-01 NOTE — PROGRESS NOTE ADULT - SUBJECTIVE AND OBJECTIVE BOX
Cardiology    HISTORY OF PRESENT ILLNESS: HPI:  81y M with dementia, HTN, HLD, T2DM, BPH, CVA (29 Jun 2022 12:05)  Essentially here w/ failure to thrive, poor PO intake, weakness.  Unable to give a good interval HPI due to dementia, which as worsened in the last few years.  10pt ROS is not able to be obtained.    Dr Phillips last note:  ...with hypertension, hypercholesterolemia, and diabetes with preserved LV systolic function on a remote echo, no ischemia on a very remote stress echo, who  presents to us today for a stress echocardiogram for evaluation of chest discomfort. The patient states he  experienced intermittent left-sided chest discomfort sometimes at rest and other times with exertion. He was in  the hospital recently for nausea and vomiting likely of a GI etiology, underwent a CT scan and MRI of his brain  which is within normal limits. The patient denies orthopnea, PND, or lower extremity edema. He has not  passed out.    6/30- had vomiting this morning.  lethargic on interview/exam today, unable to provide any interval ROS.    Date of service 7/1- resting comfortably, awaiting endoscopy, mostly nonverbal / unintelligible.    acetaminophen     Tablet .. 650 milliGRAM(s) Oral every 6 hours PRN  atorvastatin 10 milliGRAM(s) Oral at bedtime  dextrose 5%. 1000 milliLiter(s) IV Continuous <Continuous>  dextrose 5%. 1000 milliLiter(s) IV Continuous <Continuous>  dextrose 50% Injectable 25 Gram(s) IV Push once  dextrose 50% Injectable 12.5 Gram(s) IV Push once  dextrose 50% Injectable 25 Gram(s) IV Push once  dextrose Oral Gel 15 Gram(s) Oral once PRN  finasteride 5 milliGRAM(s) Oral daily  glucagon  Injectable 1 milliGRAM(s) IntraMuscular once  insulin lispro (ADMELOG) corrective regimen sliding scale   SubCutaneous three times a day before meals  insulin lispro (ADMELOG) corrective regimen sliding scale   SubCutaneous at bedtime  multivitamin 1 Tablet(s) Oral daily  ondansetron Injectable 4 milliGRAM(s) IV Push every 8 hours PRN  pantoprazole  Injectable 40 milliGRAM(s) IV Push every 12 hours  polyethylene glycol 3350 17 Gram(s) Oral daily  valsartan 40 milliGRAM(s) Oral daily                            8.1    7.38  )-----------( 218      ( 01 Jul 2022 06:10 )             25.9       07-01    134<L>  |  98  |  26<H>  ----------------------------<  139<H>  3.8   |  26  |  0.98    Ca    8.9      01 Jul 2022 06:10  Phos  2.8     06-30  Mg     1.90     06-30    TPro  6.9  /  Alb  3.1<L>  /  TBili  0.3  /  DBili  x   /  AST  24  /  ALT  9   /  AlkPhos  59  07-01      T(C): 36.6 (07-01-22 @ 11:03), Max: 36.8 (06-30-22 @ 20:16)  HR: 63 (07-01-22 @ 11:03) (63 - 80)  BP: 120/67 (07-01-22 @ 11:03) (104/61 - 152/91)  RR: 19 (07-01-22 @ 11:03) (17 - 19)  SpO2: 93% (07-01-22 @ 11:03) (93% - 99%)  Wt(kg): --    I&O's Summary    Appearance: thin elderly man, resting comfortably.  HEENT:   Normal oral mucosa, PERRL, EOMI	  Lymphatic: No lymphadenopathy , no edema  Cardiovascular: Normal S1 S2, No JVD, No murmurs , Peripheral pulses palpable 2+ bilaterally  Respiratory: Lungs clear to auscultation, normal effort 	  Gastrointestinal:  Soft, Non-tender, + BS	  Skin: No rashes, No ecchymoses, No cyanosis, warm to touch  Musculoskeletal: Normal range of motion, normal strength  Psychiatry:  oriented x name.  no acute distress.    TELEMETRY: NSR	    ECG: NSR      ASSESSMENT/PLAN: 	81y Male with HTN, HLD, prior CVA, here with failure to thrive.  No prior MI.      Check an echocardiogram, rule out CHF (not edematous or in pulmonary edema as he appears dehydrated).  (Last echo and stress testing was in ~9762-9813, normal but out of date)  Valsartan for HTN.  Will follow.  Supportive care / antibiotics for UTI.  Undergoing endoscopy to work up normocytic anemia.  No invasive workup planned, re: dementia.  Would address family's goals of care before ordering more CV testing.      Gómez Pacheco M.D.  Cardiac Electrophysiology    office [phone]  pager [phone]

## 2022-07-02 LAB
ALBUMIN SERPL ELPH-MCNC: 2.9 G/DL — LOW (ref 3.3–5)
ALP SERPL-CCNC: 55 U/L — SIGNIFICANT CHANGE UP (ref 40–120)
ALT FLD-CCNC: 9 U/L — SIGNIFICANT CHANGE UP (ref 4–41)
ANION GAP SERPL CALC-SCNC: 10 MMOL/L — SIGNIFICANT CHANGE UP (ref 7–14)
AST SERPL-CCNC: 18 U/L — SIGNIFICANT CHANGE UP (ref 4–40)
BASOPHILS # BLD AUTO: 0.02 K/UL — SIGNIFICANT CHANGE UP (ref 0–0.2)
BASOPHILS NFR BLD AUTO: 0.4 % — SIGNIFICANT CHANGE UP (ref 0–2)
BILIRUB SERPL-MCNC: 0.3 MG/DL — SIGNIFICANT CHANGE UP (ref 0.2–1.2)
BUN SERPL-MCNC: 26 MG/DL — HIGH (ref 7–23)
CALCIUM SERPL-MCNC: 8.2 MG/DL — LOW (ref 8.4–10.5)
CHLORIDE SERPL-SCNC: 97 MMOL/L — LOW (ref 98–107)
CO2 SERPL-SCNC: 25 MMOL/L — SIGNIFICANT CHANGE UP (ref 22–31)
CREAT SERPL-MCNC: 0.87 MG/DL — SIGNIFICANT CHANGE UP (ref 0.5–1.3)
EGFR: 86 ML/MIN/1.73M2 — SIGNIFICANT CHANGE UP
EOSINOPHIL # BLD AUTO: 0.14 K/UL — SIGNIFICANT CHANGE UP (ref 0–0.5)
EOSINOPHIL NFR BLD AUTO: 3.1 % — SIGNIFICANT CHANGE UP (ref 0–6)
GLUCOSE BLDC GLUCOMTR-MCNC: 124 MG/DL — HIGH (ref 70–99)
GLUCOSE BLDC GLUCOMTR-MCNC: 160 MG/DL — HIGH (ref 70–99)
GLUCOSE BLDC GLUCOMTR-MCNC: 164 MG/DL — HIGH (ref 70–99)
GLUCOSE BLDC GLUCOMTR-MCNC: 167 MG/DL — HIGH (ref 70–99)
GLUCOSE SERPL-MCNC: 108 MG/DL — HIGH (ref 70–99)
HCT VFR BLD CALC: 23.7 % — LOW (ref 39–50)
HGB BLD-MCNC: 7.3 G/DL — LOW (ref 13–17)
IANC: 1.84 K/UL — SIGNIFICANT CHANGE UP (ref 1.8–7.4)
IMM GRANULOCYTES NFR BLD AUTO: 2.6 % — HIGH (ref 0–1.5)
LYMPHOCYTES # BLD AUTO: 1.6 K/UL — SIGNIFICANT CHANGE UP (ref 1–3.3)
LYMPHOCYTES # BLD AUTO: 35.1 % — SIGNIFICANT CHANGE UP (ref 13–44)
MCHC RBC-ENTMCNC: 27.7 PG — SIGNIFICANT CHANGE UP (ref 27–34)
MCHC RBC-ENTMCNC: 30.8 GM/DL — LOW (ref 32–36)
MCV RBC AUTO: 89.8 FL — SIGNIFICANT CHANGE UP (ref 80–100)
MONOCYTES # BLD AUTO: 0.84 K/UL — SIGNIFICANT CHANGE UP (ref 0–0.9)
MONOCYTES NFR BLD AUTO: 18.4 % — HIGH (ref 2–14)
NEUTROPHILS # BLD AUTO: 1.84 K/UL — SIGNIFICANT CHANGE UP (ref 1.8–7.4)
NEUTROPHILS NFR BLD AUTO: 40.4 % — LOW (ref 43–77)
NRBC # BLD: 0 /100 WBCS — SIGNIFICANT CHANGE UP
NRBC # FLD: 0 K/UL — SIGNIFICANT CHANGE UP
PLATELET # BLD AUTO: 192 K/UL — SIGNIFICANT CHANGE UP (ref 150–400)
POTASSIUM SERPL-MCNC: 3.9 MMOL/L — SIGNIFICANT CHANGE UP (ref 3.5–5.3)
POTASSIUM SERPL-SCNC: 3.9 MMOL/L — SIGNIFICANT CHANGE UP (ref 3.5–5.3)
PROT SERPL-MCNC: 6.3 G/DL — SIGNIFICANT CHANGE UP (ref 6–8.3)
RBC # BLD: 2.64 M/UL — LOW (ref 4.2–5.8)
RBC # FLD: 14 % — SIGNIFICANT CHANGE UP (ref 10.3–14.5)
SODIUM SERPL-SCNC: 132 MMOL/L — LOW (ref 135–145)
WBC # BLD: 4.56 K/UL — SIGNIFICANT CHANGE UP (ref 3.8–10.5)
WBC # FLD AUTO: 4.56 K/UL — SIGNIFICANT CHANGE UP (ref 3.8–10.5)

## 2022-07-02 PROCEDURE — 99233 SBSQ HOSP IP/OBS HIGH 50: CPT

## 2022-07-02 RX ORDER — PANTOPRAZOLE SODIUM 20 MG/1
40 TABLET, DELAYED RELEASE ORAL
Refills: 0 | Status: DISCONTINUED | OUTPATIENT
Start: 2022-07-02 | End: 2022-07-04

## 2022-07-02 RX ADMIN — POLYETHYLENE GLYCOL 3350 17 GRAM(S): 17 POWDER, FOR SOLUTION ORAL at 11:52

## 2022-07-02 RX ADMIN — FINASTERIDE 5 MILLIGRAM(S): 5 TABLET, FILM COATED ORAL at 11:53

## 2022-07-02 RX ADMIN — PANTOPRAZOLE SODIUM 40 MILLIGRAM(S): 20 TABLET, DELAYED RELEASE ORAL at 05:18

## 2022-07-02 RX ADMIN — Medication 1: at 16:40

## 2022-07-02 RX ADMIN — VALSARTAN 40 MILLIGRAM(S): 80 TABLET ORAL at 05:18

## 2022-07-02 RX ADMIN — Medication 1 TABLET(S): at 11:52

## 2022-07-02 RX ADMIN — ATORVASTATIN CALCIUM 10 MILLIGRAM(S): 80 TABLET, FILM COATED ORAL at 22:15

## 2022-07-02 RX ADMIN — Medication 1: at 11:51

## 2022-07-02 NOTE — PROGRESS NOTE ADULT - PROBLEM SELECTOR PLAN 9
ICDs given anemia  Updated daughter Sierra, wishes to take him home only tomorrow as family is out of state today  dc planning for tomorrow ICDs given anemia  Updated daughter Sierra on 7/2, wishes to take him home only tomorrow as family is out of state today  dc planning for tomorrow

## 2022-07-02 NOTE — CHART NOTE - NSCHARTNOTEFT_GEN_A_CORE
Notified by RN that patient's family is at bedside and are requesting to discharge the patient AMA.     Pt seen at bedside with pt's family present. As per patient's daughter Belinda, pt was visited yesterday afternoon and was not fed by hospital staff, prior to becoming NPO at midnight for EGD. Pt's family is concerned for negligence, stating patient's under-garments have not been changed after they have been soiled, and are unsure why the patient is not being discharged. Family also requesting to be notified of all medical care plan.    Explained plan of care to pt's family; family understands and agrees to having patient remain at hospital while pt awaits consults. Also discussed with family about designated HCP and as per family, Sierra (Daughter), makes majority of decisions for her father, however no HCP form exists in chart. Pt's daughter states that she had forms completed at different hospital, however no documentation on file.    Relayed pt's family's concerns to assistant nurse manager in regards to patient's family's concerns about nursing care as well.
Brief Update Note    HDS, hgb 7.3 from 8.1, 9.3, 7.4. Initially 7.0 s/p 1 pRBC 6/29  HDS, no bleeding reported  EGD notable for duodenal erosions w/o bleeding and a singly duodenal polyp    - Monitor Hgb and transfuse if Hgb < 7  - Follow up path  - PPI 40 daily x4 weeks  - Diet per speech  - Avoid nsaids  - Rest of care per primary    Preliminary note until signed by Attending.    Thank you for involving us in this patient's care. Please reach out to GI if any further questions or concerns.    Rachele King MD  Gastroenterology/Hepatology Fellow, PGY-VI    NON-URGENT CONSULTS:  Please email [email or web address]  [email or web address]  AT NIGHT AND ON WEEKENDS:  Contact on-call GI fellow via answering service ([phone]) from 5pm-8am and on weekends/holidays  MONDAY-FRIDAY 8AM-5PM:  Pager# [phone] ([facility])  GI Phone# [phone] ([facility])

## 2022-07-02 NOTE — PROGRESS NOTE ADULT - SUBJECTIVE AND OBJECTIVE BOX
Patient is a 81y old  Male who presents with a chief complaint of Encephalopathy (02 Jul 2022 12:18)      SUBJECTIVE / OVERNIGHT EVENTS: Pt seen and examined at 11:25am, no overnight events, pt unable to give any history due to his  mental status, appears comfortable, no other new issues reported.        MEDICATIONS  (STANDING):  atorvastatin 10 milliGRAM(s) Oral at bedtime  dextrose 5%. 1000 milliLiter(s) (50 mL/Hr) IV Continuous <Continuous>  dextrose 5%. 1000 milliLiter(s) (100 mL/Hr) IV Continuous <Continuous>  dextrose 50% Injectable 25 Gram(s) IV Push once  dextrose 50% Injectable 12.5 Gram(s) IV Push once  dextrose 50% Injectable 25 Gram(s) IV Push once  finasteride 5 milliGRAM(s) Oral daily  glucagon  Injectable 1 milliGRAM(s) IntraMuscular once  insulin lispro (ADMELOG) corrective regimen sliding scale   SubCutaneous three times a day before meals  insulin lispro (ADMELOG) corrective regimen sliding scale   SubCutaneous at bedtime  multivitamin 1 Tablet(s) Oral daily  pantoprazole    Tablet 40 milliGRAM(s) Oral before breakfast  polyethylene glycol 3350 17 Gram(s) Oral daily  valsartan 40 milliGRAM(s) Oral daily    MEDICATIONS  (PRN):  acetaminophen     Tablet .. 650 milliGRAM(s) Oral every 6 hours PRN Temp greater or equal to 38C (100.4F), Mild Pain (1 - 3)  dextrose Oral Gel 15 Gram(s) Oral once PRN Blood Glucose LESS THAN 70 milliGRAM(s)/deciliter  ondansetron Injectable 4 milliGRAM(s) IV Push every 8 hours PRN Nausea and/or Vomiting      Vital Signs Last 24 Hrs  T(C): 36.9 (02 Jul 2022 05:15), Max: 37.1 (01 Jul 2022 21:50)  T(F): 98.4 (02 Jul 2022 05:15), Max: 98.7 (01 Jul 2022 21:50)  HR: 65 (02 Jul 2022 05:15) (60 - 77)  BP: 112/58 (02 Jul 2022 05:15) (112/58 - 123/62)  BP(mean): --  RR: 18 (02 Jul 2022 05:15) (16 - 18)  SpO2: 98% (02 Jul 2022 05:15) (98% - 100%)  CAPILLARY BLOOD GLUCOSE      POCT Blood Glucose.: 160 mg/dL (02 Jul 2022 11:36)  POCT Blood Glucose.: 124 mg/dL (02 Jul 2022 07:43)  POCT Blood Glucose.: 147 mg/dL (01 Jul 2022 21:15)  POCT Blood Glucose.: 188 mg/dL (01 Jul 2022 16:41)  POCT Blood Glucose.: 135 mg/dL (01 Jul 2022 12:53)    I&O's Summary      PHYSICAL EXAM:  GENERAL: NAD, well-developed  CHEST/LUNG: Clear to auscultation bilaterally; No wheeze  HEART: Regular rate and rhythm  ABDOMEN: Soft, Nontender, Nondistended  EXTREMITIES: no LE edema  PSYCH: Calm  NEUROLOGY: eyes open, does not engage in interview  SKIN: No rashes or lesions    LABS:                        7.3    4.56  )-----------( 192      ( 02 Jul 2022 05:30 )             23.7     07-02    132<L>  |  97<L>  |  26<H>  ----------------------------<  108<H>  3.9   |  25  |  0.87    Ca    8.2<L>      02 Jul 2022 05:30    TPro  6.3  /  Alb  2.9<L>  /  TBili  0.3  /  DBili  x   /  AST  18  /  ALT  9   /  AlkPhos  55  07-02    PT/INR - ( 01 Jul 2022 06:10 )   PT: 15.1 sec;   INR: 1.30 ratio                   RADIOLOGY & ADDITIONAL TESTS:  < from: Upper Endoscopy (07.01.22 @ 11:38) >    - 2 cm hiatal hernia.                       - Erythematous mucosa in the gastric body and antrum.                        Biopsied.            - A single duodenal polyp, possible source of anemia.                        Biopsied.                       - Duodenal erosions without bleeding.                       - Normal second portion of the duodenum.    < end of copied text >    Imaging Personally Reviewed:    Consultant(s) Notes Reviewed:      Care Discussed with Consultants/Other Providers:   Patient is a 81y old  Male who presents with a chief complaint of Encephalopathy (02 Jul 2022 12:18)      SUBJECTIVE / OVERNIGHT EVENTS: Pt seen and examined at 11:25am, overnight events noted, pt unable to give any history due to his  mental status, appears comfortable, no other new issues reported.        MEDICATIONS  (STANDING):  atorvastatin 10 milliGRAM(s) Oral at bedtime  dextrose 5%. 1000 milliLiter(s) (50 mL/Hr) IV Continuous <Continuous>  dextrose 5%. 1000 milliLiter(s) (100 mL/Hr) IV Continuous <Continuous>  dextrose 50% Injectable 25 Gram(s) IV Push once  dextrose 50% Injectable 12.5 Gram(s) IV Push once  dextrose 50% Injectable 25 Gram(s) IV Push once  finasteride 5 milliGRAM(s) Oral daily  glucagon  Injectable 1 milliGRAM(s) IntraMuscular once  insulin lispro (ADMELOG) corrective regimen sliding scale   SubCutaneous three times a day before meals  insulin lispro (ADMELOG) corrective regimen sliding scale   SubCutaneous at bedtime  multivitamin 1 Tablet(s) Oral daily  pantoprazole    Tablet 40 milliGRAM(s) Oral before breakfast  polyethylene glycol 3350 17 Gram(s) Oral daily  valsartan 40 milliGRAM(s) Oral daily    MEDICATIONS  (PRN):  acetaminophen     Tablet .. 650 milliGRAM(s) Oral every 6 hours PRN Temp greater or equal to 38C (100.4F), Mild Pain (1 - 3)  dextrose Oral Gel 15 Gram(s) Oral once PRN Blood Glucose LESS THAN 70 milliGRAM(s)/deciliter  ondansetron Injectable 4 milliGRAM(s) IV Push every 8 hours PRN Nausea and/or Vomiting      Vital Signs Last 24 Hrs  T(C): 36.9 (02 Jul 2022 05:15), Max: 37.1 (01 Jul 2022 21:50)  T(F): 98.4 (02 Jul 2022 05:15), Max: 98.7 (01 Jul 2022 21:50)  HR: 65 (02 Jul 2022 05:15) (60 - 77)  BP: 112/58 (02 Jul 2022 05:15) (112/58 - 123/62)  BP(mean): --  RR: 18 (02 Jul 2022 05:15) (16 - 18)  SpO2: 98% (02 Jul 2022 05:15) (98% - 100%)  CAPILLARY BLOOD GLUCOSE      POCT Blood Glucose.: 160 mg/dL (02 Jul 2022 11:36)  POCT Blood Glucose.: 124 mg/dL (02 Jul 2022 07:43)  POCT Blood Glucose.: 147 mg/dL (01 Jul 2022 21:15)  POCT Blood Glucose.: 188 mg/dL (01 Jul 2022 16:41)  POCT Blood Glucose.: 135 mg/dL (01 Jul 2022 12:53)    I&O's Summary      PHYSICAL EXAM:  GENERAL: NAD, well-developed  CHEST/LUNG: Clear to auscultation bilaterally; No wheeze  HEART: Regular rate and rhythm  ABDOMEN: Soft, Nontender, Nondistended  EXTREMITIES: no LE edema  PSYCH: Calm  NEUROLOGY: eyes open, does not engage in interview  SKIN: No rashes or lesions    LABS:                        7.3    4.56  )-----------( 192      ( 02 Jul 2022 05:30 )             23.7     07-02    132<L>  |  97<L>  |  26<H>  ----------------------------<  108<H>  3.9   |  25  |  0.87    Ca    8.2<L>      02 Jul 2022 05:30    TPro  6.3  /  Alb  2.9<L>  /  TBili  0.3  /  DBili  x   /  AST  18  /  ALT  9   /  AlkPhos  55  07-02    PT/INR - ( 01 Jul 2022 06:10 )   PT: 15.1 sec;   INR: 1.30 ratio                   RADIOLOGY & ADDITIONAL TESTS:  < from: Upper Endoscopy (07.01.22 @ 11:38) >    - 2 cm hiatal hernia.                       - Erythematous mucosa in the gastric body and antrum.                        Biopsied.            - A single duodenal polyp, possible source of anemia.                        Biopsied.                       - Duodenal erosions without bleeding.                       - Normal second portion of the duodenum.    < end of copied text >    Imaging Personally Reviewed:    Consultant(s) Notes Reviewed:      Care Discussed with Consultants/Other Providers:   Patient is a 81y old  Male who presents with a chief complaint of Encephalopathy (02 Jul 2022 12:18)      SUBJECTIVE / OVERNIGHT EVENTS: Pt seen and examined at 11:25am, overnight events noted, pt unable to give any history due to his  mental status, appears comfortable, no other new issues reported.        MEDICATIONS  (STANDING):  atorvastatin 10 milliGRAM(s) Oral at bedtime  dextrose 5%. 1000 milliLiter(s) (50 mL/Hr) IV Continuous <Continuous>  dextrose 5%. 1000 milliLiter(s) (100 mL/Hr) IV Continuous <Continuous>  dextrose 50% Injectable 25 Gram(s) IV Push once  dextrose 50% Injectable 12.5 Gram(s) IV Push once  dextrose 50% Injectable 25 Gram(s) IV Push once  finasteride 5 milliGRAM(s) Oral daily  glucagon  Injectable 1 milliGRAM(s) IntraMuscular once  insulin lispro (ADMELOG) corrective regimen sliding scale   SubCutaneous three times a day before meals  insulin lispro (ADMELOG) corrective regimen sliding scale   SubCutaneous at bedtime  multivitamin 1 Tablet(s) Oral daily  pantoprazole    Tablet 40 milliGRAM(s) Oral before breakfast  polyethylene glycol 3350 17 Gram(s) Oral daily  valsartan 40 milliGRAM(s) Oral daily    MEDICATIONS  (PRN):  acetaminophen     Tablet .. 650 milliGRAM(s) Oral every 6 hours PRN Temp greater or equal to 38C (100.4F), Mild Pain (1 - 3)  dextrose Oral Gel 15 Gram(s) Oral once PRN Blood Glucose LESS THAN 70 milliGRAM(s)/deciliter  ondansetron Injectable 4 milliGRAM(s) IV Push every 8 hours PRN Nausea and/or Vomiting      Vital Signs Last 24 Hrs  T(C): 36.9 (02 Jul 2022 05:15), Max: 37.1 (01 Jul 2022 21:50)  T(F): 98.4 (02 Jul 2022 05:15), Max: 98.7 (01 Jul 2022 21:50)  HR: 65 (02 Jul 2022 05:15) (60 - 77)  BP: 112/58 (02 Jul 2022 05:15) (112/58 - 123/62)  BP(mean): --  RR: 18 (02 Jul 2022 05:15) (16 - 18)  SpO2: 98% (02 Jul 2022 05:15) (98% - 100%)  CAPILLARY BLOOD GLUCOSE      POCT Blood Glucose.: 160 mg/dL (02 Jul 2022 11:36)  POCT Blood Glucose.: 124 mg/dL (02 Jul 2022 07:43)  POCT Blood Glucose.: 147 mg/dL (01 Jul 2022 21:15)  POCT Blood Glucose.: 188 mg/dL (01 Jul 2022 16:41)  POCT Blood Glucose.: 135 mg/dL (01 Jul 2022 12:53)    I&O's Summary      PHYSICAL EXAM:  GENERAL: NAD, well-developed  CHEST/LUNG: Clear to auscultation bilaterally; No wheeze  HEART: Regular rate and rhythm  ABDOMEN: Soft, Nontender, Nondistended  EXTREMITIES: no LE edema  PSYCH: Calm  NEUROLOGY: eyes open, does not engage in interview  SKIN: No rashes or lesions    LABS:                        7.3    4.56  )-----------( 192      ( 02 Jul 2022 05:30 )             23.7     07-02    132<L>  |  97<L>  |  26<H>  ----------------------------<  108<H>  3.9   |  25  |  0.87    Ca    8.2<L>      02 Jul 2022 05:30    TPro  6.3  /  Alb  2.9<L>  /  TBili  0.3  /  DBili  x   /  AST  18  /  ALT  9   /  AlkPhos  55  07-02    PT/INR - ( 01 Jul 2022 06:10 )   PT: 15.1 sec;   INR: 1.30 ratio                   RADIOLOGY & ADDITIONAL TESTS:  < from: Upper Endoscopy (07.01.22 @ 11:38) >    - 2 cm hiatal hernia.                       - Erythematous mucosa in the gastric body and antrum.                        Biopsied.            - A single duodenal polyp, possible source of anemia.                        Biopsied.                       - Duodenal erosions without bleeding.                       - Normal second portion of the duodenum.    < end of copied text >    Imaging Personally Reviewed:  < from: Transthoracic Echocardiogram (07.01.22 @ 14:21) >  Mitral annular calcification, otherwise normal mitral  valve. Minimal mitral regurgitation.  2. Aorticvalve not well visualized; appears calcified.  Mild aortic regurgitation.  3. Endocardium not well visualized; grossly normal left  ventricular systolic function.  4. Mild diastolic dysfunction (Stage I).  5. The right ventricle is not well visualized; grossly  normal right ventricular systolic function.    < end of copied text >    Consultant(s) Notes Reviewed:      Care Discussed with Consultants/Other Providers:

## 2022-07-02 NOTE — PROGRESS NOTE ADULT - SUBJECTIVE AND OBJECTIVE BOX
ANESTHESIA POSTOP CHECK    81y Male POSTOP DAY 1 S/P EGD    Vital Signs Last 24 Hrs  T(C): 36.9 (02 Jul 2022 05:15), Max: 37.1 (01 Jul 2022 21:50)  T(F): 98.4 (02 Jul 2022 05:15), Max: 98.7 (01 Jul 2022 21:50)  HR: 65 (02 Jul 2022 05:15) (60 - 77)  BP: 112/58 (02 Jul 2022 05:15) (80/48 - 123/62)  BP(mean): --  RR: 18 (02 Jul 2022 05:15) (15 - 18)  SpO2: 98% (02 Jul 2022 05:15) (98% - 100%)  I&O's Summary      [X ] NO APPARENT ANESTHESIA COMPLICATIONS

## 2022-07-02 NOTE — PROGRESS NOTE ADULT - PROBLEM SELECTOR PLAN 7
BP controlled off medications  - continue to monitor  -f/u echo BP controlled off medications  - continue to monitor  - echo showed grossly normal left ventricular systolic function. Mild diastolic dysfunction (Stage I). grossly  normal right ventricular systolic function.

## 2022-07-02 NOTE — PROGRESS NOTE ADULT - SUBJECTIVE AND OBJECTIVE BOX
C A R D I O L O G Y  **********************************     DATE OF SERVICE: 07-02-22    Patient resting comfortably in no distress. Unable to obtain ROS given mental status.  	  MEDICATIONS:  MEDICATIONS  (STANDING):  atorvastatin 10 milliGRAM(s) Oral at bedtime  dextrose 5%. 1000 milliLiter(s) (50 mL/Hr) IV Continuous <Continuous>  dextrose 5%. 1000 milliLiter(s) (100 mL/Hr) IV Continuous <Continuous>  dextrose 50% Injectable 25 Gram(s) IV Push once  dextrose 50% Injectable 12.5 Gram(s) IV Push once  dextrose 50% Injectable 25 Gram(s) IV Push once  finasteride 5 milliGRAM(s) Oral daily  glucagon  Injectable 1 milliGRAM(s) IntraMuscular once  insulin lispro (ADMELOG) corrective regimen sliding scale   SubCutaneous three times a day before meals  insulin lispro (ADMELOG) corrective regimen sliding scale   SubCutaneous at bedtime  multivitamin 1 Tablet(s) Oral daily  pantoprazole    Tablet 40 milliGRAM(s) Oral before breakfast  polyethylene glycol 3350 17 Gram(s) Oral daily  valsartan 40 milliGRAM(s) Oral daily      LABS:	 	    CARDIAC MARKERS:                                7.3    4.56  )-----------( 192      ( 02 Jul 2022 05:30 )             23.7     Hemoglobin: 7.3 g/dL (07-02 @ 05:30)  Hemoglobin: 8.1 g/dL (07-01 @ 06:10)  Hemoglobin: 9.3 g/dL (06-30 @ 06:35)  Hemoglobin: 7.4 g/dL (06-29 @ 19:33)  Hemoglobin: 7.2 g/dL (06-29 @ 12:30)      07-02    132<L>  |  97<L>  |  26<H>  ----------------------------<  108<H>  3.9   |  25  |  0.87    Ca    8.2<L>      02 Jul 2022 05:30    TPro  6.3  /  Alb  2.9<L>  /  TBili  0.3  /  DBili  x   /  AST  18  /  ALT  9   /  AlkPhos  55  07-02    Creatinine Trend: 0.87<--, 0.98<--, 0.68<--, 0.66<--, 0.87<--    COAGS:       proBNP:   Lipid Profile:   HgA1c:   TSH:       PHYSICAL EXAM:  T(C): 36.6 (07-02-22 @ 13:15), Max: 37.1 (07-01-22 @ 21:50)  HR: 72 (07-02-22 @ 13:15) (65 - 72)  BP: 116/61 (07-02-22 @ 13:15) (112/58 - 116/61)  RR: 17 (07-02-22 @ 13:15) (17 - 18)  SpO2: 99% (07-02-22 @ 13:15) (98% - 100%)  Wt(kg): --  I&O's Summary        Gen: NAD  HEENT:  (-)icterus (-)pallor  CV: N S1 S2 1/6 ROZ (+)2 Pulses B/l  Resp:  Clear to auscultation B/L, normal effort  GI: (+) BS Soft, NT, ND  Lymph:  (-)Edema, (-)obvious lymphadenopathy  Skin: Warm to touch, Normal turgor  Psych: Unable to assess mood and affect    TELEMETRY: None	    ECG: NSR    < from: Transthoracic Echocardiogram (07.01.22 @ 14:21) >  CONCLUSIONS:  1. Mitral annular calcification, otherwise normal mitral  valve. Minimal mitral regurgitation.  2. Aorticvalve not well visualized; appears calcified.  Mild aortic regurgitation.  3. Endocardium not well visualized; grossly normal left  ventricular systolic function.  4. Mild diastolic dysfunction (Stage I).  5. The right ventricle is not well visualized; grossly  normal right ventricular systolic function.    < end of copied text >      ASSESSMENT/PLAN: 81y Male with HTN, HLD, prior CVA, here with failure to thrive.  No prior MI.      - TTE noted with normal LV/RV function  - Valsartan for HTN.  - Monitor off abx per ID  - s/p EGD - tolerated procedure well from CV perspective  - No further inpatient cardiac w/u planned    Jaxon Abbott PA-C  Pager: [phone]

## 2022-07-03 LAB
ALBUMIN SERPL ELPH-MCNC: 3.2 G/DL — LOW (ref 3.3–5)
ALP SERPL-CCNC: 61 U/L — SIGNIFICANT CHANGE UP (ref 40–120)
ALT FLD-CCNC: 8 U/L — SIGNIFICANT CHANGE UP (ref 4–41)
ANION GAP SERPL CALC-SCNC: 12 MMOL/L — SIGNIFICANT CHANGE UP (ref 7–14)
AST SERPL-CCNC: 26 U/L — SIGNIFICANT CHANGE UP (ref 4–40)
BASOPHILS # BLD AUTO: 0.02 K/UL — SIGNIFICANT CHANGE UP (ref 0–0.2)
BASOPHILS NFR BLD AUTO: 0.4 % — SIGNIFICANT CHANGE UP (ref 0–2)
BILIRUB SERPL-MCNC: 0.3 MG/DL — SIGNIFICANT CHANGE UP (ref 0.2–1.2)
BUN SERPL-MCNC: 19 MG/DL — SIGNIFICANT CHANGE UP (ref 7–23)
CALCIUM SERPL-MCNC: 8.6 MG/DL — SIGNIFICANT CHANGE UP (ref 8.4–10.5)
CHLORIDE SERPL-SCNC: 98 MMOL/L — SIGNIFICANT CHANGE UP (ref 98–107)
CO2 SERPL-SCNC: 25 MMOL/L — SIGNIFICANT CHANGE UP (ref 22–31)
CREAT SERPL-MCNC: 0.84 MG/DL — SIGNIFICANT CHANGE UP (ref 0.5–1.3)
EGFR: 87 ML/MIN/1.73M2 — SIGNIFICANT CHANGE UP
EOSINOPHIL # BLD AUTO: 0.13 K/UL — SIGNIFICANT CHANGE UP (ref 0–0.5)
EOSINOPHIL NFR BLD AUTO: 2.6 % — SIGNIFICANT CHANGE UP (ref 0–6)
GLUCOSE BLDC GLUCOMTR-MCNC: 115 MG/DL — HIGH (ref 70–99)
GLUCOSE BLDC GLUCOMTR-MCNC: 122 MG/DL — HIGH (ref 70–99)
GLUCOSE BLDC GLUCOMTR-MCNC: 122 MG/DL — HIGH (ref 70–99)
GLUCOSE BLDC GLUCOMTR-MCNC: 126 MG/DL — HIGH (ref 70–99)
GLUCOSE BLDC GLUCOMTR-MCNC: 151 MG/DL — HIGH (ref 70–99)
GLUCOSE SERPL-MCNC: 105 MG/DL — HIGH (ref 70–99)
HCT VFR BLD CALC: 25.3 % — LOW (ref 39–50)
HGB BLD-MCNC: 7.9 G/DL — LOW (ref 13–17)
IANC: 1.99 K/UL — SIGNIFICANT CHANGE UP (ref 1.8–7.4)
IMM GRANULOCYTES NFR BLD AUTO: 4.5 % — HIGH (ref 0–1.5)
LYMPHOCYTES # BLD AUTO: 1.69 K/UL — SIGNIFICANT CHANGE UP (ref 1–3.3)
LYMPHOCYTES # BLD AUTO: 34.2 % — SIGNIFICANT CHANGE UP (ref 13–44)
MCHC RBC-ENTMCNC: 28.3 PG — SIGNIFICANT CHANGE UP (ref 27–34)
MCHC RBC-ENTMCNC: 31.2 GM/DL — LOW (ref 32–36)
MCV RBC AUTO: 90.7 FL — SIGNIFICANT CHANGE UP (ref 80–100)
MONOCYTES # BLD AUTO: 0.89 K/UL — SIGNIFICANT CHANGE UP (ref 0–0.9)
MONOCYTES NFR BLD AUTO: 18 % — HIGH (ref 2–14)
NEUTROPHILS # BLD AUTO: 1.99 K/UL — SIGNIFICANT CHANGE UP (ref 1.8–7.4)
NEUTROPHILS NFR BLD AUTO: 40.3 % — LOW (ref 43–77)
NRBC # BLD: 0 /100 WBCS — SIGNIFICANT CHANGE UP
NRBC # FLD: 0 K/UL — SIGNIFICANT CHANGE UP
PLATELET # BLD AUTO: 231 K/UL — SIGNIFICANT CHANGE UP (ref 150–400)
POTASSIUM SERPL-MCNC: 4.5 MMOL/L — SIGNIFICANT CHANGE UP (ref 3.5–5.3)
POTASSIUM SERPL-SCNC: 4.5 MMOL/L — SIGNIFICANT CHANGE UP (ref 3.5–5.3)
PROT SERPL-MCNC: 6.8 G/DL — SIGNIFICANT CHANGE UP (ref 6–8.3)
RBC # BLD: 2.79 M/UL — LOW (ref 4.2–5.8)
RBC # FLD: 14 % — SIGNIFICANT CHANGE UP (ref 10.3–14.5)
SODIUM SERPL-SCNC: 135 MMOL/L — SIGNIFICANT CHANGE UP (ref 135–145)
WBC # BLD: 4.94 K/UL — SIGNIFICANT CHANGE UP (ref 3.8–10.5)
WBC # FLD AUTO: 4.94 K/UL — SIGNIFICANT CHANGE UP (ref 3.8–10.5)

## 2022-07-03 PROCEDURE — 99232 SBSQ HOSP IP/OBS MODERATE 35: CPT

## 2022-07-03 RX ADMIN — FINASTERIDE 5 MILLIGRAM(S): 5 TABLET, FILM COATED ORAL at 11:46

## 2022-07-03 RX ADMIN — VALSARTAN 40 MILLIGRAM(S): 80 TABLET ORAL at 05:09

## 2022-07-03 RX ADMIN — ATORVASTATIN CALCIUM 10 MILLIGRAM(S): 80 TABLET, FILM COATED ORAL at 22:15

## 2022-07-03 RX ADMIN — Medication 1: at 17:30

## 2022-07-03 RX ADMIN — Medication 1 TABLET(S): at 11:46

## 2022-07-03 RX ADMIN — PANTOPRAZOLE SODIUM 40 MILLIGRAM(S): 20 TABLET, DELAYED RELEASE ORAL at 05:09

## 2022-07-03 NOTE — DISCHARGE NOTE PROVIDER - HOSPITAL COURSE
81y M with dementia, HTN, HLD, T2DM, BPH, Anemia, CVA who presents from home with failure to thrive and metabolic encephalopathy in the setting of UTI.    Metabolic encephalopathy.   - +UA, but per ID likely NO UTI.  +BCx likely contaminant. Observed off Abx  - likely due to progression of dementia   - corrected electrolyte abnormalities  - GOC Conersation by medical attending, Full Code    Anemia of chronic disease.   - Hgb 7-8  - no bleeding reported  - GI on board  - EGD notable for duodenal erosions w/o bleeding and a singly duodenal polyp  - Monitor Hgb and transfuse if Hgb < 7   - S/P PRBC x 1 on 6/29  - Follow up path from EGD  - PPI 40 daily x4 weeks  - Avoid nsaids    Dementia.   - Dementia progressive per daughter, pt more bed bound    Dysphagia.  - Pt requires thickened liquids at home  - S+S rec. soft and bite sized diet with mildly thick liquids.    HTN (hypertension).  - BP controlled off medications, continue to monitor  - TTE - Grossly nl LV/RV; Mild diastolic dysfunction    Diabetes.   - On PO meds at home  - A1C 5.8    On ___ this case was reviewed with  ____, the patient is medically stable and optimized for discharge. All medications were reviewed and prescriptions were sent to mutually agreed upon pharmacy. 81y M with dementia, HTN, HLD, T2DM, BPH, Anemia, CVA who presents from home with failure to thrive and metabolic encephalopathy in the setting of UTI.    Metabolic encephalopathy.   - +UA, but per ID likely NO UTI.  +BCx likely contaminant. Observed off Abx  - likely due to progression of dementia   - corrected electrolyte abnormalities  - GOC Conersation by medical attending, Full Code    Anemia of chronic disease.   - Hgb 7-8  - no bleeding reported  - GI on board  - EGD notable for duodenal erosions w/o bleeding and a singly duodenal polyp  - Monitor Hgb and transfuse if Hgb < 7   - S/P PRBC x 1 on 6/29  - Follow up path from EGD  - PPI 40 daily x4 weeks  - Avoid nsaids    Dementia.   - Dementia progressive per daughter, pt more bed bound    Dysphagia.  - Pt requires thickened liquids at home  - S+S rec. soft and bite sized diet with mildly thick liquids.    HTN (hypertension).  - BP controlled off medications, continue to monitor  - TTE - Grossly nl LV/RV; Mild diastolic dysfunction    Diabetes.   - On PO meds at home  - A1C 5.8    stable for dc

## 2022-07-03 NOTE — DISCHARGE NOTE PROVIDER - CARE PROVIDER_API CALL
PMD,   Dr. Borrero [phone]  Phone: (   )    -  Fax: (   )    -  Established Patient  Follow Up Time: 1 week   PMANH,   Dr. Borrero [phone]  Phone: (   )    -  Fax: (   )    -  Established Patient  Follow Up Time: 1 week    Paul Pacheco,   Gastroenterology   [phone]  Phone: (   )    -  Fax: (   )    -  Follow Up Time: 1 week

## 2022-07-03 NOTE — DISCHARGE NOTE PROVIDER - PROVIDER TOKENS
FREE:[LAST:[PMD],PHONE:[(   )    -],FAX:[(   )    -],ADDRESS:[Dr. Borrero [phone]],FOLLOWUP:[1 week],ESTABLISHEDPATIENT:[T]] FREE:[LAST:[PMD],PHONE:[(   )    -],FAX:[(   )    -],ADDRESS:[Dr. Borrero [phone]],FOLLOWUP:[1 week],ESTABLISHEDPATIENT:[T]],FREE:[LAST:[Paul Pacheco],PHONE:[(   )    -],FAX:[(   )    -],ADDRESS:[Gastroenterology   [phone]],FOLLOWUP:[1 week]]

## 2022-07-03 NOTE — PROGRESS NOTE ADULT - PROBLEM SELECTOR PLAN 9
ICDs given anemia  Updated daughter Sierra on 7/2  family not available till 7/4 to take care of him, requesting dc only tomorrow

## 2022-07-03 NOTE — PROGRESS NOTE ADULT - PROBLEM SELECTOR PLAN 7
BP controlled off medications  - continue to monitor  - echo showed grossly normal left ventricular systolic function. Mild diastolic dysfunction (Stage I). grossly  normal right ventricular systolic function.  - no further cardiac wkup

## 2022-07-03 NOTE — DISCHARGE NOTE PROVIDER - NSDCMRMEDTOKEN_GEN_ALL_CORE_FT
atorvastatin 10 mg oral tablet: 1 tab(s) orally once a day  finasteride 5 mg oral tablet: 1 tab(s) orally once a day  metFORMIN 500 mg oral tablet: 1 tab(s) orally 2 times a day  MiraLax oral powder for reconstitution: 17 gram(s) orally once a day, As Needed  Multiple Vitamins oral tablet: 1 tab(s) orally once a day  Vitamin D2 50,000 intl units (1.25 mg) oral capsule (obsolete): 1 cap(s) orally once a week   acetaminophen 325 mg oral tablet: 2 tab(s) orally every 6 hours, As needed, Temp greater or equal to 38C (100.4F), Mild Pain (1 - 3)  atorvastatin 10 mg oral tablet: 1 tab(s) orally once a day  finasteride 5 mg oral tablet: 1 tab(s) orally once a day  metFORMIN 500 mg oral tablet: 1 tab(s) orally 2 times a day  MiraLax oral powder for reconstitution: 17 gram(s) orally once a day, As Needed  Multiple Vitamins oral tablet: 1 tab(s) orally once a day  pantoprazole 40 mg oral delayed release tablet: 1 tab(s) orally once a day (before a meal)  valsartan 40 mg oral tablet: 1 tab(s) orally once a day  Vitamin D2 50,000 intl units (1.25 mg) oral capsule (obsolete): 1 cap(s) orally once a week

## 2022-07-03 NOTE — DISCHARGE NOTE PROVIDER - NSDCCPCAREPLAN_GEN_ALL_CORE_FT
PRINCIPAL DISCHARGE DIAGNOSIS  Diagnosis: Metabolic encephalopathy  Assessment and Plan of Treatment: You were admitted with altered mental status. Likely due to progression of Dementia. Low suspicion for urinary tract infection. You remained stable off antibiotics.      SECONDARY DISCHARGE DIAGNOSES  Diagnosis: GIB (gastrointestinal bleeding)  Assessment and Plan of Treatment: You tested positive for blood in your stool. You were evaluated by our GI doctors. your endoscopyed duodenal erosions without bleeding and a single duodenal polyp. Follow up pathology. Continue Protonix 40 daily for 4 weeks. Avoid NSAIDs such as Ibuprofen.    Diagnosis: Anemia of chronic disease  Assessment and Plan of Treatment: You recieved on unit of blood on 6/29. Your hemoglobin on discharge is ----. Follow-up with your outpatient provider for further care/recommendations. Monitor for signs/symptoms indicating worsening of disease, such as, easy bleeding/bruising, pale skin, fatigue, dizziness, increased heart rate, or chest pain.    Diagnosis: HTN (hypertension)  Assessment and Plan of Treatment: Well controlled off medications. Monitor for any visual changes, headaches or dizziness.  Monitor blood pressure regularly.  Follow up with your PCP for further management for high blood pressure.    Diagnosis: Diabetes  Assessment and Plan of Treatment: Continue your medication regimen and a consistent carbohydrate diet (Meaning eating the same amount of carbohydrates at the same time each day). Monitor blood glucose levels throughout the day before meals and at bedtime. Record blood sugars and bring to outpatient providers appointment in order to be reviewed by your doctor for management modifications. Monitor for signs/symptoms of low blood glucose, such as, dizziness, altered mental status, or cool/clammy skin. In addition, monitor for signs/symptoms of high blood glucose, such as, feeling hot, dry, fatigued, or with increased thirst/urination. Make regular podiatry appointments in order to have feet checked for wounds and uncontrolled toe nail growth to prevent infections, as well as, appointments with an ophthalmologist to monitor your vision.     PRINCIPAL DISCHARGE DIAGNOSIS  Diagnosis: Metabolic encephalopathy  Assessment and Plan of Treatment: You were admitted with altered mental status. Likely due to progression of Dementia. Low suspicion for urinary tract infection. You remained stable off antibiotics.      SECONDARY DISCHARGE DIAGNOSES  Diagnosis: GIB (gastrointestinal bleeding)  Assessment and Plan of Treatment: You tested positive for blood in your stool. You were evaluated by our GI doctors. your endoscopyed duodenal erosions without bleeding and a single duodenal polyp. Follow up pathology. Continue Protonix 40 daily for 4 weeks. Avoid NSAIDs such as Ibuprofen.    Diagnosis: Anemia of chronic disease  Assessment and Plan of Treatment: You recieved one unit of blood on 6/29. Your hemoglobin on discharge is 7.9. Follow-up with your outpatient provider for further care/recommendations. Monitor for signs/symptoms indicating worsening of disease, such as, easy bleeding/bruising, pale skin, fatigue, dizziness, increased heart rate, or chest pain.    Diagnosis: HTN (hypertension)  Assessment and Plan of Treatment: Well controlled off medications. Monitor for any visual changes, headaches or dizziness.  Monitor blood pressure regularly.  Follow up with your PCP for further management for high blood pressure.    Diagnosis: Diabetes  Assessment and Plan of Treatment: Continue your medication regimen and a consistent carbohydrate diet (Meaning eating the same amount of carbohydrates at the same time each day). Monitor blood glucose levels throughout the day before meals and at bedtime. Record blood sugars and bring to outpatient providers appointment in order to be reviewed by your doctor for management modifications. Monitor for signs/symptoms of low blood glucose, such as, dizziness, altered mental status, or cool/clammy skin. In addition, monitor for signs/symptoms of high blood glucose, such as, feeling hot, dry, fatigued, or with increased thirst/urination. Make regular podiatry appointments in order to have feet checked for wounds and uncontrolled toe nail growth to prevent infections, as well as, appointments with an ophthalmologist to monitor your vision.

## 2022-07-03 NOTE — PROGRESS NOTE ADULT - SUBJECTIVE AND OBJECTIVE BOX
C A R D I O L O G Y  **********************************     DATE OF SERVICE: 07-03-22         acetaminophen     Tablet .. 650 milliGRAM(s) Oral every 6 hours PRN  atorvastatin 10 milliGRAM(s) Oral at bedtime  dextrose 5%. 1000 milliLiter(s) IV Continuous <Continuous>  dextrose 5%. 1000 milliLiter(s) IV Continuous <Continuous>  dextrose 50% Injectable 25 Gram(s) IV Push once  dextrose 50% Injectable 12.5 Gram(s) IV Push once  dextrose 50% Injectable 25 Gram(s) IV Push once  dextrose Oral Gel 15 Gram(s) Oral once PRN  finasteride 5 milliGRAM(s) Oral daily  glucagon  Injectable 1 milliGRAM(s) IntraMuscular once  insulin lispro (ADMELOG) corrective regimen sliding scale   SubCutaneous three times a day before meals  insulin lispro (ADMELOG) corrective regimen sliding scale   SubCutaneous at bedtime  multivitamin 1 Tablet(s) Oral daily  ondansetron Injectable 4 milliGRAM(s) IV Push every 8 hours PRN  pantoprazole    Tablet 40 milliGRAM(s) Oral before breakfast  polyethylene glycol 3350 17 Gram(s) Oral daily  valsartan 40 milliGRAM(s) Oral daily                            7.9    4.94  )-----------( 231      ( 03 Jul 2022 04:55 )             25.3       Hemoglobin: 7.9 g/dL (07-03 @ 04:55)  Hemoglobin: 7.3 g/dL (07-02 @ 05:30)  Hemoglobin: 8.1 g/dL (07-01 @ 06:10)  Hemoglobin: 9.3 g/dL (06-30 @ 06:35)  Hemoglobin: 7.4 g/dL (06-29 @ 19:33)      07-03    135  |  98  |  19  ----------------------------<  105<H>  4.5   |  25  |  0.84    Ca    8.6      03 Jul 2022 04:55    TPro  6.8  /  Alb  3.2<L>  /  TBili  0.3  /  DBili  x   /  AST  26  /  ALT  8   /  AlkPhos  61  07-03    Creatinine Trend: 0.84<--, 0.87<--, 0.98<--, 0.68<--, 0.66<--, 0.87<--    COAGS:           T(C): 36.9 (07-03-22 @ 05:05), Max: 36.9 (07-03-22 @ 05:05)  HR: 66 (07-03-22 @ 05:05) (66 - 75)  BP: 141/67 (07-03-22 @ 05:05) (116/61 - 160/73)  RR: 18 (07-03-22 @ 05:05) (17 - 18)  SpO2: 98% (07-03-22 @ 05:05) (98% - 99%)  Wt(kg): --    I&O's Summary    Gen: NAD  HEENT:  (-)icterus (-)pallor  CV: N S1 S2 1/6 ROZ (+)2 Pulses B/l  Resp:  Clear to auscultation B/L, normal effort  GI: (+) BS Soft, NT, ND  Lymph:  (-)Edema, (-)obvious lymphadenopathy  Skin: Warm to touch, Normal turgor  Psych: Unable to assess mood and affect    TELEMETRY: None	    ECG: NSR    < from: Transthoracic Echocardiogram (07.01.22 @ 14:21) >  CONCLUSIONS:  1. Mitral annular calcification, otherwise normal mitral  valve. Minimal mitral regurgitation.  2. Aorticvalve not well visualized; appears calcified.  Mild aortic regurgitation.  3. Endocardium not well visualized; grossly normal left  ventricular systolic function.  4. Mild diastolic dysfunction (Stage I).  5. The right ventricle is not well visualized; grossly  normal right ventricular systolic function.    < end of copied text >      ASSESSMENT/PLAN: 81y Male with HTN, HLD, prior CVA, here with failure to thrive.  No prior MI.      - TTE noted with normal LV/RV function  - bp controlled well    - Monitor off abx per ID  - s/p EGD - tolerated procedure well from CV perspective  - No further inpatient cardiac w/u planned

## 2022-07-03 NOTE — PROGRESS NOTE ADULT - SUBJECTIVE AND OBJECTIVE BOX
Patient is a 81y old  Male who presents with a chief complaint of Encephalopathy (03 Jul 2022 08:32)      SUBJECTIVE / OVERNIGHT EVENTS: Pt seen and examined at 11:20am, overnight events noted, pt unable to give any history due to his  mental status, appears comfortable, no reports of any bleeding, no other new issues reported.      MEDICATIONS  (STANDING):  atorvastatin 10 milliGRAM(s) Oral at bedtime  dextrose 5%. 1000 milliLiter(s) (50 mL/Hr) IV Continuous <Continuous>  dextrose 5%. 1000 milliLiter(s) (100 mL/Hr) IV Continuous <Continuous>  dextrose 50% Injectable 25 Gram(s) IV Push once  dextrose 50% Injectable 12.5 Gram(s) IV Push once  dextrose 50% Injectable 25 Gram(s) IV Push once  finasteride 5 milliGRAM(s) Oral daily  glucagon  Injectable 1 milliGRAM(s) IntraMuscular once  insulin lispro (ADMELOG) corrective regimen sliding scale   SubCutaneous three times a day before meals  insulin lispro (ADMELOG) corrective regimen sliding scale   SubCutaneous at bedtime  multivitamin 1 Tablet(s) Oral daily  pantoprazole    Tablet 40 milliGRAM(s) Oral before breakfast  polyethylene glycol 3350 17 Gram(s) Oral daily  valsartan 40 milliGRAM(s) Oral daily    MEDICATIONS  (PRN):  acetaminophen     Tablet .. 650 milliGRAM(s) Oral every 6 hours PRN Temp greater or equal to 38C (100.4F), Mild Pain (1 - 3)  dextrose Oral Gel 15 Gram(s) Oral once PRN Blood Glucose LESS THAN 70 milliGRAM(s)/deciliter  ondansetron Injectable 4 milliGRAM(s) IV Push every 8 hours PRN Nausea and/or Vomiting      Vital Signs Last 24 Hrs  T(C): 36.9 (03 Jul 2022 05:05), Max: 36.9 (03 Jul 2022 05:05)  T(F): 98.5 (03 Jul 2022 05:05), Max: 98.5 (03 Jul 2022 05:05)  HR: 66 (03 Jul 2022 05:05) (66 - 75)  BP: 141/67 (03 Jul 2022 05:05) (116/61 - 160/73)  BP(mean): --  RR: 18 (03 Jul 2022 05:05) (17 - 18)  SpO2: 98% (03 Jul 2022 05:05) (98% - 99%)  CAPILLARY BLOOD GLUCOSE      POCT Blood Glucose.: 122 mg/dL (03 Jul 2022 11:35)  POCT Blood Glucose.: 126 mg/dL (03 Jul 2022 11:20)  POCT Blood Glucose.: 115 mg/dL (03 Jul 2022 07:41)  POCT Blood Glucose.: 167 mg/dL (02 Jul 2022 22:07)  POCT Blood Glucose.: 164 mg/dL (02 Jul 2022 16:29)    I&O's Summary      PHYSICAL EXAM:  GENERAL: NAD, well-developed  CHEST/LUNG: Clear to auscultation bilaterally; No wheeze  HEART: Regular rate and rhythm  ABDOMEN: Soft, Nontender, Nondistended  EXTREMITIES: no LE edema  PSYCH: Calm  NEUROLOGY: eyes open, does not engage in interview  SKIN: No rashes or lesions    LABS:                        7.9    4.94  )-----------( 231      ( 03 Jul 2022 04:55 )             25.3     07-03    135  |  98  |  19  ----------------------------<  105<H>  4.5   |  25  |  0.84    Ca    8.6      03 Jul 2022 04:55    TPro  6.8  /  Alb  3.2<L>  /  TBili  0.3  /  DBili  x   /  AST  26  /  ALT  8   /  AlkPhos  61  07-03              RADIOLOGY & ADDITIONAL TESTS:    Imaging Personally Reviewed:    Consultant(s) Notes Reviewed:      Care Discussed with Consultants/Other Providers:

## 2022-07-04 ENCOUNTER — TRANSCRIPTION ENCOUNTER (OUTPATIENT)
Age: 82
End: 2022-07-04

## 2022-07-04 VITALS
HEART RATE: 64 BPM | RESPIRATION RATE: 16 BRPM | OXYGEN SATURATION: 99 % | SYSTOLIC BLOOD PRESSURE: 135 MMHG | TEMPERATURE: 98 F | DIASTOLIC BLOOD PRESSURE: 59 MMHG

## 2022-07-04 LAB
ALBUMIN SERPL ELPH-MCNC: 3.1 G/DL — LOW (ref 3.3–5)
ALP SERPL-CCNC: 64 U/L — SIGNIFICANT CHANGE UP (ref 40–120)
ALT FLD-CCNC: 8 U/L — SIGNIFICANT CHANGE UP (ref 4–41)
ANION GAP SERPL CALC-SCNC: 10 MMOL/L — SIGNIFICANT CHANGE UP (ref 7–14)
AST SERPL-CCNC: 15 U/L — SIGNIFICANT CHANGE UP (ref 4–40)
BASOPHILS # BLD AUTO: 0.02 K/UL — SIGNIFICANT CHANGE UP (ref 0–0.2)
BASOPHILS NFR BLD AUTO: 0.4 % — SIGNIFICANT CHANGE UP (ref 0–2)
BILIRUB SERPL-MCNC: 0.3 MG/DL — SIGNIFICANT CHANGE UP (ref 0.2–1.2)
BUN SERPL-MCNC: 16 MG/DL — SIGNIFICANT CHANGE UP (ref 7–23)
CALCIUM SERPL-MCNC: 8.6 MG/DL — SIGNIFICANT CHANGE UP (ref 8.4–10.5)
CHLORIDE SERPL-SCNC: 99 MMOL/L — SIGNIFICANT CHANGE UP (ref 98–107)
CO2 SERPL-SCNC: 26 MMOL/L — SIGNIFICANT CHANGE UP (ref 22–31)
CREAT SERPL-MCNC: 0.81 MG/DL — SIGNIFICANT CHANGE UP (ref 0.5–1.3)
CULTURE RESULTS: SIGNIFICANT CHANGE UP
EGFR: 88 ML/MIN/1.73M2 — SIGNIFICANT CHANGE UP
EOSINOPHIL # BLD AUTO: 0.11 K/UL — SIGNIFICANT CHANGE UP (ref 0–0.5)
EOSINOPHIL NFR BLD AUTO: 2 % — SIGNIFICANT CHANGE UP (ref 0–6)
GLUCOSE BLDC GLUCOMTR-MCNC: 114 MG/DL — HIGH (ref 70–99)
GLUCOSE BLDC GLUCOMTR-MCNC: 120 MG/DL — HIGH (ref 70–99)
GLUCOSE BLDC GLUCOMTR-MCNC: 163 MG/DL — HIGH (ref 70–99)
GLUCOSE SERPL-MCNC: 103 MG/DL — HIGH (ref 70–99)
HCT VFR BLD CALC: 25.9 % — LOW (ref 39–50)
HGB BLD-MCNC: 7.9 G/DL — LOW (ref 13–17)
IANC: 2.56 K/UL — SIGNIFICANT CHANGE UP (ref 1.8–7.4)
IMM GRANULOCYTES NFR BLD AUTO: 3.1 % — HIGH (ref 0–1.5)
LYMPHOCYTES # BLD AUTO: 1.53 K/UL — SIGNIFICANT CHANGE UP (ref 1–3.3)
LYMPHOCYTES # BLD AUTO: 28 % — SIGNIFICANT CHANGE UP (ref 13–44)
MCHC RBC-ENTMCNC: 27.9 PG — SIGNIFICANT CHANGE UP (ref 27–34)
MCHC RBC-ENTMCNC: 30.5 GM/DL — LOW (ref 32–36)
MCV RBC AUTO: 91.5 FL — SIGNIFICANT CHANGE UP (ref 80–100)
MONOCYTES # BLD AUTO: 1.07 K/UL — HIGH (ref 0–0.9)
MONOCYTES NFR BLD AUTO: 19.6 % — HIGH (ref 2–14)
NEUTROPHILS # BLD AUTO: 2.56 K/UL — SIGNIFICANT CHANGE UP (ref 1.8–7.4)
NEUTROPHILS NFR BLD AUTO: 46.9 % — SIGNIFICANT CHANGE UP (ref 43–77)
NRBC # BLD: 0 /100 WBCS — SIGNIFICANT CHANGE UP
NRBC # FLD: 0 K/UL — SIGNIFICANT CHANGE UP
PLATELET # BLD AUTO: 226 K/UL — SIGNIFICANT CHANGE UP (ref 150–400)
POTASSIUM SERPL-MCNC: 3.8 MMOL/L — SIGNIFICANT CHANGE UP (ref 3.5–5.3)
POTASSIUM SERPL-SCNC: 3.8 MMOL/L — SIGNIFICANT CHANGE UP (ref 3.5–5.3)
PROT SERPL-MCNC: 6.8 G/DL — SIGNIFICANT CHANGE UP (ref 6–8.3)
RBC # BLD: 2.83 M/UL — LOW (ref 4.2–5.8)
RBC # FLD: 13.7 % — SIGNIFICANT CHANGE UP (ref 10.3–14.5)
SODIUM SERPL-SCNC: 135 MMOL/L — SIGNIFICANT CHANGE UP (ref 135–145)
SPECIMEN SOURCE: SIGNIFICANT CHANGE UP
WBC # BLD: 5.46 K/UL — SIGNIFICANT CHANGE UP (ref 3.8–10.5)
WBC # FLD AUTO: 5.46 K/UL — SIGNIFICANT CHANGE UP (ref 3.8–10.5)

## 2022-07-04 PROCEDURE — 99232 SBSQ HOSP IP/OBS MODERATE 35: CPT

## 2022-07-04 RX ORDER — VALSARTAN 80 MG/1
1 TABLET ORAL
Qty: 30 | Refills: 0
Start: 2022-07-04 | End: 2022-08-02

## 2022-07-04 RX ORDER — ACETAMINOPHEN 500 MG
2 TABLET ORAL
Qty: 0 | Refills: 0 | DISCHARGE
Start: 2022-07-04

## 2022-07-04 RX ORDER — PANTOPRAZOLE SODIUM 20 MG/1
1 TABLET, DELAYED RELEASE ORAL
Qty: 30 | Refills: 0
Start: 2022-07-04 | End: 2022-08-02

## 2022-07-04 RX ADMIN — FINASTERIDE 5 MILLIGRAM(S): 5 TABLET, FILM COATED ORAL at 11:37

## 2022-07-04 RX ADMIN — PANTOPRAZOLE SODIUM 40 MILLIGRAM(S): 20 TABLET, DELAYED RELEASE ORAL at 07:01

## 2022-07-04 RX ADMIN — Medication 1: at 11:36

## 2022-07-04 RX ADMIN — Medication 1 TABLET(S): at 11:38

## 2022-07-04 RX ADMIN — VALSARTAN 40 MILLIGRAM(S): 80 TABLET ORAL at 05:44

## 2022-07-04 RX ADMIN — POLYETHYLENE GLYCOL 3350 17 GRAM(S): 17 POWDER, FOR SOLUTION ORAL at 11:37

## 2022-07-04 NOTE — PROGRESS NOTE ADULT - PROBLEM SELECTOR PROBLEM 3
GIB (gastrointestinal bleeding)

## 2022-07-04 NOTE — DISCHARGE NOTE NURSING/CASE MANAGEMENT/SOCIAL WORK - PATIENT PORTAL LINK FT
You can access the FollowMyHealth Patient Portal offered by [facility] by registering at the following website: [email or web address]. By joining [employer]’s FollowMyHealth portal, you will also be able to view your health information using other applications (apps) compatible with our system.

## 2022-07-04 NOTE — PROGRESS NOTE ADULT - PROBLEM SELECTOR PLAN 1
Pt with AMS, UA with 4 wbc, nit positive, leuk small, prior culture data with ESBL E Coli   Doubt UTI  -ertapenem dc on 6/30,   -  urine culture 50-99,000 cfu coag neg staph  -blood culture 1 bottle with B cereus likely contaminant  - Appreciate ID consult, monitor off abx
Pt with AMS, UA with 4 wbc, nit positive, leuk small, prior culture data with ESBL E Coli   Doubt UTI  -ertapenem dc on 6/30,   -  urine culture 50-99,000 cfu coag neg staph  -blood culture 1 bottle with B cereus likely contaminant  - Appreciate ID consult, monitor off abx
Pt with AMS, UA with 4 wbc, nit positive, leuk small, prior culture data with ESBL E Coli   - On ertapenem pending urine and blood culture results  - follow up urine culture  -blood culture 1 bottle with B cereus  - will get ID consult, f/u consult recs
Pt with AMS, UA with 4 wbc, nit positive, leuk small, prior culture data with ESBL E Coli   Doubt UTI  -ertapenem dc on 6/30,   - follow up urine culture  -blood culture 1 bottle with B cereus likely contaminant  - Appreciate ID consult, monitor off abx
Pt with AMS, UA with 4 wbc, nit positive, leuk small, prior culture data with ESBL E Coli   Doubt UTI  -ertapenem dc on 6/30,   -  urine culture 50-99,000 cfu coag neg staph  -blood culture 1 bottle with B cereus likely contaminant  - Appreciate ID consult, monitor off abx

## 2022-07-04 NOTE — PROGRESS NOTE ADULT - REASON FOR ADMISSION
Encephalopathy

## 2022-07-04 NOTE — PROGRESS NOTE ADULT - PROBLEM SELECTOR PLAN 5
Dementia progressive per daughter, pt more bed bound  - PT consult  -  palliative consulted, f/u consult recs
Dementia progressive per daughter, pt more bed bound  - PT consulted - recommending [facility]  -  palliative consulted, f/u consult recs

## 2022-07-04 NOTE — DISCHARGE NOTE NURSING/CASE MANAGEMENT/SOCIAL WORK - NSDCPEFALRISK_GEN_ALL_CORE
For information on Fall & Injury Prevention, visit: [email or web address] OR  [email or web address] OR  https://www.cdc.gov/steadi/patient.html

## 2022-07-04 NOTE — PROGRESS NOTE ADULT - PROBLEM SELECTOR PLAN 2
unlikely due to UTI possibly due to progression of dementia vs GIB  - correct electrolyte abnormalities  - avoid benzos, can give low dose haldol if needed for agitation  -Pall care consulted for GOC
2/2 UTI, also with progression of dementia  - continue to treat infection  - correct electrolyte abnormalities  - avoid benzos, can give low dose haldol if needed for agitation  -Pall care consulted for GOC
likely due to progression of dementia vs GIB  - correct electrolyte abnormalities  - avoid benzos, can give low dose haldol if needed for agitation  -Pall care consulted for GOC
likely due to progression of dementia vs GIB  - corrected electrolyte abnormalities  - avoid benzos
likely due to progression of dementia vs GIB  - correct electrolyte abnormalities  - avoid benzos, can give low dose haldol if needed for agitation  -Pall care consulted for GOC

## 2022-07-04 NOTE — PROGRESS NOTE ADULT - PROBLEM SELECTOR PLAN 4
Pt with chronic anemia per daughter, unclear source or baseline hemoglobin, attempted to contact PMD but unable to reach. Low suspicion for GI bleed as patient is constipated, has no history of melena or hematochezia, unable to perform rectal as pt becomes violent  - s/p 1U prbc  - B12 wnl, elevated folate elevated ferritin c/w aocd  Guaiac positive, GI consulted, egd results as above
Pt with chronic anemia per daughter, unclear source or baseline hemoglobin, attempted to contact PMD but unable to reach. Low suspicion for GI bleed as patient is constipated, has no history of melena or hematochezia, unable to perform rectal as pt becomes violent  - s/p 1U prbc  - follow up iron studies, B12, folate added on to ED labs  Guaiac positive, will get GI consult, f/u consult recs
Pt with chronic anemia per daughter, unclear source or baseline hemoglobin, attempted to contact PMD but unable to reach. Low suspicion for GI bleed as patient is constipated, has no history of melena or hematochezia, unable to perform rectal as pt becomes violent  - s/p 1U prbc  - B12 wnl, elevated folate elevated ferritin c/w aocd  Guaiac positive, GI consulted, egd results as above  - hgb stable
Pt with chronic anemia per daughter, unclear source or baseline hemoglobin, attempted to contact PMD but unable to reach. Low suspicion for GI bleed as patient is constipated, has no history of melena or hematochezia, unable to perform rectal as pt becomes violent  - s/p 1U prbc  - B12 wnl, elevated folate elevated ferritin c/w aocd  Guaiac positive, GI consulted, egd results as above
Pt with chronic anemia per daughter, unclear source or baseline hemoglobin, attempted to contact PMD but unable to reach. Low suspicion for GI bleed as patient is constipated, has no history of melena or hematochezia, unable to perform rectal as pt becomes violent  - s/p 1U prbc  - B12 wnl, elevated folate elevated ferritin c/w aocd  Guaiac positive, GI consulted, for egd today

## 2022-07-04 NOTE — PROGRESS NOTE ADULT - SUBJECTIVE AND OBJECTIVE BOX
Patient is a 81y old  Male who presents with a chief complaint of Encephalopathy (03 Jul 2022 17:54)      SUBJECTIVE / OVERNIGHT EVENTS:    No events overnight. Unable to obtain ROS 2/2 dementia    MEDICATIONS  (STANDING):  atorvastatin 10 milliGRAM(s) Oral at bedtime  dextrose 5%. 1000 milliLiter(s) (50 mL/Hr) IV Continuous <Continuous>  dextrose 5%. 1000 milliLiter(s) (100 mL/Hr) IV Continuous <Continuous>  dextrose 50% Injectable 25 Gram(s) IV Push once  dextrose 50% Injectable 12.5 Gram(s) IV Push once  dextrose 50% Injectable 25 Gram(s) IV Push once  finasteride 5 milliGRAM(s) Oral daily  glucagon  Injectable 1 milliGRAM(s) IntraMuscular once  insulin lispro (ADMELOG) corrective regimen sliding scale   SubCutaneous three times a day before meals  insulin lispro (ADMELOG) corrective regimen sliding scale   SubCutaneous at bedtime  multivitamin 1 Tablet(s) Oral daily  pantoprazole    Tablet 40 milliGRAM(s) Oral before breakfast  polyethylene glycol 3350 17 Gram(s) Oral daily  valsartan 40 milliGRAM(s) Oral daily    MEDICATIONS  (PRN):  acetaminophen     Tablet .. 650 milliGRAM(s) Oral every 6 hours PRN Temp greater or equal to 38C (100.4F), Mild Pain (1 - 3)  dextrose Oral Gel 15 Gram(s) Oral once PRN Blood Glucose LESS THAN 70 milliGRAM(s)/deciliter  ondansetron Injectable 4 milliGRAM(s) IV Push every 8 hours PRN Nausea and/or Vomiting      PHYSICAL EXAM:  T(C): 36.9 (07-04-22 @ 05:39), Max: 37 (07-03-22 @ 13:42)  HR: 64 (07-04-22 @ 05:39) (64 - 68)  BP: 152/75 (07-04-22 @ 05:39) (147/76 - 166/79)  RR: 18 (07-04-22 @ 05:39) (17 - 18)  SpO2: 98% (07-04-22 @ 05:39) (98% - 100%)  I&O's Summary    GENERAL: NAD, well-developed, lying in bed  HEAD:  Atraumatic, Normocephalic, MMM  CHEST/LUNG: No use of accessory muscles, CTAB, breathing non-labored  COR: RR, no mrcg  ABD: Soft, ND/NT, +BS  PSYCH: Asleep, awakes to voice, does not answer questions  NEUROLOGY: CN II-XII grossly intact, moving all extremities spontaneously  SKIN: No rashes or lesions  EXT: wwp, no cce    LABS:  CAPILLARY BLOOD GLUCOSE      POCT Blood Glucose.: 120 mg/dL (04 Jul 2022 07:46)  POCT Blood Glucose.: 122 mg/dL (03 Jul 2022 22:09)  POCT Blood Glucose.: 151 mg/dL (03 Jul 2022 16:41)  POCT Blood Glucose.: 122 mg/dL (03 Jul 2022 11:35)  POCT Blood Glucose.: 126 mg/dL (03 Jul 2022 11:20)                          7.9    5.46  )-----------( 226      ( 04 Jul 2022 06:22 )             25.9     07-04    135  |  99  |  16  ----------------------------<  103<H>  3.8   |  26  |  0.81    Ca    8.6      04 Jul 2022 06:22    TPro  6.8  /  Alb  3.1<L>  /  TBili  0.3  /  DBili  x   /  AST  15  /  ALT  8   /  AlkPhos  64  07-04                RADIOLOGY & ADDITIONAL TESTS:    Telemetry Personally Reviewed -     Imaging Personally Reviewed -     Imaging Reviewed -     Consultant(s) Notes Reviewed -       Care Discussed with Consultants/Other Providers -

## 2022-07-04 NOTE — PROGRESS NOTE ADULT - PROVIDER SPECIALTY LIST ADULT
Cardiology
Anesthesia
Cardiology
Hospitalist

## 2022-07-04 NOTE — PROGRESS NOTE ADULT - SUBJECTIVE AND OBJECTIVE BOX
C A R D I O L O G Y  **********************************     DATE OF SERVICE: 07-04-22    Resting comfortably in no distress. Unable to obtain ROS given mental status.      MEDICATIONS  (STANDING):  atorvastatin 10 milliGRAM(s) Oral at bedtime  dextrose 5%. 1000 milliLiter(s) (50 mL/Hr) IV Continuous <Continuous>  dextrose 5%. 1000 milliLiter(s) (100 mL/Hr) IV Continuous <Continuous>  dextrose 50% Injectable 25 Gram(s) IV Push once  dextrose 50% Injectable 12.5 Gram(s) IV Push once  dextrose 50% Injectable 25 Gram(s) IV Push once  finasteride 5 milliGRAM(s) Oral daily  glucagon  Injectable 1 milliGRAM(s) IntraMuscular once  insulin lispro (ADMELOG) corrective regimen sliding scale   SubCutaneous three times a day before meals  insulin lispro (ADMELOG) corrective regimen sliding scale   SubCutaneous at bedtime  multivitamin 1 Tablet(s) Oral daily  pantoprazole    Tablet 40 milliGRAM(s) Oral before breakfast  polyethylene glycol 3350 17 Gram(s) Oral daily  valsartan 40 milliGRAM(s) Oral daily    MEDICATIONS  (PRN):  acetaminophen     Tablet .. 650 milliGRAM(s) Oral every 6 hours PRN Temp greater or equal to 38C (100.4F), Mild Pain (1 - 3)  dextrose Oral Gel 15 Gram(s) Oral once PRN Blood Glucose LESS THAN 70 milliGRAM(s)/deciliter  ondansetron Injectable 4 milliGRAM(s) IV Push every 8 hours PRN Nausea and/or Vomiting      LABS:                          7.9    5.46  )-----------( 226      ( 04 Jul 2022 06:22 )             25.9     Hemoglobin: 7.9 g/dL (07-04 @ 06:22)  Hemoglobin: 7.9 g/dL (07-03 @ 04:55)  Hemoglobin: 7.3 g/dL (07-02 @ 05:30)  Hemoglobin: 8.1 g/dL (07-01 @ 06:10)  Hemoglobin: 9.3 g/dL (06-30 @ 06:35)    07-04    135  |  99  |  16  ----------------------------<  103<H>  3.8   |  26  |  0.81    Ca    8.6      04 Jul 2022 06:22    TPro  6.8  /  Alb  3.1<L>  /  TBili  0.3  /  DBili  x   /  AST  15  /  ALT  8   /  AlkPhos  64  07-04    Creatinine Trend: 0.81<--, 0.84<--, 0.87<--, 0.98<--, 0.68<--, 0.66<--               PHYSICAL EXAM  Vital Signs Last 24 Hrs  T(C): 36.9 (04 Jul 2022 05:39), Max: 37 (03 Jul 2022 13:42)  T(F): 98.4 (04 Jul 2022 05:39), Max: 98.6 (03 Jul 2022 13:42)  HR: 64 (04 Jul 2022 05:39) (64 - 68)  BP: 152/75 (04 Jul 2022 05:39) (147/76 - 166/79)  BP(mean): --  RR: 18 (04 Jul 2022 05:39) (17 - 18)  SpO2: 98% (04 Jul 2022 05:39) (98% - 100%)      Gen: NAD  HEENT:  (-)icterus (-)pallor  CV: N S1 S2 1/6 ROZ (+)2 Pulses B/l  Resp:  Clear to auscultation B/L, normal effort  GI: (+) BS Soft, NT, ND  Lymph:  (-)Edema, (-)obvious lymphadenopathy  Skin: Warm to touch, Normal turgor  Psych: Unable to assess mood and affect    TELEMETRY: None	    ECG: NSR    < from: Transthoracic Echocardiogram (07.01.22 @ 14:21) >  CONCLUSIONS:  1. Mitral annular calcification, otherwise normal mitral  valve. Minimal mitral regurgitation.  2. Aorticvalve not well visualized; appears calcified.  Mild aortic regurgitation.  3. Endocardium not well visualized; grossly normal left  ventricular systolic function.  4. Mild diastolic dysfunction (Stage I).  5. The right ventricle is not well visualized; grossly  normal right ventricular systolic function.    < end of copied text >      ASSESSMENT/PLAN: 81y Male with HTN, HLD, prior CVA, here with failure to thrive.  No prior MI.      - TTE noted with normal LV/RV function  - BP control with Valsartan  - Monitor off abx per ID  - s/p EGD - tolerated procedure well from CV perspective  - No further inpatient cardiac w/u planned   - D/C planning per primary team    Jaxon Abbott PA-C  Pager: [phone]

## 2022-07-04 NOTE — PROGRESS NOTE ADULT - PROBLEM SELECTOR PLAN 8
On PO meds at home  -  A1c 5.8  -  ISS  - trend FS

## 2022-07-04 NOTE — PROGRESS NOTE ADULT - PROBLEM SELECTOR PROBLEM 4
Anemia of chronic disease

## 2022-07-04 NOTE — PROGRESS NOTE ADULT - PROBLEM SELECTOR PLAN 3
Guaiac positive, s/p 1 unit prbc, GI consult appreciated, for egd today, f/u egd results  ppi bid
Guaiac positive, s/p 1 unit prbc, GI consult appreciated, s/p egd which showed  2 cm hiatal hernia.  Erythematous mucosa in the gastric body and antrum.  Biopsied. A single duodenal polyp, possible source of anemia. Biopsied.  Duodenal erosions without bleeding. Normal second portion of the duodenum  cont ppi daily  f/u bx results
Guaiac positive, s/p 1 unit prbc, GI consult, f/u consult recs  ppi bid
Guaiac positive, s/p 1 unit prbc, GI consult appreciated, s/p egd which showed  2 cm hiatal hernia.  Erythematous mucosa in the gastric body and antrum.  Biopsied. A single duodenal polyp, possible source of anemia. Biopsied.  Duodenal erosions without bleeding. Normal second portion of the duodenum  cont ppi daily x4 weeks per GI recs  H&H stable, no further bleeding reported  f/u bx results as outpatient
Guaiac positive, s/p 1 unit prbc, GI consult appreciated, s/p egd which showed  2 cm hiatal hernia.  Erythematous mucosa in the gastric body and antrum.  Biopsied. A single duodenal polyp, possible source of anemia. Biopsied.  Duodenal erosions without bleeding. Normal second portion of the duodenum  cont ppi daily x4 weeks per GI recs  H&H stable, no further bleeding reported  f/u bx results

## 2022-07-04 NOTE — PROGRESS NOTE ADULT - PROBLEM SELECTOR PLAN 6
Pt requires thickened liquids at home  - diet per swallow eval
Pt requires thickened liquids at home  - diet per swallow eval
- diet per swallow eval
- diet per swallow eval
Pt requires thickened liquids at home  - full liquids per GI, f/u egd

## 2022-08-30 ENCOUNTER — NON-APPOINTMENT (OUTPATIENT)
Age: 82
End: 2022-08-30

## 2022-10-25 ENCOUNTER — INPATIENT (INPATIENT)
Facility: HOSPITAL | Age: 82
LOS: 3 days | Discharge: HOME | End: 2022-10-29
Attending: INTERNAL MEDICINE | Admitting: INTERNAL MEDICINE

## 2022-10-25 VITALS
HEART RATE: 80 BPM | SYSTOLIC BLOOD PRESSURE: 124 MMHG | HEIGHT: 74 IN | WEIGHT: 176.37 LBS | TEMPERATURE: 98 F | OXYGEN SATURATION: 94 % | RESPIRATION RATE: 17 BRPM | DIASTOLIC BLOOD PRESSURE: 64 MMHG

## 2022-10-25 DIAGNOSIS — Z09 ENCOUNTER FOR FOLLOW-UP EXAMINATION AFTER COMPLETED TREATMENT FOR CONDITIONS OTHER THAN MALIGNANT NEOPLASM: Chronic | ICD-10-CM

## 2022-10-25 DIAGNOSIS — Z98.89 OTHER SPECIFIED POSTPROCEDURAL STATES: Chronic | ICD-10-CM

## 2022-10-25 LAB
ALBUMIN SERPL ELPH-MCNC: 2.7 G/DL — LOW (ref 3.3–5)
ALP SERPL-CCNC: 60 U/L — SIGNIFICANT CHANGE UP (ref 40–120)
ALT FLD-CCNC: 18 U/L — SIGNIFICANT CHANGE UP (ref 12–78)
ANION GAP SERPL CALC-SCNC: 6 MMOL/L — SIGNIFICANT CHANGE UP (ref 5–17)
ANISOCYTOSIS BLD QL: SLIGHT — SIGNIFICANT CHANGE UP
APTT BLD: 26.4 SEC — LOW (ref 27.5–35.5)
AST SERPL-CCNC: 20 U/L — SIGNIFICANT CHANGE UP (ref 15–37)
BASOPHILS # BLD AUTO: 0.02 K/UL — SIGNIFICANT CHANGE UP (ref 0–0.2)
BASOPHILS NFR BLD AUTO: 0.2 % — SIGNIFICANT CHANGE UP (ref 0–2)
BILIRUB SERPL-MCNC: 0.3 MG/DL — SIGNIFICANT CHANGE UP (ref 0.2–1.2)
BLD GP AB SCN SERPL QL: SIGNIFICANT CHANGE UP
BUN SERPL-MCNC: 26 MG/DL — HIGH (ref 7–23)
CALCIUM SERPL-MCNC: 9 MG/DL — SIGNIFICANT CHANGE UP (ref 8.5–10.1)
CHLORIDE SERPL-SCNC: 102 MMOL/L — SIGNIFICANT CHANGE UP (ref 96–108)
CK SERPL-CCNC: 332 U/L — HIGH (ref 26–308)
CO2 SERPL-SCNC: 28 MMOL/L — SIGNIFICANT CHANGE UP (ref 22–31)
CREAT SERPL-MCNC: 1 MG/DL — SIGNIFICANT CHANGE UP (ref 0.5–1.3)
EGFR: 75 ML/MIN/1.73M2 — SIGNIFICANT CHANGE UP
EOSINOPHIL # BLD AUTO: 0 K/UL — SIGNIFICANT CHANGE UP (ref 0–0.5)
EOSINOPHIL NFR BLD AUTO: 0 % — SIGNIFICANT CHANGE UP (ref 0–6)
FLUAV AG NPH QL: SIGNIFICANT CHANGE UP
FLUBV AG NPH QL: SIGNIFICANT CHANGE UP
GLUCOSE SERPL-MCNC: 167 MG/DL — HIGH (ref 70–99)
HCT VFR BLD CALC: 26 % — LOW (ref 39–50)
HGB BLD-MCNC: 7.8 G/DL — LOW (ref 13–17)
HYPOCHROMIA BLD QL: SLIGHT — SIGNIFICANT CHANGE UP
IMM GRANULOCYTES NFR BLD AUTO: 0.8 % — SIGNIFICANT CHANGE UP (ref 0–0.9)
INR BLD: 1.16 RATIO — SIGNIFICANT CHANGE UP (ref 0.88–1.16)
LIDOCAIN IGE QN: 171 U/L — SIGNIFICANT CHANGE UP (ref 73–393)
LYMPHOCYTES # BLD AUTO: 0.93 K/UL — LOW (ref 1–3.3)
LYMPHOCYTES # BLD AUTO: 7.2 % — LOW (ref 13–44)
MAGNESIUM SERPL-MCNC: 2 MG/DL — SIGNIFICANT CHANGE UP (ref 1.6–2.6)
MANUAL SMEAR VERIFICATION: SIGNIFICANT CHANGE UP
MCHC RBC-ENTMCNC: 27.1 PG — SIGNIFICANT CHANGE UP (ref 27–34)
MCHC RBC-ENTMCNC: 30 G/DL — LOW (ref 32–36)
MCV RBC AUTO: 90.3 FL — SIGNIFICANT CHANGE UP (ref 80–100)
MONOCYTES # BLD AUTO: 1.79 K/UL — HIGH (ref 0–0.9)
MONOCYTES NFR BLD AUTO: 13.8 % — SIGNIFICANT CHANGE UP (ref 2–14)
NEUTROPHILS # BLD AUTO: 10.12 K/UL — HIGH (ref 1.8–7.4)
NEUTROPHILS NFR BLD AUTO: 78 % — HIGH (ref 43–77)
NRBC # BLD: 0 /100 WBCS — SIGNIFICANT CHANGE UP (ref 0–0)
PLAT MORPH BLD: NORMAL — SIGNIFICANT CHANGE UP
PLATELET # BLD AUTO: 238 K/UL — SIGNIFICANT CHANGE UP (ref 150–400)
POTASSIUM SERPL-MCNC: 4.9 MMOL/L — SIGNIFICANT CHANGE UP (ref 3.5–5.3)
POTASSIUM SERPL-SCNC: 4.9 MMOL/L — SIGNIFICANT CHANGE UP (ref 3.5–5.3)
PROT SERPL-MCNC: 7.9 GM/DL — SIGNIFICANT CHANGE UP (ref 6–8.3)
PROTHROM AB SERPL-ACNC: 13.9 SEC — HIGH (ref 10.5–13.4)
RBC # BLD: 2.88 M/UL — LOW (ref 4.2–5.8)
RBC # FLD: 18.6 % — HIGH (ref 10.3–14.5)
RBC BLD AUTO: ABNORMAL
SARS-COV-2 RNA SPEC QL NAA+PROBE: SIGNIFICANT CHANGE UP
SODIUM SERPL-SCNC: 136 MMOL/L — SIGNIFICANT CHANGE UP (ref 135–145)
TROPONIN I, HIGH SENSITIVITY RESULT: 13.1 NG/L — SIGNIFICANT CHANGE UP
WBC # BLD: 12.97 K/UL — HIGH (ref 3.8–10.5)
WBC # FLD AUTO: 12.97 K/UL — HIGH (ref 3.8–10.5)

## 2022-10-25 PROCEDURE — 71045 X-RAY EXAM CHEST 1 VIEW: CPT | Mod: 26

## 2022-10-25 PROCEDURE — 72125 CT NECK SPINE W/O DYE: CPT | Mod: 26,MA

## 2022-10-25 PROCEDURE — 74174 CTA ABD&PLVS W/CONTRAST: CPT | Mod: 26,MA

## 2022-10-25 PROCEDURE — 70450 CT HEAD/BRAIN W/O DYE: CPT | Mod: 26,MA

## 2022-10-25 PROCEDURE — 99285 EMERGENCY DEPT VISIT HI MDM: CPT

## 2022-10-25 RX ORDER — PANTOPRAZOLE SODIUM 20 MG/1
8 TABLET, DELAYED RELEASE ORAL
Qty: 80 | Refills: 0 | Status: DISCONTINUED | OUTPATIENT
Start: 2022-10-25 | End: 2022-10-26

## 2022-10-25 RX ORDER — CIPROFLOXACIN LACTATE 400MG/40ML
400 VIAL (ML) INTRAVENOUS ONCE
Refills: 0 | Status: COMPLETED | OUTPATIENT
Start: 2022-10-25 | End: 2022-10-25

## 2022-10-25 RX ORDER — METRONIDAZOLE 500 MG
500 TABLET ORAL ONCE
Refills: 0 | Status: COMPLETED | OUTPATIENT
Start: 2022-10-25 | End: 2022-10-25

## 2022-10-25 RX ORDER — PANTOPRAZOLE SODIUM 20 MG/1
80 TABLET, DELAYED RELEASE ORAL ONCE
Refills: 0 | Status: COMPLETED | OUTPATIENT
Start: 2022-10-25 | End: 2022-10-25

## 2022-10-25 RX ORDER — SODIUM CHLORIDE 9 MG/ML
1000 INJECTION INTRAMUSCULAR; INTRAVENOUS; SUBCUTANEOUS ONCE
Refills: 0 | Status: COMPLETED | OUTPATIENT
Start: 2022-10-25 | End: 2022-10-25

## 2022-10-25 RX ADMIN — PANTOPRAZOLE SODIUM 80 MILLIGRAM(S): 20 TABLET, DELAYED RELEASE ORAL at 21:38

## 2022-10-25 RX ADMIN — PANTOPRAZOLE SODIUM 10 MG/HR: 20 TABLET, DELAYED RELEASE ORAL at 21:38

## 2022-10-25 RX ADMIN — SODIUM CHLORIDE 1000 MILLILITER(S): 9 INJECTION INTRAMUSCULAR; INTRAVENOUS; SUBCUTANEOUS at 21:38

## 2022-10-25 NOTE — ED PROVIDER NOTE - NS ED MD DISPO SPECIAL CONSIDERATION1
- Start taking lisinopril, once daily.  (Prescription has been sent to your pharmacy)    - Continue all other medications as you have been.    - Please follow-up with me in clinic in 1 month.  - Please come in for fasting labs, a few days prior to the appointment with me.  You may schedule appointments at our , through [facility], or by calling [phone].     
Fall Precaution/Dementia/Geriatrics/Frailty

## 2022-10-25 NOTE — ED ADULT NURSE NOTE - ED STAT RN HANDOFF DETAILS 2
Report given to Ileana CHAUDHRY on 2E. Pt  pending 1 unit PRBC, pt consent from previous shift missing witness, pt requires new consent, RN made aware. Pt is alert nonverbal, on RA, VSS.

## 2022-10-25 NOTE — ED ADULT NURSE NOTE - OBJECTIVE STATEMENT
Brought into ED by daughter. Daughter reports hx  of recent blood transfusion with c/o of days of black stools. Daughter states that blood work was done yesterday and low H&H was reported. Daughter reporting increase weakness. Pt awake and alert. As per daughter, pt has a hx of dementia

## 2022-10-25 NOTE — ED PROVIDER NOTE - OBJECTIVE STATEMENT
81M PMHx of BPH, PUD, GI bleed, anemia, HTN, HLD, DM, dementia presenting with low Hb 6.9 by outpatient labs. Daughter is historian. Has been having dark melena stool for past few days. Appearing to have increased weakness, fatigue, and unable to get out of bed today. A/w decreased appetite over past few days. Normally ambulatory without difficulty. Denies any apparent chest pain, shortness of breath, headaches, coughs, abdominal pain, traumatic injuries, neck stiffness/pain, other extremity injuries.

## 2022-10-25 NOTE — ED PROVIDER NOTE - PROGRESS NOTE DETAILS
(+) diverticulitis on CT, tx flagyl/cipro, already on protonix for likely ugib, but Hb 7.8, consent in chart. daughter signed. no transfusion needed. Hemodynamically stable.

## 2022-10-25 NOTE — ED PROVIDER NOTE - CLINICAL SUMMARY MEDICAL DECISION MAKING FREE TEXT BOX
Pt presenting with weakness, hb low outpatient, weakness, black stool per rectum  Has been admitted [facility] and [facility] previously for similar presentation.    Given history and exam patient’s presentation most consistent with upper GI bleed possibly secondary to peptic ulcer disease or variceal bleeding. I have low suspicion for aortoenteric fistula, ENT bleeding mimic, Boerhaave’s, Pulmonary bleeding mimic.  - CBC, CMP, Lipase, PT/INR/PTT, Type and Screen  - Analgesia and antiemetic medications PRN  - Septic workup for UTI  - CTH/N and ap   - Protonix 80 mg IVP w/ drip.  - PRBC transfusion PRN  Findings:   - Hb:  - [place]atch[place] Bleeding Score: ___  indicating need for admission.

## 2022-10-25 NOTE — ED ADULT NURSE NOTE - CHIEF COMPLAINT QUOTE
from home, low H/H also has uti is dehydrated as per daughter, last transfusion 3 weeks ago in [place].

## 2022-10-25 NOTE — ED ADULT TRIAGE NOTE - CHIEF COMPLAINT QUOTE
from home, low H/H also has uti is dehydrated as per daughter, last transfusion 3 weeks ago in [place].

## 2022-10-26 DIAGNOSIS — K57.92 DIVERTICULITIS OF INTESTINE, PART UNSPECIFIED, WITHOUT PERFORATION OR ABSCESS WITHOUT BLEEDING: ICD-10-CM

## 2022-10-26 DIAGNOSIS — E78.5 HYPERLIPIDEMIA, UNSPECIFIED: ICD-10-CM

## 2022-10-26 DIAGNOSIS — I10 ESSENTIAL (PRIMARY) HYPERTENSION: ICD-10-CM

## 2022-10-26 DIAGNOSIS — N40.0 BENIGN PROSTATIC HYPERPLASIA WITHOUT LOWER URINARY TRACT SYMPTOMS: ICD-10-CM

## 2022-10-26 DIAGNOSIS — K92.1 MELENA: ICD-10-CM

## 2022-10-26 DIAGNOSIS — E11.9 TYPE 2 DIABETES MELLITUS WITHOUT COMPLICATIONS: ICD-10-CM

## 2022-10-26 PROBLEM — D63.8 ANEMIA IN OTHER CHRONIC DISEASES CLASSIFIED ELSEWHERE: Chronic | Status: ACTIVE | Noted: 2022-06-29

## 2022-10-26 PROBLEM — F03.90 UNSPECIFIED DEMENTIA WITHOUT BEHAVIORAL DISTURBANCE: Chronic | Status: ACTIVE | Noted: 2022-06-29

## 2022-10-26 LAB
ABO RH CONFIRMATION: SIGNIFICANT CHANGE UP
APPEARANCE UR: CLEAR — SIGNIFICANT CHANGE UP
BACTERIA # UR AUTO: ABNORMAL
BASOPHILS # BLD AUTO: 0.02 K/UL — SIGNIFICANT CHANGE UP (ref 0–0.2)
BASOPHILS NFR BLD AUTO: 0.2 % — SIGNIFICANT CHANGE UP (ref 0–2)
BILIRUB UR-MCNC: NEGATIVE — SIGNIFICANT CHANGE UP
COLOR SPEC: YELLOW — SIGNIFICANT CHANGE UP
DIFF PNL FLD: ABNORMAL
EOSINOPHIL # BLD AUTO: 0.02 K/UL — SIGNIFICANT CHANGE UP (ref 0–0.5)
EOSINOPHIL NFR BLD AUTO: 0.2 % — SIGNIFICANT CHANGE UP (ref 0–6)
EPI CELLS # UR: SIGNIFICANT CHANGE UP
GLUCOSE BLDC GLUCOMTR-MCNC: 136 MG/DL — HIGH (ref 70–99)
GLUCOSE BLDC GLUCOMTR-MCNC: 146 MG/DL — HIGH (ref 70–99)
GLUCOSE BLDC GLUCOMTR-MCNC: 163 MG/DL — HIGH (ref 70–99)
GLUCOSE BLDC GLUCOMTR-MCNC: 187 MG/DL — HIGH (ref 70–99)
GLUCOSE UR QL: NEGATIVE MG/DL — SIGNIFICANT CHANGE UP
HCT VFR BLD CALC: 21.9 % — LOW (ref 39–50)
HCT VFR BLD CALC: 24.1 % — LOW (ref 39–50)
HGB BLD-MCNC: 6.8 G/DL — CRITICAL LOW (ref 13–17)
HGB BLD-MCNC: 7.2 G/DL — LOW (ref 13–17)
IMM GRANULOCYTES NFR BLD AUTO: 0.6 % — SIGNIFICANT CHANGE UP (ref 0–0.9)
KETONES UR-MCNC: NEGATIVE — SIGNIFICANT CHANGE UP
LEUKOCYTE ESTERASE UR-ACNC: ABNORMAL
LYMPHOCYTES # BLD AUTO: 1.56 K/UL — SIGNIFICANT CHANGE UP (ref 1–3.3)
LYMPHOCYTES # BLD AUTO: 12.3 % — LOW (ref 13–44)
MCHC RBC-ENTMCNC: 27.4 PG — SIGNIFICANT CHANGE UP (ref 27–34)
MCHC RBC-ENTMCNC: 28 PG — SIGNIFICANT CHANGE UP (ref 27–34)
MCHC RBC-ENTMCNC: 29.9 G/DL — LOW (ref 32–36)
MCHC RBC-ENTMCNC: 31.1 G/DL — LOW (ref 32–36)
MCV RBC AUTO: 90.1 FL — SIGNIFICANT CHANGE UP (ref 80–100)
MCV RBC AUTO: 91.6 FL — SIGNIFICANT CHANGE UP (ref 80–100)
MONOCYTES # BLD AUTO: 1.92 K/UL — HIGH (ref 0–0.9)
MONOCYTES NFR BLD AUTO: 15.2 % — HIGH (ref 2–14)
NEUTROPHILS # BLD AUTO: 9.06 K/UL — HIGH (ref 1.8–7.4)
NEUTROPHILS NFR BLD AUTO: 71.5 % — SIGNIFICANT CHANGE UP (ref 43–77)
NITRITE UR-MCNC: NEGATIVE — SIGNIFICANT CHANGE UP
NRBC # BLD: 0 /100 WBCS — SIGNIFICANT CHANGE UP (ref 0–0)
NRBC # BLD: 0 /100 WBCS — SIGNIFICANT CHANGE UP (ref 0–0)
PH UR: 7 — SIGNIFICANT CHANGE UP (ref 5–8)
PLATELET # BLD AUTO: 189 K/UL — SIGNIFICANT CHANGE UP (ref 150–400)
PLATELET # BLD AUTO: 199 K/UL — SIGNIFICANT CHANGE UP (ref 150–400)
PROT UR-MCNC: 30 MG/DL
RBC # BLD: 2.43 M/UL — LOW (ref 4.2–5.8)
RBC # BLD: 2.63 M/UL — LOW (ref 4.2–5.8)
RBC # FLD: 18.6 % — HIGH (ref 10.3–14.5)
RBC # FLD: 18.6 % — HIGH (ref 10.3–14.5)
RBC CASTS # UR COMP ASSIST: SIGNIFICANT CHANGE UP /HPF (ref 0–4)
SP GR SPEC: 1 — LOW (ref 1.01–1.02)
UROBILINOGEN FLD QL: NEGATIVE MG/DL — SIGNIFICANT CHANGE UP
WBC # BLD: 12.66 K/UL — HIGH (ref 3.8–10.5)
WBC # BLD: 9.3 K/UL — SIGNIFICANT CHANGE UP (ref 3.8–10.5)
WBC # FLD AUTO: 12.66 K/UL — HIGH (ref 3.8–10.5)
WBC # FLD AUTO: 9.3 K/UL — SIGNIFICANT CHANGE UP (ref 3.8–10.5)
WBC UR QL: SIGNIFICANT CHANGE UP

## 2022-10-26 PROCEDURE — 93010 ELECTROCARDIOGRAM REPORT: CPT

## 2022-10-26 PROCEDURE — 12345: CPT | Mod: NC

## 2022-10-26 PROCEDURE — 99222 1ST HOSP IP/OBS MODERATE 55: CPT

## 2022-10-26 RX ORDER — VALSARTAN 80 MG/1
40 TABLET ORAL DAILY
Refills: 0 | Status: DISCONTINUED | OUTPATIENT
Start: 2022-10-26 | End: 2022-10-26

## 2022-10-26 RX ORDER — ACETAMINOPHEN 500 MG
650 TABLET ORAL EVERY 4 HOURS
Refills: 0 | Status: DISCONTINUED | OUTPATIENT
Start: 2022-10-26 | End: 2022-10-29

## 2022-10-26 RX ORDER — CIPROFLOXACIN LACTATE 400MG/40ML
400 VIAL (ML) INTRAVENOUS EVERY 12 HOURS
Refills: 0 | Status: DISCONTINUED | OUTPATIENT
Start: 2022-10-26 | End: 2022-10-26

## 2022-10-26 RX ORDER — CEFTRIAXONE 500 MG/1
1000 INJECTION, POWDER, FOR SOLUTION INTRAMUSCULAR; INTRAVENOUS EVERY 24 HOURS
Refills: 0 | Status: DISCONTINUED | OUTPATIENT
Start: 2022-10-26 | End: 2022-10-29

## 2022-10-26 RX ORDER — SODIUM CHLORIDE 9 MG/ML
1000 INJECTION INTRAMUSCULAR; INTRAVENOUS; SUBCUTANEOUS ONCE
Refills: 0 | Status: COMPLETED | OUTPATIENT
Start: 2022-10-26 | End: 2022-10-26

## 2022-10-26 RX ORDER — PANTOPRAZOLE SODIUM 20 MG/1
40 TABLET, DELAYED RELEASE ORAL EVERY 12 HOURS
Refills: 0 | Status: DISCONTINUED | OUTPATIENT
Start: 2022-10-26 | End: 2022-10-29

## 2022-10-26 RX ORDER — ACETAMINOPHEN 500 MG
650 TABLET ORAL EVERY 6 HOURS
Refills: 0 | Status: DISCONTINUED | OUTPATIENT
Start: 2022-10-26 | End: 2022-10-26

## 2022-10-26 RX ORDER — SODIUM CHLORIDE 9 MG/ML
1000 INJECTION, SOLUTION INTRAVENOUS
Refills: 0 | Status: DISCONTINUED | OUTPATIENT
Start: 2022-10-26 | End: 2022-10-29

## 2022-10-26 RX ORDER — INSULIN LISPRO 100/ML
VIAL (ML) SUBCUTANEOUS
Refills: 0 | Status: DISCONTINUED | OUTPATIENT
Start: 2022-10-26 | End: 2022-10-29

## 2022-10-26 RX ORDER — FINASTERIDE 5 MG/1
5 TABLET, FILM COATED ORAL DAILY
Refills: 0 | Status: DISCONTINUED | OUTPATIENT
Start: 2022-10-26 | End: 2022-10-29

## 2022-10-26 RX ORDER — DEXTROSE 50 % IN WATER 50 %
25 SYRINGE (ML) INTRAVENOUS ONCE
Refills: 0 | Status: DISCONTINUED | OUTPATIENT
Start: 2022-10-26 | End: 2022-10-29

## 2022-10-26 RX ORDER — METRONIDAZOLE 500 MG
500 TABLET ORAL EVERY 8 HOURS
Refills: 0 | Status: DISCONTINUED | OUTPATIENT
Start: 2022-10-26 | End: 2022-10-26

## 2022-10-26 RX ORDER — DEXTROSE 50 % IN WATER 50 %
15 SYRINGE (ML) INTRAVENOUS ONCE
Refills: 0 | Status: DISCONTINUED | OUTPATIENT
Start: 2022-10-26 | End: 2022-10-29

## 2022-10-26 RX ORDER — METRONIDAZOLE 500 MG
500 TABLET ORAL EVERY 8 HOURS
Refills: 0 | Status: DISCONTINUED | OUTPATIENT
Start: 2022-10-26 | End: 2022-10-29

## 2022-10-26 RX ORDER — ATORVASTATIN CALCIUM 80 MG/1
10 TABLET, FILM COATED ORAL AT BEDTIME
Refills: 0 | Status: DISCONTINUED | OUTPATIENT
Start: 2022-10-26 | End: 2022-10-29

## 2022-10-26 RX ORDER — CEFTRIAXONE 500 MG/1
1000 INJECTION, POWDER, FOR SOLUTION INTRAMUSCULAR; INTRAVENOUS EVERY 24 HOURS
Refills: 0 | Status: DISCONTINUED | OUTPATIENT
Start: 2022-10-26 | End: 2022-10-26

## 2022-10-26 RX ORDER — DEXTROSE 50 % IN WATER 50 %
12.5 SYRINGE (ML) INTRAVENOUS ONCE
Refills: 0 | Status: DISCONTINUED | OUTPATIENT
Start: 2022-10-26 | End: 2022-10-29

## 2022-10-26 RX ORDER — GLUCAGON INJECTION, SOLUTION 0.5 MG/.1ML
1 INJECTION, SOLUTION SUBCUTANEOUS ONCE
Refills: 0 | Status: DISCONTINUED | OUTPATIENT
Start: 2022-10-26 | End: 2022-10-29

## 2022-10-26 RX ADMIN — Medication 1: at 17:39

## 2022-10-26 RX ADMIN — SODIUM CHLORIDE 1000 MILLILITER(S): 9 INJECTION INTRAMUSCULAR; INTRAVENOUS; SUBCUTANEOUS at 00:17

## 2022-10-26 RX ADMIN — FINASTERIDE 5 MILLIGRAM(S): 5 TABLET, FILM COATED ORAL at 12:45

## 2022-10-26 RX ADMIN — Medication 1 TABLET(S): at 12:45

## 2022-10-26 RX ADMIN — Medication 100 MILLIGRAM(S): at 00:58

## 2022-10-26 RX ADMIN — CEFTRIAXONE 100 MILLIGRAM(S): 500 INJECTION, POWDER, FOR SOLUTION INTRAMUSCULAR; INTRAVENOUS at 12:46

## 2022-10-26 RX ADMIN — SODIUM CHLORIDE 1000 MILLILITER(S): 9 INJECTION INTRAMUSCULAR; INTRAVENOUS; SUBCUTANEOUS at 20:32

## 2022-10-26 RX ADMIN — Medication 100 MILLIGRAM(S): at 20:34

## 2022-10-26 RX ADMIN — Medication 100 MILLIGRAM(S): at 08:42

## 2022-10-26 RX ADMIN — Medication 650 MILLIGRAM(S): at 17:38

## 2022-10-26 RX ADMIN — Medication 200 MILLIGRAM(S): at 01:54

## 2022-10-26 RX ADMIN — PANTOPRAZOLE SODIUM 40 MILLIGRAM(S): 20 TABLET, DELAYED RELEASE ORAL at 17:39

## 2022-10-26 RX ADMIN — ATORVASTATIN CALCIUM 10 MILLIGRAM(S): 80 TABLET, FILM COATED ORAL at 22:48

## 2022-10-26 NOTE — H&P ADULT - NSHPPHYSICALEXAM_GEN_ALL_CORE
Physical exam:  General: patient in no acute distress, resting comfortably  Head:  Atraumatic, Normocephalic  Eyes: EOMI, PERRLA, clear sclera  Neck: Supple, thyroid nontender, non enlarged  Cardio: S1/S2 +ve, regular rate and rhythm, no M/G/R  Resp: clear to ausculation bilaterally, no rales or wheezes  GI: abdomen soft, nontender, non distended, no guarding, BS +ve x 4  Ext: no significant pedal edema  Neuro: CN 2-12 intact, nonverbal, no gross motor deficits.  Skin: No rashes or lesions

## 2022-10-26 NOTE — H&P ADULT - PROBLEM SELECTOR PLAN 1
sigmoid diverticulitis on CT  No abdominal pain on palpation  Started on cipro/flagyl in ED.  Will continue   Tylenol 650 mg PRN pain/fever

## 2022-10-26 NOTE — H&P ADULT - NSHPLABSRESULTS_GEN_ALL_CORE
Recent Vitals  T(C): 36.6 (10-25-22 @ 19:17), Max: 36.6 (10-25-22 @ 19:17)  HR: 77 (10-26-22 @ 00:24) (70 - 80)  BP: 131/68 (10-26-22 @ 00:24) (124/64 - 134/69)  RR: 15 (10-26-22 @ 00:24) (15 - 20)  SpO2: 99% (10-26-22 @ 00:24) (94% - 99%)                        7.2    12.66 )-----------( 199      ( 26 Oct 2022 00:35 )             24.1     10    136  |  102  |  26<H>  ----------------------------<  167<H>  4.9   |  28  |  1.00    Ca    9.0      25 Oct 2022 21:05  Mg     2.0     10-25    TPro  7.9  /  Alb  2.7<L>  /  TBili  0.3  /  DBili  x   /  AST  20  /  ALT  18  /  AlkPhos  60  10-    PT/INR - ( 25 Oct 2022 21:05 )   PT: 13.9 sec;   INR: 1.16 ratio         PTT - ( 25 Oct 2022 21:05 )  PTT:26.4 sec  LIVER FUNCTIONS - ( 25 Oct 2022 21:05 )  Alb: 2.7 g/dL / Pro: 7.9 gm/dL / ALK PHOS: 60 U/L / ALT: 18 U/L / AST: 20 U/L / GGT: x           Urinalysis Basic - ( 26 Oct 2022 00:36 )    Color: Yellow / Appearance: Clear / S.005 / pH: x  Gluc: x / Ketone: Negative  / Bili: Negative / Urobili: Negative mg/dL   Blood: x / Protein: 30 mg/dL / Nitrite: Negative   Leuk Esterase: Trace / RBC: 0-2 /HPF / WBC 3-5   Sq Epi: x / Non Sq Epi: Few / Bacteria: Few        Home Medications:  acetaminophen 325 mg oral tablet: 2 tab(s) orally every 6 hours, As needed, Temp greater or equal to 38C (100.4F), Mild Pain (1 - 3) (2022 17:36)  atorvastatin 10 mg oral tablet: 1 tab(s) orally once a day (2022 11:26)  finasteride 5 mg oral tablet: 1 tab(s) orally once a day (2022 11:26)  metFORMIN 500 mg oral tablet: 1 tab(s) orally 2 times a day (2022 11:26)  MiraLax oral powder for reconstitution: 17 gram(s) orally once a day, As Needed (2022 11:26)  Multiple Vitamins oral tablet: 1 tab(s) orally once a day (2022 11:26)  Vitamin D2 50,000 intl units (1.25 mg) oral capsule (obsolete): 1 cap(s) orally once a week (2022 11:26)

## 2022-10-26 NOTE — CONSULT NOTE ADULT - SUBJECTIVE AND OBJECTIVE BOX
81M with dementia x3y (AOx1 at baseline), HTN, HL, T2DM, BPH, CVA who presents from home for low blood count.     Hospitalized one month ago at [facility] for same, anemia – received blood transfusion and had aspiration PNA, treated with abx.    Was very fatigued the last few days. Home care nurse came, did labs, said he has UTI and blood level was low and sent in.    Pt is minimally verbal and unable to participate in history taking, history was obtained by discussing with patients daughters, Gareth [phone]. Pt lives with his 2 daughters. Patient has poor appetite generally, but no N/V. Has had black stool, predating iron supplements. Daughter says for a few months. Stool has been hard. Always constipated – daughter gives stool softener. Has been taking iron pills since hospitalization at [facility] x30 days, completed 3d ago. No BRBPR. Last bm 2d ago – black. Does not complain of any pain. Daughter says no fevers/chills.    RN on floor said that on arrival patient had a brown/green bm    Prior endoscopic w/u, for anemia:    EGD 2022 – 1cm HH, gastric antral erythema, duodenal 8mm polyp bx, few duodenal erosions. Path from stomach and duodenal polyp unremarkable.   EGD 2020 showing normal esophagus, medium amount of food (residue) in the stomach, erythematous mucosa in the stomach (bx gastritis, neg H pylori), single pyloric canal/duodenal bulb polyp (bx small intestine mucosa).   Colonoscopy 2019 showing Three 5 to 8 mm polyps in the ascending colon, removed with a hot snare. Resected and retrieved (path- TA); one 10 mm polyp in the descending colon, removed with a hot snare. Resected and retrieved (path- TA); one 10 mm polyp in the sigmoid colon (path- TA); distal rectum and anal verge are normal on retroflexion view.  PMH/PSH--  Diabetes  HTN (hypertension)  HLD (hyperlipidemia)  BPH (benign prostatic hyperplasia)  Dementia  Anemia of chronic disease  S/P hernia surgery, remote    Allergies--  penicillin (Rash (Mild))    Medications--  Other: acetaminophen     Tablet .. PRN  atorvastatin  dextrose 5%.  dextrose 5%.  dextrose 50% Injectable  dextrose 50% Injectable  dextrose 50% Injectable  dextrose Oral Gel PRN  finasteride  glucagon  Injectable  insulin lispro (ADMELOG) corrective regimen sliding scale  multivitamin  pantoprazole  Injectable  valsartan    Social History--  EtOH: denies   Tobacco: denies   Drug Use: denies     Family/Marital History--  No pertinent family history in first degree relatives    ROS - unable to obtain due to patient dementia.    Physical Exam--  Vital Signs: T(F): 97.9 (10-26-22 @ 11:20), Max: 98.9 (10-26-22 @ 02:42)  HR: 72 (10-26-22 @ 11:20)  BP: 119/71 (10-26-22 @ 11:20)  RR: 20 (10-26-22 @ 11:20)  SpO2: 98% (10-26-22 @ 11:20)    General: Nontoxic-appearing in no acute distress.  HEENT: AT/NC. PERRL. Anicteric. Conjunctiva pink and moist.   Neck: Not rigid. No sense of mass.  Nodes: None palpable.  Lungs: Clear bilaterally without rales, wheezing or rhonchi  Heart: Regular rate and rhythm. No Murmur. No rub. No gallop.   Abdomen: Bowel sounds present and normoactive. Soft. Nontender. Mildly distended. Healed vertical midline scar.   Extremities: No cyanosis or clubbing. No edema.   Skin: Warm. Dry. Good turgor. No rash.   Neuro/Psychiatric: Non-verbal, flat affect    Laboratory & Imaging Data--  CBC                        6.8    9.30  )-----------( 189      ( 26 Oct 2022 09:14 )             21.9       Chemistries  10-25    136  |  102  |  26<H>  ----------------------------<  167<H>  4.9   |  28  |  1.00    Ca    9.0      25 Oct 2022 21:05  Mg     2.0     10-25    TPro  7.9  /  Alb  2.7<L>  /  TBili  0.3  /  DBili  x   /  AST  20  /  ALT  18  /  AlkPhos  60  10-25    ACC: [id number] EXAM:  CT ANGIO ABD PELV (W)AW IC                          PROCEDURE DATE:  10/25/2022          INTERPRETATION:  CLINICAL INFORMATION: Evaluate for GI bleed. Dark melena   stool. Low hemoglobin.    COMPARISON: 9/28/2021.    CONTRAST/COMPLICATIONS:  IV Contrast: Omnipaque 350  85 cc administered   5 cc discarded  Oral Contrast: NONE  Complications: None reported at time of study completion    PROCEDURE:  CT of the Abdomen and Pelvis was performed.  Precontrast, Arterial and Delayed phases were performed.  Sagittal and coronal reformats were performed.    FINDINGS:  LOWER CHEST: Mild atelectasis/fibrosis of the bilateral lung bases.   Coronary artery calcifications..    LIVER: Within normal limits.  BILE DUCTS: Normal caliber.  GALLBLADDER: Within normal limits.  SPLEEN: Within normal limits.  PANCREAS: Within normal limits.  ADRENALS: Within normal limits.  KIDNEYS/URETERS: Within normal limits.    BLADDER: Underdistended, but with thickened wall suggestive of chronic   outlet obstruction, correlate clinically to exclude acute infectious   process.  REPRODUCTIVE ORGANS: Prostate is enlarged.    BOWEL: Short segment wall thickening of the sigmoid colon associated with   small diverticula, most compatible with acute diverticulitis. No   extraluminal air or loculated fluid collection. Moderate amount of fecal   material within the rectum with questionable minimal wall thickening,   possible early stercoral colitis. No bowel obstruction. Appendix is   normal. No evidence of contrast extravasation to suggest acute GI bleed.  PERITONEUM: No ascites.  VESSELS: Atherosclerotic changes.  RETROPERITONEUM/LYMPH NODES: No lymphadenopathy.  ABDOMINAL WALL: Fat-containing left inguinal hernia.  BONES: Degenerative changes.    IMPRESSION:  Acute sigmoid diverticulitis. No loculated fluid collections or   extraluminal free air.    No evidence of contrast extravasation to suggest GI bleed.    Moderate amount of fecal material within the rectum with questionable   minimal wall thickening, possible early stercoral colitis.    Impression:   1. Obscure GI bleeding. Differential includes polyp vs angioectasia vs neoplasm.  2. Short segment sigmoid wall thickening; ddx infectious vs inflammatory vs ischemic vs neoplasm    Recommendations:  - add iron studies and ferritin to pre-transfusion labs  - transfuse 1 unit PRBC  - NPO after midnight for small bowel enteroscopy tomorrow. Consent obtained from daughter Gareth  - clovis sigmoid wall thickening - it is mild, and on personal image review, I do not see surrounding fat stranding. Patient does not have a history of fever or abdominal pain, and thus I think diverticulitis is unlikely. Would stop antibiotics and observe for now.    Case discussed with hospitalist via Teams

## 2022-10-26 NOTE — PROGRESS NOTE ADULT - SUBJECTIVE AND OBJECTIVE BOX
Patient is a 81y old  Male who presents with a chief complaint of diverticulitis, melena (26 Oct 2022 14:03)      SUBJECTIVE / OVERNIGHT EVENTS: Patient seen and examined at bedside. No acute events overnight. History limited as patient is Farsi speaking and has history of dementia. Per nurse at bedside had bowel movement here which was dark brown in color.     MEDICATIONS  (STANDING):  atorvastatin 10 milliGRAM(s) Oral at bedtime  dextrose 5%. 1000 milliLiter(s) (50 mL/Hr) IV Continuous <Continuous>  dextrose 5%. 1000 milliLiter(s) (100 mL/Hr) IV Continuous <Continuous>  dextrose 50% Injectable 25 Gram(s) IV Push once  dextrose 50% Injectable 12.5 Gram(s) IV Push once  dextrose 50% Injectable 25 Gram(s) IV Push once  finasteride 5 milliGRAM(s) Oral daily  glucagon  Injectable 1 milliGRAM(s) IntraMuscular once  insulin lispro (ADMELOG) corrective regimen sliding scale   SubCutaneous three times a day before meals  multivitamin 1 Tablet(s) Oral daily  pantoprazole  Injectable 40 milliGRAM(s) IV Push every 12 hours  valsartan 40 milliGRAM(s) Oral daily    MEDICATIONS  (PRN):  acetaminophen     Tablet .. 650 milliGRAM(s) Oral every 6 hours PRN Temp greater or equal to 38C (100.4F), Moderate Pain (4 - 6)  dextrose Oral Gel 15 Gram(s) Oral once PRN Blood Glucose LESS THAN 70 milliGRAM(s)/deciliter      CAPILLARY BLOOD GLUCOSE      POCT Blood Glucose.: 146 mg/dL (26 Oct 2022 11:41)  POCT Blood Glucose.: 136 mg/dL (26 Oct 2022 07:12)    I&O's Summary      PHYSICAL EXAM:  Vital Signs Last 24 Hrs  T(C): 37.1 (26 Oct 2022 12:44), Max: 37.2 (26 Oct 2022 02:42)  T(F): 98.8 (26 Oct 2022 12:44), Max: 98.9 (26 Oct 2022 02:42)  HR: 89 (26 Oct 2022 12:44) (67 - 89)  BP: 160/77 (26 Oct 2022 12:44) (119/71 - 160/77)  BP(mean): 88 (26 Oct 2022 06:20) (88 - 88)  RR: 20 (26 Oct 2022 12:44) (15 - 20)  SpO2: 96% (26 Oct 2022 12:44) (94% - 99%)    Parameters below as of 26 Oct 2022 12:44  Patient On (Oxygen Delivery Method): room air        GEN: male in NAD, appears comfortable, no diaphoresis  EYES: No scleral injection, PERRL, EOMI  ENTM: neck supple & symmetric without tracheal deviation, moist membranes, no gross hearing impairment, thyroid gland not enlarged  CV: +S1/S2, no m/r/g, no abdominal bruit, no LE edema  RESP: breathing comfortably, no respiratory accessory muscle use, CTAB, no w/r/r  GI: normoactive BS, soft, NTND, no rebounding/guarding, no palpable masses    LABS:                        6.8    9.30  )-----------( 189      ( 26 Oct 2022 09:14 )             21.9     10-25    136  |  102  |  26<H>  ----------------------------<  167<H>  4.9   |  28  |  1.00    Ca    9.0      25 Oct 2022 21:05  Mg     2.0     10-25    TPro  7.9  /  Alb  2.7<L>  /  TBili  0.3  /  DBili  x   /  AST  20  /  ALT  18  /  AlkPhos  60  10-25    PT/INR - ( 25 Oct 2022 21:05 )   PT: 13.9 sec;   INR: 1.16 ratio         PTT - ( 25 Oct 2022 21:05 )  PTT:26.4 sec  CARDIAC MARKERS ( 25 Oct 2022 21:05 )  x     / x     / 332 U/L / x     / x          Urinalysis Basic - ( 26 Oct 2022 00:36 )    Color: Yellow / Appearance: Clear / S.005 / pH: x  Gluc: x / Ketone: Negative  / Bili: Negative / Urobili: Negative mg/dL   Blood: x / Protein: 30 mg/dL / Nitrite: Negative   Leuk Esterase: Trace / RBC: 0-2 /HPF / WBC 3-5   Sq Epi: x / Non Sq Epi: Few / Bacteria: Few          RADIOLOGY & ADDITIONAL TESTS:  Results Reviewed:   Imaging Personally Reviewed:  Electrocardiogram Personally Reviewed:    COORDINATION OF CARE:  Care Discussed with Consultants/Other Providers [Y/N]:  Prior or Outpatient Records Reviewed [Y/N]:

## 2022-10-26 NOTE — H&P ADULT - HISTORY OF PRESENT ILLNESS
Patient is an 81M with a PMH of dementia, HTN, HLD, T2DM, BPH, Anemia, CVA who presents to the ED for weakness.  Patient is a nonhistorian.  Per ED attending, patient was sent to the ED for weakness and fatigue.  Reportedly not getting out of bed and having decreased appetite.  Family also noted melanotic stools over the last few days.  Patient was sent for labwork and was found to have a Hb of 6.9.  Was referred to the ED.  Patient currently in no acute distress.  CT shows acute sigmoid diverticulitis.  Vitals stable, labs show leukocytosis.  Will admit to med surg.

## 2022-10-26 NOTE — PHARMACOTHERAPY INTERVENTION NOTE - COMMENTS
Recommended to switch ciprofloxacin to ceftriaxone as patient has previously tolerated beta-lactams.

## 2022-10-26 NOTE — PROGRESS NOTE ADULT - ASSESSMENT
81M with a PMH of dementia, HTN, HLD, T2DM, BPH, Anemia, CVA who presents to the ED for weakness.  Patient is a nonhistorian.  Per ED attending, patient was sent to the ED for weakness and fatigue.  Reportedly not getting out of bed and having decreased appetite.  Family also noted melanotic stools over the last few days.  Patient was sent for labwork and was found to have a Hb of 6.9.  Patient admitted with concern for possible UGIB.

## 2022-10-26 NOTE — ED ADULT NURSE REASSESSMENT NOTE - NS ED NURSE REASSESS COMMENT FT1
Pt awake and alert in bed. Portable bedside monitoring ongoing. Antibiotics infusing as per order. NAD noted.

## 2022-10-27 ENCOUNTER — RESULT REVIEW (OUTPATIENT)
Age: 82
End: 2022-10-27

## 2022-10-27 LAB
A1C WITH ESTIMATED AVERAGE GLUCOSE RESULT: 5.7 % — HIGH (ref 4–5.6)
ANION GAP SERPL CALC-SCNC: 8 MMOL/L — SIGNIFICANT CHANGE UP (ref 5–17)
BUN SERPL-MCNC: 17 MG/DL — SIGNIFICANT CHANGE UP (ref 7–23)
CALCIUM SERPL-MCNC: 8.2 MG/DL — LOW (ref 8.5–10.1)
CHLORIDE SERPL-SCNC: 104 MMOL/L — SIGNIFICANT CHANGE UP (ref 96–108)
CO2 SERPL-SCNC: 27 MMOL/L — SIGNIFICANT CHANGE UP (ref 22–31)
CREAT SERPL-MCNC: 0.89 MG/DL — SIGNIFICANT CHANGE UP (ref 0.5–1.3)
CULTURE RESULTS: NO GROWTH — SIGNIFICANT CHANGE UP
EGFR: 86 ML/MIN/1.73M2 — SIGNIFICANT CHANGE UP
ESTIMATED AVERAGE GLUCOSE: 117 MG/DL — HIGH (ref 68–114)
FERRITIN SERPL-MCNC: 55 NG/ML — SIGNIFICANT CHANGE UP (ref 30–400)
GLUCOSE BLDC GLUCOMTR-MCNC: 107 MG/DL — HIGH (ref 70–99)
GLUCOSE BLDC GLUCOMTR-MCNC: 129 MG/DL — HIGH (ref 70–99)
GLUCOSE BLDC GLUCOMTR-MCNC: 196 MG/DL — HIGH (ref 70–99)
GLUCOSE SERPL-MCNC: 124 MG/DL — HIGH (ref 70–99)
HCT VFR BLD CALC: 27.3 % — LOW (ref 39–50)
HGB BLD-MCNC: 8.3 G/DL — LOW (ref 13–17)
IRON SATN MFR SERPL: 10 % — LOW (ref 16–55)
IRON SATN MFR SERPL: 25 UG/DL — LOW (ref 45–165)
MCHC RBC-ENTMCNC: 27.9 PG — SIGNIFICANT CHANGE UP (ref 27–34)
MCHC RBC-ENTMCNC: 30.4 G/DL — LOW (ref 32–36)
MCV RBC AUTO: 91.6 FL — SIGNIFICANT CHANGE UP (ref 80–100)
NRBC # BLD: 0 /100 WBCS — SIGNIFICANT CHANGE UP (ref 0–0)
PLATELET # BLD AUTO: 197 K/UL — SIGNIFICANT CHANGE UP (ref 150–400)
POTASSIUM SERPL-MCNC: 3.8 MMOL/L — SIGNIFICANT CHANGE UP (ref 3.5–5.3)
POTASSIUM SERPL-SCNC: 3.8 MMOL/L — SIGNIFICANT CHANGE UP (ref 3.5–5.3)
PROCALCITONIN SERPL-MCNC: 0.1 NG/ML — SIGNIFICANT CHANGE UP (ref 0.02–0.1)
RBC # BLD: 2.98 M/UL — LOW (ref 4.2–5.8)
RBC # BLD: 2.98 M/UL — LOW (ref 4.2–5.8)
RBC # FLD: 17.9 % — HIGH (ref 10.3–14.5)
RETICS #: 73 K/UL — SIGNIFICANT CHANGE UP (ref 25–125)
RETICS/RBC NFR: 2.5 % — SIGNIFICANT CHANGE UP (ref 0.5–2.5)
SODIUM SERPL-SCNC: 139 MMOL/L — SIGNIFICANT CHANGE UP (ref 135–145)
SPECIMEN SOURCE: SIGNIFICANT CHANGE UP
TIBC SERPL-MCNC: 249 UG/DL — SIGNIFICANT CHANGE UP (ref 220–430)
UIBC SERPL-MCNC: 224 UG/DL — SIGNIFICANT CHANGE UP (ref 110–370)
WBC # BLD: 10.29 K/UL — SIGNIFICANT CHANGE UP (ref 3.8–10.5)
WBC # FLD AUTO: 10.29 K/UL — SIGNIFICANT CHANGE UP (ref 3.8–10.5)

## 2022-10-27 PROCEDURE — 88312 SPECIAL STAINS GROUP 1: CPT | Mod: 26

## 2022-10-27 PROCEDURE — 88305 TISSUE EXAM BY PATHOLOGIST: CPT | Mod: 26

## 2022-10-27 PROCEDURE — 44361 SMALL BOWEL ENDOSCOPY/BIOPSY: CPT

## 2022-10-27 PROCEDURE — 99233 SBSQ HOSP IP/OBS HIGH 50: CPT

## 2022-10-27 RX ORDER — ACETAMINOPHEN 500 MG
1000 TABLET ORAL ONCE
Refills: 0 | Status: COMPLETED | OUTPATIENT
Start: 2022-10-27 | End: 2022-10-27

## 2022-10-27 RX ORDER — SODIUM CHLORIDE 9 MG/ML
1000 INJECTION, SOLUTION INTRAVENOUS
Refills: 0 | Status: DISCONTINUED | OUTPATIENT
Start: 2022-10-27 | End: 2022-10-28

## 2022-10-27 RX ADMIN — Medication 400 MILLIGRAM(S): at 16:37

## 2022-10-27 RX ADMIN — Medication 1: at 12:03

## 2022-10-27 RX ADMIN — Medication 1000 MILLIGRAM(S): at 17:18

## 2022-10-27 RX ADMIN — ATORVASTATIN CALCIUM 10 MILLIGRAM(S): 80 TABLET, FILM COATED ORAL at 21:43

## 2022-10-27 RX ADMIN — PANTOPRAZOLE SODIUM 40 MILLIGRAM(S): 20 TABLET, DELAYED RELEASE ORAL at 18:11

## 2022-10-27 RX ADMIN — Medication 100 MILLIGRAM(S): at 05:25

## 2022-10-27 RX ADMIN — CEFTRIAXONE 100 MILLIGRAM(S): 500 INJECTION, POWDER, FOR SOLUTION INTRAMUSCULAR; INTRAVENOUS at 12:00

## 2022-10-27 RX ADMIN — Medication 100 MILLIGRAM(S): at 14:04

## 2022-10-27 RX ADMIN — PANTOPRAZOLE SODIUM 40 MILLIGRAM(S): 20 TABLET, DELAYED RELEASE ORAL at 05:24

## 2022-10-27 RX ADMIN — SODIUM CHLORIDE 70 MILLILITER(S): 9 INJECTION, SOLUTION INTRAVENOUS at 11:52

## 2022-10-27 RX ADMIN — Medication 100 MILLIGRAM(S): at 21:43

## 2022-10-27 NOTE — BRIEF OPERATIVE NOTE - OPERATION/FINDINGS
Small bowel enteroscopy report - see photos in paper chart  Scope in 15:54  Scope out 16:10  Exam to proximal jejunum  ASA 3  Indication: black stool, iron deficiency anemia    Findings:     The examined esophagus was normal.   The stomach was normal.   In the duodenal bulb, just past the pylorus, there was an 8mm polyp. Biopsied.   The remainder of the duodenum and examined jejunum were normal.    Impression: duodenal bulb nodule; otherwise normal small bowel enteroscopy    Recommend: goals of care discussion. Next step would be colonoscopy +/- VCE, but in light of advanced dementia and other comorbidities, it may be more reasonable to refer to hematology and monitor CBC and transfuse as needed.

## 2022-10-27 NOTE — PROGRESS NOTE ADULT - SUBJECTIVE AND OBJECTIVE BOX
Patient is a 81y old  Male who presents with a chief complaint of diverticulitis, melena (26 Oct 2022 14:03)      SUBJECTIVE / OVERNIGHT EVENTS: Patient had fever of 101 F last night so PA restarted Ceftriaxone and Flagyl. Patient at baseline mentation. This morning patient's daughter Mackenzie called the nursing station with some reservation about enteroscopy. She was concerned about patient receiving anesthesia, possible throat pain, and possibly causing new bleeding. In addition, wondering if it's worth putting him through it given prior endoscopic evaluations only showing ulcers. NO reported bloody bowel movement or melena.    MEDICATIONS  (STANDING):  atorvastatin 10 milliGRAM(s) Oral at bedtime  cefTRIAXone   IVPB 1000 milliGRAM(s) IV Intermittent every 24 hours  dextrose 5%. 1000 milliLiter(s) (50 mL/Hr) IV Continuous <Continuous>  dextrose 5%. 1000 milliLiter(s) (100 mL/Hr) IV Continuous <Continuous>  dextrose 50% Injectable 25 Gram(s) IV Push once  dextrose 50% Injectable 12.5 Gram(s) IV Push once  dextrose 50% Injectable 25 Gram(s) IV Push once  finasteride 5 milliGRAM(s) Oral daily  glucagon  Injectable 1 milliGRAM(s) IntraMuscular once  insulin lispro (ADMELOG) corrective regimen sliding scale   SubCutaneous three times a day before meals  lactated ringers. 1000 milliLiter(s) (70 mL/Hr) IV Continuous <Continuous>  metroNIDAZOLE  IVPB 500 milliGRAM(s) IV Intermittent every 8 hours  multivitamin 1 Tablet(s) Oral daily  pantoprazole  Injectable 40 milliGRAM(s) IV Push every 12 hours    MEDICATIONS  (PRN):  acetaminophen     Tablet .. 650 milliGRAM(s) Oral every 4 hours PRN Temp greater or equal to 38C (100.4F), Moderate Pain (4 - 6)  dextrose Oral Gel 15 Gram(s) Oral once PRN Blood Glucose LESS THAN 70 milliGRAM(s)/deciliter      CAPILLARY BLOOD GLUCOSE      POCT Blood Glucose.: 107 mg/dL (27 Oct 2022 08:10)  POCT Blood Glucose.: 163 mg/dL (26 Oct 2022 22:51)  POCT Blood Glucose.: 187 mg/dL (26 Oct 2022 17:12)  POCT Blood Glucose.: 146 mg/dL (26 Oct 2022 11:41)    I&O's Summary    26 Oct 2022 07:01  -  27 Oct 2022 07:00  --------------------------------------------------------  IN: 1026 mL / OUT: 1200 mL / NET: -174 mL        PHYSICAL EXAM:  Vital Signs Last 24 Hrs  T(C): 37.1 (27 Oct 2022 05:32), Max: 38.6 (26 Oct 2022 19:55)  T(F): 98.8 (27 Oct 2022 05:32), Max: 101.5 (26 Oct 2022 19:55)  HR: 65 (27 Oct 2022 05:32) (64 - 104)  BP: 160/72 (27 Oct 2022 05:32) (95/62 - 160/77)  BP(mean): --  RR: 17 (27 Oct 2022 05:32) (17 - 20)  SpO2: 97% (27 Oct 2022 05:32) (94% - 98%)    Parameters below as of 27 Oct 2022 05:32  Patient On (Oxygen Delivery Method): room air        GEN: male in NAD, appears comfortable, no diaphoresis  EYES: No scleral injection, PERRL, EOMI  ENTM: neck supple & symmetric without tracheal deviation, moist membranes, no gross hearing impairment, thyroid gland not enlarged  CV: +S1/S2, no m/r/g, no abdominal bruit, no LE edema  RESP: breathing comfortably, no respiratory accessory muscle use, CTAB, no w/r/r  GI: normoactive BS, soft, NTND, no rebounding/guarding, no palpable masses    LABS:                        8.3    10.29 )-----------( 197      ( 27 Oct 2022 07:05 )             27.3     10-27    139  |  104  |  17  ----------------------------<  124<H>  3.8   |  27  |  0.89    Ca    8.2<L>      27 Oct 2022 07:05  Mg     2.0     10-25    TPro  7.9  /  Alb  2.7<L>  /  TBili  0.3  /  DBili  x   /  AST  20  /  ALT  18  /  AlkPhos  60  10-25    PT/INR - ( 25 Oct 2022 21:05 )   PT: 13.9 sec;   INR: 1.16 ratio         PTT - ( 25 Oct 2022 21:05 )  PTT:26.4 sec  CARDIAC MARKERS ( 25 Oct 2022 21:05 )  x     / x     / 332 U/L / x     / x          Urinalysis Basic - ( 26 Oct 2022 00:36 )    Color: Yellow / Appearance: Clear / S.005 / pH: x  Gluc: x / Ketone: Negative  / Bili: Negative / Urobili: Negative mg/dL   Blood: x / Protein: 30 mg/dL / Nitrite: Negative   Leuk Esterase: Trace / RBC: 0-2 /HPF / WBC 3-5   Sq Epi: x / Non Sq Epi: Few / Bacteria: Few        Culture - Urine (collected 26 Oct 2022 00:35)  Source: Clean Catch Clean Catch (Midstream)  Final Report (27 Oct 2022 07:45):    No growth    Culture - Blood (collected 25 Oct 2022 21:07)  Source: .Blood Blood-Peripheral  Preliminary Report (27 Oct 2022 01:03):    No growth to date.    Culture - Blood (collected 25 Oct 2022 21:00)  Source: .Blood Blood-Peripheral  Preliminary Report (27 Oct 2022 01:03):    No growth to date.        RADIOLOGY & ADDITIONAL TESTS:  Results Reviewed:   Imaging Personally Reviewed:  Electrocardiogram Personally Reviewed:    COORDINATION OF CARE:  Care Discussed with Consultants/Other Providers [Y/N]:  Prior or Outpatient Records Reviewed [Y/N]:

## 2022-10-27 NOTE — PROGRESS NOTE ADULT - PROBLEM SELECTOR PLAN 2
Not observed here  Can monitor Hg daily given no recurrence  GI consulted & will perform push enteroscopy  Iron studies and ferritin  Maintain Hg >7
Not observed here  Can monitor Hg daily given no recurrence  GI consulted & will perform push enteroscopy  Unable to add on iron studies and ferritin so ordered  Maintain Hg >7

## 2022-10-27 NOTE — PROGRESS NOTE ADULT - PROBLEM SELECTOR PLAN 1
Possible sigmoid diverticulitis on CT A&P on admission (though no stranding)  Had fever on 10/26  Prudent to treat for colitis with Ceftriaxone and Flagyl  BCx NGTD
Possible sigmoid diverticulitis on CT A&P on admission  No abdominal pain on palpation  Per GI attending less likely  Monitor off antibiotics  Monitor for signs of bacteremia

## 2022-10-27 NOTE — CHART NOTE - NSCHARTNOTEFT_GEN_A_CORE
Medicine PA EVENT NOTE    HPI:  Patient is an 81M with a PMH of dementia, HTN, HLD, T2DM, BPH, Anemia, CVA who presents to the ED for weakness.  Patient is a non historian.  Per ED attending, patient was sent to the ED for weakness and fatigue.  Reportedly not getting out of bed and having decreased appetite.  Family also noted melanotic stools over the last few days.  Patient was sent for labw ork and was found to have a Hb of 6.9.  Was referred to the ED.  Patient currently in no acute distress.  CT shows acute sigmoid diverticulitis.  Vitals stable, labs show leukocytosis.  Will admit to med surg. (26 Oct 2022 01:40)    Notified by RN that patients T 101.5F, received Tylenol ~1hr prior. Patient was seen and examined at bedside. Patient is Farsi speaking per family & RN, writer is Farsi speaking and attempted to communicate with patient. At baseline patient is A&Ox1, when asked orientation questions patient repeated in Farsi "what is my name" Patient NAD, upon rechecking vitals BP noted to be 95/62 (prior -148) & Hg of 6.8.      Vital Signs Last 24 Hrs  T(C): 38.6 (26 Oct 2022 19:55), Max: 38.6 (26 Oct 2022 19:55)  T(F): 101.5 (26 Oct 2022 19:55), Max: 101.5 (26 Oct 2022 19:55)  HR: 88 (26 Oct 2022 20:15) (67 - 104)  BP: 95/62 (26 Oct 2022 20:15) (95/62 - 160/77)  RR: 20 (26 Oct 2022 20:15) (15 - 20)  SpO2: 95% (26 Oct 2022 20:15) (94% - 99%), Room air    CONSTITUTIONAL: Well groomed, no apparent distress  EYES: PERRLA and symmetric, EOMI, No conjunctival or scleral injection, non-icteric  ENMT: Oral mucosa with moist membranes.   NECK: Supple, symmetric and without tracheal deviation   RESP: No respiratory distress, no use of accessory muscles; CTA b/l, no WRR  CV: RRR, +S1S2,no peripheral edema  GI: Soft, NT, ND, no rebound, no guarding; no palpable masses  NEURO: CN II-XII intact; normal reflexes in upper and lower extremities, sensation intact in upper and lower extremities b/l to light touch   PSYCH: Alert, does not answer orientation questions (asked in Farsi & English), stares at examiner & repeats in Farsi "what is my name"    A&P:  Patient is an 81M with a PMH of dementia, HTN, HLD, T2DM, BPH, Anemia, CVA who presents to the ED for weakness.  Patient is a non historian.  Per ED attending, patient was sent to the ED for weakness and fatigue.  Reportedly not getting out of bed and having decreased appetite.  Family also noted melanotic stools over the last few days.  Patient was sent for labw ork and was found to have a Hb of 6.9.  Was referred to the ED.  Patient currently in no acute distress.  CT shows acute sigmoid diverticulitis.  Now noted to be febrile, anemic, w/ borderline blood pressure.    - Restart abx  - Cooling blanket/ icepacks/ tylenol Q4 PRN for temp  - Collect procal  - Pending Blood cx & urine cultures  - Transfuse 1U pRBC  - f/u post-transfusion PRBC  - Trend CBCs  - If BP does not respond to pRBC, bolus IVF  - rest of the care per hospitalist      Kasia Hassan PA-C  10/26/22  9:18pm
Called by nurse manager overnight that daughter called unit to express reservation about having enteroscopy performed.    Attempted to call two daughters, but no answer.    Unknown which daughter called the unit and whether or not they are the healthcare proxy. In addition, unsure exactly what was said to nursing as details are scant. I will call again later. Maintain NPO. Restarted on ABX overnight by PA due to fever. Seems prudent to just finish diverticulitis treatment as this time. Hg stable and responded appropriately to 1 unit pRBC

## 2022-10-28 LAB
ANION GAP SERPL CALC-SCNC: 7 MMOL/L — SIGNIFICANT CHANGE UP (ref 5–17)
BUN SERPL-MCNC: 17 MG/DL — SIGNIFICANT CHANGE UP (ref 7–23)
CALCIUM SERPL-MCNC: 8.2 MG/DL — LOW (ref 8.5–10.1)
CHLORIDE SERPL-SCNC: 102 MMOL/L — SIGNIFICANT CHANGE UP (ref 96–108)
CO2 SERPL-SCNC: 26 MMOL/L — SIGNIFICANT CHANGE UP (ref 22–31)
CREAT SERPL-MCNC: 0.8 MG/DL — SIGNIFICANT CHANGE UP (ref 0.5–1.3)
EGFR: 88 ML/MIN/1.73M2 — SIGNIFICANT CHANGE UP
GLUCOSE BLDC GLUCOMTR-MCNC: 122 MG/DL — HIGH (ref 70–99)
GLUCOSE BLDC GLUCOMTR-MCNC: 129 MG/DL — HIGH (ref 70–99)
GLUCOSE BLDC GLUCOMTR-MCNC: 150 MG/DL — HIGH (ref 70–99)
GLUCOSE BLDC GLUCOMTR-MCNC: 153 MG/DL — HIGH (ref 70–99)
GLUCOSE SERPL-MCNC: 109 MG/DL — HIGH (ref 70–99)
HCT VFR BLD CALC: 28 % — LOW (ref 39–50)
HGB BLD-MCNC: 8.7 G/DL — LOW (ref 13–17)
MCHC RBC-ENTMCNC: 27.7 PG — SIGNIFICANT CHANGE UP (ref 27–34)
MCHC RBC-ENTMCNC: 31.1 G/DL — LOW (ref 32–36)
MCV RBC AUTO: 89.2 FL — SIGNIFICANT CHANGE UP (ref 80–100)
NRBC # BLD: 0 /100 WBCS — SIGNIFICANT CHANGE UP (ref 0–0)
PLATELET # BLD AUTO: 182 K/UL — SIGNIFICANT CHANGE UP (ref 150–400)
POTASSIUM SERPL-MCNC: 3.5 MMOL/L — SIGNIFICANT CHANGE UP (ref 3.5–5.3)
POTASSIUM SERPL-SCNC: 3.5 MMOL/L — SIGNIFICANT CHANGE UP (ref 3.5–5.3)
RAPID RVP RESULT: SIGNIFICANT CHANGE UP
RBC # BLD: 3.14 M/UL — LOW (ref 4.2–5.8)
RBC # FLD: 17.8 % — HIGH (ref 10.3–14.5)
SARS-COV-2 RNA SPEC QL NAA+PROBE: SIGNIFICANT CHANGE UP
SODIUM SERPL-SCNC: 135 MMOL/L — SIGNIFICANT CHANGE UP (ref 135–145)
WBC # BLD: 6.08 K/UL — SIGNIFICANT CHANGE UP (ref 3.8–10.5)
WBC # FLD AUTO: 6.08 K/UL — SIGNIFICANT CHANGE UP (ref 3.8–10.5)

## 2022-10-28 PROCEDURE — 99232 SBSQ HOSP IP/OBS MODERATE 35: CPT

## 2022-10-28 RX ORDER — VALSARTAN 80 MG/1
40 TABLET ORAL DAILY
Refills: 0 | Status: DISCONTINUED | OUTPATIENT
Start: 2022-10-28 | End: 2022-10-29

## 2022-10-28 RX ADMIN — VALSARTAN 40 MILLIGRAM(S): 80 TABLET ORAL at 21:52

## 2022-10-28 RX ADMIN — Medication 1 TABLET(S): at 11:49

## 2022-10-28 RX ADMIN — PANTOPRAZOLE SODIUM 40 MILLIGRAM(S): 20 TABLET, DELAYED RELEASE ORAL at 05:34

## 2022-10-28 RX ADMIN — FINASTERIDE 5 MILLIGRAM(S): 5 TABLET, FILM COATED ORAL at 11:49

## 2022-10-28 RX ADMIN — Medication 100 MILLIGRAM(S): at 21:52

## 2022-10-28 RX ADMIN — Medication 100 MILLIGRAM(S): at 05:34

## 2022-10-28 RX ADMIN — ATORVASTATIN CALCIUM 10 MILLIGRAM(S): 80 TABLET, FILM COATED ORAL at 21:52

## 2022-10-28 RX ADMIN — Medication 100 MILLIGRAM(S): at 14:47

## 2022-10-28 RX ADMIN — PANTOPRAZOLE SODIUM 40 MILLIGRAM(S): 20 TABLET, DELAYED RELEASE ORAL at 17:15

## 2022-10-28 RX ADMIN — CEFTRIAXONE 100 MILLIGRAM(S): 500 INJECTION, POWDER, FOR SOLUTION INTRAMUSCULAR; INTRAVENOUS at 12:20

## 2022-10-28 NOTE — PROGRESS NOTE ADULT - SUBJECTIVE AND OBJECTIVE BOX
Patient without complaints    T(C): 36.2 (10-28-22 @ 05:36), Max: 38.5 (10-27-22 @ 15:30)  HR: 60 (10-28-22 @ 05:36) (60 - 76)  BP: 154/80 (10-28-22 @ 05:36) (110/61 - 165/75)  RR: 18 (10-28-22 @ 05:36) (17 - 20)  SpO2: 99% (10-28-22 @ 05:36) (94% - 99%)    NAD  soft NT ND                          8.7    6.08  )-----------( 182      ( 28 Oct 2022 07:05 )             28.0   10-28    135  |  102  |  17  ----------------------------<  109<H>  3.5   |  26  |  0.80    Ca    8.2<L>      28 Oct 2022 07:05    SBE yesterday with known 8mm duodenal bulb polyp, otherwise normal exam to proximal jejunum    Fe studies 10/27, after transfusion:  Fe 25  TIBC 249  % sat 10  Ferritin 55    Impression: Overt obscure GI bleeding. Given the above studies are after transfusion and that he was transfused at [facility] 1 month ago, is iron deficient.     Had a long conversation with daughters regarding goals of care. Spent 25 minutes discussing that although not an obvious terminal condition such as cancer, has had multiple unrevealing endoscopic evaluations. Daughters cite that pt has been effectively bedbound of late, and they had been on hospice after hospitalization at [facility] in June, but went off it after a few weeks because other sisters want pt to be treated. Gareth and Mackenzie recognize his decline since the summer, however are not willing to discuss palliative care at this time. They also defer colonoscopic evaluation, that they do not want to take unnecessary risks with pt life.    Recommend:   - outpatient hematology referral for CBC monitoring and PRN transfusion  - not unreasonable to complete 7d course of antibiotics for presumed diverticulitis as no other source was ascertained

## 2022-10-28 NOTE — PROGRESS NOTE ADULT - ASSESSMENT
81 M with a history of dementia, HTN, HLD, DM II, BPH, Anemia of chronic disease, CVA presented to the ED for weakness and fatigue w/ melanotic stools and anemia w/ Hb of 6.9.  Patient admitted with concern for possible UGIB and was also found to have diverticulitis on CT.  Anemia       Diverticulitis  - CT showed a possible sigmoid diverticulitis    - patient continues to be febrile  - c/w Ceftriaxone and Flagyl  - NGTD on blood cxs  - recheck for COVID and get RVP    Suspected acute blood loss anemia due to GI bleed w/ underlying chronic anemia   - GI consulted & did a small bowel enteroscopy on 10/27 that showed a duodenal bulb nodule/ polyp (biopsy results pending)   - as per GI, the next step would be colonoscopy +/- VCE, but in light of advanced dementia and other comorbidities, the family have refused colonoscopy and plan on following up w/ outpatient hematology for PRN transfusions  - status post 1 unit pRBC  - Iron studies equivocal     DM II  - c/w ISS  - Hgb A1C is 5.7    Essential hypertension  - resume Valsartan     HLD  - c/w Lipitor    BPH   - c/w finasteride    Prophylaxis:  DVT: SCD  GI: Protonix   81 M with a history of dementia, HTN, HLD, DM II, BPH, Anemia of chronic disease, CVA presented to the ED for weakness and fatigue w/ melanotic stools and anemia w/ Hb of 6.9.  Patient admitted with concern for possible UGIB and was also found to have diverticulitis on CT.      Diverticulitis  - CT showed a possible sigmoid diverticulitis    - patient continues to be febrile  - c/w Ceftriaxone and Flagyl  - NGTD on blood cxs  - recheck for COVID and get RVP    Suspected acute blood loss anemia due to GI bleed w/ underlying chronic anemia   - GI consulted & did a small bowel enteroscopy on 10/27 that showed a duodenal bulb nodule/ polyp (biopsy results pending)   - as per GI, the next step would be colonoscopy +/- VCE, but in light of advanced dementia and other comorbidities, the family have refused colonoscopy and plan on following up w/ outpatient hematology for PRN transfusions  - status post 1 unit pRBC  - Iron studies equivocal     DM II  - c/w ISS  - Hgb A1C is 5.7    Essential hypertension  - resume Valsartan     HLD  - c/w Lipitor    BPH   - c/w finasteride    Prophylaxis:  DVT: SCD  GI: Protonix

## 2022-10-28 NOTE — PROGRESS NOTE ADULT - SUBJECTIVE AND OBJECTIVE BOX
Patient is a 81y old  Male who presents with a chief complaint of diverticulitis, melena (28 Oct 2022 13:00)      INTERVAL HPI/OVERNIGHT EVENTS:    MEDICATIONS  (STANDING):  atorvastatin 10 milliGRAM(s) Oral at bedtime  cefTRIAXone   IVPB 1000 milliGRAM(s) IV Intermittent every 24 hours  dextrose 5%. 1000 milliLiter(s) (50 mL/Hr) IV Continuous <Continuous>  dextrose 5%. 1000 milliLiter(s) (100 mL/Hr) IV Continuous <Continuous>  dextrose 50% Injectable 25 Gram(s) IV Push once  dextrose 50% Injectable 12.5 Gram(s) IV Push once  dextrose 50% Injectable 25 Gram(s) IV Push once  finasteride 5 milliGRAM(s) Oral daily  glucagon  Injectable 1 milliGRAM(s) IntraMuscular once  insulin lispro (ADMELOG) corrective regimen sliding scale   SubCutaneous three times a day before meals  lactated ringers. 1000 milliLiter(s) (70 mL/Hr) IV Continuous <Continuous>  metroNIDAZOLE  IVPB 500 milliGRAM(s) IV Intermittent every 8 hours  multivitamin 1 Tablet(s) Oral daily  pantoprazole  Injectable 40 milliGRAM(s) IV Push every 12 hours    MEDICATIONS  (PRN):  acetaminophen     Tablet .. 650 milliGRAM(s) Oral every 4 hours PRN Temp greater or equal to 38C (100.4F), Moderate Pain (4 - 6)  dextrose Oral Gel 15 Gram(s) Oral once PRN Blood Glucose LESS THAN 70 milliGRAM(s)/deciliter      Allergies    penicillin (Rash (Mild))    Intolerances        Vital Signs Last 24 Hrs  T(C): 37.1 (28 Oct 2022 13:19), Max: 38.5 (27 Oct 2022 15:30)  T(F): 98.8 (28 Oct 2022 13:19), Max: 101.3 (27 Oct 2022 15:30)  HR: 68 (28 Oct 2022 13:19) (60 - 76)  BP: 145/73 (28 Oct 2022 13:19) (110/61 - 165/75)  BP(mean): --  RR: 18 (28 Oct 2022 13:19) (17 - 20)  SpO2: 97% (28 Oct 2022 13:19) (94% - 99%)    Parameters below as of 28 Oct 2022 13:19  Patient On (Oxygen Delivery Method): room air        PHYSICAL EXAM:  GENERAL: NAD, well-groomed, well-developed  HEAD:  Atraumatic, Normocephalic  EYES: EOMI, PERRLA, conjunctiva and sclera clear  ENMT: No tonsillar erythema, exudates, or enlargement; Moist mucous membranes, Good dentition, No lesions  NECK: Supple, No JVD, Normal thyroid  NERVOUS SYSTEM:  Alert & Oriented X3, Good concentration; Motor Strength 5/5 B/L upper and lower extremities; DTRs 2+ intact and symmetric  CHEST/LUNG: Clear to auscultation bilaterally; No rales, rhonchi, wheezing, or rubs  HEART: Regular rate and rhythm; No murmurs, rubs, or gallops  ABDOMEN: Soft, Nontender, Nondistended; Bowel sounds present  EXTREMITIES:  2+ Peripheral Pulses, No clubbing, cyanosis, or edema  LYMPH: No lymphadenopathy noted  SKIN: No rashes or lesions    LABS:                        8.7    6.08  )-----------( 182      ( 28 Oct 2022 07:05 )             28.0     10-28    135  |  102  |  17  ----------------------------<  109<H>  3.5   |  26  |  0.80    Ca    8.2<L>      28 Oct 2022 07:05         POCT Blood Glucose.: 129 mg/dL (28 Oct 2022 11:10)  POCT Blood Glucose.: 122 mg/dL (28 Oct 2022 09:09)  POCT Blood Glucose.: 129 mg/dL (27 Oct 2022 17:37)      Culture - Urine (collected 26 Oct 2022 00:35)  Source: Clean Catch Clean Catch (Midstream)  Final Report (27 Oct 2022 07:45):    No growth    Culture - Blood (collected 25 Oct 2022 21:07)  Source: .Blood Blood-Peripheral  Preliminary Report (27 Oct 2022 01:03):    No growth to date.    Culture - Blood (collected 25 Oct 2022 21:00)  Source: .Blood Blood-Peripheral  Preliminary Report (27 Oct 2022 01:03):    No growth to date.      RADIOLOGY & ADDITIONAL TESTS:   81 M with a history of dementia, HTN, HLD, DM II, BPH, Anemia of chronic disease, CVA presented to the ED for weakness and fatigue w/ melanotic stools and anemia w/ Hb of 6.9.  Patient admitted with concern for possible UGIB and was also found to have diverticulitis on CT. He is lying in bed in NAD.    MEDICATIONS  (STANDING):  atorvastatin 10 milliGRAM(s) Oral at bedtime  cefTRIAXone   IVPB 1000 milliGRAM(s) IV Intermittent every 24 hours  dextrose 5%. 1000 milliLiter(s) (50 mL/Hr) IV Continuous <Continuous>  dextrose 5%. 1000 milliLiter(s) (100 mL/Hr) IV Continuous <Continuous>  dextrose 50% Injectable 25 Gram(s) IV Push once  dextrose 50% Injectable 12.5 Gram(s) IV Push once  dextrose 50% Injectable 25 Gram(s) IV Push once  finasteride 5 milliGRAM(s) Oral daily  glucagon  Injectable 1 milliGRAM(s) IntraMuscular once  insulin lispro (ADMELOG) corrective regimen sliding scale   SubCutaneous three times a day before meals  lactated ringers. 1000 milliLiter(s) (70 mL/Hr) IV Continuous <Continuous>  metroNIDAZOLE  IVPB 500 milliGRAM(s) IV Intermittent every 8 hours  multivitamin 1 Tablet(s) Oral daily  pantoprazole  Injectable 40 milliGRAM(s) IV Push every 12 hours    MEDICATIONS  (PRN):  acetaminophen     Tablet .. 650 milliGRAM(s) Oral every 4 hours PRN Temp greater or equal to 38C (100.4F), Moderate Pain (4 - 6)  dextrose Oral Gel 15 Gram(s) Oral once PRN Blood Glucose LESS THAN 70 milliGRAM(s)/deciliter      Allergies    penicillin (Rash (Mild))    Intolerances        Vital Signs Last 24 Hrs  T(C): 37.1 (28 Oct 2022 13:19), Max: 38.5 (27 Oct 2022 15:30)  T(F): 98.8 (28 Oct 2022 13:19), Max: 101.3 (27 Oct 2022 15:30)  HR: 68 (28 Oct 2022 13:19) (60 - 76)  BP: 145/73 (28 Oct 2022 13:19) (110/61 - 165/75)   RR: 18 (28 Oct 2022 13:19) (17 - 20)  SpO2: 97% (28 Oct 2022 13:19) (94% - 99%)    Parameters below as of 28 Oct 2022 13:19  Patient On (Oxygen Delivery Method): room air        PHYSICAL EXAM:  GENERAL: NAD, well-groomed, well-developed  HEAD:  Atraumatic, Normocephalic  EYES: EOMI, PERRLA   NECK: Supple   NERVOUS SYSTEM:  Alert & nonverbal   CHEST/LUNG: Clear to auscultation bilaterally; No rales, rhonchi, wheezing, or rubs  HEART: Regular rate and rhythm; No murmurs, rubs, or gallops  ABDOMEN: Soft, Nontender, Nondistended; Bowel sounds present  EXTREMITIES:  No clubbing, cyanosis, or edema     LABS:                        8.7    6.08  )-----------( 182      ( 28 Oct 2022 07:05 )             28.0     10-28    135  |  102  |  17  ----------------------------<  109<H>  3.5   |  26  |  0.80    Ca    8.2<L>      28 Oct 2022 07:05         POCT Blood Glucose.: 129 mg/dL (28 Oct 2022 11:10)  POCT Blood Glucose.: 122 mg/dL (28 Oct 2022 09:09)  POCT Blood Glucose.: 129 mg/dL (27 Oct 2022 17:37)      Culture - Urine (collected 26 Oct 2022 00:35)  Source: Clean Catch Clean Catch (Midstream)  Final Report (27 Oct 2022 07:45):    No growth    Culture - Blood (collected 25 Oct 2022 21:07)  Source: .Blood Blood-Peripheral  Preliminary Report (27 Oct 2022 01:03):    No growth to date.    Culture - Blood (collected 25 Oct 2022 21:00)  Source: .Blood Blood-Peripheral  Preliminary Report (27 Oct 2022 01:03):    No growth to date.      RADIOLOGY & ADDITIONAL TESTS:

## 2022-10-29 ENCOUNTER — TRANSCRIPTION ENCOUNTER (OUTPATIENT)
Age: 82
End: 2022-10-29

## 2022-10-29 VITALS
OXYGEN SATURATION: 97 % | RESPIRATION RATE: 18 BRPM | DIASTOLIC BLOOD PRESSURE: 77 MMHG | SYSTOLIC BLOOD PRESSURE: 165 MMHG | TEMPERATURE: 98 F | HEART RATE: 78 BPM

## 2022-10-29 LAB
GLUCOSE BLDC GLUCOMTR-MCNC: 110 MG/DL — HIGH (ref 70–99)
GLUCOSE BLDC GLUCOMTR-MCNC: 121 MG/DL — HIGH (ref 70–99)
GLUCOSE BLDC GLUCOMTR-MCNC: 245 MG/DL — HIGH (ref 70–99)

## 2022-10-29 PROCEDURE — 99239 HOSP IP/OBS DSCHRG MGMT >30: CPT

## 2022-10-29 RX ORDER — METFORMIN HYDROCHLORIDE 850 MG/1
1 TABLET ORAL
Qty: 0 | Refills: 0 | DISCHARGE

## 2022-10-29 RX ORDER — CEFDINIR 250 MG/5ML
1 POWDER, FOR SUSPENSION ORAL
Qty: 8 | Refills: 0
Start: 2022-10-29 | End: 2022-11-01

## 2022-10-29 RX ORDER — METRONIDAZOLE 500 MG
1 TABLET ORAL
Qty: 12 | Refills: 0
Start: 2022-10-29 | End: 2022-11-01

## 2022-10-29 RX ORDER — PREGABALIN 225 MG/1
1 CAPSULE ORAL
Qty: 30 | Refills: 0
Start: 2022-10-29 | End: 2022-11-27

## 2022-10-29 RX ADMIN — Medication 1 TABLET(S): at 13:19

## 2022-10-29 RX ADMIN — CEFTRIAXONE 100 MILLIGRAM(S): 500 INJECTION, POWDER, FOR SOLUTION INTRAMUSCULAR; INTRAVENOUS at 13:19

## 2022-10-29 RX ADMIN — PANTOPRAZOLE SODIUM 40 MILLIGRAM(S): 20 TABLET, DELAYED RELEASE ORAL at 05:14

## 2022-10-29 RX ADMIN — VALSARTAN 40 MILLIGRAM(S): 80 TABLET ORAL at 05:15

## 2022-10-29 RX ADMIN — FINASTERIDE 5 MILLIGRAM(S): 5 TABLET, FILM COATED ORAL at 13:19

## 2022-10-29 RX ADMIN — Medication 100 MILLIGRAM(S): at 15:50

## 2022-10-29 RX ADMIN — Medication 100 MILLIGRAM(S): at 05:15

## 2022-10-29 RX ADMIN — Medication 2: at 13:21

## 2022-10-29 NOTE — PATIENT PROFILE ADULT - WAS YOUR LAST COVID-19 VACCINE GREATER THAN OR EQUAL TO TWO MONTHS AGO?
47 y/o healthy female who presents with palpitations. No fever or signs of infectious etiology. On rhythm strip, patient feels when she is having PAC's. No SOB or CP. Physical exam normal. IV placed, Metoprolol given, patient monitored. Labs unremarkable. EKG normal.    Patient is a good candidate to attempt outpatient management. Supportive care and home care discussed in detail. Patient aware they may have to return for re-evaluation and possible admission if outpatient treatment fails. Strict return precautions discussed. EP follow up discussed (patient had prior Holter monitor for 48 hours)    Full DC instructions discussed and patient knows when to seek immediate medical attention.  Patient has proper follow up.  All results discussed and patient aware they may require further work up.  Proper follow up ensured. Limitations of ED work up discussed.  Medications administered and prescribed/OTC home meds discussed.  All questions and concerns from patient or family addressed. Understanding of instructions verbalized. No

## 2022-10-29 NOTE — DISCHARGE NOTE PROVIDER - NSDCFUADDAPPT_GEN_ALL_CORE_FT
It is important to see your primary physician as well as any specialty physicians within the next week to perform a comprehensive medical review.  Call their offices for an appointment as soon as you leave the hospital.  You will also need to see them for renewal of your medications.  If have any difficulty following with a physician, contact the [facility] [phone] or via [email or web address]. To obtain your results, you can access the [employer] Patient Portal at [email or web address].  Your medical issues appear to be stable at this time, but if your symptoms recur or worsen, contact your physicians and/or return to the hospital if necessary.  If you encounter any issues or questions with your medication, call your physicians before stopping the medication.

## 2022-10-29 NOTE — DISCHARGE NOTE PROVIDER - CARE PROVIDER_API CALL
Your Hematologist,   Phone: (   )    -  Fax: (   )    -  Follow Up Time:     Your PCP,   Phone: (   )    -  Fax: (   )    -  Follow Up Time: 1 week

## 2022-10-29 NOTE — DISCHARGE NOTE NURSING/CASE MANAGEMENT/SOCIAL WORK - PATIENT PORTAL LINK FT
You can access the FollowMyHealth Patient Portal offered by [facility] by registering at the following website: [email or web address]. By joining [employer]’s FollowMyHealth portal, you will also be able to view your health information using other applications (apps) compatible with our system.

## 2022-10-29 NOTE — DISCHARGE NOTE PROVIDER - NSDCMRMEDTOKEN_GEN_ALL_CORE_FT
acetaminophen 325 mg oral tablet: 2 tab(s) orally every 6 hours, As needed, Temp greater or equal to 38C (100.4F), Mild Pain (1 - 3)  atorvastatin 10 mg oral tablet: 1 tab(s) orally once a day  finasteride 5 mg oral tablet: 1 tab(s) orally once a day  metFORMIN 500 mg oral tablet: 1 tab(s) orally 2 times a day  MiraLax oral powder for reconstitution: 17 gram(s) orally once a day, As Needed  Multiple Vitamins oral tablet: 1 tab(s) orally once a day  pantoprazole 40 mg oral delayed release tablet: 1 tab(s) orally once a day (before a meal)  valsartan 40 mg oral tablet: 1 tab(s) orally once a day  Vitamin D2 50,000 intl units (1.25 mg) oral capsule (obsolete): 1 cap(s) orally once a week   acetaminophen 325 mg oral tablet: 2 tab(s) orally every 6 hours, As needed, Temp greater or equal to 38C (100.4F), Mild Pain (1 - 3)  atorvastatin 10 mg oral tablet: 1 tab(s) orally once a day  cefdinir 300 mg oral capsule: 1 cap(s) orally every 12 hours   finasteride 5 mg oral tablet: 1 tab(s) orally once a day  metroNIDAZOLE 500 mg oral tablet: 1 tab(s) orally 3 times a day  MiraLax oral powder for reconstitution: 17 gram(s) orally once a day, As Needed  Multiple Vitamins oral liquid: 5 milliliter(s) orally once a day   pantoprazole 40 mg oral delayed release tablet: 1 tab(s) orally once a day (before a meal)  valsartan 40 mg oral tablet: 1 tab(s) orally once a day  Vitamin B-12 1000 mcg oral tablet: 1 tab(s) orally once a day   Vitamin D2 50,000 intl units (1.25 mg) oral capsule (obsolete): 1 cap(s) orally once a week

## 2022-10-29 NOTE — DISCHARGE NOTE NURSING/CASE MANAGEMENT/SOCIAL WORK - NSDCPEFALRISK_GEN_ALL_CORE
For information on Fall & Injury Prevention, visit: [email or web address] OR  [email or web address] OR  https://www.cdc.gov/steadi/patient.html

## 2022-10-29 NOTE — DISCHARGE NOTE PROVIDER - PROVIDER TOKENS
FREE:[LAST:[Your Hematologist],PHONE:[(   )    -],FAX:[(   )    -]],FREE:[LAST:[Your PCP],PHONE:[(   )    -],FAX:[(   )    -],FOLLOWUP:[1 week]]

## 2022-10-29 NOTE — DISCHARGE NOTE PROVIDER - NSDCCPCAREPLAN_GEN_ALL_CORE_FT
PRINCIPAL DISCHARGE DIAGNOSIS  Diagnosis: Diverticulitis  Assessment and Plan of Treatment:       SECONDARY DISCHARGE DIAGNOSES  Diagnosis: Melena  Assessment and Plan of Treatment:

## 2022-10-29 NOTE — DISCHARGE NOTE NURSING/CASE MANAGEMENT/SOCIAL WORK - NSDCFUADDAPPT_GEN_ALL_CORE_FT
It is important to see your primary physician as well as any specialty physicians within the next week to perform a comprehensive medical review.  Call their offices for an appointment as soon as you leave the hospital.  You will also need to see them for renewal of your medications.  If have any difficulty following with a physician, contact the [facility] [phone] or via [email or web address]. To obtain your results, you can access the [employer] Patient Portal at [email or web address].  Your medical issues appear to be stable at this time, but if your symptoms recur or worsen, contact your physicians and/or return to the hospital if necessary.  If you encounter any issues or questions with your medication, call your physicians before stopping the medication.

## 2022-10-29 NOTE — PATIENT PROFILE ADULT - DO YOU FEEL LIKE HURTING YOURSELF OR OTHERS?
I attempted to reach the patient regarding her positive COVID-19. Message left to call back the ED.   no

## 2022-10-29 NOTE — PATIENT PROFILE ADULT - FUNCTIONAL ASSESSMENT - BASIC MOBILITY 6.
2-calculated by average/Not able to assess (calculate score using [facility] averaging method)

## 2022-10-29 NOTE — DISCHARGE NOTE PROVIDER - HOSPITAL COURSE
81 M with a history of dementia, HTN, HLD, DM II, BPH, Anemia of chronic disease, CVA presented to the ED for weakness and fatigue w/ melanotic stools and suspected acute blood loss anemia due to GI bleed w/ underlying chronic anemia w/ Hb of 6.9 on admission.  Patient was also found to have sigmoid diverticulitis on CT. He was started on Ceftriaxone and Flagyl and there was NGTD on blood cxs.  GI was consulted & did a small bowel enteroscopy on 10/27 that showed a duodenal bulb nodule/ polyp (biopsy results pending)   - as per GI, the next step would be colonoscopy +/- VCE, but in light of advanced dementia and other comorbidities, the family have refused colonoscopy and plan on following up w/ outpatient hematology for PRN transfusions  - status post 1 unit pRBC  - Iron studies equivocal 81 M with a history of dementia, HTN, HLD, DM II, BPH, Anemia of chronic disease, CVA presented to the ED for weakness and fatigue w/ melanotic stools and suspected acute blood loss anemia due to GI bleed w/ underlying chronic anemia w/ Hb of 6.9 on admission.  Patient was also found to have sigmoid diverticulitis on CT. He was started on Ceftriaxone and Flagyl and there was NGTD on blood cxs. Pt was transfused 1 unit pRBC. GI was consulted & did a small bowel enteroscopy on 10/27 that showed a duodenal bulb nodule/ polyp (biopsy results pending). As per GI, the next step would be colonoscopy +/- VCE, but in light of advanced dementia and other comorbidities, the family have refused colonoscopy and plan on following up w/ outpatient hematology for PRN transfusions.     Discharge time: 43 minutes      Vital Signs Last 24 Hrs  T(C): 36.9 (29 Oct 2022 11:25), Max: 37.1 (28 Oct 2022 23:41)  T(F): 98.5 (29 Oct 2022 11:25), Max: 98.8 (28 Oct 2022 23:41)  HR: 69 (29 Oct 2022 11:25) (61 - 69)  BP: 164/79 (29 Oct 2022 11:25) (159/72 - 179/71)  BP(mean): --  RR: 18 (29 Oct 2022 11:25) (17 - 18)  SpO2: 95% (29 Oct 2022 11:25) (95% - 99%)    Parameters below as of 29 Oct 2022 04:58  Patient On (Oxygen Delivery Method): room air      PHYSICAL EXAM:  GENERAL: NAD, well-groomed, well-developed  HEAD:  Atraumatic, Normocephalic  EYES: EOMI, PERRLA   NECK: Supple   NERVOUS SYSTEM:  Alert & nonverbal   CHEST/LUNG: Clear to auscultation bilaterally; No rales, rhonchi, wheezing, or rubs  HEART: Regular rate and rhythm; No murmurs, rubs, or gallops  ABDOMEN: Soft, Nontender, Nondistended; Bowel sounds present  EXTREMITIES:  No clubbing, cyanosis, or edema

## 2022-10-29 NOTE — DISCHARGE NOTE NURSING/CASE MANAGEMENT/SOCIAL WORK - NSDPDISTO_GEN_ALL_CORE
Patient presents to the clinic ambulating with rolling walker. States pain is 5/10. Patient is pleasant. Staples to RLQ intact. Staples removed and tolerated well. States will need refill on pain medications. Message sent to Dr Caldwell. Instructed to clean daily with soap and water. Escorted to xray.      Home

## 2022-10-29 NOTE — PATIENT PROFILE ADULT - FALL HARM RISK - HARM RISK INTERVENTIONS
Assistance with ambulation/Assistance OOB with selected safe patient handling equipment/Communicate Risk of Fall with Harm to all staff/Discuss with provider need for PT consult/Monitor gait and stability/Reinforce activity limits and safety measures with patient and family/Tailored Fall Risk Interventions/Visual Cue: Yellow wristband and red socks/Bed in lowest position, wheels locked, appropriate side rails in place/Call bell, personal items and telephone in reach/Instruct patient to call for assistance before getting out of bed or chair/Non-slip footwear when patient is out of bed/[place] to call system/Physically safe environment - no spills, clutter or unnecessary equipment/Purposeful Proactive Rounding/Room/bathroom lighting operational, light cord in reach

## 2022-10-31 LAB
CULTURE RESULTS: SIGNIFICANT CHANGE UP
CULTURE RESULTS: SIGNIFICANT CHANGE UP
SPECIMEN SOURCE: SIGNIFICANT CHANGE UP
SPECIMEN SOURCE: SIGNIFICANT CHANGE UP
SURGICAL PATHOLOGY STUDY: SIGNIFICANT CHANGE UP

## 2023-05-08 NOTE — ED PROVIDER NOTE - NS ED ATTENDING STATEMENT MOD
I have personally seen and examined this patient.  I have fully participated in the care of this patient. I have reviewed all pertinent clinical information, including history, physical exam, plan and the Resident’s note and agree except as noted. "I am good now..."

## 2023-12-11 NOTE — H&P ADULT - ASSESSMENT
Consider the RSV vaccine  Stay with the medicines  See in 6 months 78yo M with pmhx DM, HTN, HLD, and BPH came in with mulitple episodes of nausea, vomiting, and watery diarrhea since the past 2 days. Also c/o subjective fevers and chills. No abdominal pain. No sick contact.

## 2023-12-14 NOTE — ED ADULT TRIAGE NOTE - LOCATION:
Medical Necessity Information: It is in your best interest to select a reason for this procedure from the list below. All of these items fulfill various CMS LCD requirements except lesion extends to a margin. Include Z78.9 (Other Specified Conditions Influencing Health Status) As An Associated Diagnosis?: No Add Nub Associated Diagnoses If Applicable When Selecting Medical Necessity: Yes Medical Necessity Clause: This procedure was medically necessary because the lesion that was treated was: Lab: [id number] Lab Facility: [id number] Left arm; Previous Accession (Optional): [id number] Date Of Previous Biopsy (Optional): 11/27/23 Referring Physician (Optional): Kacey Watt MD Surgeon (Optional): Marc Moya, DO Size Of Lesion In Cm: 0.4 Size Of Margin In Cm: 0.2 Anesthesia Volume In Cc: 0 Eye Clamp Note Details: An eye clamp was used during the procedure. Excision Method: Elliptical Saucerization Depth: dermis and superficial adipose tissue Repair Type: Flap Suturegard Retention Suture: 2-0 Nylon Retention Suture Bite Size: 3 mm Length To Time In Minutes Device Was In Place: 10 Number Of Hemigard Strips Per Side: 1 Undermining Type: Entire Wound Debridement Text: The wound edges were debrided prior to proceeding with the closure to facilitate wound healing. Helical Rim Text: The closure involved the helical rim. Vermilion Border Text: The closure involved the vermilion border. Nostril Rim Text: The closure involved the nostril rim. Retention Suture Text: Retention sutures were placed to support the closure and prevent dehiscence. Flap Type: Rotation Flap Primary Defect Length (In Cm): 0.8 Primary Defect Width (In Cm): 2.2 Secondary Defect Width (In Cm): 2.5 Suture Removal: 8 days Epidermal Closure Graft Donor Site (Optional): simple interrupted Graft Donor Site Bandage (Optional-Leave Blank If You Don't Want In Note): Steri-strips and a pressure bandage were applied to the donor site. Detail Level: Detailed Excision Depth: adipose tissue Scalpel Size: 15 blade Anesthesia Type: 1% lidocaine with epinephrine Additional Anesthesia Volume In Cc: 3 Hemostasis: Electrocautery Estimated Blood Loss (Cc): minimal Additional Epidermal Sutures: 3-0 Prolene Additional Epidermal Closure (Optional): running subcuticular Wound Care: Petrolatum Dressing: dry sterile dressing Suturegard Intro: Intraoperative tissue expansion was performed, utilizing the SUTUREGARD device, in order to reduce wound tension. Suturegard Body: The suture ends were repeatedly re-tightened and re-clamped to achieve the desired tissue expansion. Hemigard Intro: Due to skin fragility and wound tension, it was decided to use HEMIGARD adhesive retention suture devices to permit a linear closure. The skin was cleaned and dried for a 6cm distance away from the wound. Excessive hair, if present, was removed to allow for adhesion. Hemigard Postcare Instructions: The HEMIGARD strips are to remain completely dry for at least 5-7 days. Positioning (Leave Blank If You Do Not Want): The patient was placed in a comfortable position exposing the surgical site. Complex Repair Preamble Text (Leave Blank If You Do Not Want): Extensive wide undermining was performed. Intermediate Repair Preamble Text (Leave Blank If You Do Not Want): Undermining was performed with blunt dissection. Fusiform Excision Additional Text (Leave Blank If You Do Not Want): The margin was drawn around the clinically apparent lesion.  A fusiform shape was then drawn on the skin incorporating the lesion and margins.  Incisions were then made along these lines to the appropriate tissue plane and the lesion was extirpated. Eliptical Excision Additional Text (Leave Blank If You Do Not Want): The margin was drawn around the clinically apparent lesion.  An elliptical shape was then drawn on the skin incorporating the lesion and margins.  Incisions were then made along these lines to the appropriate tissue plane and the lesion was extirpated. Saucerization Excision Additional Text (Leave Blank If You Do Not Want): The margin was drawn around the clinically apparent lesion.  Incisions were then made along these lines, in a tangential fashion, to the appropriate tissue plane and the lesion was extirpated. Slit Excision Additional Text (Leave Blank If You Do Not Want): A linear line was drawn on the skin overlying the lesion. An incision was made slowly until the lesion was visualized.  Once visualized, the lesion was removed with blunt dissection. Excisional Biopsy Additional Text (Leave Blank If You Do Not Want): The margin was drawn around the clinically apparent lesion. An elliptical shape was then drawn on the skin incorporating the lesion and margins.  Incisions were then made along these lines to the appropriate tissue plane and the lesion was extirpated. Perilesional Excision Additional Text (Leave Blank If You Do Not Want): The margin was drawn around the clinically apparent lesion. Incisions were then made along these lines to the appropriate tissue plane and the lesion was extirpated. Repair Performed By Another Provider Text (Leave Blank If You Do Not Want): After the tissue was excised the defect was repaired by another provider. No Repair - Repaired With Adjacent Surgical Defect Text (Leave Blank If You Do Not Want): After the excision the defect was repaired concurrently with another surgical defect which was in close approximation. Adjacent Tissue Transfer Text: The defect edges were debeveled with a #15 scalpel blade. Given the location of the defect and the proximity to free margins an adjacent tissue transfer was deemed most appropriate. Using a sterile surgical marker, an appropriate flap was drawn incorporating the defect and placing the expected incisions within the relaxed skin tension lines where possible. The area thus outlined was incised deep to adipose tissue with a #15 scalpel blade. The skin margins were undermined to an appropriate distance in all directions utilizing iris scissors and carried over to close the primary defect. Advancement Flap (Single) Text: The defect edges were debeveled with a #15 scalpel blade.  Given the location of the defect and the proximity to free margins a single advancement flap was deemed most appropriate.  Using a sterile surgical marker, an appropriate advancement flap was drawn incorporating the defect and placing the expected incisions within the relaxed skin tension lines where possible.    The area thus outlined was incised deep to adipose tissue with a #15 scalpel blade.  The skin margins were undermined to an appropriate distance in all directions utilizing iris scissors. Advancement Flap (Double) Text: The defect edges were debeveled with a #15 scalpel blade.  Given the location of the defect and the proximity to free margins a double advancement flap was deemed most appropriate.  Using a sterile surgical marker, the appropriate advancement flaps were drawn incorporating the defect and placing the expected incisions within the relaxed skin tension lines where possible.    The area thus outlined was incised deep to adipose tissue with a #15 scalpel blade.  The skin margins were undermined to an appropriate distance in all directions utilizing iris scissors. Burow's Advancement Flap Text: The defect edges were debeveled with a #15 scalpel blade.  Given the location of the defect and the proximity to free margins a Burow's advancement flap was deemed most appropriate.  Using a sterile surgical marker, the appropriate advancement flap was drawn incorporating the defect and placing the expected incisions within the relaxed skin tension lines where possible.    The area thus outlined was incised deep to adipose tissue with a #15 scalpel blade.  The skin margins were undermined to an appropriate distance in all directions utilizing iris scissors. Chonodrocutaneous Helical Advancement Flap Text: The defect edges were debeveled with a #15 scalpel blade.  Given the location of the defect and the proximity to free margins a chondrocutaneous helical advancement flap was deemed most appropriate.  Using a sterile surgical marker, the appropriate advancement flap was drawn incorporating the defect and placing the expected incisions within the relaxed skin tension lines where possible.    The area thus outlined was incised deep to adipose tissue with a #15 scalpel blade.  The skin margins were undermined to an appropriate distance in all directions utilizing iris scissors. Crescentic Advancement Flap Text: The defect edges were debeveled with a #15 scalpel blade.  Given the location of the defect and the proximity to free margins a crescentic advancement flap was deemed most appropriate.  Using a sterile surgical marker, the appropriate advancement flap was drawn incorporating the defect and placing the expected incisions within the relaxed skin tension lines where possible.    The area thus outlined was incised deep to adipose tissue with a #15 scalpel blade.  The skin margins were undermined to an appropriate distance in all directions utilizing iris scissors. A-T Advancement Flap Text: The defect edges were debeveled with a #15 scalpel blade.  Given the location of the defect, shape of the defect and the proximity to free margins an A-T advancement flap was deemed most appropriate.  Using a sterile surgical marker, an appropriate advancement flap was drawn incorporating the defect and placing the expected incisions within the relaxed skin tension lines where possible.    The area thus outlined was incised deep to adipose tissue with a #15 scalpel blade.  The skin margins were undermined to an appropriate distance in all directions utilizing iris scissors. O-T Advancement Flap Text: The defect edges were debeveled with a #15 scalpel blade.  Given the location of the defect, shape of the defect and the proximity to free margins an O-T advancement flap was deemed most appropriate.  Using a sterile surgical marker, an appropriate advancement flap was drawn incorporating the defect and placing the expected incisions within the relaxed skin tension lines where possible.    The area thus outlined was incised deep to adipose tissue with a #15 scalpel blade.  The skin margins were undermined to an appropriate distance in all directions utilizing iris scissors. O-L Flap Text: The defect edges were debeveled with a #15 scalpel blade.  Given the location of the defect, shape of the defect and the proximity to free margins an O-L flap was deemed most appropriate.  Using a sterile surgical marker, an appropriate advancement flap was drawn incorporating the defect and placing the expected incisions within the relaxed skin tension lines where possible.    The area thus outlined was incised deep to adipose tissue with a #15 scalpel blade.  The skin margins were undermined to an appropriate distance in all directions utilizing iris scissors. O-Z Flap Text: The defect edges were debeveled with a #15 scalpel blade.  Given the location of the defect, shape of the defect and the proximity to free margins an O-Z flap was deemed most appropriate.  Using a sterile surgical marker, an appropriate transposition flap was drawn incorporating the defect and placing the expected incisions within the relaxed skin tension lines where possible. The area thus outlined was incised deep to adipose tissue with a #15 scalpel blade.  The skin margins were undermined to an appropriate distance in all directions utilizing iris scissors. Double O-Z Flap Text: The defect edges were debeveled with a #15 scalpel blade.  Given the location of the defect, shape of the defect and the proximity to free margins a Double O-Z flap was deemed most appropriate.  Using a sterile surgical marker, an appropriate transposition flap was drawn incorporating the defect and placing the expected incisions within the relaxed skin tension lines where possible. The area thus outlined was incised deep to adipose tissue with a #15 scalpel blade.  The skin margins were undermined to an appropriate distance in all directions utilizing iris scissors. V-Y Flap Text: The defect edges were debeveled with a #15 scalpel blade.  Given the location of the defect, shape of the defect and the proximity to free margins a V-Y flap was deemed most appropriate.  Using a sterile surgical marker, an appropriate advancement flap was drawn incorporating the defect and placing the expected incisions within the relaxed skin tension lines where possible.    The area thus outlined was incised deep to adipose tissue with a #15 scalpel blade.  The skin margins were undermined to an appropriate distance in all directions utilizing iris scissors. Advancement-Rotation Flap Text: The defect edges were debeveled with a #15 scalpel blade.  Given the location of the defect, shape of the defect and the proximity to free margins an advancement-rotation flap was deemed most appropriate.  Using a sterile surgical marker, an appropriate flap was drawn incorporating the defect and placing the expected incisions within the relaxed skin tension lines where possible. The area thus outlined was incised deep to adipose tissue with a #15 scalpel blade.  The skin margins were undermined to an appropriate distance in all directions utilizing iris scissors. Mercedes Flap Text: The defect edges were debeveled with a #15 scalpel blade.  Given the location of the defect, shape of the defect and the proximity to free margins a Mercedes flap was deemed most appropriate.  Using a sterile surgical marker, an appropriate advancement flap was drawn incorporating the defect and placing the expected incisions within the relaxed skin tension lines where possible. The area thus outlined was incised deep to adipose tissue with a #15 scalpel blade.  The skin margins were undermined to an appropriate distance in all directions utilizing iris scissors. Modified Advancement Flap Text: The defect edges were debeveled with a #15 scalpel blade.  Given the location of the defect, shape of the defect and the proximity to free margins a modified advancement flap was deemed most appropriate.  Using a sterile surgical marker, an appropriate advancement flap was drawn incorporating the defect and placing the expected incisions within the relaxed skin tension lines where possible.    The area thus outlined was incised deep to adipose tissue with a #15 scalpel blade.  The skin margins were undermined to an appropriate distance in all directions utilizing iris scissors. Mucosal Advancement Flap Text: Given the location of the defect, shape of the defect and the proximity to free margins a mucosal advancement flap was deemed most appropriate. Incisions were made with a 15 blade scalpel in the appropriate fashion along the cutaneous vermilion border and the mucosal lip. The remaining actinically damaged mucosal tissue was excised.  The mucosal advancement flap was then elevated to the gingival sulcus with care taken to preserve the neurovascular structures and advanced into the primary defect. Care was taken to ensure that precise realignment of the vermilion border was achieved. Peng Advancement Flap Text: The defect edges were debeveled with a #15 scalpel blade.  Given the location of the defect, shape of the defect and the proximity to free margins a Peng advancement flap was deemed most appropriate.  Using a sterile surgical marker, an appropriate advancement flap was drawn incorporating the defect and placing the expected incisions within the relaxed skin tension lines where possible. The area thus outlined was incised deep to adipose tissue with a #15 scalpel blade.  The skin margins were undermined to an appropriate distance in all directions utilizing iris scissors. Hatchet Flap Text: The defect edges were debeveled with a #15 scalpel blade.  Given the location of the defect, shape of the defect and the proximity to free margins a hatchet flap was deemed most appropriate.  Using a sterile surgical marker, an appropriate hatchet flap was drawn incorporating the defect and placing the expected incisions within the relaxed skin tension lines where possible.    The area thus outlined was incised deep to adipose tissue with a #15 scalpel blade.  The skin margins were undermined to an appropriate distance in all directions utilizing iris scissors. Rotation Flap Text: The defect edges were debeveled with a #15 scalpel blade.  Given the location of the defect, shape of the defect and the proximity to free margins a rotation flap was deemed most appropriate.  Using a sterile surgical marker, an appropriate rotation flap was drawn incorporating the defect and placing the expected incisions within the relaxed skin tension lines where possible.    The area thus outlined was incised deep to adipose tissue with a #15 scalpel blade.  The skin margins were undermined to an appropriate distance in all directions utilizing iris scissors. Bilateral Rotation Flap Text: The defect edges were debeveled with a #15 scalpel blade. Given the location of the defect, shape of the defect and the proximity to free margins a bilateral rotation flap was deemed most appropriate. Using a sterile surgical marker, an appropriate rotation flap was drawn incorporating the defect and placing the expected incisions within the relaxed skin tension lines where possible. The area thus outlined was incised deep to adipose tissue with a #15 scalpel blade. The skin margins were undermined to an appropriate distance in all directions utilizing iris scissors. Following this, the designed flap was carried over into the primary defect and sutured into place. Spiral Flap Text: The defect edges were debeveled with a #15 scalpel blade.  Given the location of the defect, shape of the defect and the proximity to free margins a spiral flap was deemed most appropriate.  Using a sterile surgical marker, an appropriate rotation flap was drawn incorporating the defect and placing the expected incisions within the relaxed skin tension lines where possible. The area thus outlined was incised deep to adipose tissue with a #15 scalpel blade.  The skin margins were undermined to an appropriate distance in all directions utilizing iris scissors. Staged Advancement Flap Text: The defect edges were debeveled with a #15 scalpel blade.  Given the location of the defect, shape of the defect and the proximity to free margins a staged advancement flap was deemed most appropriate.  Using a sterile surgical marker, an appropriate advancement flap was drawn incorporating the defect and placing the expected incisions within the relaxed skin tension lines where possible. The area thus outlined was incised deep to adipose tissue with a #15 scalpel blade.  The skin margins were undermined to an appropriate distance in all directions utilizing iris scissors. Star Wedge Flap Text: The defect edges were debeveled with a #15 scalpel blade.  Given the location of the defect, shape of the defect and the proximity to free margins a star wedge flap was deemed most appropriate.  Using a sterile surgical marker, an appropriate rotation flap was drawn incorporating the defect and placing the expected incisions within the relaxed skin tension lines where possible. The area thus outlined was incised deep to adipose tissue with a #15 scalpel blade.  The skin margins were undermined to an appropriate distance in all directions utilizing iris scissors. Transposition Flap Text: The defect edges were debeveled with a #15 scalpel blade.  Given the location of the defect and the proximity to free margins a transposition flap was deemed most appropriate.  Using a sterile surgical marker, an appropriate transposition flap was drawn incorporating the defect.    The area thus outlined was incised deep to adipose tissue with a #15 scalpel blade.  The skin margins were undermined to an appropriate distance in all directions utilizing iris scissors. Muscle Hinge Flap Text: The defect edges were debeveled with a #15 scalpel blade.  Given the size, depth and location of the defect and the proximity to free margins a muscle hinge flap was deemed most appropriate.  Using a sterile surgical marker, an appropriate hinge flap was drawn incorporating the defect. The area thus outlined was incised with a #15 scalpel blade.  The skin margins were undermined to an appropriate distance in all directions utilizing iris scissors. Mustarde Flap Text: The defect edges were debeveled with a #15 scalpel blade.  Given the size, depth and location of the defect and the proximity to free margins a Mustarde flap was deemed most appropriate. Using a sterile surgical marker, an appropriate flap was drawn incorporating the defect. The area thus outlined was incised with a #15 scalpel blade. The skin margins were undermined to an appropriate distance in all directions utilizing iris scissors. Following this, the designed flap was carried into the primary defect and sutured into place. Nasal Turnover Hinge Flap Text: The defect edges were debeveled with a #15 scalpel blade.  Given the size, depth, location of the defect and the defect being full thickness a nasal turnover hinge flap was deemed most appropriate.  Using a sterile surgical marker, an appropriate hinge flap was drawn incorporating the defect. The area thus outlined was incised with a #15 scalpel blade. The flap was designed to recreate the nasal mucosal lining and the alar rim. The skin margins were undermined to an appropriate distance in all directions utilizing iris scissors. Nasalis-Muscle-Based Myocutaneous Island Pedicle Flap Text: Using a #15 blade, an incision was made around the donor flap to the level of the nasalis muscle. Wide lateral undermining was then performed in both the subcutaneous plane above the nasalis muscle, and in a submuscular plane just above periosteum. This allowed the formation of a free nasalis muscle axial pedicle (based on the angular artery) which was still attached to the actual cutaneous flap, increasing its mobility and vascular viability. Hemostasis was obtained with pinpoint electrocoagulation. The flap was mobilized into position and the pivotal anchor points positioned and stabilized with buried interrupted sutures. Subcutaneous and dermal tissues were closed in a multilayered fashion with sutures. Tissue redundancies were excised, and the epidermal edges were apposed without significant tension and sutured with sutures. Orbicularis Oris Muscle Flap Text: The defect edges were debeveled with a #15 scalpel blade.  Given that the defect affected the competency of the oral sphincter an orbicularis oris muscle flap was deemed most appropriate to restore this competency and normal muscle function.  Using a sterile surgical marker, an appropriate flap was drawn incorporating the defect. The area thus outlined was incised with a #15 scalpel blade. Melolabial Transposition Flap Text: The defect edges were debeveled with a #15 scalpel blade.  Given the location of the defect and the proximity to free margins a melolabial flap was deemed most appropriate.  Using a sterile surgical marker, an appropriate melolabial transposition flap was drawn incorporating the defect.    The area thus outlined was incised deep to adipose tissue with a #15 scalpel blade.  The skin margins were undermined to an appropriate distance in all directions utilizing iris scissors. Rhombic Flap Text: The defect edges were debeveled with a #15 scalpel blade.  Given the location of the defect and the proximity to free margins a rhombic flap was deemed most appropriate.  Using a sterile surgical marker, an appropriate rhombic flap was drawn incorporating the defect.    The area thus outlined was incised deep to adipose tissue with a #15 scalpel blade.  The skin margins were undermined to an appropriate distance in all directions utilizing iris scissors. Rhomboid Transposition Flap Text: The defect edges were debeveled with a #15 scalpel blade.  Given the location of the defect and the proximity to free margins a rhomboid transposition flap was deemed most appropriate.  Using a sterile surgical marker, an appropriate rhomboid flap was drawn incorporating the defect.    The area thus outlined was incised deep to adipose tissue with a #15 scalpel blade.  The skin margins were undermined to an appropriate distance in all directions utilizing iris scissors. Bi-Rhombic Flap Text: The defect edges were debeveled with a #15 scalpel blade.  Given the location of the defect and the proximity to free margins a bi-rhombic flap was deemed most appropriate.  Using a sterile surgical marker, an appropriate rhombic flap was drawn incorporating the defect. The area thus outlined was incised deep to adipose tissue with a #15 scalpel blade.  The skin margins were undermined to an appropriate distance in all directions utilizing iris scissors. Helical Rim Advancement Flap Text: The defect edges were debeveled with a #15 blade scalpel.  Given the location of the defect and the proximity to free margins (helical rim) a double helical rim advancement flap was deemed most appropriate.  Using a sterile surgical marker, the appropriate advancement flaps were drawn incorporating the defect and placing the expected incisions between the helical rim and antihelix where possible.  The area thus outlined was incised through and through with a #15 scalpel blade.  With a skin hook and iris scissors, the flaps were gently and sharply undermined and freed up. Bilateral Helical Rim Advancement Flap Text: The defect edges were debeveled with a #15 blade scalpel.  Given the location of the defect and the proximity to free margins (helical rim) a bilateral helical rim advancement flap was deemed most appropriate.  Using a sterile surgical marker, the appropriate advancement flaps were drawn incorporating the defect and placing the expected incisions between the helical rim and antihelix where possible.  The area thus outlined was incised through and through with a #15 scalpel blade.  With a skin hook and iris scissors, the flaps were gently and sharply undermined and freed up. Ear Star Wedge Flap Text: The defect edges were debeveled with a #15 blade scalpel.  Given the location of the defect and the proximity to free margins (helical rim) an ear star wedge flap was deemed most appropriate.  Using a sterile surgical marker, the appropriate flap was drawn incorporating the defect and placing the expected incisions between the helical rim and antihelix where possible.  The area thus outlined was incised through and through with a #15 scalpel blade. Banner Transposition Flap Text: The defect edges were debeveled with a #15 scalpel blade.  Given the location of the defect and the proximity to free margins a Banner transposition flap was deemed most appropriate.  Using a sterile surgical marker, an appropriate flap drawn around the defect. The area thus outlined was incised deep to adipose tissue with a #15 scalpel blade.  The skin margins were undermined to an appropriate distance in all directions utilizing iris scissors. Bilobed Flap Text: The defect edges were debeveled with a #15 scalpel blade.  Given the location of the defect and the proximity to free margins a bilobe flap was deemed most appropriate.  Using a sterile surgical marker, an appropriate bilobe flap drawn around the defect.    The area thus outlined was incised deep to adipose tissue with a #15 scalpel blade.  The skin margins were undermined to an appropriate distance in all directions utilizing iris scissors. Bilobed Transposition Flap Text: The defect edges were debeveled with a #15 scalpel blade.  Given the location of the defect and the proximity to free margins a bilobed transposition flap was deemed most appropriate.  Using a sterile surgical marker, an appropriate bilobe flap drawn around the defect.    The area thus outlined was incised deep to adipose tissue with a #15 scalpel blade.  The skin margins were undermined to an appropriate distance in all directions utilizing iris scissors. Trilobed Flap Text: The defect edges were debeveled with a #15 scalpel blade.  Given the location of the defect and the proximity to free margins a trilobed flap was deemed most appropriate.  Using a sterile surgical marker, an appropriate trilobed flap drawn around the defect.    The area thus outlined was incised deep to adipose tissue with a #15 scalpel blade.  The skin margins were undermined to an appropriate distance in all directions utilizing iris scissors. Dorsal Nasal Flap Text: The defect edges were debeveled with a #15 scalpel blade.  Given the location of the defect and the proximity to free margins a dorsal nasal flap was deemed most appropriate.  Using a sterile surgical marker, an appropriate dorsal nasal flap was drawn around the defect.    The area thus outlined was incised deep to adipose tissue with a #15 scalpel blade.  The skin margins were undermined to an appropriate distance in all directions utilizing iris scissors. Island Pedicle Flap Text: The defect edges were debeveled with a #15 scalpel blade.  Given the location of the defect, shape of the defect and the proximity to free margins an island pedicle advancement flap was deemed most appropriate.  Using a sterile surgical marker, an appropriate advancement flap was drawn incorporating the defect, outlining the appropriate donor tissue and placing the expected incisions within the relaxed skin tension lines where possible.    The area thus outlined was incised deep to adipose tissue with a #15 scalpel blade.  The skin margins were undermined to an appropriate distance in all directions around the primary defect and laterally outward around the island pedicle utilizing iris scissors.  There was minimal undermining beneath the pedicle flap. Island Pedicle Flap With Canthal Suspension Text: The defect edges were debeveled with a #15 scalpel blade.  Given the location of the defect, shape of the defect and the proximity to free margins an island pedicle advancement flap was deemed most appropriate.  Using a sterile surgical marker, an appropriate advancement flap was drawn incorporating the defect, outlining the appropriate donor tissue and placing the expected incisions within the relaxed skin tension lines where possible. The area thus outlined was incised deep to adipose tissue with a #15 scalpel blade.  The skin margins were undermined to an appropriate distance in all directions around the primary defect and laterally outward around the island pedicle utilizing iris scissors.  There was minimal undermining beneath the pedicle flap. A suspension suture was placed in the canthal tendon to prevent tension and prevent ectropion. Alar Island Pedicle Flap Text: The defect edges were debeveled with a #15 scalpel blade.  Given the location of the defect, shape of the defect and the proximity to the alar rim an island pedicle advancement flap was deemed most appropriate.  Using a sterile surgical marker, an appropriate advancement flap was drawn incorporating the defect, outlining the appropriate donor tissue and placing the expected incisions within the nasal ala running parallel to the alar rim. The area thus outlined was incised with a #15 scalpel blade.  The skin margins were undermined minimally to an appropriate distance in all directions around the primary defect and laterally outward around the island pedicle utilizing iris scissors.  There was minimal undermining beneath the pedicle flap. Double Island Pedicle Flap Text: The defect edges were debeveled with a #15 scalpel blade.  Given the location of the defect, shape of the defect and the proximity to free margins a double island pedicle advancement flap was deemed most appropriate.  Using a sterile surgical marker, an appropriate advancement flap was drawn incorporating the defect, outlining the appropriate donor tissue and placing the expected incisions within the relaxed skin tension lines where possible.    The area thus outlined was incised deep to adipose tissue with a #15 scalpel blade.  The skin margins were undermined to an appropriate distance in all directions around the primary defect and laterally outward around the island pedicle utilizing iris scissors.  There was minimal undermining beneath the pedicle flap. Island Pedicle Flap-Requiring Vessel Identification Text: The defect edges were debeveled with a #15 scalpel blade.  Given the location of the defect, shape of the defect and the proximity to free margins an island pedicle advancement flap was deemed most appropriate.  Using a sterile surgical marker, an appropriate advancement flap was drawn, based on the axial vessel mentioned above, incorporating the defect, outlining the appropriate donor tissue and placing the expected incisions within the relaxed skin tension lines where possible.    The area thus outlined was incised deep to adipose tissue with a #15 scalpel blade.  The skin margins were undermined to an appropriate distance in all directions around the primary defect and laterally outward around the island pedicle utilizing iris scissors.  There was minimal undermining beneath the pedicle flap. Keystone Flap Text: The defect edges were debeveled with a #15 scalpel blade.  Given the location of the defect, shape of the defect a keystone flap was deemed most appropriate.  Using a sterile surgical marker, an appropriate keystone flap was drawn incorporating the defect, outlining the appropriate donor tissue and placing the expected incisions within the relaxed skin tension lines where possible. The area thus outlined was incised deep to adipose tissue with a #15 scalpel blade.  The skin margins were undermined to an appropriate distance in all directions around the primary defect and laterally outward around the flap utilizing iris scissors. O-T Plasty Text: The defect edges were debeveled with a #15 scalpel blade.  Given the location of the defect, shape of the defect and the proximity to free margins an O-T plasty was deemed most appropriate.  Using a sterile surgical marker, an appropriate O-T plasty was drawn incorporating the defect and placing the expected incisions within the relaxed skin tension lines where possible.    The area thus outlined was incised deep to adipose tissue with a #15 scalpel blade.  The skin margins were undermined to an appropriate distance in all directions utilizing iris scissors. O-Z Plasty Text: The defect edges were debeveled with a #15 scalpel blade.  Given the location of the defect, shape of the defect and the proximity to free margins an O-Z plasty (double transposition flap) was deemed most appropriate.  Using a sterile surgical marker, the appropriate transposition flaps were drawn incorporating the defect and placing the expected incisions within the relaxed skin tension lines where possible.    The area thus outlined was incised deep to adipose tissue with a #15 scalpel blade.  The skin margins were undermined to an appropriate distance in all directions utilizing iris scissors.  Hemostasis was achieved with electrocautery.  The flaps were then transposed into place, one clockwise and the other counterclockwise, and anchored with interrupted buried subcutaneous sutures. Double O-Z Plasty Text: The defect edges were debeveled with a #15 scalpel blade.  Given the location of the defect, shape of the defect and the proximity to free margins a Double O-Z plasty (double transposition flap) was deemed most appropriate.  Using a sterile surgical marker, the appropriate transposition flaps were drawn incorporating the defect and placing the expected incisions within the relaxed skin tension lines where possible. The area thus outlined was incised deep to adipose tissue with a #15 scalpel blade.  The skin margins were undermined to an appropriate distance in all directions utilizing iris scissors.  Hemostasis was achieved with electrocautery.  The flaps were then transposed into place, one clockwise and the other counterclockwise, and anchored with interrupted buried subcutaneous sutures. V-Y Plasty Text: The defect edges were debeveled with a #15 scalpel blade.  Given the location of the defect, shape of the defect and the proximity to free margins an V-Y advancement flap was deemed most appropriate.  Using a sterile surgical marker, an appropriate advancement flap was drawn incorporating the defect and placing the expected incisions within the relaxed skin tension lines where possible.    The area thus outlined was incised deep to adipose tissue with a #15 scalpel blade.  The skin margins were undermined to an appropriate distance in all directions utilizing iris scissors. H Plasty Text: Given the location of the defect, shape of the defect and the proximity to free margins a H-plasty was deemed most appropriate for repair.  Using a sterile surgical marker, the appropriate advancement arms of the H-plasty were drawn incorporating the defect and placing the expected incisions within the relaxed skin tension lines where possible. The area thus outlined was incised deep to adipose tissue with a #15 scalpel blade. The skin margins were undermined to an appropriate distance in all directions utilizing iris scissors.  The opposing advancement arms were then advanced into place in opposite direction and anchored with interrupted buried subcutaneous sutures. W Plasty Text: The lesion was extirpated to the level of the fat with a #15 scalpel blade.  Given the location of the defect, shape of the defect and the proximity to free margins a W-plasty was deemed most appropriate for repair.  Using a sterile surgical marker, the appropriate transposition arms of the W-plasty were drawn incorporating the defect and placing the expected incisions within the relaxed skin tension lines where possible.    The area thus outlined was incised deep to adipose tissue with a #15 scalpel blade.  The skin margins were undermined to an appropriate distance in all directions utilizing iris scissors.  The opposing transposition arms were then transposed into place in opposite direction and anchored with interrupted buried subcutaneous sutures. Z Plasty Text: The lesion was extirpated to the level of the fat with a #15 scalpel blade.  Given the location of the defect, shape of the defect and the proximity to free margins a Z-plasty was deemed most appropriate for repair.  Using a sterile surgical marker, the appropriate transposition arms of the Z-plasty were drawn incorporating the defect and placing the expected incisions within the relaxed skin tension lines where possible.    The area thus outlined was incised deep to adipose tissue with a #15 scalpel blade.  The skin margins were undermined to an appropriate distance in all directions utilizing iris scissors.  The opposing transposition arms were then transposed into place in opposite direction and anchored with interrupted buried subcutaneous sutures. Double Z Plasty Text: The lesion was extirpated to the level of the fat with a #15 scalpel blade. Given the location of the defect, shape of the defect and the proximity to free margins a double Z-plasty was deemed most appropriate for repair. Using a sterile surgical marker, the appropriate transposition arms of the double Z-plasty were drawn incorporating the defect and placing the expected incisions within the relaxed skin tension lines where possible. The area thus outlined was incised deep to adipose tissue with a #15 scalpel blade. The skin margins were undermined to an appropriate distance in all directions utilizing iris scissors. The opposing transposition arms were then transposed and carried over into place in opposite direction and anchored with interrupted buried subcutaneous sutures. Zygomaticofacial Flap Text: Given the location of the defect, shape of the defect and the proximity to free margins a zygomaticofacial flap was deemed most appropriate for repair.  Using a sterile surgical marker, the appropriate flap was drawn incorporating the defect and placing the expected incisions within the relaxed skin tension lines where possible. The area thus outlined was incised deep to adipose tissue with a #15 scalpel blade with preservation of a vascular pedicle.  The skin margins were undermined to an appropriate distance in all directions utilizing iris scissors.  The flap was then placed into the defect and anchored with interrupted buried subcutaneous sutures. Cheek Interpolation Flap Text: A decision was made to reconstruct the defect utilizing an interpolation axial flap and a staged reconstruction.  A telfa template was made of the defect.  This telfa template was then used to outline the Cheek Interpolation flap.  The donor area for the pedicle flap was then injected with anesthesia.  The flap was excised through the skin and subcutaneous tissue down to the layer of the underlying musculature.  The interpolation flap was carefully excised within this deep plane to maintain its blood supply.  The edges of the donor site were undermined.   The donor site was closed in a primary fashion.  The pedicle was then rotated into position and sutured.  Once the tube was sutured into place, adequate blood supply was confirmed with blanching and refill.  The pedicle was then wrapped with xeroform gauze and dressed appropriately with a telfa and gauze bandage to ensure continued blood supply and protect the attached pedicle. Cheek-To-Nose Interpolation Flap Text: A decision was made to reconstruct the defect utilizing an interpolation axial flap and a staged reconstruction.  A telfa template was made of the defect.  This telfa template was then used to outline the Cheek-To-Nose Interpolation flap.  The donor area for the pedicle flap was then injected with anesthesia.  The flap was excised through the skin and subcutaneous tissue down to the layer of the underlying musculature.  The interpolation flap was carefully excised within this deep plane to maintain its blood supply.  The edges of the donor site were undermined.   The donor site was closed in a primary fashion.  The pedicle was then rotated into position and sutured.  Once the tube was sutured into place, adequate blood supply was confirmed with blanching and refill.  The pedicle was then wrapped with xeroform gauze and dressed appropriately with a telfa and gauze bandage to ensure continued blood supply and protect the attached pedicle. Interpolation Flap Text: A decision was made to reconstruct the defect utilizing an interpolation axial flap and a staged reconstruction.  A telfa template was made of the defect.  This telfa template was then used to outline the interpolation flap.  The donor area for the pedicle flap was then injected with anesthesia.  The flap was excised through the skin and subcutaneous tissue down to the layer of the underlying musculature.  The interpolation flap was carefully excised within this deep plane to maintain its blood supply.  The edges of the donor site were undermined.   The donor site was closed in a primary fashion.  The pedicle was then rotated into position and sutured.  Once the tube was sutured into place, adequate blood supply was confirmed with blanching and refill.  The pedicle was then wrapped with xeroform gauze and dressed appropriately with a telfa and gauze bandage to ensure continued blood supply and protect the attached pedicle. Melolabial Interpolation Flap Text: A decision was made to reconstruct the defect utilizing an interpolation axial flap and a staged reconstruction.  A telfa template was made of the defect.  This telfa template was then used to outline the melolabial interpolation flap.  The donor area for the pedicle flap was then injected with anesthesia.  The flap was excised through the skin and subcutaneous tissue down to the layer of the underlying musculature.  The pedicle flap was carefully excised within this deep plane to maintain its blood supply.  The edges of the donor site were undermined.   The donor site was closed in a primary fashion.  The pedicle was then rotated into position and sutured.  Once the tube was sutured into place, adequate blood supply was confirmed with blanching and refill.  The pedicle was then wrapped with xeroform gauze and dressed appropriately with a telfa and gauze bandage to ensure continued blood supply and protect the attached pedicle. Mastoid Interpolation Flap Text: A decision was made to reconstruct the defect utilizing an interpolation axial flap and a staged reconstruction.  A telfa template was made of the defect.  This telfa template was then used to outline the mastoid interpolation flap.  The donor area for the pedicle flap was then injected with anesthesia.  The flap was excised through the skin and subcutaneous tissue down to the layer of the underlying musculature.  The pedicle flap was carefully excised within this deep plane to maintain its blood supply.  The edges of the donor site were undermined.   The donor site was closed in a primary fashion.  The pedicle was then rotated into position and sutured.  Once the tube was sutured into place, adequate blood supply was confirmed with blanching and refill.  The pedicle was then wrapped with xeroform gauze and dressed appropriately with a telfa and gauze bandage to ensure continued blood supply and protect the attached pedicle. Posterior Auricular Interpolation Flap Text: A decision was made to reconstruct the defect utilizing an interpolation axial flap and a staged reconstruction.  A telfa template was made of the defect.  This telfa template was then used to outline the posterior auricular interpolation flap.  The donor area for the pedicle flap was then injected with anesthesia.  The flap was excised through the skin and subcutaneous tissue down to the layer of the underlying musculature.  The pedicle flap was carefully excised within this deep plane to maintain its blood supply.  The edges of the donor site were undermined.   The donor site was closed in a primary fashion.  The pedicle was then rotated into position and sutured.  Once the tube was sutured into place, adequate blood supply was confirmed with blanching and refill.  The pedicle was then wrapped with xeroform gauze and dressed appropriately with a telfa and gauze bandage to ensure continued blood supply and protect the attached pedicle. Paramedian Forehead Flap Text: A decision was made to reconstruct the defect utilizing an interpolation axial flap and a staged reconstruction.  A telfa template was made of the defect.  This telfa template was then used to outline the paramedian forehead pedicle flap.  The donor area for the pedicle flap was then injected with anesthesia.  The flap was excised through the skin and subcutaneous tissue down to the layer of the underlying musculature.  The pedicle flap was carefully excised within this deep plane to maintain its blood supply.  The edges of the donor site were undermined.   The donor site was closed in a primary fashion.  The pedicle was then rotated into position and sutured.  Once the tube was sutured into place, adequate blood supply was confirmed with blanching and refill.  The pedicle was then wrapped with xeroform gauze and dressed appropriately with a telfa and gauze bandage to ensure continued blood supply and protect the attached pedicle. Abbe Flap (Upper To Lower Lip) Text: The defect of the lower lip was assessed and measured.  Given the location and size of the defect, an Abbe flap was deemed most appropriate. Using a sterile surgical marker, an appropriate Abbe flap was measured and drawn on the upper lip. Local anesthesia was then infiltrated.  A scalpel was then used to incise the upper lip through and through the skin, vermilion, muscle and mucosa, leaving the flap pedicled on the opposite side.  The flap was then rotated and transferred to the lower lip defect.  The flap was then sutured into place with a three layer technique, closing the orbicularis oris muscle layer with subcutaneous buried sutures, followed by a mucosal layer and an epidermal layer. Abbe Flap (Lower To Upper Lip) Text: The defect of the upper lip was assessed and measured.  Given the location and size of the defect, an Abbe flap was deemed most appropriate. Using a sterile surgical marker, an appropriate Abbe flap was measured and drawn on the lower lip. Local anesthesia was then infiltrated. A scalpel was then used to incise the upper lip through and through the skin, vermilion, muscle and mucosa, leaving the flap pedicled on the opposite side.  The flap was then rotated and transferred to the lower lip defect.  The flap was then sutured into place with a three layer technique, closing the orbicularis oris muscle layer with subcutaneous buried sutures, followed by a mucosal layer and an epidermal layer. Estlander Flap (Upper To Lower Lip) Text: The defect of the lower lip was assessed and measured.  Given the location and size of the defect, an Estlander flap was deemed most appropriate. Using a sterile surgical marker, an appropriate Estlander flap was measured and drawn on the upper lip. Local anesthesia was then infiltrated. A scalpel was then used to incise the lateral aspect of the flap, through skin, muscle and mucosa, leaving the flap pedicled medially.  The flap was then rotated and positioned to fill the lower lip defect.  The flap was then sutured into place with a three layer technique, closing the orbicularis oris muscle layer with subcutaneous buried sutures, followed by a mucosal layer and an epidermal layer. Lip Wedge Excision Repair Text: Given the location of the defect and the proximity to free margins a full thickness wedge repair was deemed most appropriate.  Using a sterile surgical marker, the appropriate repair was drawn incorporating the defect and placing the expected incisions perpendicular to the vermilion border.  The vermilion border was also meticulously outlined to ensure appropriate reapproximation during the repair.  The area thus outlined was incised through and through with a #15 scalpel blade.  The muscularis and dermis were reaproximated with deep sutures following hemostasis. Care was taken to realign the vermilion border before proceeding with the superficial closure.  Once the vermilion was realigned the superfical and mucosal closure was finished. Ftsg Text: The defect edges were debeveled with a #15 scalpel blade.  Given the location of the defect, shape of the defect and the proximity to free margins a full thickness skin graft was deemed most appropriate.  Using a sterile surgical marker, the primary defect shape was transferred to the donor site. The area thus outlined was incised deep to adipose tissue with a #15 scalpel blade.  The harvested graft was then trimmed of adipose tissue until only dermis and epidermis was left.  The skin margins of the secondary defect were undermined to an appropriate distance in all directions utilizing iris scissors.  The secondary defect was closed with interrupted buried subcutaneous sutures.  The skin edges were then re-apposed with running  sutures.  The skin graft was then placed in the primary defect and oriented appropriately. Split-Thickness Skin Graft Text: The defect edges were debeveled with a #15 scalpel blade.  Given the location of the defect, shape of the defect and the proximity to free margins a split thickness skin graft was deemed most appropriate.  Using a sterile surgical marker, the primary defect shape was transferred to the donor site. The split thickness graft was then harvested.  The skin graft was then placed in the primary defect and oriented appropriately. Pinch Graft Text: The defect edges were debeveled with a #15 scalpel blade. Given the location of the defect, shape of the defect and the proximity to free margins a pinch graft was deemed most appropriate. Using a sterile surgical marker, the primary defect shape was transferred to the donor site. The area thus outlined was incised deep to adipose tissue with a #15 scalpel blade.  The harvested graft was then trimmed of adipose tissue until only dermis and epidermis was left. The skin margins of the secondary defect were undermined to an appropriate distance in all directions utilizing iris scissors.  The secondary defect was closed with interrupted buried subcutaneous sutures.  The skin edges were then re-apposed with running  sutures.  The skin graft was then placed in the primary defect and oriented appropriately. Burow's Graft Text: The defect edges were debeveled with a #15 scalpel blade.  Given the location of the defect, shape of the defect, the proximity to free margins and the presence of a standing cone deformity a Burow's skin graft was deemed most appropriate. The standing cone was removed and this tissue was then trimmed to the shape of the primary defect. The adipose tissue was also removed until only dermis and epidermis were left.  The skin margins of the secondary defect were undermined to an appropriate distance in all directions utilizing iris scissors.  The secondary defect was closed with interrupted buried subcutaneous sutures.  The skin edges were then re-apposed with running  sutures.  The skin graft was then placed in the primary defect and oriented appropriately. Cartilage Graft Text: The defect edges were debeveled with a #15 scalpel blade.  Given the location of the defect, shape of the defect, the fact the defect involved a full thickness cartilage defect a cartilage graft was deemed most appropriate.  An appropriate donor site was identified, cleansed, and anesthetized. The cartilage graft was then harvested and transferred to the recipient site, oriented appropriately and then sutured into place.  The secondary defect was then repaired using a primary closure. Composite Graft Text: The defect edges were debeveled with a #15 scalpel blade.  Given the location of the defect, shape of the defect, the proximity to free margins and the fact the defect was full thickness a composite graft was deemed most appropriate.  The defect was outline and then transferred to the donor site.  A full thickness graft was then excised from the donor site. The graft was then placed in the primary defect, oriented appropriately and then sutured into place.  The secondary defect was then repaired using a primary closure. Epidermal Autograft Text: The defect edges were debeveled with a #15 scalpel blade.  Given the location of the defect, shape of the defect and the proximity to free margins an epidermal autograft was deemed most appropriate.  Using a sterile surgical marker, the primary defect shape was transferred to the donor site. The epidermal graft was then harvested.  The skin graft was then placed in the primary defect and oriented appropriately. Dermal Autograft Text: The defect edges were debeveled with a #15 scalpel blade.  Given the location of the defect, shape of the defect and the proximity to free margins a dermal autograft was deemed most appropriate.  Using a sterile surgical marker, the primary defect shape was transferred to the donor site. The area thus outlined was incised deep to adipose tissue with a #15 scalpel blade.  The harvested graft was then trimmed of adipose and epidermal tissue until only dermis was left.  The skin graft was then placed in the primary defect and oriented appropriately. Skin Substitute Text: The defect edges were debeveled with a #15 scalpel blade.  Given the location of the defect, shape of the defect and the proximity to free margins a skin substitute graft was deemed most appropriate.  The graft material was trimmed to fit the size of the defect. The graft was then placed in the primary defect and oriented appropriately. Tissue Cultured Epidermal Autograft Text: The defect edges were debeveled with a #15 scalpel blade.  Given the location of the defect, shape of the defect and the proximity to free margins a tissue cultured epidermal autograft was deemed most appropriate.  The graft was then trimmed to fit the size of the defect.  The graft was then placed in the primary defect and oriented appropriately. Xenograft Text: The defect edges were debeveled with a #15 scalpel blade.  Given the location of the defect, shape of the defect and the proximity to free margins a xenograft was deemed most appropriate.  The graft was then trimmed to fit the size of the defect.  The graft was then placed in the primary defect and oriented appropriately. Purse String (Intermediate) Text: Given the location of the defect and the characteristics of the surrounding skin a purse string intermediate closure was deemed most appropriate.  Undermining was performed circumferentially around the surgical defect.  A purse string suture was then placed and tightened. Purse String (Simple) Text: Given the location of the defect and the characteristics of the surrounding skin a purse string simple closure was deemed most appropriate.  Undermining was performed circumferentially around the surgical defect.  A purse string suture was then placed and tightened. Complex Repair And Single Advancement Flap Text: The defect edges were debeveled with a #15 scalpel blade.  The primary defect was closed partially with a complex linear closure.  Given the location of the remaining defect, shape of the defect and the proximity to free margins a single advancement flap was deemed most appropriate for complete closure of the defect.  Using a sterile surgical marker, an appropriate advancement flap was drawn incorporating the defect and placing the expected incisions within the relaxed skin tension lines where possible.    The area thus outlined was incised deep to adipose tissue with a #15 scalpel blade.  The skin margins were undermined to an appropriate distance in all directions utilizing iris scissors. Complex Repair And Double Advancement Flap Text: The defect edges were debeveled with a #15 scalpel blade.  The primary defect was closed partially with a complex linear closure.  Given the location of the remaining defect, shape of the defect and the proximity to free margins a double advancement flap was deemed most appropriate for complete closure of the defect.  Using a sterile surgical marker, an appropriate advancement flap was drawn incorporating the defect and placing the expected incisions within the relaxed skin tension lines where possible.    The area thus outlined was incised deep to adipose tissue with a #15 scalpel blade.  The skin margins were undermined to an appropriate distance in all directions utilizing iris scissors. Complex Repair And Modified Advancement Flap Text: The defect edges were debeveled with a #15 scalpel blade.  The primary defect was closed partially with a complex linear closure.  Given the location of the remaining defect, shape of the defect and the proximity to free margins a modified advancement flap was deemed most appropriate for complete closure of the defect.  Using a sterile surgical marker, an appropriate advancement flap was drawn incorporating the defect and placing the expected incisions within the relaxed skin tension lines where possible.    The area thus outlined was incised deep to adipose tissue with a #15 scalpel blade.  The skin margins were undermined to an appropriate distance in all directions utilizing iris scissors. Complex Repair And A-T Advancement Flap Text: The defect edges were debeveled with a #15 scalpel blade.  The primary defect was closed partially with a complex linear closure.  Given the location of the remaining defect, shape of the defect and the proximity to free margins an A-T advancement flap was deemed most appropriate for complete closure of the defect.  Using a sterile surgical marker, an appropriate advancement flap was drawn incorporating the defect and placing the expected incisions within the relaxed skin tension lines where possible.    The area thus outlined was incised deep to adipose tissue with a #15 scalpel blade.  The skin margins were undermined to an appropriate distance in all directions utilizing iris scissors. Complex Repair And O-T Advancement Flap Text: The defect edges were debeveled with a #15 scalpel blade.  The primary defect was closed partially with a complex linear closure.  Given the location of the remaining defect, shape of the defect and the proximity to free margins an O-T advancement flap was deemed most appropriate for complete closure of the defect.  Using a sterile surgical marker, an appropriate advancement flap was drawn incorporating the defect and placing the expected incisions within the relaxed skin tension lines where possible.    The area thus outlined was incised deep to adipose tissue with a #15 scalpel blade.  The skin margins were undermined to an appropriate distance in all directions utilizing iris scissors. Complex Repair And O-L Flap Text: The defect edges were debeveled with a #15 scalpel blade.  The primary defect was closed partially with a complex linear closure.  Given the location of the remaining defect, shape of the defect and the proximity to free margins an O-L flap was deemed most appropriate for complete closure of the defect.  Using a sterile surgical marker, an appropriate flap was drawn incorporating the defect and placing the expected incisions within the relaxed skin tension lines where possible.    The area thus outlined was incised deep to adipose tissue with a #15 scalpel blade.  The skin margins were undermined to an appropriate distance in all directions utilizing iris scissors. Complex Repair And Bilobe Flap Text: The defect edges were debeveled with a #15 scalpel blade.  The primary defect was closed partially with a complex linear closure.  Given the location of the remaining defect, shape of the defect and the proximity to free margins a bilobe flap was deemed most appropriate for complete closure of the defect.  Using a sterile surgical marker, an appropriate advancement flap was drawn incorporating the defect and placing the expected incisions within the relaxed skin tension lines where possible.    The area thus outlined was incised deep to adipose tissue with a #15 scalpel blade.  The skin margins were undermined to an appropriate distance in all directions utilizing iris scissors. Complex Repair And Melolabial Flap Text: The defect edges were debeveled with a #15 scalpel blade.  The primary defect was closed partially with a complex linear closure.  Given the location of the remaining defect, shape of the defect and the proximity to free margins a melolabial flap was deemed most appropriate for complete closure of the defect.  Using a sterile surgical marker, an appropriate advancement flap was drawn incorporating the defect and placing the expected incisions within the relaxed skin tension lines where possible.    The area thus outlined was incised deep to adipose tissue with a #15 scalpel blade.  The skin margins were undermined to an appropriate distance in all directions utilizing iris scissors. Complex Repair And Rotation Flap Text: The defect edges were debeveled with a #15 scalpel blade.  The primary defect was closed partially with a complex linear closure.  Given the location of the remaining defect, shape of the defect and the proximity to free margins a rotation flap was deemed most appropriate for complete closure of the defect.  Using a sterile surgical marker, an appropriate advancement flap was drawn incorporating the defect and placing the expected incisions within the relaxed skin tension lines where possible.    The area thus outlined was incised deep to adipose tissue with a #15 scalpel blade.  The skin margins were undermined to an appropriate distance in all directions utilizing iris scissors. Complex Repair And Rhombic Flap Text: The defect edges were debeveled with a #15 scalpel blade.  The primary defect was closed partially with a complex linear closure.  Given the location of the remaining defect, shape of the defect and the proximity to free margins a rhombic flap was deemed most appropriate for complete closure of the defect.  Using a sterile surgical marker, an appropriate advancement flap was drawn incorporating the defect and placing the expected incisions within the relaxed skin tension lines where possible.    The area thus outlined was incised deep to adipose tissue with a #15 scalpel blade.  The skin margins were undermined to an appropriate distance in all directions utilizing iris scissors. Complex Repair And Transposition Flap Text: The defect edges were debeveled with a #15 scalpel blade.  The primary defect was closed partially with a complex linear closure.  Given the location of the remaining defect, shape of the defect and the proximity to free margins a transposition flap was deemed most appropriate for complete closure of the defect.  Using a sterile surgical marker, an appropriate advancement flap was drawn incorporating the defect and placing the expected incisions within the relaxed skin tension lines where possible.    The area thus outlined was incised deep to adipose tissue with a #15 scalpel blade.  The skin margins were undermined to an appropriate distance in all directions utilizing iris scissors. Complex Repair And V-Y Plasty Text: The defect edges were debeveled with a #15 scalpel blade.  The primary defect was closed partially with a complex linear closure.  Given the location of the remaining defect, shape of the defect and the proximity to free margins a V-Y plasty was deemed most appropriate for complete closure of the defect.  Using a sterile surgical marker, an appropriate advancement flap was drawn incorporating the defect and placing the expected incisions within the relaxed skin tension lines where possible.    The area thus outlined was incised deep to adipose tissue with a #15 scalpel blade.  The skin margins were undermined to an appropriate distance in all directions utilizing iris scissors. Complex Repair And M Plasty Text: The defect edges were debeveled with a #15 scalpel blade.  The primary defect was closed partially with a complex linear closure.  Given the location of the remaining defect, shape of the defect and the proximity to free margins an M plasty was deemed most appropriate for complete closure of the defect.  Using a sterile surgical marker, an appropriate advancement flap was drawn incorporating the defect and placing the expected incisions within the relaxed skin tension lines where possible.    The area thus outlined was incised deep to adipose tissue with a #15 scalpel blade.  The skin margins were undermined to an appropriate distance in all directions utilizing iris scissors. Complex Repair And Double M Plasty Text: The defect edges were debeveled with a #15 scalpel blade.  The primary defect was closed partially with a complex linear closure.  Given the location of the remaining defect, shape of the defect and the proximity to free margins a double M plasty was deemed most appropriate for complete closure of the defect.  Using a sterile surgical marker, an appropriate advancement flap was drawn incorporating the defect and placing the expected incisions within the relaxed skin tension lines where possible.    The area thus outlined was incised deep to adipose tissue with a #15 scalpel blade.  The skin margins were undermined to an appropriate distance in all directions utilizing iris scissors. Complex Repair And W Plasty Text: The defect edges were debeveled with a #15 scalpel blade.  The primary defect was closed partially with a complex linear closure.  Given the location of the remaining defect, shape of the defect and the proximity to free margins a W plasty was deemed most appropriate for complete closure of the defect.  Using a sterile surgical marker, an appropriate advancement flap was drawn incorporating the defect and placing the expected incisions within the relaxed skin tension lines where possible.    The area thus outlined was incised deep to adipose tissue with a #15 scalpel blade.  The skin margins were undermined to an appropriate distance in all directions utilizing iris scissors. Complex Repair And Z Plasty Text: The defect edges were debeveled with a #15 scalpel blade.  The primary defect was closed partially with a complex linear closure.  Given the location of the remaining defect, shape of the defect and the proximity to free margins a Z plasty was deemed most appropriate for complete closure of the defect.  Using a sterile surgical marker, an appropriate advancement flap was drawn incorporating the defect and placing the expected incisions within the relaxed skin tension lines where possible.    The area thus outlined was incised deep to adipose tissue with a #15 scalpel blade.  The skin margins were undermined to an appropriate distance in all directions utilizing iris scissors. Complex Repair And Dorsal Nasal Flap Text: The defect edges were debeveled with a #15 scalpel blade.  The primary defect was closed partially with a complex linear closure.  Given the location of the remaining defect, shape of the defect and the proximity to free margins a dorsal nasal flap was deemed most appropriate for complete closure of the defect.  Using a sterile surgical marker, an appropriate flap was drawn incorporating the defect and placing the expected incisions within the relaxed skin tension lines where possible.    The area thus outlined was incised deep to adipose tissue with a #15 scalpel blade.  The skin margins were undermined to an appropriate distance in all directions utilizing iris scissors. Complex Repair And Ftsg Text: The defect edges were debeveled with a #15 scalpel blade.  The primary defect was closed partially with a complex linear closure.  Given the location of the defect, shape of the defect and the proximity to free margins a full thickness skin graft was deemed most appropriate to repair the remaining defect.  The graft was trimmed to fit the size of the remaining defect.  The graft was then placed in the primary defect, oriented appropriately, and sutured into place. Complex Repair And Burow's Graft Text: The defect edges were debeveled with a #15 scalpel blade.  The primary defect was closed partially with a complex linear closure.  Given the location of the defect, shape of the defect, the proximity to free margins and the presence of a standing cone deformity a Burow's graft was deemed most appropriate to repair the remaining defect.  The graft was trimmed to fit the size of the remaining defect.  The graft was then placed in the primary defect, oriented appropriately, and sutured into place. Complex Repair And Split-Thickness Skin Graft Text: The defect edges were debeveled with a #15 scalpel blade.  The primary defect was closed partially with a complex linear closure.  Given the location of the defect, shape of the defect and the proximity to free margins a split thickness skin graft was deemed most appropriate to repair the remaining defect.  The graft was trimmed to fit the size of the remaining defect.  The graft was then placed in the primary defect, oriented appropriately, and sutured into place. Complex Repair And Epidermal Autograft Text: The defect edges were debeveled with a #15 scalpel blade.  The primary defect was closed partially with a complex linear closure.  Given the location of the defect, shape of the defect and the proximity to free margins an epidermal autograft was deemed most appropriate to repair the remaining defect.  The graft was trimmed to fit the size of the remaining defect.  The graft was then placed in the primary defect, oriented appropriately, and sutured into place. Complex Repair And Dermal Autograft Text: The defect edges were debeveled with a #15 scalpel blade.  The primary defect was closed partially with a complex linear closure.  Given the location of the defect, shape of the defect and the proximity to free margins an dermal autograft was deemed most appropriate to repair the remaining defect.  The graft was trimmed to fit the size of the remaining defect.  The graft was then placed in the primary defect, oriented appropriately, and sutured into place. Complex Repair And Tissue Cultured Epidermal Autograft Text: The defect edges were debeveled with a #15 scalpel blade.  The primary defect was closed partially with a complex linear closure.  Given the location of the defect, shape of the defect and the proximity to free margins an tissue cultured epidermal autograft was deemed most appropriate to repair the remaining defect.  The graft was trimmed to fit the size of the remaining defect.  The graft was then placed in the primary defect, oriented appropriately, and sutured into place. Complex Repair And Xenograft Text: The defect edges were debeveled with a #15 scalpel blade.  The primary defect was closed partially with a complex linear closure.  Given the location of the defect, shape of the defect and the proximity to free margins a xenograft was deemed most appropriate to repair the remaining defect.  The graft was trimmed to fit the size of the remaining defect.  The graft was then placed in the primary defect, oriented appropriately, and sutured into place. Complex Repair And Skin Substitute Graft Text: The defect edges were debeveled with a #15 scalpel blade.  The primary defect was closed partially with a complex linear closure.  Given the location of the remaining defect, shape of the defect and the proximity to free margins a skin substitute graft was deemed most appropriate to repair the remaining defect.  The graft was trimmed to fit the size of the remaining defect.  The graft was then placed in the primary defect, oriented appropriately, and sutured into place. Path Notes (To The Dermatopathologist): Please check margins. Consent was obtained from the patient. The risks and benefits to therapy were discussed in detail. Specifically, the risks of infection, scarring, bleeding, prolonged wound healing, incomplete removal, allergy to anesthesia, nerve injury and recurrence were addressed. Prior to the procedure, the treatment site was clearly identified and confirmed by the patient. All components of Universal Protocol/PAUSE Rule completed. Post-Care Instructions: I reviewed with the patient in detail post-care instructions. Patient is not to engage in any heavy lifting, exercise, or swimming for the next 14 days. Should the patient develop any fevers, chills, bleeding, severe pain patient will contact the office immediately. Home Suture Removal Text: Patient was provided a home suture removal kit and will remove their sutures at home.  If they have any questions or difficulties they will call the office. Where Do You Want The Question To Include Opioid Counseling Located?: Case Summary Tab Billing Type: Third-Party Bill Information: Selecting Yes will display possible errors in your note based on the variables you have selected. This validation is only offered as a suggestion for you. PLEASE NOTE THAT THE VALIDATION TEXT WILL BE REMOVED WHEN YOU FINALIZE YOUR NOTE. IF YOU WANT TO FAX A PRELIMINARY NOTE YOU WILL NEED TO TOGGLE THIS TO 'NO' IF YOU DO NOT WANT IT IN YOUR FAXED NOTE.

## 2024-06-22 NOTE — ED PROVIDER NOTE - [facility] ED SYMPTOMS NEG
Bed: TH6  Expected date:   Expected time:   Means of arrival:   Comments:  WM - FALL   no fever/no abd pain, no chest pain/no chills
